# Patient Record
Sex: MALE | Race: BLACK OR AFRICAN AMERICAN | NOT HISPANIC OR LATINO | Employment: UNEMPLOYED | URBAN - METROPOLITAN AREA
[De-identification: names, ages, dates, MRNs, and addresses within clinical notes are randomized per-mention and may not be internally consistent; named-entity substitution may affect disease eponyms.]

---

## 2023-09-19 ENCOUNTER — OFFICE VISIT (OUTPATIENT)
Dept: PRIMARY CARE | Facility: CLINIC | Age: 48
End: 2023-09-19
Payer: COMMERCIAL

## 2023-09-19 ENCOUNTER — LAB (OUTPATIENT)
Dept: LAB | Facility: LAB | Age: 48
End: 2023-09-19
Payer: COMMERCIAL

## 2023-09-19 VITALS
HEART RATE: 51 BPM | TEMPERATURE: 97.7 F | WEIGHT: 315 LBS | DIASTOLIC BLOOD PRESSURE: 87 MMHG | OXYGEN SATURATION: 94 % | HEIGHT: 68 IN | BODY MASS INDEX: 47.74 KG/M2 | SYSTOLIC BLOOD PRESSURE: 149 MMHG

## 2023-09-19 DIAGNOSIS — I50.43 ACUTE ON CHRONIC COMBINED SYSTOLIC AND DIASTOLIC CONGESTIVE HEART FAILURE (MULTI): Primary | ICD-10-CM

## 2023-09-19 DIAGNOSIS — J45.20 MILD INTERMITTENT ASTHMA WITHOUT COMPLICATION (HHS-HCC): ICD-10-CM

## 2023-09-19 DIAGNOSIS — I50.22 HEART FAILURE WITH MID-RANGE EJECTION FRACTION (HFMEF) (MULTI): ICD-10-CM

## 2023-09-19 DIAGNOSIS — I10 PRIMARY HYPERTENSION: ICD-10-CM

## 2023-09-19 DIAGNOSIS — M79.89 SWELLING OF BOTH LOWER EXTREMITIES: ICD-10-CM

## 2023-09-19 DIAGNOSIS — N18.31 STAGE 3A CHRONIC KIDNEY DISEASE (MULTI): ICD-10-CM

## 2023-09-19 DIAGNOSIS — Z65.9 CONCERNED ABOUT HAVING SOCIAL PROBLEM: ICD-10-CM

## 2023-09-19 DIAGNOSIS — G47.33 OBSTRUCTIVE SLEEP APNEA: ICD-10-CM

## 2023-09-19 DIAGNOSIS — I50.23 ACUTE ON CHRONIC SYSTOLIC HEART FAILURE (MULTI): ICD-10-CM

## 2023-09-19 PROBLEM — R06.02 SOB (SHORTNESS OF BREATH) ON EXERTION: Status: ACTIVE | Noted: 2023-09-19

## 2023-09-19 PROBLEM — F32.A DEPRESSION: Status: ACTIVE | Noted: 2023-09-19

## 2023-09-19 PROBLEM — I50.20 SYSTOLIC HEART FAILURE (MULTI): Status: ACTIVE | Noted: 2023-09-19

## 2023-09-19 PROBLEM — I50.9 CHF EXACERBATION (MULTI): Status: ACTIVE | Noted: 2023-09-19

## 2023-09-19 PROBLEM — E66.01 MORBID OBESITY (MULTI): Status: ACTIVE | Noted: 2023-09-19

## 2023-09-19 PROBLEM — I42.6: Status: ACTIVE | Noted: 2023-09-19

## 2023-09-19 LAB
ALBUMIN (G/DL) IN SER/PLAS: 4 G/DL (ref 3.4–5)
ANION GAP IN SER/PLAS: 11 MMOL/L (ref 10–20)
CALCIUM (MG/DL) IN SER/PLAS: 9.3 MG/DL (ref 8.6–10.6)
CARBON DIOXIDE, TOTAL (MMOL/L) IN SER/PLAS: 34 MMOL/L (ref 21–32)
CHLORIDE (MMOL/L) IN SER/PLAS: 102 MMOL/L (ref 98–107)
CREATININE (MG/DL) IN SER/PLAS: 1.24 MG/DL (ref 0.5–1.3)
GFR MALE: 71 ML/MIN/1.73M2
GLUCOSE (MG/DL) IN SER/PLAS: 86 MG/DL (ref 74–99)
NATRIURETIC PEPTIDE B (PG/ML) IN SER/PLAS: 3 PG/ML (ref 0–99)
PHOSPHATE (MG/DL) IN SER/PLAS: 3.1 MG/DL (ref 2.5–4.9)
POTASSIUM (MMOL/L) IN SER/PLAS: 4.2 MMOL/L (ref 3.5–5.3)
SODIUM (MMOL/L) IN SER/PLAS: 143 MMOL/L (ref 136–145)
UREA NITROGEN (MG/DL) IN SER/PLAS: 12 MG/DL (ref 6–23)

## 2023-09-19 PROCEDURE — 3077F SYST BP >= 140 MM HG: CPT | Performed by: STUDENT IN AN ORGANIZED HEALTH CARE EDUCATION/TRAINING PROGRAM

## 2023-09-19 PROCEDURE — 83880 ASSAY OF NATRIURETIC PEPTIDE: CPT

## 2023-09-19 PROCEDURE — 3008F BODY MASS INDEX DOCD: CPT | Performed by: STUDENT IN AN ORGANIZED HEALTH CARE EDUCATION/TRAINING PROGRAM

## 2023-09-19 PROCEDURE — 99214 OFFICE O/P EST MOD 30 MIN: CPT | Performed by: STUDENT IN AN ORGANIZED HEALTH CARE EDUCATION/TRAINING PROGRAM

## 2023-09-19 PROCEDURE — 80069 RENAL FUNCTION PANEL: CPT

## 2023-09-19 PROCEDURE — 3079F DIAST BP 80-89 MM HG: CPT | Performed by: STUDENT IN AN ORGANIZED HEALTH CARE EDUCATION/TRAINING PROGRAM

## 2023-09-19 PROCEDURE — 1036F TOBACCO NON-USER: CPT | Performed by: STUDENT IN AN ORGANIZED HEALTH CARE EDUCATION/TRAINING PROGRAM

## 2023-09-19 PROCEDURE — 36415 COLL VENOUS BLD VENIPUNCTURE: CPT

## 2023-09-19 RX ORDER — ATORVASTATIN CALCIUM 40 MG/1
TABLET, FILM COATED ORAL
COMMUNITY
End: 2023-09-19 | Stop reason: SDUPTHER

## 2023-09-19 RX ORDER — EMPAGLIFLOZIN 10 MG/1
TABLET, FILM COATED ORAL
COMMUNITY
Start: 2023-05-02 | End: 2023-12-10 | Stop reason: HOSPADM

## 2023-09-19 RX ORDER — MULTIVITAMIN/IRON/FOLIC ACID 18MG-0.4MG
TABLET ORAL
COMMUNITY
Start: 2023-05-02 | End: 2024-05-27 | Stop reason: ENTERED-IN-ERROR

## 2023-09-19 RX ORDER — SACUBITRIL AND VALSARTAN 97; 103 MG/1; MG/1
1 TABLET, FILM COATED ORAL 2 TIMES DAILY
COMMUNITY
Start: 2023-04-04 | End: 2023-12-10 | Stop reason: HOSPADM

## 2023-09-19 RX ORDER — FOLIC ACID 1 MG/1
TABLET ORAL
COMMUNITY
Start: 2023-05-02 | End: 2023-12-10 | Stop reason: HOSPADM

## 2023-09-19 RX ORDER — ALBUTEROL SULFATE 90 UG/1
2 AEROSOL, METERED RESPIRATORY (INHALATION) EVERY 4 HOURS PRN
Qty: 18 G | Refills: 3 | Status: ON HOLD | OUTPATIENT
Start: 2023-09-19 | End: 2023-12-10 | Stop reason: SDUPTHER

## 2023-09-19 RX ORDER — METOPROLOL SUCCINATE 25 MG/1
25 TABLET, EXTENDED RELEASE ORAL DAILY
Qty: 90 TABLET | Refills: 0 | Status: ON HOLD | OUTPATIENT
Start: 2023-09-19 | End: 2024-06-09

## 2023-09-19 RX ORDER — SPIRONOLACTONE 25 MG/1
12.5 TABLET ORAL DAILY
COMMUNITY
End: 2023-09-19 | Stop reason: SDUPTHER

## 2023-09-19 RX ORDER — ASPIRIN 81 MG/1
1 TABLET ORAL DAILY
Status: ON HOLD | COMMUNITY
End: 2024-06-09

## 2023-09-19 RX ORDER — ATORVASTATIN CALCIUM 40 MG/1
40 TABLET, FILM COATED ORAL DAILY
Qty: 90 TABLET | Refills: 0 | Status: SHIPPED | OUTPATIENT
Start: 2023-09-19 | End: 2023-12-10 | Stop reason: HOSPADM

## 2023-09-19 RX ORDER — TORSEMIDE 100 MG/1
100 TABLET ORAL 2 TIMES DAILY
COMMUNITY
Start: 2023-06-29 | End: 2023-12-10 | Stop reason: HOSPADM

## 2023-09-19 RX ORDER — SPIRONOLACTONE 25 MG/1
12.5 TABLET ORAL DAILY
Qty: 90 TABLET | Refills: 0 | Status: SHIPPED | OUTPATIENT
Start: 2023-09-19 | End: 2023-12-10 | Stop reason: HOSPADM

## 2023-09-19 RX ORDER — METOPROLOL SUCCINATE 25 MG/1
25 TABLET, EXTENDED RELEASE ORAL DAILY
COMMUNITY
End: 2023-09-19 | Stop reason: SDUPTHER

## 2023-09-19 ASSESSMENT — PAIN SCALES - GENERAL: PAINLEVEL: 10-WORST PAIN EVER

## 2023-09-19 NOTE — PROGRESS NOTES
"Subjective   Patient ID: Patric Arenas is a 48 y.o. male with a PMHx of HFmrEF (last echo w/ EF of 45-50%), DLD, asthma, HTN, MJ, and obesity who presents for Follow-up (Has concerns with heart condition.) and med refill.    He reports worsening shortness of breath on exertion and states that overall he feels worse than when he went into hospital for heart failure 2 months ago. Feet \"always swell up\" and are currently swollen. Elevation of the legs offers some relief. Notes orthopnea and paroxysmal nocturnal dyspnea, has to now sleep sitting up. Denies dyspnea currently. Able to ambulate, but reports ambulating from exam room to entrance of clinic would leave him short of breath. No chest pain. Has not had follow up with cardiology since June.    Also has a history of sleep apnea and was prescribed a CPAP in the past. He denies using it since he had a hard time tolerating it and says that he stored it in his basement. He has not had recent follow up with sleep medicine. He reports snoring and that he wakes up feeling tired.    Patient is not sure about all his medications and reports needing refills on some of them. These include spironolactone, metoprolol, atorvastatin, and albuterol. He denies taking sertraline which was previously prescribed for depression.    Reports drinking about 1 pint of liquor a week. No other drug use. Lives with his sister and is currently not working due to disability. He needs assistance with disability paperwork.      Review of Systems   Eyes:  Positive for visual disturbance (has not had follow up with eye doctor recently).   Musculoskeletal:  Positive for back pain (non-radiating).   Neurological:  Positive for headaches (3 times per week).     Objective   /87 (BP Location: Right arm, Patient Position: Sitting, BP Cuff Size: Large adult)   Pulse 51   Temp 36.5 °C (97.7 °F) (Temporal)   Ht 1.727 m (5' 8\")   Wt (!) 181 kg (399 lb 9.6 oz)   SpO2 94%   BMI 60.76 kg/m²  Body " mass index is 60.76 kg/m².    Physical Exam  General: Falling asleep during interview   HENT: NCAT, no scleral icterus  Skin: Lipodermatosclerosis on anterior shins bilaterally  Cardiac: RRR, normal S1, S2, no M/R/G  Pulm: CTAB, decreased air flow in bilateral lower lung fields  Abdomen: soft, distended, NT, no involuntary guarding or rebound tenderness  : no flank pain  EXT: significant pitting edema in bilateral lower extremities  Neuro: AOx3, able to follow commands, no gross FND    Assessment/Plan   48 y.o. M with PMHx of HFmrEF, DLD, asthma, HTN, MJ (not using CPAP), and obesity presenting with a heart failure exacerbation. This is supported by his significant pitting edema in lower extremities, complaint of orthopnea, significant weight gain, and medication nonadherence. Assessment of severity of exacerbation is complicated by challenging volume exam due to body habitus as well as likely multifactorial nature of his symptoms. Patient's chronic pulmonary disease, untreated MJ, and likely obesity hypoventilation are also likely causing elevated right-sided pressures, exacerbating his lower extremity edema.    Problem List Items Addressed This Visit       CHF exacerbation (CMS/[facility]) - Primary     -degree of exacerbation is challenging to assess due to above factors  -last reported dry weight was 173 kg, weight today is 181 kg suggesting significant volume overload  -obtain CXR for further evaluation of volume status  -RFP and BNP for additional evaluation (although suspect BNP will have limited value given patient's obesity)  -refill medications and counseled on the importance of adherence to regimen (Entresto 97/103mg, Jardiance 10mg, Metoprolol 100mg BID, and spironolactone 25mg)  -follow up with cardiology         Relevant Medications    Entresto  mg tablet    spironolactone (Aldactone) 25 mg tablet    metoprolol succinate XL (Toprol-XL) 25 mg 24 hr tablet    atorvastatin (Lipitor) 40 mg tablet     Other Relevant Orders    Renal Function Panel (Completed)    Referral to Cardiology    Compression Stockings 20-30 mmHg    Referral to Adult Sleep Medicine    XR chest 2 views (Completed)    B-type natriuretic peptide (Completed)    Obstructive sleep apnea     -untreated MJ is likely contributing to exacerbation of symptoms and worsening lower extremity edema  -encouraged use of home CPAP  -follow up with sleep medicine         Relevant Orders    Referral to Adult Sleep Medicine    XR chest 2 views (Completed)    Swelling of both lower extremities     -suspect edema is d/t a combination of volume overload from ADHF and chronic venous hypertension related to obesity and MJ  -elevate legs as able  -compression stockings         Heart failure with mid-range ejection fraction (HFmEF) (CMS/[facility])    Relevant Medications    Entresto  mg tablet    metoprolol succinate XL (Toprol-XL) 25 mg 24 hr tablet    Mild intermittent asthma without complication    Relevant Medications    albuterol (Ventolin HFA) 90 mcg/actuation inhaler    Other Relevant Orders    XR chest 2 views (Completed)    Primary hypertension     -BP elevated at 149/87 above goal of <130/80  -elevated BP is likely contributing to exacerbation of symptoms  -currently managed with Entresto, spironolactone, torsemide  -may consider additional antihypertensive therapy if pressure remains elevated on follow up         Relevant Medications    spironolactone (Aldactone) 25 mg tablet    metoprolol succinate XL (Toprol-XL) 25 mg 24 hr tablet    Other Relevant Orders    Renal Function Panel (Completed)    Stage 3a chronic kidney disease (CMS/[facility])     -GFR on last RFP mildly decreased in the 50s  -given high dose loop diuretic use will obtain repeat RFP to assess for stability         Concerned about having social problem     -connect with community health working to assist with housing/disability concerns          Follow up in 1 week for reassessment of  symptoms.    This patient was seen and discussed with Dr. Gutiérrez.    Pedro Luis Cronin  MS3    Patient seen and evaluated with medical student. Agree with assessment above, edits made within text.     Jeremiah Crockett MD   PGY-3  [facility]

## 2023-09-19 NOTE — LETTER
To whom it may concern,    Mr. Patric Arenas has a number of serious chronic medical conditions and should be approved for an apartment to assist with the management of these conditions. Please reach out to my office for any questions.    Sincerely,          Jeremiah Crockett MD

## 2023-09-19 NOTE — PATIENT INSTRUCTIONS
Dear Mr. Patric Arenas,     It was a pleasure getting to manage your care with you today.     Go upstairs to the 5th floor of [facility] to get your x-ray done.    Labs:  We ordered Labs for you at the  Labs.     Directions to  Labs:   1. You can find them as you exit our clinic walk past the coffee shop.   2. Turn Right and walk to the end of the [address] (you will see a staircase).   3. When you arrive at the end of the [address]ren[address], turn left and walk down the hallway with skylights.   4. Half way down the [address] hallway you will see the lab on your left    Prescriptions:  We have sent medication prescriptions to your pharmacy on file. Please pick them up at your earliest convenience.     Referral:   We have provided you the below referrals. Please call to schedule referrals if no one has contacted you within 3 days.   1. X-ray of the lungs  2. Cardiology  3. Sleep doctors    We will get in touch with our community health worker to help you with your unemployment.    Follow up Appointment:     Emergency  In the case of an emergency please call 911 or visit the Emergency Department immediately for evaluation.     We look forward to continuing your care here at our Clinic. Take Care.     Sincerely,   Jeremiah Crockett MD

## 2023-09-21 PROBLEM — E78.5 DYSLIPIDEMIA: Status: ACTIVE | Noted: 2023-09-21

## 2023-09-21 PROBLEM — Z65.9 CONCERNED ABOUT HAVING SOCIAL PROBLEM: Status: ACTIVE | Noted: 2023-09-21

## 2023-09-21 PROBLEM — I50.22 HEART FAILURE WITH MID-RANGE EJECTION FRACTION (HFMEF) (MULTI): Status: ACTIVE | Noted: 2023-09-19

## 2023-09-21 RX ORDER — DEXTROMETHORPHAN HYDROBROMIDE, GUAIFENESIN 5; 100 MG/5ML; MG/5ML
650 LIQUID ORAL EVERY 8 HOURS PRN
COMMUNITY
End: 2024-05-27 | Stop reason: ENTERED-IN-ERROR

## 2023-09-21 ASSESSMENT — ENCOUNTER SYMPTOMS
HEADACHES: 1
BACK PAIN: 1

## 2023-09-22 NOTE — ASSESSMENT & PLAN NOTE
-degree of exacerbation is challenging to assess due to above factors  -last reported dry weight was 173 kg, weight today is 181 kg suggesting significant volume overload  -obtain CXR for further evaluation of volume status  -RFP and BNP for additional evaluation (although suspect BNP will have limited value given patient's obesity)  -refill medications and counseled on the importance of adherence to regimen (Entresto 97/103mg, Jardiance 10mg, Metoprolol 100mg BID, and spironolactone 25mg)  -follow up with cardiology

## 2023-09-22 NOTE — ASSESSMENT & PLAN NOTE
-BP elevated at 149/87 above goal of <130/80  -elevated BP is likely contributing to exacerbation of symptoms  -currently managed with Entresto, spironolactone, torsemide  -may consider additional antihypertensive therapy if pressure remains elevated on follow up

## 2023-09-22 NOTE — ASSESSMENT & PLAN NOTE
-suspect edema is d/t a combination of volume overload from ADHF and chronic venous hypertension related to obesity and MJ  -elevate legs as able  -compression stockings

## 2023-09-22 NOTE — ASSESSMENT & PLAN NOTE
-GFR on last RFP mildly decreased in the 50s  -given high dose loop diuretic use will obtain repeat RFP to assess for stability

## 2023-09-22 NOTE — ASSESSMENT & PLAN NOTE
-untreated MJ is likely contributing to exacerbation of symptoms and worsening lower extremity edema  -encouraged use of home CPAP  -follow up with sleep medicine

## 2023-09-26 ENCOUNTER — OFFICE VISIT (OUTPATIENT)
Dept: PRIMARY CARE | Facility: CLINIC | Age: 48
End: 2023-09-26
Payer: COMMERCIAL

## 2023-09-26 VITALS
DIASTOLIC BLOOD PRESSURE: 83 MMHG | HEART RATE: 90 BPM | BODY MASS INDEX: 47.74 KG/M2 | WEIGHT: 315 LBS | OXYGEN SATURATION: 86 % | HEIGHT: 68 IN | SYSTOLIC BLOOD PRESSURE: 127 MMHG | TEMPERATURE: 96.3 F

## 2023-09-26 DIAGNOSIS — I50.43 ACUTE ON CHRONIC COMBINED SYSTOLIC AND DIASTOLIC CONGESTIVE HEART FAILURE (MULTI): Primary | ICD-10-CM

## 2023-09-26 DIAGNOSIS — G47.33 OBSTRUCTIVE SLEEP APNEA: ICD-10-CM

## 2023-09-26 DIAGNOSIS — E66.01 MORBID OBESITY (MULTI): ICD-10-CM

## 2023-09-26 DIAGNOSIS — R06.02 SOB (SHORTNESS OF BREATH) ON EXERTION: ICD-10-CM

## 2023-09-26 DIAGNOSIS — I10 PRIMARY HYPERTENSION: ICD-10-CM

## 2023-09-26 PROCEDURE — 99214 OFFICE O/P EST MOD 30 MIN: CPT | Performed by: STUDENT IN AN ORGANIZED HEALTH CARE EDUCATION/TRAINING PROGRAM

## 2023-09-26 PROCEDURE — 1036F TOBACCO NON-USER: CPT | Performed by: STUDENT IN AN ORGANIZED HEALTH CARE EDUCATION/TRAINING PROGRAM

## 2023-09-26 PROCEDURE — 3074F SYST BP LT 130 MM HG: CPT | Performed by: STUDENT IN AN ORGANIZED HEALTH CARE EDUCATION/TRAINING PROGRAM

## 2023-09-26 PROCEDURE — 3008F BODY MASS INDEX DOCD: CPT | Performed by: STUDENT IN AN ORGANIZED HEALTH CARE EDUCATION/TRAINING PROGRAM

## 2023-09-26 PROCEDURE — 3079F DIAST BP 80-89 MM HG: CPT | Performed by: STUDENT IN AN ORGANIZED HEALTH CARE EDUCATION/TRAINING PROGRAM

## 2023-09-26 RX ORDER — METOLAZONE 5 MG/1
5 TABLET ORAL DAILY
Qty: 30 TABLET | Refills: 0 | Status: SHIPPED | OUTPATIENT
Start: 2023-09-26 | End: 2024-06-09 | Stop reason: HOSPADM

## 2023-09-26 ASSESSMENT — PAIN SCALES - GENERAL: PAINLEVEL: 0-NO PAIN

## 2023-09-26 NOTE — PATIENT INSTRUCTIONS
Dear Mr. Arenas,     It was a pleasure getting to manage your care with you today.     Labs:  We ordered Labs for you at the  Labs. Please get these done on Friday at the time of your next visit.    Directions to  Labs:   1. You can find them as you exit our clinic walk past the coffee shop.   2. Turn Right and walk to the end of the [address] (you will see a staircase).   3. When you arrive at the end of the [address]ren[address], turn left and walk down the hallway with skylights.   4. Half way down the [address] hallway you will see the lab on your left    Prescriptions:  We have sent medication prescriptions to your pharmacy on file. Please pick them up at your earliest convenience. This is a water pill booster that you can take once daily.    Referral:   We have provided you the below referrals. Please call to schedule referrals if no one has contacted you within 3 days.   1. Cardiology [phone]  2. Sleep medicine [phone]    Follow up Appointment: on Friday 9/29    Emergency  In the case of an emergency please call 911 or visit the Emergency Department immediately for evaluation.     We look forward to continuing your care here at our Clinic. Take Care.     Sincerely,   Jeremiah Crockett MD

## 2023-09-26 NOTE — PROGRESS NOTES
"Patient ID: Patric Arenas is a 48 y.o. male with a PMHx of HFmrEF (last echo w/ EF of 45-50%), DLD, asthma, HTN, MJ, and obesity who presents for follow-up for HF exacerbation management.     He reports taking his medications everyday this past week (spironolactone, metoprolol, torsemide, Jardiance). He reports some continued dyspnea on exertion and states that overall he feels better than last week. Feet \"always swell up\" but are less swollen today. Notes occasional orthopnea and has continued to sleep sitting up. Denies dyspnea at rest or chest pain. Has not had follow up with cardiology since June and still has not scheduled appointment.  Has not gotten compression stockings. Reports today that he had been prescribed supplemental O2 (2L) but has not been using because it's too large and cumbersome, wants a smaller one.     Also has a history of sleep apnea and was prescribed a CPAP in the past. He denies using it because he doesn't like the mask, would like to try \"the new mask that's out\". He has not had recent follow up with sleep medicine and has not called yet to get that scheduled. He reports snoring and that he wakes up feeling tired.     Lives with his sister and is currently not working due to disability. He needs assistance with disability paperwork, says he needs something from social security.     Review of Systems   Eyes:  Positive for visual disturbance (has not had follow up with eye doctor recently).   Musculoskeletal:  Positive for back pain (non-radiating).   Neurological:  Positive for headaches (3 times per week).     Objective:  /83 (last week: 149/87) Pulse 90 Temp 35.7 °C (96.3 °F) (Temporal) Ht 1.727 m (5' 8\") Wt 180 kg (396 lb 1.6 oz) (last week 181kg, 399 lb 9.6 oz) SpO2 86% (93% after 30 mins rest) (last week 94%) BMI 60.23 kg/m²     Physical Exam  General: Awake, alert, conversing appropriately  HENT: NCAT, no scleral icterus  Skin: Lipodermatosclerosis on anterior shins " bilaterally  Cardiac: RRR, normal S1, S2, no M/R/G  Pulm: CTAB, decreased air flow in bilateral lower lung fields. No crackles.  Abdomen: firm, distended, NT, no involuntary guarding or rebound tenderness  : no flank pain  EXT: pitting edema in bilateral lower extremities, decreased compared to last week  Neuro: AOx3, able to follow commands, no gross FND  Psych: Appropriately concerned for health today (seemingly unconcerned last week).    Assessment/Plan:  48 y.o. M with PMHx of HFmrEF, DLD, asthma, HTN, MJ (not using CPAP), and obesity presenting for a follow up on management of a heart failure exacerbation.     Problem List Items Addressed This Visit             ICD-10-CM    CHF exacerbation (CMS/[facility]) - Primary I50.9     HF appears improved today overall given medication adherence, 1 kg weight loss over the past week, decreased lower extremity edema, and patient-reported improvement in symptoms. However, he has low SpO2 after exertion, decreased air movement in lower lung fields, and continued orthopnea and dyspnea on exertion warranting more aggressive management.  - Degree of exacerbation challenging to assess due to body habitus and multifactorial nature of symptoms  - Consider IVC US at next visit if time allows, although imaging utility may be limited d/t body habitus  - Stressed importance of continued medication adherence  - Rx sent for O2 condenser for chronic use  - Add metolazone 5 mg daily for increased diuresis in addition to torsemide 100 mg BID         Relevant Medications    metOLazone (Zaroxolyn) 5 mg tablet    Other Relevant Orders    Portable Oxygen Condenser    Renal function panel    Morbid obesity (CMS/[facility]) E66.01    Relevant Orders    Hemoglobin A1c    Obstructive sleep apnea G47.33     - Likely contributing to exacerbation of CHF symptoms and sleep difficulties  - Will attempt to prescribe nasal CPAP for improved patient compliance  - Sleep medicine referral          Relevant Orders     Portable Oxygen Condenser    Positive Airway Pressure (PAP) Therapy    SOB (shortness of breath) on exertion R06.02    Relevant Orders    Portable Oxygen Condenser    Primary hypertension I10     - BP much improved today compared to last week; currently at goal of <130 systolic  - Stress continued medication adherence          Follow up in 3 days for reassessment of symptoms with plan to draw repeat RFP prior to visit to ensure patient's electrolytes and renal function remain stable.    This patient was seen and discussed with Dr. Hobson.    Pedro Luis Cronin  MS3    Patient seen and evaluated with medical student. Agree with assessment above, edits made within text.    Jeremiah Crockett MD   PGY-3  [facility]

## 2023-09-28 NOTE — ASSESSMENT & PLAN NOTE
- Likely contributing to exacerbation of CHF symptoms and sleep difficulties  - Will attempt to prescribe nasal CPAP for improved patient compliance  - Sleep medicine referral

## 2023-09-28 NOTE — PROGRESS NOTES
I saw and evaluated the patient. I personally obtained the key and critical portions of the history and physical exam or was physically present for key and critical portions performed by the resident/fellow. I reviewed the resident/fellow's documentation and discussed the patient with the resident/fellow. I agree with the resident/fellow's medical decision making as documented in the note.    Abdullahi Hobson MD

## 2023-09-28 NOTE — ASSESSMENT & PLAN NOTE
- BP much improved today compared to last week; currently at goal of <130 systolic  - Stress continued medication adherence

## 2023-09-28 NOTE — ASSESSMENT & PLAN NOTE
HF appears improved today overall given medication adherence, 1 kg weight loss over the past week, decreased lower extremity edema, and patient-reported improvement in symptoms. However, he has low SpO2 after exertion, decreased air movement in lower lung fields, and continued orthopnea and dyspnea on exertion warranting more aggressive management.  - Degree of exacerbation challenging to assess due to body habitus and multifactorial nature of symptoms  - Consider IVC US at next visit if time allows, although imaging utility may be limited d/t body habitus  - Stressed importance of continued medication adherence  - Rx sent for O2 condenser for chronic use  - Add metolazone 5 mg daily for increased diuresis in addition to torsemide 100 mg BID

## 2023-10-17 NOTE — PROGRESS NOTES
I saw and evaluated the patient. I personally obtained the key and critical portions of the history and physical exam or was physically present for key and critical portions performed by the resident/fellow. I reviewed the resident/fellow's documentation and discussed the patient with the resident/fellow. I agree with the resident/fellow's medical decision making as documented in the note.    Vi Gutiérrez MD

## 2023-12-07 ENCOUNTER — APPOINTMENT (OUTPATIENT)
Dept: RADIOLOGY | Facility: HOSPITAL | Age: 48
End: 2023-12-07
Payer: COMMERCIAL

## 2023-12-07 ENCOUNTER — HOSPITAL ENCOUNTER (INPATIENT)
Facility: HOSPITAL | Age: 48
LOS: 3 days | Discharge: HOME | End: 2023-12-10
Attending: EMERGENCY MEDICINE | Admitting: STUDENT IN AN ORGANIZED HEALTH CARE EDUCATION/TRAINING PROGRAM
Payer: COMMERCIAL

## 2023-12-07 DIAGNOSIS — R06.02 SHORTNESS OF BREATH: Primary | ICD-10-CM

## 2023-12-07 DIAGNOSIS — J44.1 COPD EXACERBATION (MULTI): ICD-10-CM

## 2023-12-07 DIAGNOSIS — J45.20 MILD INTERMITTENT ASTHMA WITHOUT COMPLICATION (HHS-HCC): ICD-10-CM

## 2023-12-07 DIAGNOSIS — J96.21 ACUTE ON CHRONIC RESPIRATORY FAILURE WITH HYPOXEMIA (MULTI): ICD-10-CM

## 2023-12-07 DIAGNOSIS — I50.43 ACUTE ON CHRONIC COMBINED SYSTOLIC AND DIASTOLIC CONGESTIVE HEART FAILURE (MULTI): ICD-10-CM

## 2023-12-07 LAB
ALBUMIN SERPL BCP-MCNC: 3.9 G/DL (ref 3.4–5)
ALP SERPL-CCNC: 80 U/L (ref 33–120)
ALT SERPL W P-5'-P-CCNC: 10 U/L (ref 10–52)
AMPHETAMINES UR QL SCN: NORMAL
ANION GAP BLDV CALCULATED.4IONS-SCNC: 2 MMOL/L (ref 10–25)
ANION GAP BLDV CALCULATED.4IONS-SCNC: 3 MMOL/L (ref 10–25)
ANION GAP BLDV CALCULATED.4IONS-SCNC: 3 MMOL/L (ref 10–25)
ANION GAP SERPL CALC-SCNC: 11 MMOL/L (ref 10–20)
ANION GAP SERPL CALC-SCNC: 13 MMOL/L (ref 10–20)
ANION GAP SERPL CALC-SCNC: 8 MMOL/L (ref 10–20)
AST SERPL W P-5'-P-CCNC: 14 U/L (ref 9–39)
BARBITURATES UR QL SCN: NORMAL
BASE EXCESS BLDV CALC-SCNC: 12.1 MMOL/L (ref -2–3)
BASE EXCESS BLDV CALC-SCNC: 12.2 MMOL/L (ref -2–3)
BASE EXCESS BLDV CALC-SCNC: 13.5 MMOL/L (ref -2–3)
BASOPHILS # BLD AUTO: 0.03 X10*3/UL (ref 0–0.1)
BASOPHILS NFR BLD AUTO: 0.7 %
BENZODIAZ UR QL SCN: NORMAL
BILIRUB SERPL-MCNC: 0.5 MG/DL (ref 0–1.2)
BNP SERPL-MCNC: 42 PG/ML (ref 0–99)
BODY TEMPERATURE: 37 DEGREES CELSIUS
BUN SERPL-MCNC: 10 MG/DL (ref 6–23)
BUN SERPL-MCNC: 11 MG/DL (ref 6–23)
BUN SERPL-MCNC: 14 MG/DL (ref 6–23)
BZE UR QL SCN: NORMAL
CA-I BLDV-SCNC: 1.14 MMOL/L (ref 1.1–1.33)
CA-I BLDV-SCNC: 1.14 MMOL/L (ref 1.1–1.33)
CA-I BLDV-SCNC: 1.18 MMOL/L (ref 1.1–1.33)
CALCIUM SERPL-MCNC: 9.5 MG/DL (ref 8.6–10.6)
CALCIUM SERPL-MCNC: 9.6 MG/DL (ref 8.6–10.6)
CALCIUM SERPL-MCNC: 9.7 MG/DL (ref 8.6–10.6)
CANNABINOIDS UR QL SCN: NORMAL
CARDIAC TROPONIN I PNL SERPL HS: 14 NG/L (ref 0–53)
CHLORIDE BLDV-SCNC: 97 MMOL/L (ref 98–107)
CHLORIDE BLDV-SCNC: 98 MMOL/L (ref 98–107)
CHLORIDE BLDV-SCNC: 99 MMOL/L (ref 98–107)
CHLORIDE SERPL-SCNC: 101 MMOL/L (ref 98–107)
CHLORIDE SERPL-SCNC: 96 MMOL/L (ref 98–107)
CHLORIDE SERPL-SCNC: 97 MMOL/L (ref 98–107)
CO2 SERPL-SCNC: 37 MMOL/L (ref 21–32)
CO2 SERPL-SCNC: 37 MMOL/L (ref 21–32)
CO2 SERPL-SCNC: 39 MMOL/L (ref 21–32)
CREAT SERPL-MCNC: 1.16 MG/DL (ref 0.5–1.3)
CREAT SERPL-MCNC: 1.3 MG/DL (ref 0.5–1.3)
CREAT SERPL-MCNC: 1.42 MG/DL (ref 0.5–1.3)
EOSINOPHIL # BLD AUTO: 0.14 X10*3/UL (ref 0–0.7)
EOSINOPHIL NFR BLD AUTO: 3.4 %
ERYTHROCYTE [DISTWIDTH] IN BLOOD BY AUTOMATED COUNT: 14.6 % (ref 11.5–14.5)
ETHANOL SERPL-MCNC: <10 MG/DL
FENTANYL+NORFENTANYL UR QL SCN: NORMAL
FLUAV RNA RESP QL NAA+PROBE: NOT DETECTED
FLUBV RNA RESP QL NAA+PROBE: NOT DETECTED
GFR SERPL CREATININE-BSD FRML MDRD: 60 ML/MIN/1.73M*2
GFR SERPL CREATININE-BSD FRML MDRD: 67 ML/MIN/1.73M*2
GFR SERPL CREATININE-BSD FRML MDRD: 77 ML/MIN/1.73M*2
GLUCOSE BLDV-MCNC: 112 MG/DL (ref 74–99)
GLUCOSE BLDV-MCNC: 181 MG/DL (ref 74–99)
GLUCOSE BLDV-MCNC: 195 MG/DL (ref 74–99)
GLUCOSE SERPL-MCNC: 104 MG/DL (ref 74–99)
GLUCOSE SERPL-MCNC: 161 MG/DL (ref 74–99)
GLUCOSE SERPL-MCNC: 177 MG/DL (ref 74–99)
HCO3 BLDV-SCNC: 42.1 MMOL/L (ref 22–26)
HCO3 BLDV-SCNC: 42.7 MMOL/L (ref 22–26)
HCO3 BLDV-SCNC: 44.2 MMOL/L (ref 22–26)
HCT VFR BLD AUTO: 49.4 % (ref 41–52)
HCT VFR BLD EST: 46 % (ref 41–52)
HCT VFR BLD EST: 49 % (ref 41–52)
HCT VFR BLD EST: 50 % (ref 41–52)
HGB BLD-MCNC: 15 G/DL (ref 13.5–17.5)
HGB BLDV-MCNC: 15.3 G/DL (ref 13.5–17.5)
HGB BLDV-MCNC: 16.3 G/DL (ref 13.5–17.5)
HGB BLDV-MCNC: 16.5 G/DL (ref 13.5–17.5)
IMM GRANULOCYTES # BLD AUTO: 0.02 X10*3/UL (ref 0–0.7)
IMM GRANULOCYTES NFR BLD AUTO: 0.5 % (ref 0–0.9)
INHALED O2 CONCENTRATION: 32 %
INHALED O2 CONCENTRATION: 60 %
LACTATE BLDV-SCNC: 1 MMOL/L (ref 0.4–2)
LACTATE BLDV-SCNC: 2 MMOL/L (ref 0.4–2)
LACTATE BLDV-SCNC: 2 MMOL/L (ref 0.4–2)
LACTATE BLDV-SCNC: 2.1 MMOL/L (ref 0.4–2)
LYMPHOCYTES # BLD AUTO: 1.14 X10*3/UL (ref 1.2–4.8)
LYMPHOCYTES NFR BLD AUTO: 27.5 %
MAGNESIUM SERPL-MCNC: 2.17 MG/DL (ref 1.6–2.4)
MAGNESIUM SERPL-MCNC: 2.24 MG/DL (ref 1.6–2.4)
MAGNESIUM SERPL-MCNC: 2.31 MG/DL (ref 1.6–2.4)
MCH RBC QN AUTO: 31 PG (ref 26–34)
MCHC RBC AUTO-ENTMCNC: 30.4 G/DL (ref 32–36)
MCV RBC AUTO: 102 FL (ref 80–100)
MONOCYTES # BLD AUTO: 0.38 X10*3/UL (ref 0.1–1)
MONOCYTES NFR BLD AUTO: 9.2 %
NEUTROPHILS # BLD AUTO: 2.44 X10*3/UL (ref 1.2–7.7)
NEUTROPHILS NFR BLD AUTO: 58.7 %
NRBC BLD-RTO: 0 /100 WBCS (ref 0–0)
OPIATES UR QL SCN: NORMAL
OXYCODONE+OXYMORPHONE UR QL SCN: NORMAL
OXYHGB MFR BLDV: 59.7 % (ref 45–75)
OXYHGB MFR BLDV: 67.9 % (ref 45–75)
OXYHGB MFR BLDV: 78 % (ref 45–75)
PCO2 BLDV: 78 MM HG (ref 41–51)
PCO2 BLDV: 81 MM HG (ref 41–51)
PCO2 BLDV: 82 MM HG (ref 41–51)
PCP UR QL SCN: NORMAL
PH BLDV: 7.33 PH (ref 7.33–7.43)
PH BLDV: 7.34 PH (ref 7.33–7.43)
PH BLDV: 7.34 PH (ref 7.33–7.43)
PLATELET # BLD AUTO: 229 X10*3/UL (ref 150–450)
PO2 BLDV: 40 MM HG (ref 35–45)
PO2 BLDV: 44 MM HG (ref 35–45)
PO2 BLDV: 53 MM HG (ref 35–45)
POTASSIUM BLDV-SCNC: 3.8 MMOL/L (ref 3.5–5.3)
POTASSIUM BLDV-SCNC: 3.9 MMOL/L (ref 3.5–5.3)
POTASSIUM BLDV-SCNC: 3.9 MMOL/L (ref 3.5–5.3)
POTASSIUM SERPL-SCNC: 3.7 MMOL/L (ref 3.5–5.3)
POTASSIUM SERPL-SCNC: 3.7 MMOL/L (ref 3.5–5.3)
POTASSIUM SERPL-SCNC: 3.8 MMOL/L (ref 3.5–5.3)
PROT SERPL-MCNC: 7.9 G/DL (ref 6.4–8.2)
RBC # BLD AUTO: 4.84 X10*6/UL (ref 4.5–5.9)
SAO2 % BLDV: 61 % (ref 45–75)
SAO2 % BLDV: 70 % (ref 45–75)
SAO2 % BLDV: 80 % (ref 45–75)
SARS-COV-2 RNA RESP QL NAA+PROBE: NOT DETECTED
SODIUM BLDV-SCNC: 139 MMOL/L (ref 136–145)
SODIUM BLDV-SCNC: 140 MMOL/L (ref 136–145)
SODIUM BLDV-SCNC: 140 MMOL/L (ref 136–145)
SODIUM SERPL-SCNC: 142 MMOL/L (ref 136–145)
SODIUM SERPL-SCNC: 142 MMOL/L (ref 136–145)
SODIUM SERPL-SCNC: 143 MMOL/L (ref 136–145)
WBC # BLD AUTO: 4.2 X10*3/UL (ref 4.4–11.3)

## 2023-12-07 PROCEDURE — 85025 COMPLETE CBC W/AUTO DIFF WBC: CPT

## 2023-12-07 PROCEDURE — 84132 ASSAY OF SERUM POTASSIUM: CPT | Performed by: EMERGENCY MEDICINE

## 2023-12-07 PROCEDURE — 36415 COLL VENOUS BLD VENIPUNCTURE: CPT

## 2023-12-07 PROCEDURE — 2500000004 HC RX 250 GENERAL PHARMACY W/ HCPCS (ALT 636 FOR OP/ED)

## 2023-12-07 PROCEDURE — 71046 X-RAY EXAM CHEST 2 VIEWS: CPT | Performed by: RADIOLOGY

## 2023-12-07 PROCEDURE — 2500000005 HC RX 250 GENERAL PHARMACY W/O HCPCS: Performed by: STUDENT IN AN ORGANIZED HEALTH CARE EDUCATION/TRAINING PROGRAM

## 2023-12-07 PROCEDURE — 83735 ASSAY OF MAGNESIUM: CPT

## 2023-12-07 PROCEDURE — 99285 EMERGENCY DEPT VISIT HI MDM: CPT | Performed by: EMERGENCY MEDICINE

## 2023-12-07 PROCEDURE — 1210000001 HC SEMI-PRIVATE ROOM DAILY

## 2023-12-07 PROCEDURE — 94660 CPAP INITIATION&MGMT: CPT

## 2023-12-07 PROCEDURE — 96375 TX/PRO/DX INJ NEW DRUG ADDON: CPT

## 2023-12-07 PROCEDURE — 87636 SARSCOV2 & INF A&B AMP PRB: CPT | Performed by: EMERGENCY MEDICINE

## 2023-12-07 PROCEDURE — 82435 ASSAY OF BLOOD CHLORIDE: CPT | Performed by: STUDENT IN AN ORGANIZED HEALTH CARE EDUCATION/TRAINING PROGRAM

## 2023-12-07 PROCEDURE — 84484 ASSAY OF TROPONIN QUANT: CPT

## 2023-12-07 PROCEDURE — 85018 HEMOGLOBIN: CPT

## 2023-12-07 PROCEDURE — 2500000004 HC RX 250 GENERAL PHARMACY W/ HCPCS (ALT 636 FOR OP/ED): Mod: SE

## 2023-12-07 PROCEDURE — 82435 ASSAY OF BLOOD CHLORIDE: CPT

## 2023-12-07 PROCEDURE — 84295 ASSAY OF SERUM SODIUM: CPT | Performed by: EMERGENCY MEDICINE

## 2023-12-07 PROCEDURE — 93010 ELECTROCARDIOGRAM REPORT: CPT | Performed by: EMERGENCY MEDICINE

## 2023-12-07 PROCEDURE — 82077 ASSAY SPEC XCP UR&BREATH IA: CPT

## 2023-12-07 PROCEDURE — 2500000002 HC RX 250 W HCPCS SELF ADMINISTERED DRUGS (ALT 637 FOR MEDICARE OP, ALT 636 FOR OP/ED)

## 2023-12-07 PROCEDURE — 83605 ASSAY OF LACTIC ACID: CPT

## 2023-12-07 PROCEDURE — 2500000002 HC RX 250 W HCPCS SELF ADMINISTERED DRUGS (ALT 637 FOR MEDICARE OP, ALT 636 FOR OP/ED): Mod: SE

## 2023-12-07 PROCEDURE — 80048 BASIC METABOLIC PNL TOTAL CA: CPT | Mod: CCI

## 2023-12-07 PROCEDURE — 5A09357 ASSISTANCE WITH RESPIRATORY VENTILATION, LESS THAN 24 CONSECUTIVE HOURS, CONTINUOUS POSITIVE AIRWAY PRESSURE: ICD-10-PCS | Performed by: STUDENT IN AN ORGANIZED HEALTH CARE EDUCATION/TRAINING PROGRAM

## 2023-12-07 PROCEDURE — 2500000005 HC RX 250 GENERAL PHARMACY W/O HCPCS: Mod: SE

## 2023-12-07 PROCEDURE — 80307 DRUG TEST PRSMV CHEM ANLYZR: CPT

## 2023-12-07 PROCEDURE — 71046 X-RAY EXAM CHEST 2 VIEWS: CPT

## 2023-12-07 PROCEDURE — 84295 ASSAY OF SERUM SODIUM: CPT

## 2023-12-07 PROCEDURE — 99222 1ST HOSP IP/OBS MODERATE 55: CPT

## 2023-12-07 PROCEDURE — 2500000001 HC RX 250 WO HCPCS SELF ADMINISTERED DRUGS (ALT 637 FOR MEDICARE OP)

## 2023-12-07 PROCEDURE — 83880 ASSAY OF NATRIURETIC PEPTIDE: CPT

## 2023-12-07 PROCEDURE — 82805 BLOOD GASES W/O2 SATURATION: CPT

## 2023-12-07 PROCEDURE — 96365 THER/PROPH/DIAG IV INF INIT: CPT

## 2023-12-07 RX ORDER — ACETAMINOPHEN 325 MG/1
650 TABLET ORAL EVERY 6 HOURS
Status: DISCONTINUED | OUTPATIENT
Start: 2023-12-07 | End: 2023-12-10 | Stop reason: HOSPADM

## 2023-12-07 RX ORDER — AZITHROMYCIN 500 MG/1
500 TABLET, FILM COATED ORAL DAILY
Status: COMPLETED | OUTPATIENT
Start: 2023-12-07 | End: 2023-12-07

## 2023-12-07 RX ORDER — MAGNESIUM SULFATE HEPTAHYDRATE 40 MG/ML
2 INJECTION, SOLUTION INTRAVENOUS ONCE
Status: COMPLETED | OUTPATIENT
Start: 2023-12-07 | End: 2023-12-07

## 2023-12-07 RX ORDER — ATORVASTATIN CALCIUM 40 MG/1
40 TABLET, FILM COATED ORAL NIGHTLY
Status: DISCONTINUED | OUTPATIENT
Start: 2023-12-07 | End: 2023-12-10 | Stop reason: HOSPADM

## 2023-12-07 RX ORDER — ALBUTEROL SULFATE 0.83 MG/ML
2.5 SOLUTION RESPIRATORY (INHALATION) ONCE
Status: COMPLETED | OUTPATIENT
Start: 2023-12-07 | End: 2023-12-07

## 2023-12-07 RX ORDER — LORAZEPAM 0.5 MG/1
2 TABLET ORAL EVERY 2 HOUR PRN
Status: DISCONTINUED | OUTPATIENT
Start: 2023-12-07 | End: 2023-12-08

## 2023-12-07 RX ORDER — MULTIVIT-MIN/IRON FUM/FOLIC AC 7.5 MG-4
1 TABLET ORAL DAILY
Status: DISCONTINUED | OUTPATIENT
Start: 2023-12-07 | End: 2023-12-07

## 2023-12-07 RX ORDER — LORAZEPAM 0.5 MG/1
1 TABLET ORAL EVERY 2 HOUR PRN
Status: DISCONTINUED | OUTPATIENT
Start: 2023-12-07 | End: 2023-12-08

## 2023-12-07 RX ORDER — MULTIVIT-MIN/IRON FUM/FOLIC AC 7.5 MG-4
1 TABLET ORAL DAILY
Status: DISCONTINUED | OUTPATIENT
Start: 2023-12-07 | End: 2023-12-10 | Stop reason: HOSPADM

## 2023-12-07 RX ORDER — FUROSEMIDE 10 MG/ML
100 INJECTION INTRAMUSCULAR; INTRAVENOUS ONCE
Status: COMPLETED | OUTPATIENT
Start: 2023-12-07 | End: 2023-12-07

## 2023-12-07 RX ORDER — IPRATROPIUM BROMIDE AND ALBUTEROL SULFATE 2.5; .5 MG/3ML; MG/3ML
3 SOLUTION RESPIRATORY (INHALATION)
Status: DISCONTINUED | OUTPATIENT
Start: 2023-12-07 | End: 2023-12-08

## 2023-12-07 RX ORDER — ENOXAPARIN SODIUM 100 MG/ML
60 INJECTION SUBCUTANEOUS EVERY 12 HOURS SCHEDULED
Status: DISCONTINUED | OUTPATIENT
Start: 2023-12-07 | End: 2023-12-10 | Stop reason: HOSPADM

## 2023-12-07 RX ORDER — KETOROLAC TROMETHAMINE 15 MG/ML
15 INJECTION, SOLUTION INTRAMUSCULAR; INTRAVENOUS ONCE
Status: COMPLETED | OUTPATIENT
Start: 2023-12-07 | End: 2023-12-07

## 2023-12-07 RX ORDER — ASPIRIN 81 MG/1
81 TABLET ORAL DAILY
Status: DISCONTINUED | OUTPATIENT
Start: 2023-12-07 | End: 2023-12-10 | Stop reason: HOSPADM

## 2023-12-07 RX ORDER — AZITHROMYCIN 500 MG/1
250 TABLET, FILM COATED ORAL DAILY
Status: DISCONTINUED | OUTPATIENT
Start: 2023-12-08 | End: 2023-12-10 | Stop reason: HOSPADM

## 2023-12-07 RX ORDER — POTASSIUM CHLORIDE 20 MEQ/1
20 TABLET, EXTENDED RELEASE ORAL ONCE
Status: COMPLETED | OUTPATIENT
Start: 2023-12-07 | End: 2023-12-07

## 2023-12-07 RX ORDER — POLYETHYLENE GLYCOL 3350 17 G/17G
17 POWDER, FOR SOLUTION ORAL DAILY
Status: DISCONTINUED | OUTPATIENT
Start: 2023-12-07 | End: 2023-12-10 | Stop reason: HOSPADM

## 2023-12-07 RX ORDER — METOLAZONE 5 MG/1
5 TABLET ORAL DAILY
Status: DISCONTINUED | OUTPATIENT
Start: 2023-12-07 | End: 2023-12-10 | Stop reason: HOSPADM

## 2023-12-07 RX ORDER — PREDNISONE 20 MG/1
40 TABLET ORAL DAILY
Status: DISCONTINUED | OUTPATIENT
Start: 2023-12-07 | End: 2023-12-10 | Stop reason: HOSPADM

## 2023-12-07 RX ORDER — IPRATROPIUM BROMIDE AND ALBUTEROL SULFATE 2.5; .5 MG/3ML; MG/3ML
3 SOLUTION RESPIRATORY (INHALATION) EVERY 20 MIN
Status: DISPENSED | OUTPATIENT
Start: 2023-12-07 | End: 2023-12-07

## 2023-12-07 RX ORDER — FOLIC ACID 1 MG/1
1 TABLET ORAL DAILY
Status: DISCONTINUED | OUTPATIENT
Start: 2023-12-07 | End: 2023-12-10 | Stop reason: HOSPADM

## 2023-12-07 RX ORDER — LORAZEPAM 0.5 MG/1
0.5 TABLET ORAL EVERY 2 HOUR PRN
Status: DISCONTINUED | OUTPATIENT
Start: 2023-12-07 | End: 2023-12-08

## 2023-12-07 RX ORDER — SERTRALINE HYDROCHLORIDE 25 MG/1
25 TABLET, FILM COATED ORAL DAILY
Status: DISCONTINUED | OUTPATIENT
Start: 2023-12-07 | End: 2023-12-10 | Stop reason: HOSPADM

## 2023-12-07 RX ORDER — FOLIC ACID 1 MG/1
1 TABLET ORAL DAILY
Status: DISCONTINUED | OUTPATIENT
Start: 2023-12-07 | End: 2023-12-07

## 2023-12-07 RX ORDER — SPIRONOLACTONE 25 MG/1
12.5 TABLET ORAL DAILY
Status: DISCONTINUED | OUTPATIENT
Start: 2023-12-07 | End: 2023-12-10 | Stop reason: HOSPADM

## 2023-12-07 RX ADMIN — PREDNISONE 40 MG: 20 TABLET ORAL at 14:14

## 2023-12-07 RX ADMIN — Medication 1 TABLET: at 14:13

## 2023-12-07 RX ADMIN — EMPAGLIFLOZIN 10 MG: 10 TABLET, FILM COATED ORAL at 14:14

## 2023-12-07 RX ADMIN — SPIRONOLACTONE 12.5 MG: 25 TABLET ORAL at 14:14

## 2023-12-07 RX ADMIN — SACUBITRIL AND VALSARTAN 1 TABLET: 49; 51 TABLET, FILM COATED ORAL at 14:13

## 2023-12-07 RX ADMIN — KETOROLAC TROMETHAMINE 15 MG: 15 INJECTION INTRAMUSCULAR; INTRAVENOUS at 12:09

## 2023-12-07 RX ADMIN — ENOXAPARIN SODIUM 60 MG: 60 INJECTION SUBCUTANEOUS at 22:49

## 2023-12-07 RX ADMIN — IPRATROPIUM BROMIDE AND ALBUTEROL SULFATE 3 ML: .5; 3 SOLUTION RESPIRATORY (INHALATION) at 19:00

## 2023-12-07 RX ADMIN — IPRATROPIUM BROMIDE AND ALBUTEROL SULFATE 3 ML: .5; 3 SOLUTION RESPIRATORY (INHALATION) at 10:49

## 2023-12-07 RX ADMIN — FUROSEMIDE 100 MG: 10 INJECTION INTRAMUSCULAR; INTRAVENOUS at 14:13

## 2023-12-07 RX ADMIN — ALBUTEROL SULFATE 2.5 MG: 2.5 SOLUTION RESPIRATORY (INHALATION) at 17:58

## 2023-12-07 RX ADMIN — MAGNESIUM SULFATE HEPTAHYDRATE 2 G: 40 INJECTION, SOLUTION INTRAVENOUS at 09:45

## 2023-12-07 RX ADMIN — Medication: at 10:15

## 2023-12-07 RX ADMIN — Medication 60 PERCENT: at 20:03

## 2023-12-07 RX ADMIN — SERTRALINE HYDROCHLORIDE 25 MG: 25 TABLET ORAL at 14:14

## 2023-12-07 RX ADMIN — ASPIRIN 81 MG: 81 TABLET, COATED ORAL at 14:14

## 2023-12-07 RX ADMIN — FUROSEMIDE 100 MG: 10 INJECTION, SOLUTION INTRAVENOUS at 09:45

## 2023-12-07 RX ADMIN — ACETAMINOPHEN 650 MG: 325 TABLET ORAL at 14:14

## 2023-12-07 RX ADMIN — METHYLPREDNISOLONE SODIUM SUCCINATE 125 MG: 125 INJECTION, POWDER, FOR SOLUTION INTRAMUSCULAR; INTRAVENOUS at 09:45

## 2023-12-07 RX ADMIN — FOLIC ACID 1 MG: 1 TABLET ORAL at 14:14

## 2023-12-07 RX ADMIN — ENOXAPARIN SODIUM 60 MG: 60 INJECTION SUBCUTANEOUS at 14:13

## 2023-12-07 RX ADMIN — IPRATROPIUM BROMIDE AND ALBUTEROL SULFATE 3 ML: .5; 3 SOLUTION RESPIRATORY (INHALATION) at 10:26

## 2023-12-07 RX ADMIN — AZITHROMYCIN MONOHYDRATE 500 MG: 500 TABLET ORAL at 14:16

## 2023-12-07 RX ADMIN — POTASSIUM CHLORIDE 20 MEQ: 1500 TABLET, EXTENDED RELEASE ORAL at 15:02

## 2023-12-07 ASSESSMENT — ENCOUNTER SYMPTOMS
FEVER: 0
CHILLS: 0
HEADACHES: 0
FATIGUE: 1
CONFUSION: 0
EYE DISCHARGE: 0
DYSURIA: 0
AGITATION: 1
WHEEZING: 1
LIGHT-HEADEDNESS: 0
HEMATURIA: 0
CHEST TIGHTNESS: 0
ABDOMINAL PAIN: 0
EYE PAIN: 0
CONSTIPATION: 0
DIARRHEA: 0
COUGH: 0
FREQUENCY: 0
VOMITING: 0
SHORTNESS OF BREATH: 1
NAUSEA: 0
PALPITATIONS: 0
NUMBNESS: 0
HALLUCINATIONS: 0
SEIZURES: 0

## 2023-12-07 ASSESSMENT — COLUMBIA-SUICIDE SEVERITY RATING SCALE - C-SSRS
2. HAVE YOU ACTUALLY HAD ANY THOUGHTS OF KILLING YOURSELF?: NO
6. HAVE YOU EVER DONE ANYTHING, STARTED TO DO ANYTHING, OR PREPARED TO DO ANYTHING TO END YOUR LIFE?: NO
1. IN THE PAST MONTH, HAVE YOU WISHED YOU WERE DEAD OR WISHED YOU COULD GO TO SLEEP AND NOT WAKE UP?: NO

## 2023-12-07 ASSESSMENT — PAIN - FUNCTIONAL ASSESSMENT
PAIN_FUNCTIONAL_ASSESSMENT: 0-10
PAIN_FUNCTIONAL_ASSESSMENT: 0-10

## 2023-12-07 ASSESSMENT — PAIN SCALES - GENERAL
PAINLEVEL_OUTOF10: 0 - NO PAIN
PAINLEVEL_OUTOF10: 4

## 2023-12-07 ASSESSMENT — PAIN DESCRIPTION - PAIN TYPE: TYPE: CHRONIC PAIN

## 2023-12-07 ASSESSMENT — PAIN DESCRIPTION - LOCATION: LOCATION: BACK

## 2023-12-07 NOTE — ED TRIAGE NOTES
Pt presents to the ED with SOB and a dry cough. Pt also complains of swelling of the lower legs. Pmhx of Ashthma, CHF, and COPD. Pt is audibly wheezing. Denies NVD.

## 2023-12-07 NOTE — ED PROVIDER NOTES
CC: Shortness of Breath     History provided by: Patient  Limitations to History: None    HPI:  Patient is a 48-year-old male with history of NICM/HFrEF (EF 5/2022 35%) s/p ICD in 2019 for primary prevention, MJ, hyperlipidemia, asthma, obesity who presents with shortness of breath ongoing for the past day.  He states he has had lower extremity edema for the past year.  He states he ran out of his inhaler recently.  He states this feels more like a COPD than CHF.  He denies any productive cough, fevers, chills, chest pain, nausea, vomiting, diarrhea. He also endorses weight gain. States last alcoholic beverage last night.    External Records Reviewed: I reviewed outpatient medical records, Care Everywhere, prior ED visits as appropriate    ???????????????????????????????????????????????????????????????  Triage Vitals:  T 36.8 °C (98.2 °F)    BP (!) 152/95  RR 24  O2 91 %      Physical Exam  Vitals and nursing note reviewed.   Constitutional:       General: He is not in acute distress.     Appearance: Normal appearance.   HENT:      Head: Normocephalic and atraumatic.   Eyes:      Conjunctiva/sclera: Conjunctivae normal.   Cardiovascular:      Rate and Rhythm: Normal rate and regular rhythm.   Pulmonary:      Effort: Pulmonary effort is normal. No respiratory distress.      Breath sounds: Examination of the right-lower field reveals wheezing. Examination of the left-lower field reveals wheezing. Wheezing present.   Abdominal:      General: Abdomen is flat.      Palpations: Abdomen is soft.   Musculoskeletal:         General: Normal range of motion.      Cervical back: Normal range of motion and neck supple.   Skin:     General: Skin is warm and dry.      Comments: B/L LE non-pitting edema, chronic skin ulcerations, no open wounds   Neurological:      General: No focal deficit present.      Mental Status: He is alert and oriented to person, place, and time. Mental status is at baseline.   Psychiatric:          Mood and Affect: Mood normal.         Behavior: Behavior normal.        ???????????????????????????????????????????????????????????????  ED Course/Treatment/Medical Decision Making  MDM:  Patient is a 48-year-old male presents with shortness of breath.  Vital signs notable for tachypnea, oxygen saturation in 70s on room air, no obvious respiratory distress.  Patient on baseline 2 L of oxygen at home placed on 4 L in ED with improvement to 91%.  Differential include PNA, pulmonary edema, PTX, PE, COPD, asthma, MI, CHF, valvular abnormality, arrhythmia, cardiac tamponade, anxiety, anemia, metabolic acidosis.  Have low suspicion for pulmonary embolism as Wells score is low risk.  Based off chronic conditions and presentation given wheezing on examination and lower extremity edema I suspect CHF versus COPD exacerbation.  Patient given DuoNebs, magnesium, Solu-Medrol and reassess.  Additionally, given endorsement of weight gain, lower extremity edema I do suspect CHF exacerbation clinically and will give 100 mg of IV Lasix as patient takes 100 mg of p.o. torsemide at home per chart review.  Patient has no significant calf tenderness, erythema concerning for DVT at this time.  With appropriate urine output after diuretics.  Patient off CPAP and on 2 to 4 L of oxygen via nasal cannula with improvement in symptoms, given comorbidities, poor access to follow-up and unreliable history patient will be admitted to medicine for presumed new oxygen requirement, COPD exacerbation and CHF exacerbation.  Additionally, unable to corroborate with records has a known oxygen requirement at home. Discussed with admission coordinator who accepts patient to medicine.    ED Course:  ED Course as of 12/07/23 1124   Thu Dec 07, 2023   0913 Venous full panel notable for normal pH 7.34, elevated CO2 in the 70s which appears consistent with baseline.  Patient is likely a chronic retainer with history of COPD as pH is normalized and has had a  history of similar elevated pCO2's.  Patient does not appear encephalopathic at this time.  [SA]   0913 EKG done at 0805 normal sinus rhythm rate 97,.  Number 178 ms, QRS 88 ms, QTc 464 ms, no acute ST segment elevations or depressions, difficult to interpret secondary to motion artifact [SA]   0934 CXR with perihilar opacities which appear worse than prior raising suspicion for edema and acute on chronic CHF exacerbation [SA]   0949 Patient back to baseline 2L O2 via nasal cannula [SA]   1005 Elevated bicarbonate consistent with prior labs, likely due to compensation for respiratory acidosis [SA]   1011 Patient states he has run out of all of his medications and home oxygen. I spoke to Social Work to coordinate home care and services. I suspect poor compliance is contributing to presentation today [SA]   1012 Patient snoring and desatting likely due to MJ (non-compliant with home CPAP), RT at bedside with CPAP during sleep, continues to be AOx3 [SA]   1019 BNP wnl consistent with prior, likely low due to obesity  [SA]   1026 Troponin wnl, mag wnl [SA]   1123 CBC wnl [SA]      ED Course User Index  [SA] [facility], DO         Diagnoses as of 12/07/23 1124   Shortness of breath   Acute on chronic combined systolic and diastolic congestive heart failure (CMS/HCC)   COPD exacerbation (CMS/HCC)       EKG Interpretation:  See ED Course/Below:    Independent Interpretation of Studies:  I independently interpreted labs/imaging as stated in ED Course or below.    Differential diagnoses considered include but are not limited to: See MDM/Below:    Social Determinants Limiting Care:  None identified Poor health literacy      Disposition:  Admitted on tele    Brittaney Cony, ROSMERY, PGY-2    I reviewed the case with the attending ED physician. The attending ED physician agrees with the plan. Patient and/or patient´s representative was counseled regarding labs, imaging, likely diagnosis, and plan. All questions were  answered.    Disclaimer: This note was dictated by speech recognition.  Attempt at proofreading was made to minimize errors.  Errors in transcription may be present.  Please call if questions.    Procedures ? SmartLinks last updated 12/7/2023 10:35 AM     Attending Note:  The patient was seen by the resident/fellow/CJ.  I have personally performed a substantive portion of the encounter.  I have seen and examined the patient; agree with the workup, evaluation, MDM, management and diagnosis with the exception/addition of the following.  The care plan has been discussed with the resident/fellow; I have reviewed the resident/fellow’s note and agree with the documented findings with the exception/addition of the following:       HPI:   Patric Arenas is a 48 year old male with history of HFmrEF (LVEF 45-50%), HTN, HLD, MJ, asthma, and obesity presenting to the ED for shortness of breath.  The patient reports several day history of cough and shortness of breath.  He notes that at baseline, he is on 2L NC.  He reports non-productive cough and wheezing.  He reports chronic bilateral LE edema which may be worse slightly.  He denies chest pain, palpitations, fevers, chills or night sweats. No recent travel or sick contacts.  Denies history of malignancy, recent immobilization or DVT/PE.      Per chart review, seen by PCP 9/26/23. Admitted 4/2023 for CHF exacerbation, treated with diuresis with Lasix, diamox and metolazone.      Additional History Obtained from: See HPI       Physical Exam:  General: Grossly well-developed, awake, alert, NAD    Head: Normocephalic, no overt external signs of trauma    ENT: Mucus membranes moist, nares patent    Neck: Supple, trachea midline, no stridor    Neurologic: A&Ox4, FS, TM, EOMI, PERRL, HAYDEN x 4 with grossly symmetric strength, SILT grossly intact BUE and BLE    Cardiovascular: RRR, no MRG, pulses grossly symmetric    Respiratory: CTA B/L, expiratory wheezing bilaterally,    Abdomen:  Soft, not appreciably tender, no evident rebound/guarding, no obvious pusatile mass    Back: No apparent CVA TTP    MSK: No gross bony deformities in BUE and BLE. Edema in BLE    Skin/Integumentary: Warm and dry    Psychiatric: Calm and cooperative. Normal mood and affect.          EKG Interpretation:  12/07-23 - 0805 - sinus rhythm, right axis, significant background artifact limiting interpretation, but no obvious significant ST elevation or depression. TWI lead II seen previously 4/29/23. Rate decreased from 99.     Interpreted by provider as above      Differential Diagnoses Considered:  See MDM below    Chronic Medical Conditions Significantly Affecting Care:  See MDM below    External Records Reviewed:  I reviewed recent and relevant outside records as noted in HPI above and MDM below.     Independent Interpretation of Studies:    Laboratory/Imaging Studies:  Laboratory results personally reviewed and independently interpreted:  CBC without significant anemia, thrombocytopenia or leukocytosis  Metabolic panel without SUZANNE, HAGMA or significant electrolyte anomalies. No transaminitis or hyperbilirubinemia  No hypomagnesemia  HS troponin not elevated  BNP not elevated  COVID-19 and influenza negative  VBG 7.34/38/44, lactate WNL. Per chart review, appears chronic, prior pCO2 60-80, appears compensated.    Radiology imaging and preliminary and/or final reports personally reviewed/independently interpreted:  CXR   IMPRESSION:  1. Mild to moderate pulmonary edema with enlarged cardiac silhouette.    Social Determinants of Health Significantly Affecting Care:  See HPI/MDM    Prescription Drug Consideration:  See MDM    Diagnostic testing considered:  See MDM and relevant sections      Medical Decision Making/Course:  48 year old male with history of HFmrEF (LVEF 45-50%), HTN, HLD, MJ, asthma, and obesity presenting to the ED for shortness of breath. The patient was wheezing on presentation with initially increased  oxygen requirements.  He was ordered steroids, magnesium and nebulizer treatments with some improvement of symptoms.  He was also ordered diuresis as CXR with some component of pulmonary edema and patient endorsed worsening LE edema. There was no unilateral calf swelling/pain, recent long travel/immobilization, malignancy, or major risk factors or history concerning for DVT/PE.  There were no obvious EKG changes concerning for pericarditis.  History and exam less  consistent with myocarditis.  EKG and troponin, as well as history less consistent with ACS.  CXR without signs of pneumothorax, large pneumonia, large effusion, or other acute thoracic process.  Pulses were grossly symmetric and there was no tearing chest pain to the back, or history/exam findings consistent with aortic dissection  No pusatile abdominal mass or signs of ruptured AAA.  Patient desaturating while sleeping and placed on CPAP with improvement in saturations, likely 2/2 MJ.  VBG with chronic hypercapnia that appeared compensated.  Patient reports he does not have home oxygen left at home, and given likely component of exacerbation of reactive airway disease as well as fluid overload, would benefit from admission.  An opportunity to ask questions was provided and all were addressed at that time.  The patient verbalized understanding and was in agreement with plan.         Escalation of Care:  Appropriate for: Admission      Discussion of Management with Other Providers:  We discussed the patient/results with: Internal medicine       Brittaney Donnelly DO  Resident  12/07/23 1126       Brittaney Donnelly DO  Resident  12/07/23 1151       William Maza MD  12/07/23 1201

## 2023-12-07 NOTE — H&P
"History Of Present Illness  Patric Arenas is a 48 y.o. male presenting with SOB.    47 y/o M patient with PMHX of HFmrEF (last echo w/ EF of 45-50%), DLD, asthma, HTN, s/p ICD in 2019 for primary prevention, MJ (non-compliant with CPAP), asthma, obesity (BMI >50) who presents with SOB.     The patient reported that he has been feeling sick for a \"while\" where he is feeling more short of breath and also using his inhalers more often. He feels fatigued and swollen.   He reported that both of his legs have been swollen for years and they are just persistently like this. He reports compliance with his meds. He denies any chest pain, cough, fever, chills ,n/v/d/c, abdominal pain urinary symptoms and rest of ROS was unremarkable.     He reports waking un-refreshened, has day time naps and has been off CPAP because he could not get any sleep study yet. He also says he has problems with getting his home oxygen and that he currently doesn't have it where he should be on 2 L NC.     The interaction was minimal as the patient was irritated saying \"you guys keep asking me same questions over and over again\".      PMHX: As mentioned  SH:   Denies smoking  Reports drinking alcohol occasionally, reports using ecstasy 1 week ago and uses it about once monthly     Reports   Echo 12/13/2022   CONCLUSIONS:   1. Left ventricular systolic function is mildly decreased with a 45-50% estimated ejection fraction.   2. Poorly visualized anatomical structures due to suboptimal image quality.   3. Spectral Doppler shows an impaired relaxation pattern of left ventricular diastolic filling.   4. There is moderate concentric left ventricular hypertrophy.   5. There is global hypokinesis of the left ventricle with minor regional variations.    No cath report found in the charts    ED Course:   VS:     36.8 (98.2) 100 24 152/95 Abnormal  91   Reported to be desaturating to 70s which needed 4 L NC   Chem: WNL, bicarb 37, K 3.8, mg 2.17   CBC: " Unremarkable   Flu and covid -ve  PH 7.34, Pco2 78 (was 73 a year ago), ibcarb 42 --> Compensated resp acidosis   Lactate 1   CXR:   IMPRESSION:  1. Mild to moderate pulmonary edema with enlarged cardiac silhouette.    Past Medical History  No past medical history on file.    Surgical History  Past Surgical History:   Procedure Laterality Date    CARDIAC DEFIBRILLATOR PLACEMENT          Social History  He reports that he has never smoked. He has never used smokeless tobacco. He reports current alcohol use of about 10.0 standard drinks of alcohol per week. He reports that he does not currently use drugs after having used the following drugs: MDMA (ecstacy).    Family History  Family History   Problem Relation Name Age of Onset    Hypertension Mother          Allergies  Patient has no known allergies.    Review of Systems   Constitutional:  Positive for fatigue. Negative for chills and fever.   Eyes:  Negative for pain and discharge.   Respiratory:  Positive for shortness of breath and wheezing. Negative for cough and chest tightness.    Cardiovascular:  Positive for leg swelling. Negative for chest pain and palpitations.   Gastrointestinal:  Negative for abdominal pain, constipation, diarrhea, nausea and vomiting.   Genitourinary:  Negative for dysuria, enuresis, frequency and hematuria.   Neurological:  Negative for seizures, light-headedness, numbness and headaches.   Psychiatric/Behavioral:  Positive for agitation. Negative for behavioral problems, confusion and hallucinations.         Physical Exam  Constitutional:       Appearance: Normal appearance. He is obese.   HENT:      Head: Normocephalic and atraumatic.      Mouth/Throat:      Mouth: Mucous membranes are moist.      Pharynx: Oropharynx is clear.   Cardiovascular:      Rate and Rhythm: Normal rate and regular rhythm.      Heart sounds: Normal heart sounds.   Pulmonary:      Effort: Pulmonary effort is normal. No respiratory distress.      Breath sounds:  "Wheezing (with decreased airentry bilaterally) present.   Abdominal:      General: Bowel sounds are normal. There is distension.      Palpations: Abdomen is soft.      Tenderness: There is no abdominal tenderness. There is no guarding.   Musculoskeletal:      Right lower leg: Edema present.      Left lower leg: No edema (bilateral lower extremity edema 3+ pitting).   Neurological:      Mental Status: He is alert.         Last Recorded Vitals  Blood pressure 133/90, pulse 93, temperature 36.8 °C (98.2 °F), temperature source Temporal, resp. rate 20, height 1.727 m (5' 8\"), weight (!) 163 kg (360 lb), SpO2 91 %.    Relevant Results         Assessment/Plan   Principal Problem:    COPD exacerbation (CMS/HCC)      49 y/o M patient with PMHX of HFmrEF (last echo w/ EF of 45-50%), DLD, asthma, HTN, s/p ICD in 2019 for primary prevention, MJ (non-compliant with CPAP), asthma, obesity (BMI >50) who presents with SOB. The patient reported that he has been feeling sick for a \"while\" where he is feeling more short of breath and also using his inhalers more often. He feels fatigued and swollen. He reports waking un-refreshened, has day time naps and has been off CPAP because he could not get any sleep study yet. He also says he has problems with getting his home oxygen and that he currently doesn't have it where he should be on 2 L NC. Labs were unremarkable, vitals showed O2 sat of 70% per reports and corrected to 90s with 4 L NC, CXR showed Mild to moderate pulmonary edema with enlarged cardiac silhouette. He was given IV Solumderol, Duonebs, lasix 100 after which he put 1.3 Liters out.     # SOB and fatigue likely 2/2 Acute heart failure exacerbation vs COPD exacerbation   - Admit to medicine   - Monitor VS  - Keep O2 sat 88-92%  - Azithro 500 once and then 250 x3   - Prednisone 40 mg po daily  - Duonebs Q4 prn   - Lasix 100 mg BID for now (converting from 100 torsemide po) ---- NO STANDING ORDER -- WILL SPOT DOSE as NEEDED "   - c/w metolazone 5 mg po daily   - Restrict fluid intake to 1.5 L   - Daily weight  - Strict I&O  - CPAP at night  - RT consult   - Daily Blood gas  - Consider repeat Echo   - c/w empagliflozin   - c/w enteresto 49/51 BID  - Hold metop succinate 25 for now (to avoid further exacerbation)    - c/w narcisa 12.5 mg po daily   - Replete K, keep it >4 with mg >2 as well     # HLD  - c/w atorvasatin 40 mg po daily   - c/w ASA 81 mg po daily     # Substance use disorder  - Says he uses ecstasy once a month, also when questioned about alcohol the answer was unclear  - Will order ethanol level, tox screen and place on CIWA protocol with lorazepam protocol   - Thiamine and folic acid supplementation with MVI     F: prn   E: prn   N: Low sodium  A: PIV  DVT Ppx: Lovenox   Code status: Full code  NOK: Sister Em [phone]    Shruti Natarajan MD

## 2023-12-08 ENCOUNTER — ANCILLARY PROCEDURE (OUTPATIENT)
Dept: EMERGENCY MEDICINE | Facility: HOSPITAL | Age: 48
End: 2023-12-08
Payer: COMMERCIAL

## 2023-12-08 PROBLEM — R06.02 SHORTNESS OF BREATH: Status: RESOLVED | Noted: 2023-12-07 | Resolved: 2023-12-08

## 2023-12-08 LAB
ANION GAP BLDV CALCULATED.4IONS-SCNC: 3 MMOL/L (ref 10–25)
ANION GAP SERPL CALC-SCNC: 12 MMOL/L (ref 10–20)
ATRIAL RATE: 97 BPM
BASE EXCESS BLDV CALC-SCNC: 11.9 MMOL/L (ref -2–3)
BODY TEMPERATURE: 37 DEGREES CELSIUS
BUN SERPL-MCNC: 16 MG/DL (ref 6–23)
CA-I BLDV-SCNC: 1.16 MMOL/L (ref 1.1–1.33)
CALCIUM SERPL-MCNC: 9.3 MG/DL (ref 8.6–10.6)
CHLORIDE BLDV-SCNC: 99 MMOL/L (ref 98–107)
CHLORIDE SERPL-SCNC: 97 MMOL/L (ref 98–107)
CO2 SERPL-SCNC: 37 MMOL/L (ref 21–32)
CREAT SERPL-MCNC: 1.24 MG/DL (ref 0.5–1.3)
ERYTHROCYTE [DISTWIDTH] IN BLOOD BY AUTOMATED COUNT: 14.3 % (ref 11.5–14.5)
GFR SERPL CREATININE-BSD FRML MDRD: 71 ML/MIN/1.73M*2
GLUCOSE BLDV-MCNC: 178 MG/DL (ref 74–99)
GLUCOSE SERPL-MCNC: 136 MG/DL (ref 74–99)
HCO3 BLDV-SCNC: 42.2 MMOL/L (ref 22–26)
HCT VFR BLD AUTO: 49.4 % (ref 41–52)
HCT VFR BLD EST: 48 % (ref 41–52)
HGB BLD-MCNC: 14.9 G/DL (ref 13.5–17.5)
HGB BLDV-MCNC: 15.9 G/DL (ref 13.5–17.5)
INHALED O2 CONCENTRATION: 60 %
LACTATE BLDV-SCNC: 1.9 MMOL/L (ref 0.4–2)
MCH RBC QN AUTO: 30.7 PG (ref 26–34)
MCHC RBC AUTO-ENTMCNC: 30.2 G/DL (ref 32–36)
MCV RBC AUTO: 102 FL (ref 80–100)
NRBC BLD-RTO: 0 /100 WBCS (ref 0–0)
OXYHGB MFR BLDV: 68.5 % (ref 45–75)
P OFFSET: 197 MS
P ONSET: 130 MS
PCO2 BLDV: 80 MM HG (ref 41–51)
PH BLDV: 7.33 PH (ref 7.33–7.43)
PLATELET # BLD AUTO: 233 X10*3/UL (ref 150–450)
PO2 BLDV: 46 MM HG (ref 35–45)
POTASSIUM BLDV-SCNC: 4 MMOL/L (ref 3.5–5.3)
POTASSIUM SERPL-SCNC: 3.7 MMOL/L (ref 3.5–5.3)
PR INTERVAL: 178 MS
Q ONSET: 219 MS
QRS COUNT: 16 BEATS
QRS DURATION: 88 MS
QT INTERVAL: 366 MS
QTC CALCULATION(BAZETT): 464 MS
QTC FREDERICIA: 429 MS
R AXIS: 167 DEGREES
RBC # BLD AUTO: 4.85 X10*6/UL (ref 4.5–5.9)
SAO2 % BLDV: 70 % (ref 45–75)
SODIUM BLDV-SCNC: 140 MMOL/L (ref 136–145)
SODIUM SERPL-SCNC: 142 MMOL/L (ref 136–145)
T AXIS: 174 DEGREES
T OFFSET: 402 MS
VENTRICULAR RATE: 97 BPM
WBC # BLD AUTO: 7.2 X10*3/UL (ref 4.4–11.3)

## 2023-12-08 PROCEDURE — 96372 THER/PROPH/DIAG INJ SC/IM: CPT

## 2023-12-08 PROCEDURE — 2500000001 HC RX 250 WO HCPCS SELF ADMINISTERED DRUGS (ALT 637 FOR MEDICARE OP)

## 2023-12-08 PROCEDURE — 2500000004 HC RX 250 GENERAL PHARMACY W/ HCPCS (ALT 636 FOR OP/ED)

## 2023-12-08 PROCEDURE — 94640 AIRWAY INHALATION TREATMENT: CPT

## 2023-12-08 PROCEDURE — 36415 COLL VENOUS BLD VENIPUNCTURE: CPT | Performed by: STUDENT IN AN ORGANIZED HEALTH CARE EDUCATION/TRAINING PROGRAM

## 2023-12-08 PROCEDURE — 1210000001 HC SEMI-PRIVATE ROOM DAILY

## 2023-12-08 PROCEDURE — 80048 BASIC METABOLIC PNL TOTAL CA: CPT | Performed by: STUDENT IN AN ORGANIZED HEALTH CARE EDUCATION/TRAINING PROGRAM

## 2023-12-08 PROCEDURE — 93005 ELECTROCARDIOGRAM TRACING: CPT

## 2023-12-08 PROCEDURE — 2500000002 HC RX 250 W HCPCS SELF ADMINISTERED DRUGS (ALT 637 FOR MEDICARE OP, ALT 636 FOR OP/ED)

## 2023-12-08 PROCEDURE — 85027 COMPLETE CBC AUTOMATED: CPT | Performed by: STUDENT IN AN ORGANIZED HEALTH CARE EDUCATION/TRAINING PROGRAM

## 2023-12-08 PROCEDURE — 99233 SBSQ HOSP IP/OBS HIGH 50: CPT | Performed by: STUDENT IN AN ORGANIZED HEALTH CARE EDUCATION/TRAINING PROGRAM

## 2023-12-08 RX ORDER — IPRATROPIUM BROMIDE AND ALBUTEROL SULFATE 2.5; .5 MG/3ML; MG/3ML
3 SOLUTION RESPIRATORY (INHALATION) 3 TIMES DAILY
Status: DISCONTINUED | OUTPATIENT
Start: 2023-12-09 | End: 2023-12-09

## 2023-12-08 RX ORDER — FUROSEMIDE 10 MG/ML
100 INJECTION INTRAMUSCULAR; INTRAVENOUS DAILY
Status: DISCONTINUED | OUTPATIENT
Start: 2023-12-09 | End: 2023-12-10 | Stop reason: HOSPADM

## 2023-12-08 RX ADMIN — ASPIRIN 81 MG: 81 TABLET, COATED ORAL at 10:32

## 2023-12-08 RX ADMIN — SACUBITRIL AND VALSARTAN 1 TABLET: 49; 51 TABLET, FILM COATED ORAL at 10:31

## 2023-12-08 RX ADMIN — IPRATROPIUM BROMIDE AND ALBUTEROL SULFATE 3 ML: .5; 3 SOLUTION RESPIRATORY (INHALATION) at 11:00

## 2023-12-08 RX ADMIN — AZITHROMYCIN DIHYDRATE 250 MG: 500 TABLET ORAL at 10:32

## 2023-12-08 RX ADMIN — SPIRONOLACTONE 12.5 MG: 25 TABLET ORAL at 10:32

## 2023-12-08 RX ADMIN — IPRATROPIUM BROMIDE AND ALBUTEROL SULFATE 3 ML: .5; 3 SOLUTION RESPIRATORY (INHALATION) at 14:01

## 2023-12-08 RX ADMIN — ACETAMINOPHEN 650 MG: 325 TABLET ORAL at 07:49

## 2023-12-08 RX ADMIN — METOLAZONE 5 MG: 5 TABLET ORAL at 11:52

## 2023-12-08 RX ADMIN — SERTRALINE HYDROCHLORIDE 25 MG: 25 TABLET ORAL at 10:32

## 2023-12-08 RX ADMIN — Medication 1 TABLET: at 10:31

## 2023-12-08 RX ADMIN — IPRATROPIUM BROMIDE AND ALBUTEROL SULFATE 3 ML: .5; 3 SOLUTION RESPIRATORY (INHALATION) at 07:08

## 2023-12-08 RX ADMIN — ENOXAPARIN SODIUM 60 MG: 60 INJECTION SUBCUTANEOUS at 10:31

## 2023-12-08 RX ADMIN — IPRATROPIUM BROMIDE AND ALBUTEROL SULFATE 3 ML: .5; 3 SOLUTION RESPIRATORY (INHALATION) at 03:33

## 2023-12-08 RX ADMIN — IPRATROPIUM BROMIDE AND ALBUTEROL SULFATE 3 ML: .5; 3 SOLUTION RESPIRATORY (INHALATION) at 00:15

## 2023-12-08 RX ADMIN — SACUBITRIL AND VALSARTAN 1 TABLET: 49; 51 TABLET, FILM COATED ORAL at 20:33

## 2023-12-08 RX ADMIN — IPRATROPIUM BROMIDE AND ALBUTEROL SULFATE 3 ML: .5; 3 SOLUTION RESPIRATORY (INHALATION) at 22:08

## 2023-12-08 RX ADMIN — ATORVASTATIN CALCIUM 40 MG: 40 TABLET, FILM COATED ORAL at 20:32

## 2023-12-08 RX ADMIN — EMPAGLIFLOZIN 10 MG: 10 TABLET, FILM COATED ORAL at 10:32

## 2023-12-08 RX ADMIN — PREDNISONE 40 MG: 20 TABLET ORAL at 10:31

## 2023-12-08 RX ADMIN — FOLIC ACID 1 MG: 1 TABLET ORAL at 10:32

## 2023-12-08 RX ADMIN — ENOXAPARIN SODIUM 60 MG: 60 INJECTION SUBCUTANEOUS at 20:32

## 2023-12-08 RX ADMIN — ACETAMINOPHEN 650 MG: 325 TABLET ORAL at 18:39

## 2023-12-08 SDOH — SOCIAL STABILITY: SOCIAL INSECURITY: DOES ANYONE TRY TO KEEP YOU FROM HAVING/CONTACTING OTHER FRIENDS OR DOING THINGS OUTSIDE YOUR HOME?: NO

## 2023-12-08 SDOH — SOCIAL STABILITY: SOCIAL INSECURITY: DO YOU FEEL ANYONE HAS EXPLOITED OR TAKEN ADVANTAGE OF YOU FINANCIALLY OR OF YOUR PERSONAL PROPERTY?: NO

## 2023-12-08 SDOH — SOCIAL STABILITY: SOCIAL INSECURITY: ARE YOU OR HAVE YOU BEEN THREATENED OR ABUSED PHYSICALLY, EMOTIONALLY, OR SEXUALLY BY ANYONE?: NO

## 2023-12-08 SDOH — SOCIAL STABILITY: SOCIAL INSECURITY: ABUSE: ADULT

## 2023-12-08 SDOH — SOCIAL STABILITY: SOCIAL INSECURITY: DO YOU FEEL UNSAFE GOING BACK TO THE PLACE WHERE YOU ARE LIVING?: NO

## 2023-12-08 SDOH — SOCIAL STABILITY: SOCIAL INSECURITY: WERE YOU ABLE TO COMPLETE ALL THE BEHAVIORAL HEALTH SCREENINGS?: YES

## 2023-12-08 SDOH — SOCIAL STABILITY: SOCIAL INSECURITY: HAS ANYONE EVER THREATENED TO HURT YOUR FAMILY OR YOUR PETS?: NO

## 2023-12-08 SDOH — SOCIAL STABILITY: SOCIAL INSECURITY: ARE THERE ANY APPARENT SIGNS OF INJURIES/BEHAVIORS THAT COULD BE RELATED TO ABUSE/NEGLECT?: NO

## 2023-12-08 SDOH — SOCIAL STABILITY: SOCIAL INSECURITY: HAVE YOU HAD THOUGHTS OF HARMING ANYONE ELSE?: NO

## 2023-12-08 ASSESSMENT — COGNITIVE AND FUNCTIONAL STATUS - GENERAL
PATIENT BASELINE BEDBOUND: NO
DAILY ACTIVITIY SCORE: 24
MOBILITY SCORE: 24
MOBILITY SCORE: 24
DAILY ACTIVITIY SCORE: 24

## 2023-12-08 ASSESSMENT — LIFESTYLE VARIABLES
AUDIT-C TOTAL SCORE: 2
ORIENTATION AND CLOUDING OF SENSORIUM: ORIENTED AND CAN DO SERIAL ADDITIONS
AGITATION: NORMAL ACTIVITY
HOW MANY STANDARD DRINKS CONTAINING ALCOHOL DO YOU HAVE ON A TYPICAL DAY: 1 OR 2
HEADACHE, FULLNESS IN HEAD: NOT PRESENT
PAROXYSMAL SWEATS: NO SWEAT VISIBLE
HOW OFTEN DO YOU HAVE 6 OR MORE DRINKS ON ONE OCCASION: LESS THAN MONTHLY
TREMOR: NO TREMOR
ORIENTATION AND CLOUDING OF SENSORIUM: ORIENTED AND CAN DO SERIAL ADDITIONS
AUDITORY DISTURBANCES: NOT PRESENT
TOTAL SCORE: 0
ORIENTATION AND CLOUDING OF SENSORIUM: ORIENTED AND CAN DO SERIAL ADDITIONS
AUDITORY DISTURBANCES: NOT PRESENT
HEADACHE, FULLNESS IN HEAD: NOT PRESENT
NAUSEA AND VOMITING: NO NAUSEA AND NO VOMITING
PAROXYSMAL SWEATS: NO SWEAT VISIBLE
ANXIETY: NO ANXIETY, AT EASE
NAUSEA AND VOMITING: NO NAUSEA AND NO VOMITING
TREMOR: NO TREMOR
AGITATION: NORMAL ACTIVITY
ANXIETY: NO ANXIETY, AT EASE
VISUAL DISTURBANCES: NOT PRESENT
VISUAL DISTURBANCES: NOT PRESENT
PAROXYSMAL SWEATS: NO SWEAT VISIBLE
TREMOR: NO TREMOR
AGITATION: NORMAL ACTIVITY
NAUSEA AND VOMITING: NO NAUSEA AND NO VOMITING
AUDITORY DISTURBANCES: NOT PRESENT
TOTAL SCORE: 0
TOTAL SCORE: 0
SKIP TO QUESTIONS 9-10: 0
VISUAL DISTURBANCES: NOT PRESENT
AUDIT-C TOTAL SCORE: 2
HOW OFTEN DO YOU HAVE A DRINK CONTAINING ALCOHOL: MONTHLY OR LESS
ANXIETY: NO ANXIETY, AT EASE
HEADACHE, FULLNESS IN HEAD: NOT PRESENT

## 2023-12-08 ASSESSMENT — ACTIVITIES OF DAILY LIVING (ADL)
JUDGMENT_ADEQUATE_SAFELY_COMPLETE_DAILY_ACTIVITIES: YES
TOILETING: INDEPENDENT
FEEDING YOURSELF: INDEPENDENT
HEARING - LEFT EAR: FUNCTIONAL
BATHING: INDEPENDENT
DRESSING YOURSELF: INDEPENDENT
LACK_OF_TRANSPORTATION: NO
HEARING - RIGHT EAR: FUNCTIONAL
PATIENT'S MEMORY ADEQUATE TO SAFELY COMPLETE DAILY ACTIVITIES?: YES
WALKS IN HOME: INDEPENDENT
GROOMING: INDEPENDENT
ADEQUATE_TO_COMPLETE_ADL: YES

## 2023-12-08 ASSESSMENT — PAIN - FUNCTIONAL ASSESSMENT
PAIN_FUNCTIONAL_ASSESSMENT: 0-10
PAIN_FUNCTIONAL_ASSESSMENT: 0-10

## 2023-12-08 ASSESSMENT — PATIENT HEALTH QUESTIONNAIRE - PHQ9
SUM OF ALL RESPONSES TO PHQ9 QUESTIONS 1 & 2: 0
1. LITTLE INTEREST OR PLEASURE IN DOING THINGS: NOT AT ALL
2. FEELING DOWN, DEPRESSED OR HOPELESS: NOT AT ALL

## 2023-12-08 ASSESSMENT — PAIN SCALES - GENERAL
PAINLEVEL_OUTOF10: 0 - NO PAIN
PAINLEVEL_OUTOF10: 0 - NO PAIN
PAINLEVEL_OUTOF10: 2

## 2023-12-08 NOTE — HOSPITAL COURSE
49 yo M patient with PMH of HFmrEF (last echo w/ EF of 45-50%), DLD, asthma, HTN, s/p ICD in 2019 for primary prevention, MJ (non-compliant with CPAP), asthma, obesity (BMI >50) who presents with SOB.  Patient reports feeling short of breath, having uses inhaler.  He has been off CPAP and has been not able to get a sleep study.  He is also reporting being out of oxygen.  He wears 2 L at baseline.  On admission he was satting in the 70s, requiring 4 L nasal cannula.  Chest x-ray showed moderate pulmonary edema with enlarged heart, exam with Rales and wheezing, VBG showing CO2 retention about his baseline.  Overall concerning for both heart failure and COPD exacerbation.  Given Lasix, started on niv with improvement in gas.  Overall he was feeling better and requested to return home.  Patient was counseled and decided to stay.  Admitting to medicine service.  Since day of admission he has been wanting to discharge home, he has been in agreement with staying because we have advised him that he will not build to make it without oxygen at his current level of need, and he has been cooperative without any difficulties.  He has been adherent to his care as well.  Treating for acute heart failure and COPD exacerbation.  Patient with significant improvement over the last 24 hours.  Suspected lesser contribution from his heart failure.  He had a stepwise improvement each day.  Each day he is also still requesting that we work on arranging his discharge and he would like to return home.  On 12/10 he was tested for oxygen needs and saturating well and maintaining his oxygen with exertion, did not qualify for home oxygen.  Reported that he had supplies and everything needed for CPAP at home.  Overall clinically improving, discussed that he is high risk for rehospitalization, that he needs close outpatient follow-up and medical optimization.  Added controller inhaler, represcribed his albuterol and added a spacer.  Referred  patient to pulmonary.  Advised patient continue his guideline directed medical therapy and follow-up with cardiology, referral submitted.  Advised soon as possible follow-up with primary care, also submitted a referral in case he needs it.  Advised patient to verify scheduling is up appointments.  Advised patient to return with any medical concerns or worsening symptoms.  We discussed substance use, advised cessation, he does not want new resources at this time he says he knows how to ask for help and to get medical attention if he needs.

## 2023-12-08 NOTE — PROGRESS NOTES
"Patric Arenas is a 48 y.o. male on day 1 of admission presenting with COPD exacerbation (CMS/HCC).    Subjective   When asked about his oxygen, Pt stated \"My sister moved.\" \"I never used it.\" SW asked Pt what his sister had to do with his equipment, and he said \"that's where it was.\"    Objective   Pt indicated he had equipment at the home of his sister, however, his relationship appears strained as Pt was reluctant to call her to locate the equipment for purposes of discharge.  Pt given phone to call her.       Assessment/Plan   Principal Problem:    COPD exacerbation (CMS/HCC)  Active Problems:    Shortness of breath  Collaborated with admitting MD. Pt allegedly stating he is out of oxygen. Dispo determination is hospital at home vs home return with connection to oxygen provider. SW to see Pt to assess.  Spoke with Pt bedside. He is staying at [address], cell is [phone]. Pt stated he moved from his sister's home two weeks ago. He said he didn't believe the oxygen concentrator was in their possession. Pt called sister and she mentioned [facility]. SW discovered [facility] is the , not DME provider.   Communication with Kiel revealed Pt owes them $1200. They will only provide oxygen if Pt can return device, and sign new agreement for insurance billing. Pt's sister present in the room early evenign. She confirmed device is no longer in their possession. Spoke with Pt and presented choice list for DME. Pt is open to blanket referral. He is hoping for a portable oxygen device. Sister states Pt has tubing for his breathing machine but no mask. He does have the device.   [facility] referral sent via [facility]. Awaiting new orders for oxygen to attach to referral. Pt aware DME is needed for safe discharge. Sister confirmed he is living with his daughter and need resources for case management and housing. SW to follow for discharge planning.     WEI Ritchie      "

## 2023-12-08 NOTE — PROGRESS NOTES
Assessment/Plan   47 yo M patient with PMH of HFmrEF (last echo w/ EF of 45-50%), DLD, asthma, HTN, s/p ICD in 2019 for primary prevention, MJ (non-compliant with CPAP), asthma, obesity (BMI >50) who presents with SOB.  Patient reports feeling short of breath, having uses inhaler.  He has been off CPAP and has been not able to get a sleep study.  He is also reporting being out of oxygen.  He wears 2 L at baseline.  On admission he was satting in the 70s, requiring 4 L nasal cannula.  Chest x-ray showed moderate pulmonary edema with enlarged heart, exam with Rales and wheezing, VBG showing CO2 retention about his baseline.  Overall concerning for both heart failure and COPD exacerbation.  Given Lasix, started on niv with improvement in gas.  Overall he was feeling better and requested to return home.  Patient was counseled and decided to stay.  Admitting to medicine service.  Treating for acute heart failure and COPD exacerbation.    Acute hypoxemic respiratory failure secondary to acute heart failure and COPD exacerbation  -Appears mildly hypervolemic, chest x-ray with some pulmonary congestion.  He has COPD on home O2 and has been off his O2 and using his inhaler more.  Also with diminished air movement and some wheezing.  Likely multifactorial including not using required oxygen.  -Patient not stable for discharge yet wanting to leave AMA, discussed and was willing to stay.  Today he again wanted to be discharged, I discussed that it would not be advised until we can arrange oxygen at and I would need to set him up but it still recommended that he gets treated.  I discussed hospital at home and he was agreeable, waiting for assessment.    -Barrier to discharge is that patient has a bill for his oxygen, he does not have his oxygen supplies and he needs to return them.   involved.    -Continue prednisone, DuoNebs   -Start Lasix  mg daily for now monitor electrolytes.  Continue home metolazone.   "  -Does not need fluid restriction without hyponatremia.    - c/w empagliflozin, c/w enteresto 49/51 BID,  c/w narcisa 12.5 mg po daily   - Hold metop succinate 25 for now (to avoid further exacerbation)    -Monitor weights and I's/O.    # HLD  - c/w atorvasatin 40 mg po daily   - c/w ASA 81 mg po daily      # Substance use disorder  - Says he uses ecstasy once a month, also when questioned about alcohol the answer was unclear  -Patient not denies any alcohol problems states he drinks very rarely.  No signs of withdrawal, DC'd CIWA.  - Thiamine and folic acid supplementation with MVI    .  Dispo: Will need to address barrier of home oxygen.  Patient does not want to remain hospitalized, but he would like to try for hospital home.  Waiting for that evaluation as well.     Scheduled outpatient appointments in system:   No future appointments.  ---------------------------------------------------------------------------------------------------  Subjective   Patient seen and examined, overall is feeling better but still hypoxemic.  He has no Oxygen at home.  He is unsure where his supplies are.  He has a bill due to the oxygen company.  Right now this is a discharge barrier.  He wants to leave still, I explained that he cannot survive without oxygen and he agreed to stay for now for hospital at home assessment  ---------------------------------------------------------------------------------------------------  Objective   Last Recorded Vitals  Blood pressure 100/67, pulse 70, temperature 36.9 °C (98.4 °F), temperature source Oral, resp. rate 20, height 1.727 m (5' 8\"), weight (!) 163 kg (360 lb), SpO2 100 %.  Intake/Output last 3 Shifts:  I/O last 3 completed shifts:  In: - (0 mL/kg)   Out: 3875 (23.7 mL/kg) [Urine:3875 (0.7 mL/kg/hr)]  Weight: 163.3 kg     Physical Exam    Relevant Results  Lab Results   Component Value Date    WBC 7.2 12/08/2023    HGB 14.9 12/08/2023    HCT 49.4 12/08/2023     (H) 12/08/2023    "  12/08/2023      Lab Results   Component Value Date    GLUCOSE 136 (H) 12/08/2023    CALCIUM 9.3 12/08/2023     12/08/2023    K 3.7 12/08/2023    CO2 37 (H) 12/08/2023    CL 97 (L) 12/08/2023    BUN 16 12/08/2023    CREATININE 1.24 12/08/2023     Scheduled medications  acetaminophen, 650 mg, oral, q6h  aspirin, 81 mg, oral, Daily  atorvastatin, 40 mg, oral, Nightly  azithromycin, 250 mg, oral, Daily  empagliflozin, 10 mg, oral, Daily  enoxaparin, 60 mg, subcutaneous, q12h STEVE  folic acid, 1 mg, oral, Daily  [START ON 12/9/2023] furosemide, 100 mg, intravenous, Daily  ipratropium-albuteroL, 3 mL, nebulization, q4h  metOLazone, 5 mg, oral, Daily  multivitamin with minerals, 1 tablet, oral, Daily  polyethylene glycol, 17 g, oral, Daily  predniSONE, 40 mg, oral, Daily  sacubitriL-valsartan, 1 tablet, oral, BID  sertraline, 25 mg, oral, Daily  spironolactone, 12.5 mg, oral, Daily      Continuous medications     PRN medications  PRN medications: oxygen, oxygen, oxygen    Shabbir Guzmán MD        Update: Patient is clinically improving, he is still requiring oxygen.  Will need proper oxygen testing to provide oxygen prescription, then will need to see if patient is able to get oxygen from a company given prior issues.

## 2023-12-09 PROCEDURE — 99233 SBSQ HOSP IP/OBS HIGH 50: CPT | Performed by: STUDENT IN AN ORGANIZED HEALTH CARE EDUCATION/TRAINING PROGRAM

## 2023-12-09 PROCEDURE — 2500000001 HC RX 250 WO HCPCS SELF ADMINISTERED DRUGS (ALT 637 FOR MEDICARE OP)

## 2023-12-09 PROCEDURE — 2500000004 HC RX 250 GENERAL PHARMACY W/ HCPCS (ALT 636 FOR OP/ED): Performed by: STUDENT IN AN ORGANIZED HEALTH CARE EDUCATION/TRAINING PROGRAM

## 2023-12-09 PROCEDURE — 94660 CPAP INITIATION&MGMT: CPT

## 2023-12-09 PROCEDURE — 96372 THER/PROPH/DIAG INJ SC/IM: CPT

## 2023-12-09 PROCEDURE — 2500000004 HC RX 250 GENERAL PHARMACY W/ HCPCS (ALT 636 FOR OP/ED)

## 2023-12-09 PROCEDURE — 1210000001 HC SEMI-PRIVATE ROOM DAILY

## 2023-12-09 PROCEDURE — 2500000001 HC RX 250 WO HCPCS SELF ADMINISTERED DRUGS (ALT 637 FOR MEDICARE OP): Performed by: STUDENT IN AN ORGANIZED HEALTH CARE EDUCATION/TRAINING PROGRAM

## 2023-12-09 RX ORDER — IPRATROPIUM BROMIDE AND ALBUTEROL SULFATE 2.5; .5 MG/3ML; MG/3ML
3 SOLUTION RESPIRATORY (INHALATION) EVERY 6 HOURS PRN
Status: DISCONTINUED | OUTPATIENT
Start: 2023-12-09 | End: 2023-12-10 | Stop reason: HOSPADM

## 2023-12-09 RX ORDER — BENZONATATE 100 MG/1
200 CAPSULE ORAL 3 TIMES DAILY PRN
Status: DISCONTINUED | OUTPATIENT
Start: 2023-12-09 | End: 2023-12-10 | Stop reason: HOSPADM

## 2023-12-09 RX ADMIN — AZITHROMYCIN DIHYDRATE 250 MG: 500 TABLET ORAL at 08:52

## 2023-12-09 RX ADMIN — SACUBITRIL AND VALSARTAN 1 TABLET: 49; 51 TABLET, FILM COATED ORAL at 20:55

## 2023-12-09 RX ADMIN — PREDNISONE 40 MG: 20 TABLET ORAL at 08:51

## 2023-12-09 RX ADMIN — SPIRONOLACTONE 12.5 MG: 25 TABLET ORAL at 08:51

## 2023-12-09 RX ADMIN — ENOXAPARIN SODIUM 60 MG: 60 INJECTION SUBCUTANEOUS at 08:53

## 2023-12-09 RX ADMIN — FUROSEMIDE 100 MG: 10 INJECTION, SOLUTION INTRAVENOUS at 08:54

## 2023-12-09 RX ADMIN — ASPIRIN 81 MG: 81 TABLET, COATED ORAL at 08:51

## 2023-12-09 RX ADMIN — Medication 1 TABLET: at 08:51

## 2023-12-09 RX ADMIN — ENOXAPARIN SODIUM 60 MG: 60 INJECTION SUBCUTANEOUS at 20:54

## 2023-12-09 RX ADMIN — SACUBITRIL AND VALSARTAN 1 TABLET: 49; 51 TABLET, FILM COATED ORAL at 08:53

## 2023-12-09 RX ADMIN — METOLAZONE 5 MG: 5 TABLET ORAL at 08:52

## 2023-12-09 RX ADMIN — ACETAMINOPHEN 650 MG: 325 TABLET ORAL at 08:51

## 2023-12-09 RX ADMIN — ATORVASTATIN CALCIUM 40 MG: 40 TABLET, FILM COATED ORAL at 20:55

## 2023-12-09 RX ADMIN — EMPAGLIFLOZIN 10 MG: 10 TABLET, FILM COATED ORAL at 08:52

## 2023-12-09 RX ADMIN — FOLIC ACID 1 MG: 1 TABLET ORAL at 08:52

## 2023-12-09 RX ADMIN — BENZONATATE 200 MG: 100 CAPSULE ORAL at 05:44

## 2023-12-09 RX ADMIN — SERTRALINE HYDROCHLORIDE 25 MG: 25 TABLET ORAL at 08:52

## 2023-12-09 ASSESSMENT — LIFESTYLE VARIABLES
AUDITORY DISTURBANCES: NOT PRESENT
VISUAL DISTURBANCES: NOT PRESENT
HEADACHE, FULLNESS IN HEAD: NOT PRESENT
TOTAL SCORE: 0
NAUSEA AND VOMITING: NO NAUSEA AND NO VOMITING
ANXIETY: NO ANXIETY, AT EASE
AGITATION: NORMAL ACTIVITY
AGITATION: NORMAL ACTIVITY
VISUAL DISTURBANCES: NOT PRESENT
ORIENTATION AND CLOUDING OF SENSORIUM: ORIENTED AND CAN DO SERIAL ADDITIONS
ORIENTATION AND CLOUDING OF SENSORIUM: ORIENTED AND CAN DO SERIAL ADDITIONS
HEADACHE, FULLNESS IN HEAD: NOT PRESENT
PAROXYSMAL SWEATS: NO SWEAT VISIBLE
TOTAL SCORE: 0
AUDITORY DISTURBANCES: NOT PRESENT
PAROXYSMAL SWEATS: NO SWEAT VISIBLE
TREMOR: NO TREMOR
ORIENTATION AND CLOUDING OF SENSORIUM: ORIENTED AND CAN DO SERIAL ADDITIONS
NAUSEA AND VOMITING: NO NAUSEA AND NO VOMITING
AGITATION: NORMAL ACTIVITY
TREMOR: NO TREMOR
TREMOR: NO TREMOR
NAUSEA AND VOMITING: NO NAUSEA AND NO VOMITING
AUDITORY DISTURBANCES: NOT PRESENT
HEADACHE, FULLNESS IN HEAD: NOT PRESENT
ANXIETY: NO ANXIETY, AT EASE
TOTAL SCORE: 0
ANXIETY: NO ANXIETY, AT EASE
PAROXYSMAL SWEATS: NO SWEAT VISIBLE
VISUAL DISTURBANCES: NOT PRESENT

## 2023-12-09 ASSESSMENT — COGNITIVE AND FUNCTIONAL STATUS - GENERAL
MOBILITY SCORE: 20
TURNING FROM BACK TO SIDE WHILE IN FLAT BAD: A LITTLE
MOVING FROM LYING ON BACK TO SITTING ON SIDE OF FLAT BED WITH BEDRAILS: A LITTLE
DAILY ACTIVITIY SCORE: 22
CLIMB 3 TO 5 STEPS WITH RAILING: A LITTLE
DRESSING REGULAR LOWER BODY CLOTHING: A LITTLE
WALKING IN HOSPITAL ROOM: A LITTLE
HELP NEEDED FOR BATHING: A LITTLE

## 2023-12-09 ASSESSMENT — PAIN - FUNCTIONAL ASSESSMENT
PAIN_FUNCTIONAL_ASSESSMENT: 0-10
PAIN_FUNCTIONAL_ASSESSMENT: 0-10

## 2023-12-09 ASSESSMENT — PAIN SCALES - GENERAL
PAINLEVEL_OUTOF10: 0 - NO PAIN
PAINLEVEL_OUTOF10: 0 - NO PAIN

## 2023-12-09 NOTE — PROGRESS NOTES
Assessment/Plan   49 yo M patient with PMH of HFmrEF (last echo w/ EF of 45-50%), DLD, asthma, HTN, s/p ICD in 2019 for primary prevention, MJ (non-compliant with CPAP), asthma, obesity (BMI >50) who presents with SOB.  Patient reports feeling short of breath, having uses inhaler.  He has been off CPAP and has been not able to get a sleep study.  He is also reporting being out of oxygen.  He wears 2 L at baseline.  On admission he was satting in the 70s, requiring 4 L nasal cannula.  Chest x-ray showed moderate pulmonary edema with enlarged heart, exam with Rales and wheezing, VBG showing CO2 retention about his baseline.  Overall concerning for both heart failure and COPD exacerbation.  Given Lasix, started on niv with improvement in gas.  Overall he was feeling better and requested to return home.  Patient was counseled and decided to stay.  Admitting to medicine service.  Treating for acute heart failure and COPD exacerbation.  Patient with significant improvement over the last 24 hours.  He is sitting on room air at rest without any increased work of breathing, air movement is improving on exam.  He states that he was okay waiting given that we are trying to make arrangements for him.  Hospital home declined given substance use history.  We are awaiting official oxygen prescription to assist him in obtaining oxygen.  You will need ambulatory pulse ox testing.  Continue with current management.    Acute hypoxemic respiratory failure secondary to acute heart failure and COPD exacerbation  -Appears mildly hypervolemic, chest x-ray with some pulmonary congestion.  He has COPD on home O2 and has been off his O2 and using his inhaler more.  Also with diminished air movement and some wheezing.  Likely multifactorial including not using required oxygen.  -Patient not stable for discharge yet wanting to leave AMA, discussed and was willing to stay.  Today he again wanted to be discharged, I discussed that it would not  be advised until we can arrange oxygen at and I would need to set him up but it still recommended that he gets treated.  I discussed hospital at home and he was agreeable, waiting for assessment.    -Barrier to discharge is that patient has a bill for his oxygen, he does not have his oxygen supplies and he needs to return them.   involved.    -Continue prednisone, DuoNebs   -Start Lasix  mg daily for now monitor electrolytes.  Continue home metolazone.    -Does not need fluid restriction without hyponatremia.    - c/w empagliflozin, c/w enteresto 49/51 BID,  c/w narcisa 12.5 mg po daily   - Hold metop succinate 25 for now (to avoid further exacerbation)    -Monitor weights and I's/O.  -We are weaning oxygen, work of breathing is improved.  Requiring 1 L at rest, likely a couple liters on exertion.      # HLD  - c/w atorvasatin 40 mg po daily   - c/w ASA 81 mg po daily      # Substance use disorder  - Says he uses ecstasy once a month, also when questioned about alcohol the answer was unclear  -Patient not denies any alcohol problems states he drinks very rarely.  No signs of withdrawal, DC'd CHANDLER.  - Thiamine and folic acid supplementation with MVI    .  Dispo: Will need to address barrier of home oxygen.  Patient does not want to remain hospitalized, but he would like to try for hospital home.  Waiting for that evaluation as well.     Scheduled outpatient appointments in system:   No future appointments.  ---------------------------------------------------------------------------------------------------  Subjective   Patient seen and examined, overall is feeling better but still hypoxemic.  He has no Oxygen at home.  He is unsure where his supplies are.  He has a bill due to the oxygen company.  Right now this is a discharge barrier.  He wants to leave still, I explained that he cannot survive without oxygen and he agreed to stay for now for hospital at home  "assessment  ---------------------------------------------------------------------------------------------------  Objective   Last Recorded Vitals  Blood pressure 114/74, pulse 76, temperature 36.5 °C (97.7 °F), resp. rate 17, height 1.727 m (5' 8\"), weight (!) 181 kg (399 lb 3.2 oz), SpO2 93 %.  Intake/Output last 3 Shifts:  I/O last 3 completed shifts:  In: 480 (2.7 mL/kg) [P.O.:480]  Out: 1050 (5.8 mL/kg) [Urine:1050 (0.2 mL/kg/hr)]  Weight: 181.1 kg     Physical Exam  Vitals and nursing note reviewed.   Constitutional:       General: He is not in acute distress.     Appearance: He is obese. He is not ill-appearing or toxic-appearing.      Comments: Appears less toxic   HENT:      Head: Normocephalic and atraumatic.      Mouth/Throat:      Mouth: Mucous membranes are moist.   Eyes:      General: No scleral icterus.     Extraocular Movements: Extraocular movements intact.      Conjunctiva/sclera: Conjunctivae normal.   Cardiovascular:      Rate and Rhythm: Normal rate and regular rhythm.      Heart sounds: S1 normal and S2 normal. No murmur heard.  Pulmonary:      Effort: Pulmonary effort is normal. No respiratory distress.      Breath sounds: No wheezing, rhonchi or rales.      Comments: No further work of breathing, improved airflow bilaterally  Abdominal:      General: Bowel sounds are normal. There is no distension.      Palpations: Abdomen is soft.      Tenderness: There is no abdominal tenderness. There is no guarding or rebound.   Musculoskeletal:         General: No swelling or deformity.      Cervical back: Neck supple.      Right lower leg: Edema present.      Left lower leg: Edema present.   Skin:     General: Skin is warm and dry.      Findings: No rash.   Neurological:      General: No focal deficit present.      Mental Status: He is alert. Mental status is at baseline.   Psychiatric:         Mood and Affect: Mood normal.         Relevant Results  Lab Results   Component Value Date    WBC 7.2 " 12/08/2023    HGB 14.9 12/08/2023    HCT 49.4 12/08/2023     (H) 12/08/2023     12/08/2023      Lab Results   Component Value Date    GLUCOSE 136 (H) 12/08/2023    CALCIUM 9.3 12/08/2023     12/08/2023    K 3.7 12/08/2023    CO2 37 (H) 12/08/2023    CL 97 (L) 12/08/2023    BUN 16 12/08/2023    CREATININE 1.24 12/08/2023     Scheduled medications  acetaminophen, 650 mg, oral, q6h  aspirin, 81 mg, oral, Daily  atorvastatin, 40 mg, oral, Nightly  azithromycin, 250 mg, oral, Daily  empagliflozin, 10 mg, oral, Daily  enoxaparin, 60 mg, subcutaneous, q12h STEVE  folic acid, 1 mg, oral, Daily  furosemide, 100 mg, intravenous, Daily  metOLazone, 5 mg, oral, Daily  multivitamin with minerals, 1 tablet, oral, Daily  polyethylene glycol, 17 g, oral, Daily  predniSONE, 40 mg, oral, Daily  sacubitriL-valsartan, 1 tablet, oral, BID  sertraline, 25 mg, oral, Daily  spironolactone, 12.5 mg, oral, Daily      Continuous medications     PRN medications  PRN medications: benzonatate, ipratropium-albuteroL, oxygen, oxygen, oxygen    Shabbir Guzmán MD

## 2023-12-09 NOTE — CARE PLAN
The patient's goals for the shift include      The clinical goals for the shift include pt will not have any respiratory distress    Over the shift, the patient did not make progress toward the following goals. Barriers to progression include obesity and new oxygen needs. Recommendations to address these barriers include wean oxygen as tolerated.

## 2023-12-09 NOTE — NURSING NOTE
Event note: Walking Pulse -ox  Patient saturation at rest on room air  was  84-85% . Patient placed on 1L 02 NC saturation improved to between 87-90% at rest. Patient started ambulating for about 2 minutes on 1L and desaturates to 84% . Patient was having labored breathing at this time.  Oxygen bumped to 2L and saturation improved 87%. Supplemental oxygen bumped to 3L and saturation improved to between 88-92% .

## 2023-12-10 VITALS
OXYGEN SATURATION: 94 % | WEIGHT: 315 LBS | DIASTOLIC BLOOD PRESSURE: 81 MMHG | SYSTOLIC BLOOD PRESSURE: 122 MMHG | HEART RATE: 92 BPM | BODY MASS INDEX: 47.74 KG/M2 | TEMPERATURE: 97.9 F | RESPIRATION RATE: 16 BRPM | HEIGHT: 68 IN

## 2023-12-10 PROCEDURE — 94660 CPAP INITIATION&MGMT: CPT

## 2023-12-10 PROCEDURE — 99239 HOSP IP/OBS DSCHRG MGMT >30: CPT | Performed by: STUDENT IN AN ORGANIZED HEALTH CARE EDUCATION/TRAINING PROGRAM

## 2023-12-10 PROCEDURE — 96372 THER/PROPH/DIAG INJ SC/IM: CPT

## 2023-12-10 PROCEDURE — 2500000001 HC RX 250 WO HCPCS SELF ADMINISTERED DRUGS (ALT 637 FOR MEDICARE OP)

## 2023-12-10 PROCEDURE — 2500000004 HC RX 250 GENERAL PHARMACY W/ HCPCS (ALT 636 FOR OP/ED): Performed by: STUDENT IN AN ORGANIZED HEALTH CARE EDUCATION/TRAINING PROGRAM

## 2023-12-10 PROCEDURE — 2500000004 HC RX 250 GENERAL PHARMACY W/ HCPCS (ALT 636 FOR OP/ED)

## 2023-12-10 RX ORDER — ALBUTEROL SULFATE 90 UG/1
2 AEROSOL, METERED RESPIRATORY (INHALATION) EVERY 4 HOURS PRN
Qty: 18 G | Refills: 3 | Status: SHIPPED | OUTPATIENT
Start: 2023-12-10

## 2023-12-10 RX ORDER — AZITHROMYCIN 250 MG/1
500 TABLET, FILM COATED ORAL DAILY
Qty: 6 TABLET | Refills: 0 | Status: SHIPPED | OUTPATIENT
Start: 2023-12-11 | End: 2023-12-14

## 2023-12-10 RX ORDER — FLUTICASONE FUROATE AND VILANTEROL 200; 25 UG/1; UG/1
1 POWDER RESPIRATORY (INHALATION) ONCE
Status: SHIPPED | OUTPATIENT
Start: 2023-12-10

## 2023-12-10 RX ORDER — PREDNISONE 20 MG/1
40 TABLET ORAL DAILY
Qty: 6 TABLET | Refills: 0 | Status: SHIPPED | OUTPATIENT
Start: 2023-12-11 | End: 2023-12-14

## 2023-12-10 RX ADMIN — EMPAGLIFLOZIN 10 MG: 10 TABLET, FILM COATED ORAL at 09:00

## 2023-12-10 RX ADMIN — ENOXAPARIN SODIUM 60 MG: 60 INJECTION SUBCUTANEOUS at 09:00

## 2023-12-10 RX ADMIN — AZITHROMYCIN DIHYDRATE 250 MG: 500 TABLET ORAL at 09:01

## 2023-12-10 RX ADMIN — Medication 1 TABLET: at 09:00

## 2023-12-10 RX ADMIN — SACUBITRIL AND VALSARTAN 1 TABLET: 49; 51 TABLET, FILM COATED ORAL at 09:00

## 2023-12-10 RX ADMIN — PREDNISONE 40 MG: 20 TABLET ORAL at 09:01

## 2023-12-10 RX ADMIN — SPIRONOLACTONE 12.5 MG: 25 TABLET ORAL at 09:00

## 2023-12-10 RX ADMIN — FOLIC ACID 1 MG: 1 TABLET ORAL at 09:01

## 2023-12-10 RX ADMIN — ACETAMINOPHEN 650 MG: 325 TABLET ORAL at 01:01

## 2023-12-10 RX ADMIN — METOLAZONE 5 MG: 5 TABLET ORAL at 09:00

## 2023-12-10 RX ADMIN — ASPIRIN 81 MG: 81 TABLET, COATED ORAL at 09:00

## 2023-12-10 RX ADMIN — FUROSEMIDE 100 MG: 10 INJECTION, SOLUTION INTRAVENOUS at 09:01

## 2023-12-10 RX ADMIN — ACETAMINOPHEN 650 MG: 325 TABLET ORAL at 09:00

## 2023-12-10 ASSESSMENT — PAIN SCALES - PAIN ASSESSMENT IN ADVANCED DEMENTIA (PAINAD)
BREATHING: NORMAL
TOTALSCORE: 3
FACIALEXPRESSION: SAD, FRIGHTENED, FROWN
BODYLANGUAGE: TENSE, DISTRESSED PACING, FIDGETING
CONSOLABILITY: DISTRACTED OR REASSURED BY VOICE/TOUCH

## 2023-12-10 ASSESSMENT — LIFESTYLE VARIABLES
PAROXYSMAL SWEATS: NO SWEAT VISIBLE
HEADACHE, FULLNESS IN HEAD: NOT PRESENT
TOTAL SCORE: 0
AUDITORY DISTURBANCES: NOT PRESENT
HEADACHE, FULLNESS IN HEAD: NOT PRESENT
VISUAL DISTURBANCES: NOT PRESENT
VISUAL DISTURBANCES: NOT PRESENT
ORIENTATION AND CLOUDING OF SENSORIUM: ORIENTED AND CAN DO SERIAL ADDITIONS
AGITATION: NORMAL ACTIVITY
NAUSEA AND VOMITING: NO NAUSEA AND NO VOMITING
AGITATION: NORMAL ACTIVITY
ANXIETY: NO ANXIETY, AT EASE
NAUSEA AND VOMITING: NO NAUSEA AND NO VOMITING
TREMOR: NO TREMOR
ANXIETY: NO ANXIETY, AT EASE
AUDITORY DISTURBANCES: NOT PRESENT
TREMOR: NO TREMOR
TOTAL SCORE: 0
ORIENTATION AND CLOUDING OF SENSORIUM: ORIENTED AND CAN DO SERIAL ADDITIONS
PAROXYSMAL SWEATS: NO SWEAT VISIBLE

## 2023-12-10 ASSESSMENT — PAIN - FUNCTIONAL ASSESSMENT: PAIN_FUNCTIONAL_ASSESSMENT: 0-10

## 2023-12-10 ASSESSMENT — PAIN SCALES - WONG BAKER: WONGBAKER_NUMERICALRESPONSE: HURTS LITTLE BIT

## 2023-12-10 ASSESSMENT — PAIN SCALES - GENERAL: PAINLEVEL_OUTOF10: 4

## 2023-12-10 NOTE — DISCHARGE SUMMARY
Date of Admission: 12/7/2023    Date of Discharge: 12/10/2023    Discharge Diagnosis  Acute on chronic hypoxemic respiratory failure  COPD exacerbation (CMS/HCC)  Acute on chronic HF, moderately reduced EF  Nonadherence to treatment regimen    Issues Requiring Follow-Up  Patient previously on oxygen, he has a $1200 bill to the oxygen company.  He also cannot locate any the supplies he was given.  And he no longer has oxygen at home.  We had a discharge barrier of providing him oxygen but he was able to be weaned off of oxygen and tolerated walking well maintaining saturations above goal of 88%.  He will need close outpatient follow-up for optimization of his heart failure and COPD.    Discharge Meds     Your medication list        START taking these medications        Instructions Last Dose Given Next Dose Due   azithromycin 250 mg tablet  Commonly known as: Zithromax  Start taking on: December 11, 2023      Take 2 tablets (500 mg) by mouth once daily for 3 days. Do not start before December 11, 2023.       inhalational spacing device inhaler      Use as directed with inhalers       predniSONE 20 mg tablet  Commonly known as: Deltasone  Start taking on: December 11, 2023      Take 2 tablets (40 mg) by mouth once daily for 3 days. Do not start before December 11, 2023.              CHANGE how you take these medications        Instructions Last Dose Given Next Dose Due   albuterol 90 mcg/actuation inhaler  Commonly known as: Ventolin HFA  What changed: additional instructions      Inhale 2 puffs every 4 hours if needed for wheezing or shortness of breath. If you are using your rescue inhaler frequently, seek medical evaluation       atorvastatin 40 mg tablet  Commonly known as: Lipitor  What changed: Another medication with the same name was removed. Continue taking this medication, and follow the directions you see here.      TAKE 1 TABLET BY MOUTH ONCE DAILY       Entresto 49-51 mg tablet  Generic drug:  sacubitriL-valsartan  What changed: Another medication with the same name was removed. Continue taking this medication, and follow the directions you see here.      TAKE 1 TABLET BY MOUTH TWO TIMES A DAY       folic acid 1 mg tablet  Commonly known as: Folvite  What changed: Another medication with the same name was removed. Continue taking this medication, and follow the directions you see here.      TAKE 1 TABLET BY MOUTH ONCE A DAY       Jardiance 10 mg  Generic drug: empagliflozin  What changed: Another medication with the same name was removed. Continue taking this medication, and follow the directions you see here.      TAKE 1 TABLET BY MOUTH ONCE A DAY       spironolactone 25 mg tablet  Commonly known as: Aldactone  What changed: Another medication with the same name was removed. Continue taking this medication, and follow the directions you see here.      TAKE 1/2 TABLET BY MOUTH ONCE DAILY       torsemide 100 mg tablet  Commonly known as: Demadex  What changed: Another medication with the same name was removed. Continue taking this medication, and follow the directions you see here.      TAKE 1 TABLET BY MOUTH TWO TIMES A DAY WITH MEALS              CONTINUE taking these medications        Instructions Last Dose Given Next Dose Due   acetaminophen 650 mg ER tablet  Commonly known as: Tylenol 8 HOUR           aspirin 81 mg EC tablet           Certavite-Antioxidant tablet  Generic drug: multivitamin with minerals           Certavite-Antioxidant tablet  Generic drug: multivitamin with minerals      TAKE 1 TABLET BY MOUTH ONCE DAILY       metOLazone 5 mg tablet  Commonly known as: Zaroxolyn      Take 1 tablet (5 mg) by mouth once daily.       metoprolol succinate XL 25 mg 24 hr tablet  Commonly known as: Toprol-XL      Take 1 tablet (25 mg) by mouth once daily.       sertraline 25 mg tablet  Commonly known as: Zoloft      TAKE 1 TABLET BY MOUTH ONCE DAILY                 Where to Get Your Medications         These medications were sent to [facility]/pharmacy #[id number] - [place], [place] - [address]  [address], [facility] [address]      Phone: [phone]   albuterol 90 mcg/actuation inhaler  azithromycin 250 mg tablet  inhalational spacing device inhaler  predniSONE 20 mg tablet         Test Results Pending At Discharge  Pending Labs       No current pending labs.            Hospital Course  49 yo M patient with PMH of HFmrEF (last echo w/ EF of 45-50%), DLD, asthma, HTN, s/p ICD in 2019 for primary prevention, MJ (non-compliant with CPAP), asthma, obesity (BMI >50) who presents with SOB.  Patient reports feeling short of breath, having uses inhaler.  He has been off CPAP and has been not able to get a sleep study.  He is also reporting being out of oxygen.  He wears 2 L at baseline.  On admission he was satting in the 70s, requiring 4 L nasal cannula.  Chest x-ray showed moderate pulmonary edema with enlarged heart, exam with Rales and wheezing, VBG showing CO2 retention about his baseline.  Overall concerning for both heart failure and COPD exacerbation.  Given Lasix, started on niv with improvement in gas.  Overall he was feeling better and requested to return home.  Patient was counseled and decided to stay.  Admitting to medicine service.  Since day of admission he has been wanting to discharge home, he has been in agreement with staying because we have advised him that he will not build to make it without oxygen at his current level of need, and he has been cooperative without any difficulties.  He has been adherent to his care as well.  Treating for acute heart failure and COPD exacerbation.  Patient with significant improvement over the last 24 hours.  Suspected lesser contribution from his heart failure.  He had a stepwise improvement each day.  Each day he is also still requesting that we work on arranging his discharge and he would like to return home.  On 12/10 he was tested for oxygen needs and saturating  well and maintaining his oxygen with exertion, did not qualify for home oxygen.  Reported that he had supplies and everything needed for CPAP at home.  Overall clinically improving, discussed that he is high risk for rehospitalization, that he needs close outpatient follow-up and medical optimization.  Added controller inhaler, represcribed his albuterol and added a spacer.  Referred patient to pulmonary.  Advised patient continue his guideline directed medical therapy and follow-up with cardiology, referral submitted.  Advised soon as possible follow-up with primary care, also submitted a referral in case he needs it.  Advised patient to verify scheduling is up appointments.  Advised patient to return with any medical concerns or worsening symptoms.  We discussed substance use, advised cessation, he does not want new resources at this time he says he knows how to ask for help and to get medical attention if he needs.. Discharge plan of care discussed, education and counseling provided involving active problem care plan, warning signs,  risks/benefits of new medications or medication changes, follow-up care and testing.  Patient advised to follow-up with her primary care within 1 week of discharge or the next available for posthospitalization transition of care.  There was verbalized understanding and agreement with discharge care plan.    Pertinent Physical Exam At Time of Discharge  On the day of discharge, the patient reported feeling well and pain was controlled. Vitals and labs were stable. On exam: No acute distress, interactive, no increased work of breathing, regular rate and rhythm, abdomen soft/nontender, no edema.    Outpatient Follow-Up  No future appointments.  Referred to primary care, pulmonary and cardiology    Pt instructed to take all medications as prescribed.  Keep all follow-up appointments.  Contact their primary care physician with any questions or concerns that arise.  Come to the emergency  department with worsening of your symptoms or any other medical emergency. Instructed that any outpatient tests that are ordered for outpatient follow up are meant to expedite outpatient work up and management, and that the results are not followed or managed by the inpatient ordering team and MUST be followed up with the outpatient primary care or outpatient specialist.  Verbal understanding and agreement obtained to order tests for outpatient follow up purposes.       Time spent >30 minutes on discharge management.    Shabbir Guzmán MD

## 2023-12-10 NOTE — NURSING NOTE
Pt is discharged home via his own car. Verbalized understanding of discharge paperwork. No questioned asked. IV discontinue will monitor

## 2023-12-10 NOTE — DISCHARGE INSTRUCTIONS
You were admitted for respiratory failure due to your COPD and a likely smaller component of heart failure.  You have not on home oxygen, and although you requested to be discharged we could not send you home to your oxygen needs.  You were evaluated for hospital at home but unable to be accepted.  You agreed to remain inpatient for further management, you were treated with steroids, bronchodilators and diuretics.  You showed clinical improvement and upon testing your oxygen you were able to maintain oxygen saturations at rest and with exertion.  You report you have your supplies that you need for your nighttime noninvasive ventilation.  At this time given her clinical improvement you are ready for discharge.  However is important that you have close follow-up, given your borderline needs your risk for decompensation and rehospitalization.    Prescriptions will be sent to your pharmacy requested    Please follow-up with your primary care within 1 week of discharge for the soonest available.  A new referral will also be sent to continue to appointment..    A referral will be submitted for pulmonology evaluation.  Please verify scheduling.    Referral will be submitted for cardiology evaluation.  Please verify scheduling.    Please take all medications as prescribed and keep all follow-up appointments.  Please contact your primary care physician with any questions or concerns that arise.  You may contact your outpatient specialists office for any questions regarding specifics relating to their recommendations. Please monitor your symptoms and come to the emergency department with worsening of your symptoms, severe chest pain, shortness of breath, or any other medical emergency or concerns for your health.

## 2023-12-11 ENCOUNTER — PATIENT OUTREACH (OUTPATIENT)
Dept: CARE COORDINATION | Facility: CLINIC | Age: 48
End: 2023-12-11
Payer: COMMERCIAL

## 2023-12-11 NOTE — PROGRESS NOTES
Outreach call to patient to support a smooth transition of care from recent admission.  Unable to leave vm.   Enrolled patient in [employer] chatbot for additional support and patient education through transition period.  Will continue to monitor through transition period.

## 2023-12-15 ENCOUNTER — APPOINTMENT (OUTPATIENT)
Dept: PRIMARY CARE | Facility: HOSPITAL | Age: 48
End: 2023-12-15
Payer: COMMERCIAL

## 2023-12-18 ENCOUNTER — PHARMACY VISIT (OUTPATIENT)
Dept: PHARMACY | Facility: CLINIC | Age: 48
End: 2023-12-18
Payer: MEDICAID

## 2023-12-18 ENCOUNTER — OFFICE VISIT (OUTPATIENT)
Dept: CARDIOLOGY | Facility: HOSPITAL | Age: 48
End: 2023-12-18
Payer: COMMERCIAL

## 2023-12-18 VITALS
SYSTOLIC BLOOD PRESSURE: 110 MMHG | HEART RATE: 102 BPM | WEIGHT: 315 LBS | OXYGEN SATURATION: 87 % | DIASTOLIC BLOOD PRESSURE: 68 MMHG | BODY MASS INDEX: 60.42 KG/M2

## 2023-12-18 DIAGNOSIS — I50.43 ACUTE ON CHRONIC COMBINED SYSTOLIC AND DIASTOLIC CONGESTIVE HEART FAILURE (MULTI): ICD-10-CM

## 2023-12-18 PROCEDURE — RXMED WILLOW AMBULATORY MEDICATION CHARGE

## 2023-12-18 PROCEDURE — 99214 OFFICE O/P EST MOD 30 MIN: CPT | Performed by: NURSE PRACTITIONER

## 2023-12-18 PROCEDURE — 3008F BODY MASS INDEX DOCD: CPT | Performed by: NURSE PRACTITIONER

## 2023-12-18 PROCEDURE — 3074F SYST BP LT 130 MM HG: CPT | Performed by: NURSE PRACTITIONER

## 2023-12-18 PROCEDURE — 3078F DIAST BP <80 MM HG: CPT | Performed by: NURSE PRACTITIONER

## 2023-12-18 PROCEDURE — 1036F TOBACCO NON-USER: CPT | Performed by: NURSE PRACTITIONER

## 2023-12-18 ASSESSMENT — ENCOUNTER SYMPTOMS
DIZZINESS: 0
NEAR-SYNCOPE: 0
SYNCOPE: 0
NAUSEA: 0
IRREGULAR HEARTBEAT: 0
HEMATURIA: 0
DYSPNEA ON EXERTION: 1
WEIGHT GAIN: 0
WEIGHT LOSS: 0
ORTHOPNEA: 1
VOMITING: 0
HEMATOCHEZIA: 0
ANOREXIA: 0
BRUISES/BLEEDS EASILY: 0
PND: 1
DIARRHEA: 0
PALPITATIONS: 0
SHORTNESS OF BREATH: 1
SLEEP DISTURBANCES DUE TO BREATHING: 1
DECREASED APPETITE: 0
LIGHT-HEADEDNESS: 0

## 2023-12-18 NOTE — PATIENT INSTRUCTIONS
your refill for Jardiance 10mg daily and start taking.   Continue all other medications as ordered.   Please have labs drawn in 7-10 days after you start medication change. We will call you with any abnormal results.   Call [phone] to schedule 1 month follow up with Dr. Reyes.

## 2023-12-18 NOTE — PROGRESS NOTES
Subjective     Chief Complaint:  47yo patient here for post-hospitalization follow up for heart failure.     HPI  Presented to ED with SOB in absence of home O2 & CPAP. On admission he was satting in the 70s, requiring 4 L nasal cannula.  Chest x-ray showed moderate pulmonary edema with enlarged heart, exam with Rales and wheezing, VBG showing CO2 retention about his baseline.  Overall concerning for both heart failure and COPD exacerbation.  Given Lasix, started on niv with improvement in gas.  Overall he was feeling better and requested to return home.  Patient was counseled and decided to stay.  Admitting to medicine service.  Since day of admission he has been wanting to discharge home, he has been in agreement with staying because we have advised him that he will not build to make it without oxygen at his current level of need, and he has been cooperative without any difficulties.  He has been adherent to his care as well.  Treating for acute heart failure and COPD exacerbation.  Patient with significant improvement over the last 24 hours.  Suspected lesser contribution from his heart failure.  He had a stepwise improvement each day.  Each day he is also still requesting that we work on arranging his discharge and he would like to return home.  On 12/10 he was tested for oxygen needs and saturating well and maintaining his oxygen with exertion, did not qualify for home oxygen.  Reported that he had supplies and everything needed for CPAP at home.  Overall clinically improving, discussed that he is high risk for rehospitalization, that he needs close outpatient follow-up and medical optimization.  Added controller inhaler, represcribed his albuterol and added a spacer.  Referred patient to pulmonary.  Advised patient continue his guideline directed medical therapy and follow-up with cardiology, referral submitted.  Advised soon as possible follow-up with primary care, also submitted a referral in case he needs  it.  Advised patient to verify scheduling is up appointments.  Advised patient to return with any medical concerns or worsening symptoms.  We discussed substance use, advised cessation, he does not want new resources at this time he says he knows how to ask for help and to get medical attention if he needs.. Discharge plan of care discussed, education and counseling provided involving active problem care plan, warning signs,  risks/benefits of new medications or medication changes, follow-up care and testing.  Patient advised to follow-up with her primary care within 1 week of discharge or the next available for posthospitalization transition of care.  There was verbalized understanding and agreement with discharge care plan. Discharge weight 181kg (399lbs).      PMHx:  HFmrEF (last echo w/ EF of 45-50%), DLD, asthma, HTN, s/p ICD in 2019 for primary prevention, MJ (non-compliant with CPAP), asthma, obesity (BMI >50)  Social Hx: Never smoker. Quit EtoH/drugs 4-5 months ago.     Alone, ambulated unassisted to visit today. Denies chest pain. Denies palpitations. Denies SOB on exertion. No home O2 right now-states he lost the last oxygen tank & inpatient social work was unable to obtain home oxygen services at discharge. Sleeping on couch, endorses orthopnea. Nonadherent w/CPAP. Denies lightheadedness, dizziness, and syncope. Denies edema. Medication nonadherent-did not bring pill bottles or list. Out of Jardiance x 4 days. Does not think he is taking Metoprolol Succinate 25mg every day, unsure. Completed Azithromycin/Prednisone. Eating/drinking well. Denies N/V/D. Reporting intermittent blurred vision x 1-2 weeks.     Review of Systems   Constitutional: Negative for decreased appetite, weight gain and weight loss.   HENT:  Negative for nosebleeds.    Cardiovascular:  Positive for dyspnea on exertion, orthopnea and paroxysmal nocturnal dyspnea. Negative for chest pain, irregular heartbeat, leg swelling, near-syncope,  palpitations and syncope.   Respiratory:  Positive for shortness of breath and sleep disturbances due to breathing.    Hematologic/Lymphatic: Negative for bleeding problem. Does not bruise/bleed easily.   Gastrointestinal:  Negative for anorexia, diarrhea, hematochezia, nausea and vomiting.   Genitourinary:  Negative for hematuria.   Neurological:  Negative for dizziness and light-headedness.       Objective   Visit Vitals  /68 (BP Location: Left arm, Patient Position: Sitting)   Pulse 102   Wt (!) 180 kg (397 lb 6.4 oz)   SpO2 (!) 87%   BMI 60.42 kg/m²   Smoking Status Never   BSA 2.94 m²     Vitals reviewed.   Constitutional:       Appearance: Morbidly obese. Chronically ill-appearing.   Neck:      Vascular: No hepatojugular reflux or JVD. JVD normal.   Pulmonary:      Effort: Pulmonary effort is normal.      Breath sounds: Normal breath sounds.   Cardiovascular:      Normal rate. Regular rhythm.      Murmurs: There is no murmur.      No gallop.  No click. No rub.   Edema:     Peripheral edema present.     Ankle: bilateral trace edema of the ankle.     Feet: bilateral trace edema of the feet.  Abdominal:      General: There is distension.   Skin:     General: Skin is warm and dry.   Neurological:      Mental Status: Alert and oriented to person, place and time.       Lab Review:   Lab Results   Component Value Date     12/08/2023    K 3.7 12/08/2023    CL 97 (L) 12/08/2023    CO2 37 (H) 12/08/2023    BUN 16 12/08/2023    CREATININE 1.24 12/08/2023    GLUCOSE 136 (H) 12/08/2023    CALCIUM 9.3 12/08/2023     Lab Results   Component Value Date    WBC 7.2 12/08/2023    HGB 14.9 12/08/2023    HCT 49.4 12/08/2023     (H) 12/08/2023     12/08/2023       Current Outpatient Medications:     acetaminophen (Tylenol 8 HOUR) 650 mg ER tablet, Take 1 tablet (650 mg) by mouth every 8 hours if needed for mild pain (1 - 3) (hand pain). Do not crush, chew, or split., Disp: , Rfl:     albuterol (Ventolin  HFA) 90 mcg/actuation inhaler, Inhale 2 puffs every 4 hours if needed for wheezing or shortness of breath. If you are using your rescue inhaler frequently, seek medical evaluation, Disp: 18 g, Rfl: 3    aspirin 81 mg EC tablet, Take 1 tablet (81 mg) by mouth once daily., Disp: , Rfl:     atorvastatin (Lipitor) 40 mg tablet, TAKE 1 TABLET BY MOUTH ONCE DAILY, Disp: 30 tablet, Rfl: 3    Certavite-Antioxidant  mg-mcg tablet, , Disp: , Rfl:     empagliflozin (Jardiance) 10 mg, TAKE 1 TABLET BY MOUTH ONCE A DAY, Disp: 30 tablet, Rfl: 3    folic acid (Folvite) 1 mg tablet, TAKE 1 TABLET BY MOUTH ONCE A DAY, Disp: 30 tablet, Rfl: 3    inhalational spacing device inhaler, Use as directed with inhalers, Disp: 1 each, Rfl: 0    metOLazone (Zaroxolyn) 5 mg tablet, Take 1 tablet (5 mg) by mouth once daily., Disp: 30 tablet, Rfl: 0    metoprolol succinate XL (Toprol-XL) 25 mg 24 hr tablet, Take 1 tablet (25 mg) by mouth once daily., Disp: 90 tablet, Rfl: 0    multivitamin with minerals tablet, TAKE 1 TABLET BY MOUTH ONCE DAILY, Disp: 30 tablet, Rfl: 3    sacubitriL-valsartan (Entresto) 49-51 mg tablet, TAKE 1 TABLET BY MOUTH TWO TIMES A DAY, Disp: 60 tablet, Rfl: 3    sertraline (Zoloft) 25 mg tablet, TAKE 1 TABLET BY MOUTH ONCE DAILY, Disp: 30 tablet, Rfl: 3    spironolactone (Aldactone) 25 mg tablet, TAKE 1/2 TABLET BY MOUTH ONCE DAILY, Disp: 15 tablet, Rfl: 3    torsemide (Demadex) 100 mg tablet, TAKE 1 TABLET BY MOUTH TWO TIMES A DAY WITH MEALS, Disp: 60 tablet, Rfl: 3    Current Facility-Administered Medications:     fluticasone furoate-vilanteroL (Breo Ellipta) 200-25 mcg/dose inhaler 1 puff, 1 puff, inhalation, Once, Shabbir Guzmán MD    Assessment/Plan   Systolic Heart Failure  Endorses orthopnea. Appears euvolemic and normotensive on exam today. Most recent TTE 12/23/2022 LVEF 45-50% w/moderate LVH and impaired relaxation pattern of LV diastolic filling. NYHA Class III Stage C HFmrEF. Continue Metoprolol  Succinate 25mg every day (confirmed recent fill w/refill ready at [facility]), Entresto 49-51mg BID, Spironolactone 25mg every day, Torsemide 100mg BID, and Metolazone 5mg every day. Refill Jardiance 10mg every day. RFP in 7-10 days.

## 2023-12-26 ENCOUNTER — PATIENT OUTREACH (OUTPATIENT)
Dept: CARE COORDINATION | Facility: CLINIC | Age: 48
End: 2023-12-26
Payer: COMMERCIAL

## 2023-12-26 NOTE — PROGRESS NOTES
Outreach call to fu on PCP visit after dc, I spoke with the pt and he said he did attend his fu appt. Pt had no other questions or concerns.

## 2024-01-05 ENCOUNTER — TELEPHONE (OUTPATIENT)
Dept: PRIMARY CARE | Facility: CLINIC | Age: 49
End: 2024-01-05
Payer: COMMERCIAL

## 2024-01-09 NOTE — TELEPHONE ENCOUNTER
Copied from CRM #[id number]. Topic: Information Request - Prescription Refill FAQ  >> Jan 8, 2024  3:33 PM Rosario EPPERSON wrote:   Pt called back, missed call from office regarding paperwork needed

## 2024-01-15 ENCOUNTER — PATIENT OUTREACH (OUTPATIENT)
Dept: CARE COORDINATION | Facility: CLINIC | Age: 49
End: 2024-01-15
Payer: COMMERCIAL

## 2024-01-26 ENCOUNTER — OFFICE VISIT (OUTPATIENT)
Dept: PRIMARY CARE | Facility: CLINIC | Age: 49
End: 2024-01-26
Payer: COMMERCIAL

## 2024-01-26 VITALS
HEART RATE: 97 BPM | BODY MASS INDEX: 46.65 KG/M2 | OXYGEN SATURATION: 92 % | WEIGHT: 315 LBS | TEMPERATURE: 97.5 F | DIASTOLIC BLOOD PRESSURE: 77 MMHG | SYSTOLIC BLOOD PRESSURE: 114 MMHG | HEIGHT: 69 IN

## 2024-01-26 DIAGNOSIS — E78.5 DYSLIPIDEMIA: ICD-10-CM

## 2024-01-26 DIAGNOSIS — I50.22 HEART FAILURE WITH MID-RANGE EJECTION FRACTION (HFMEF) (MULTI): Primary | ICD-10-CM

## 2024-01-26 DIAGNOSIS — I10 PRIMARY HYPERTENSION: ICD-10-CM

## 2024-01-26 DIAGNOSIS — E66.01 CLASS 3 SEVERE OBESITY DUE TO EXCESS CALORIES WITH SERIOUS COMORBIDITY AND BODY MASS INDEX (BMI) OF 50.0 TO 59.9 IN ADULT (MULTI): ICD-10-CM

## 2024-01-26 DIAGNOSIS — M79.89 SWELLING OF BOTH LOWER EXTREMITIES: ICD-10-CM

## 2024-01-26 PROBLEM — Z20.822 CONTACT WITH AND (SUSPECTED) EXPOSURE TO COVID-19: Status: RESOLVED | Noted: 2023-05-02 | Resolved: 2024-01-26

## 2024-01-26 PROBLEM — I50.42 CHRONIC COMBINED SYSTOLIC AND DIASTOLIC HEART FAILURE (MULTI): Status: ACTIVE | Noted: 2024-01-26

## 2024-01-26 PROBLEM — I50.20 UNSPECIFIED SYSTOLIC (CONGESTIVE) HEART FAILURE (MULTI): Status: ACTIVE | Noted: 2024-01-26

## 2024-01-26 PROBLEM — R06.02 SOB (SHORTNESS OF BREATH) ON EXERTION: Status: RESOLVED | Noted: 2023-09-19 | Resolved: 2024-01-26

## 2024-01-26 PROBLEM — F10.90 ALCOHOL USE, UNSPECIFIED, UNCOMPLICATED: Status: ACTIVE | Noted: 2022-12-16

## 2024-01-26 PROBLEM — Z20.822 CONTACT WITH AND (SUSPECTED) EXPOSURE TO COVID-19: Status: ACTIVE | Noted: 2023-05-02

## 2024-01-26 PROBLEM — I50.9 CHF EXACERBATION (MULTI): Status: RESOLVED | Noted: 2023-09-19 | Resolved: 2024-01-26

## 2024-01-26 PROBLEM — Z91.199 PATIENT'S NONCOMPLIANCE WITH OTHER MEDICAL TREATMENT AND REGIMEN DUE TO UNSPECIFIED REASON: Status: ACTIVE | Noted: 2023-05-02

## 2024-01-26 PROBLEM — J44.1 COPD EXACERBATION (MULTI): Status: RESOLVED | Noted: 2023-12-07 | Resolved: 2024-01-26

## 2024-01-26 PROCEDURE — 90471 IMMUNIZATION ADMIN: CPT | Performed by: STUDENT IN AN ORGANIZED HEALTH CARE EDUCATION/TRAINING PROGRAM

## 2024-01-26 PROCEDURE — 90472 IMMUNIZATION ADMIN EACH ADD: CPT | Performed by: STUDENT IN AN ORGANIZED HEALTH CARE EDUCATION/TRAINING PROGRAM

## 2024-01-26 PROCEDURE — 99213 OFFICE O/P EST LOW 20 MIN: CPT | Performed by: STUDENT IN AN ORGANIZED HEALTH CARE EDUCATION/TRAINING PROGRAM

## 2024-01-26 PROCEDURE — 3008F BODY MASS INDEX DOCD: CPT | Performed by: STUDENT IN AN ORGANIZED HEALTH CARE EDUCATION/TRAINING PROGRAM

## 2024-01-26 PROCEDURE — 3074F SYST BP LT 130 MM HG: CPT | Performed by: STUDENT IN AN ORGANIZED HEALTH CARE EDUCATION/TRAINING PROGRAM

## 2024-01-26 PROCEDURE — 3078F DIAST BP <80 MM HG: CPT | Performed by: STUDENT IN AN ORGANIZED HEALTH CARE EDUCATION/TRAINING PROGRAM

## 2024-01-26 PROCEDURE — 90677 PCV20 VACCINE IM: CPT | Performed by: STUDENT IN AN ORGANIZED HEALTH CARE EDUCATION/TRAINING PROGRAM

## 2024-01-26 PROCEDURE — 90686 IIV4 VACC NO PRSV 0.5 ML IM: CPT | Performed by: STUDENT IN AN ORGANIZED HEALTH CARE EDUCATION/TRAINING PROGRAM

## 2024-01-26 PROCEDURE — 1036F TOBACCO NON-USER: CPT | Performed by: STUDENT IN AN ORGANIZED HEALTH CARE EDUCATION/TRAINING PROGRAM

## 2024-01-26 RX ORDER — FUROSEMIDE 80 MG/1
80 TABLET ORAL 2 TIMES DAILY
COMMUNITY
Start: 2023-04-26 | End: 2024-01-26 | Stop reason: ALTCHOICE

## 2024-01-26 RX ORDER — ENOXAPARIN SODIUM 100 MG/ML
60 INJECTION SUBCUTANEOUS EVERY 12 HOURS
COMMUNITY
Start: 2023-12-07 | End: 2024-05-27 | Stop reason: ENTERED-IN-ERROR

## 2024-01-26 RX ORDER — TORSEMIDE 100 MG/1
100 TABLET ORAL 2 TIMES DAILY
Qty: 180 TABLET | Refills: 0 | Status: SHIPPED | OUTPATIENT
Start: 2024-01-26 | End: 2024-06-09 | Stop reason: HOSPADM

## 2024-01-26 ASSESSMENT — PAIN SCALES - GENERAL: PAINLEVEL: 0-NO PAIN

## 2024-01-26 NOTE — PATIENT INSTRUCTIONS
Dear Patric Arenas,     It was a pleasure getting to manage your care with you today.     It's awesome that you're working out regularly! Please continue to walk at the gym and increase your exercise as you can tolerate.    Labs:  We ordered Labs for you at the  Labs.     Directions to  Labs:   1. You can find them as you exit our clinic walk past the coffee shop.   2. Turn Right and walk to the end of the [address] (you will see a staircase).   3. When you arrive at the end of the [address], turn left and walk down the hallway with skylights.   4. Half way down the [address] hallway you will see the lab on your left    Prescriptions:  We have sent medication prescriptions to your pharmacy on file. Please pick them up at your earliest convenience.     Referral:   We have provided you the below referrals. Please call to schedule referrals if no one has contacted you within 3 days.   1. Echocardiogram  2. Cardiac rehab    Follow up Appointment: 1 month for annual physical    Emergency  In the case of an emergency please call 911 or visit the Emergency Department immediately for evaluation.     We look forward to continuing your care here at our Clinic. Take Care.     Sincerely,   Jeremiah Crockett MD
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
## 2024-01-27 PROBLEM — E66.813 CLASS 3 SEVERE OBESITY DUE TO EXCESS CALORIES WITH SERIOUS COMORBIDITY AND BODY MASS INDEX (BMI) OF 50.0 TO 59.9 IN ADULT: Status: ACTIVE | Noted: 2023-09-19

## 2024-01-27 NOTE — ASSESSMENT & PLAN NOTE
-stable since admission last month for ADHF  -reports orthopnea, PND however no dyspnea on exertion  -suspect that symptoms are more related to poorly controlled sleep apnea rather than ADHF given that weight is relatively stable and he is not having dyspnea on exertion  -additionally, no crackles present on pulmonary exam, although volume exam is limited by body habitus  -repeat echo for further evaluation  -continue GDMT and diuretics per cardiology  -check RFP and mag levels  -monitor daily weight  -cardiac rehab referral

## 2024-01-27 NOTE — ASSESSMENT & PLAN NOTE
-bilateral lower extremity edema present on exam  -suspect edema is more due to venous insufficiency rather than ADHF given symptoms and exam findings  -encouraged leg elevation as much as possible, especially at night

## 2024-02-07 NOTE — PROGRESS NOTES
I reviewed the resident/fellow's documentation and discussed the patient with the resident/fellow. I agree with the resident/fellow's medical decision making as documented in the note.    Tova Rahman MD

## 2024-02-13 ENCOUNTER — HOSPITAL ENCOUNTER (OUTPATIENT)
Facility: HOSPITAL | Age: 49
Setting detail: OBSERVATION
Discharge: HOME | End: 2024-02-14
Attending: EMERGENCY MEDICINE | Admitting: NURSE PRACTITIONER
Payer: COMMERCIAL

## 2024-02-13 ENCOUNTER — CLINICAL SUPPORT (OUTPATIENT)
Dept: EMERGENCY MEDICINE | Facility: HOSPITAL | Age: 49
End: 2024-02-13
Payer: COMMERCIAL

## 2024-02-13 ENCOUNTER — APPOINTMENT (OUTPATIENT)
Dept: RADIOLOGY | Facility: HOSPITAL | Age: 49
End: 2024-02-13
Payer: COMMERCIAL

## 2024-02-13 DIAGNOSIS — I50.22 HEART FAILURE WITH MID-RANGE EJECTION FRACTION (HFMEF) (MULTI): Primary | ICD-10-CM

## 2024-02-13 DIAGNOSIS — J44.1 COPD EXACERBATION (MULTI): ICD-10-CM

## 2024-02-13 DIAGNOSIS — I50.9 ACUTE ON CHRONIC CONGESTIVE HEART FAILURE, UNSPECIFIED HEART FAILURE TYPE (MULTI): ICD-10-CM

## 2024-02-13 DIAGNOSIS — R07.9 CHEST PAIN, UNSPECIFIED TYPE: ICD-10-CM

## 2024-02-13 DIAGNOSIS — I50.23 ACUTE ON CHRONIC SYSTOLIC CONGESTIVE HEART FAILURE (MULTI): ICD-10-CM

## 2024-02-13 PROBLEM — R06.02 SOB (SHORTNESS OF BREATH): Status: ACTIVE | Noted: 2024-02-13

## 2024-02-13 LAB
ALBUMIN SERPL BCP-MCNC: 3.7 G/DL (ref 3.4–5)
ALP SERPL-CCNC: 67 U/L (ref 33–120)
ALT SERPL W P-5'-P-CCNC: 12 U/L (ref 10–52)
ANION GAP SERPL CALC-SCNC: 9 MMOL/L (ref 10–20)
AST SERPL W P-5'-P-CCNC: 13 U/L (ref 9–39)
BASOPHILS # BLD AUTO: 0.02 X10*3/UL (ref 0–0.1)
BASOPHILS NFR BLD AUTO: 0.5 %
BILIRUB SERPL-MCNC: 0.4 MG/DL (ref 0–1.2)
BNP SERPL-MCNC: 46 PG/ML (ref 0–99)
BUN SERPL-MCNC: 9 MG/DL (ref 6–23)
CALCIUM SERPL-MCNC: 9.3 MG/DL (ref 8.6–10.6)
CARDIAC TROPONIN I PNL SERPL HS: 9 NG/L (ref 0–53)
CHLORIDE SERPL-SCNC: 103 MMOL/L (ref 98–107)
CO2 SERPL-SCNC: 33 MMOL/L (ref 21–32)
CREAT SERPL-MCNC: 1.04 MG/DL (ref 0.5–1.3)
EGFRCR SERPLBLD CKD-EPI 2021: 87 ML/MIN/1.73M*2
EOSINOPHIL # BLD AUTO: 0.11 X10*3/UL (ref 0–0.7)
EOSINOPHIL NFR BLD AUTO: 2.9 %
ERYTHROCYTE [DISTWIDTH] IN BLOOD BY AUTOMATED COUNT: 14.6 % (ref 11.5–14.5)
GLUCOSE SERPL-MCNC: 111 MG/DL (ref 74–99)
HCT VFR BLD AUTO: 46.5 % (ref 41–52)
HGB BLD-MCNC: 14.6 G/DL (ref 13.5–17.5)
IMM GRANULOCYTES # BLD AUTO: 0.01 X10*3/UL (ref 0–0.7)
IMM GRANULOCYTES NFR BLD AUTO: 0.3 % (ref 0–0.9)
LYMPHOCYTES # BLD AUTO: 1.12 X10*3/UL (ref 1.2–4.8)
LYMPHOCYTES NFR BLD AUTO: 29.9 %
MAGNESIUM SERPL-MCNC: 1.96 MG/DL (ref 1.6–2.4)
MCH RBC QN AUTO: 31.3 PG (ref 26–34)
MCHC RBC AUTO-ENTMCNC: 31.4 G/DL (ref 32–36)
MCV RBC AUTO: 100 FL (ref 80–100)
MONOCYTES # BLD AUTO: 0.25 X10*3/UL (ref 0.1–1)
MONOCYTES NFR BLD AUTO: 6.7 %
NEUTROPHILS # BLD AUTO: 2.24 X10*3/UL (ref 1.2–7.7)
NEUTROPHILS NFR BLD AUTO: 59.7 %
NRBC BLD-RTO: 0 /100 WBCS (ref 0–0)
PLATELET # BLD AUTO: 209 X10*3/UL (ref 150–450)
POTASSIUM SERPL-SCNC: 4.3 MMOL/L (ref 3.5–5.3)
PROT SERPL-MCNC: 7 G/DL (ref 6.4–8.2)
RBC # BLD AUTO: 4.66 X10*6/UL (ref 4.5–5.9)
SODIUM SERPL-SCNC: 141 MMOL/L (ref 136–145)
WBC # BLD AUTO: 3.8 X10*3/UL (ref 4.4–11.3)

## 2024-02-13 PROCEDURE — G0378 HOSPITAL OBSERVATION PER HR: HCPCS

## 2024-02-13 PROCEDURE — 2500000004 HC RX 250 GENERAL PHARMACY W/ HCPCS (ALT 636 FOR OP/ED): Mod: SE | Performed by: EMERGENCY MEDICINE

## 2024-02-13 PROCEDURE — 94640 AIRWAY INHALATION TREATMENT: CPT

## 2024-02-13 PROCEDURE — 96374 THER/PROPH/DIAG INJ IV PUSH: CPT

## 2024-02-13 PROCEDURE — 80053 COMPREHEN METABOLIC PANEL: CPT | Performed by: EMERGENCY MEDICINE

## 2024-02-13 PROCEDURE — 83735 ASSAY OF MAGNESIUM: CPT | Performed by: STUDENT IN AN ORGANIZED HEALTH CARE EDUCATION/TRAINING PROGRAM

## 2024-02-13 PROCEDURE — 99285 EMERGENCY DEPT VISIT HI MDM: CPT | Mod: 25 | Performed by: EMERGENCY MEDICINE

## 2024-02-13 PROCEDURE — 93005 ELECTROCARDIOGRAM TRACING: CPT

## 2024-02-13 PROCEDURE — 71046 X-RAY EXAM CHEST 2 VIEWS: CPT

## 2024-02-13 PROCEDURE — 36415 COLL VENOUS BLD VENIPUNCTURE: CPT | Performed by: EMERGENCY MEDICINE

## 2024-02-13 PROCEDURE — 80307 DRUG TEST PRSMV CHEM ANLYZR: CPT | Performed by: NURSE PRACTITIONER

## 2024-02-13 PROCEDURE — 83880 ASSAY OF NATRIURETIC PEPTIDE: CPT | Performed by: EMERGENCY MEDICINE

## 2024-02-13 PROCEDURE — 84484 ASSAY OF TROPONIN QUANT: CPT | Performed by: EMERGENCY MEDICINE

## 2024-02-13 PROCEDURE — 99222 1ST HOSP IP/OBS MODERATE 55: CPT | Performed by: NURSE PRACTITIONER

## 2024-02-13 PROCEDURE — 2500000002 HC RX 250 W HCPCS SELF ADMINISTERED DRUGS (ALT 637 FOR MEDICARE OP, ALT 636 FOR OP/ED): Mod: SE | Performed by: STUDENT IN AN ORGANIZED HEALTH CARE EDUCATION/TRAINING PROGRAM

## 2024-02-13 PROCEDURE — 85025 COMPLETE CBC W/AUTO DIFF WBC: CPT | Performed by: EMERGENCY MEDICINE

## 2024-02-13 PROCEDURE — 82077 ASSAY SPEC XCP UR&BREATH IA: CPT | Performed by: NURSE PRACTITIONER

## 2024-02-13 RX ORDER — METOPROLOL SUCCINATE 25 MG/1
25 TABLET, EXTENDED RELEASE ORAL DAILY
Status: DISCONTINUED | OUTPATIENT
Start: 2024-02-14 | End: 2024-02-14 | Stop reason: HOSPADM

## 2024-02-13 RX ORDER — FUROSEMIDE 10 MG/ML
40 INJECTION INTRAMUSCULAR; INTRAVENOUS ONCE
Status: COMPLETED | OUTPATIENT
Start: 2024-02-13 | End: 2024-02-13

## 2024-02-13 RX ORDER — SERTRALINE HYDROCHLORIDE 25 MG/1
25 TABLET, FILM COATED ORAL DAILY
Status: DISCONTINUED | OUTPATIENT
Start: 2024-02-14 | End: 2024-02-14 | Stop reason: HOSPADM

## 2024-02-13 RX ORDER — POLYETHYLENE GLYCOL 3350 17 G/17G
17 POWDER, FOR SOLUTION ORAL DAILY PRN
Status: DISCONTINUED | OUTPATIENT
Start: 2024-02-13 | End: 2024-02-14 | Stop reason: HOSPADM

## 2024-02-13 RX ORDER — ASPIRIN 81 MG/1
81 TABLET ORAL DAILY
Status: DISCONTINUED | OUTPATIENT
Start: 2024-02-14 | End: 2024-02-14 | Stop reason: HOSPADM

## 2024-02-13 RX ORDER — ACETAMINOPHEN 500 MG
10 TABLET ORAL NIGHTLY PRN
Status: DISCONTINUED | OUTPATIENT
Start: 2024-02-13 | End: 2024-02-14 | Stop reason: HOSPADM

## 2024-02-13 RX ORDER — TORSEMIDE 20 MG/1
100 TABLET ORAL 2 TIMES DAILY
Status: DISCONTINUED | OUTPATIENT
Start: 2024-02-14 | End: 2024-02-14

## 2024-02-13 RX ORDER — MULTIVIT-MIN/IRON FUM/FOLIC AC 7.5 MG-4
1 TABLET ORAL DAILY
Status: DISCONTINUED | OUTPATIENT
Start: 2024-02-14 | End: 2024-02-14 | Stop reason: HOSPADM

## 2024-02-13 RX ORDER — ATORVASTATIN CALCIUM 20 MG/1
40 TABLET, FILM COATED ORAL NIGHTLY
Status: DISCONTINUED | OUTPATIENT
Start: 2024-02-13 | End: 2024-02-14 | Stop reason: HOSPADM

## 2024-02-13 RX ORDER — ENOXAPARIN SODIUM 100 MG/ML
60 INJECTION SUBCUTANEOUS EVERY 12 HOURS SCHEDULED
Status: DISCONTINUED | OUTPATIENT
Start: 2024-02-13 | End: 2024-02-14 | Stop reason: HOSPADM

## 2024-02-13 RX ORDER — SPIRONOLACTONE 25 MG/1
12.5 TABLET ORAL DAILY
Status: DISCONTINUED | OUTPATIENT
Start: 2024-02-14 | End: 2024-02-14 | Stop reason: HOSPADM

## 2024-02-13 RX ORDER — IPRATROPIUM BROMIDE AND ALBUTEROL SULFATE 2.5; .5 MG/3ML; MG/3ML
3 SOLUTION RESPIRATORY (INHALATION) EVERY 20 MIN
Status: COMPLETED | OUTPATIENT
Start: 2024-02-13 | End: 2024-02-13

## 2024-02-13 RX ORDER — ALBUTEROL SULFATE 90 UG/1
2 AEROSOL, METERED RESPIRATORY (INHALATION) EVERY 4 HOURS PRN
Status: DISCONTINUED | OUTPATIENT
Start: 2024-02-13 | End: 2024-02-14 | Stop reason: HOSPADM

## 2024-02-13 RX ORDER — FOLIC ACID 1 MG/1
1 TABLET ORAL DAILY
Status: DISCONTINUED | OUTPATIENT
Start: 2024-02-14 | End: 2024-02-14 | Stop reason: HOSPADM

## 2024-02-13 RX ORDER — ACETAMINOPHEN 325 MG/1
650 TABLET ORAL EVERY 6 HOURS PRN
Status: DISCONTINUED | OUTPATIENT
Start: 2024-02-13 | End: 2024-02-14 | Stop reason: HOSPADM

## 2024-02-13 RX ADMIN — IPRATROPIUM BROMIDE AND ALBUTEROL SULFATE 3 ML: .5; 3 SOLUTION RESPIRATORY (INHALATION) at 20:15

## 2024-02-13 RX ADMIN — IPRATROPIUM BROMIDE AND ALBUTEROL SULFATE 3 ML: .5; 3 SOLUTION RESPIRATORY (INHALATION) at 20:40

## 2024-02-13 RX ADMIN — FUROSEMIDE 40 MG: 10 INJECTION, SOLUTION INTRAMUSCULAR; INTRAVENOUS at 21:53

## 2024-02-13 RX ADMIN — IPRATROPIUM BROMIDE AND ALBUTEROL SULFATE 3 ML: .5; 3 SOLUTION RESPIRATORY (INHALATION) at 20:14

## 2024-02-13 ASSESSMENT — COLUMBIA-SUICIDE SEVERITY RATING SCALE - C-SSRS
2. HAVE YOU ACTUALLY HAD ANY THOUGHTS OF KILLING YOURSELF?: NO
1. IN THE PAST MONTH, HAVE YOU WISHED YOU WERE DEAD OR WISHED YOU COULD GO TO SLEEP AND NOT WAKE UP?: NO
6. HAVE YOU EVER DONE ANYTHING, STARTED TO DO ANYTHING, OR PREPARED TO DO ANYTHING TO END YOUR LIFE?: NO

## 2024-02-13 ASSESSMENT — PAIN SCALES - GENERAL: PAINLEVEL_OUTOF10: 8

## 2024-02-13 ASSESSMENT — PAIN - FUNCTIONAL ASSESSMENT: PAIN_FUNCTIONAL_ASSESSMENT: 0-10

## 2024-02-13 NOTE — ED TRIAGE NOTES
"Mid sternal chest pain starting this morning. Pt denies radiation of pain. Denies N/V. Pt also endorsing shortness of breath, intermittent. Defibrillator placed 2019. Pt is 88% on room air. States he was told he is suppose to wear O2 but he \"never got any\". He is placed on 2L O2 NC in triage and SPO2 is 94%. Pt reports Hx of COPD and CHF.   "

## 2024-02-14 ENCOUNTER — APPOINTMENT (OUTPATIENT)
Dept: CARDIOLOGY | Facility: HOSPITAL | Age: 49
End: 2024-02-14
Payer: COMMERCIAL

## 2024-02-14 VITALS
RESPIRATION RATE: 14 BRPM | HEIGHT: 68 IN | HEART RATE: 88 BPM | SYSTOLIC BLOOD PRESSURE: 167 MMHG | OXYGEN SATURATION: 93 % | TEMPERATURE: 96.8 F | WEIGHT: 315 LBS | DIASTOLIC BLOOD PRESSURE: 87 MMHG | BODY MASS INDEX: 47.74 KG/M2

## 2024-02-14 PROBLEM — I50.9 ACUTE ON CHRONIC CONGESTIVE HEART FAILURE (MULTI): Status: RESOLVED | Noted: 2024-02-13 | Resolved: 2024-02-14

## 2024-02-14 PROBLEM — R06.02 SOB (SHORTNESS OF BREATH): Status: RESOLVED | Noted: 2024-02-13 | Resolved: 2024-02-14

## 2024-02-14 LAB
ALBUMIN SERPL BCP-MCNC: 3.8 G/DL (ref 3.4–5)
AMPHETAMINES UR QL SCN: NORMAL
ANION GAP SERPL CALC-SCNC: 11 MMOL/L (ref 10–20)
AORTIC VALVE PEAK VELOCITY: 1.28 M/S
ATRIAL RATE: 91 BPM
AV PEAK GRADIENT: 6.6 MMHG
AVA (PEAK VEL): 2.65 CM2
BARBITURATES UR QL SCN: NORMAL
BENZODIAZ UR QL SCN: NORMAL
BUN SERPL-MCNC: 8 MG/DL (ref 6–23)
BZE UR QL SCN: NORMAL
CALCIUM SERPL-MCNC: 9.1 MG/DL (ref 8.6–10.6)
CANNABINOIDS UR QL SCN: NORMAL
CHLORIDE SERPL-SCNC: 100 MMOL/L (ref 98–107)
CO2 SERPL-SCNC: 34 MMOL/L (ref 21–32)
CREAT SERPL-MCNC: 1.03 MG/DL (ref 0.5–1.3)
EGFRCR SERPLBLD CKD-EPI 2021: 88 ML/MIN/1.73M*2
ERYTHROCYTE [DISTWIDTH] IN BLOOD BY AUTOMATED COUNT: 14.9 % (ref 11.5–14.5)
ETHANOL SERPL-MCNC: <10 MG/DL
FENTANYL+NORFENTANYL UR QL SCN: NORMAL
GLUCOSE SERPL-MCNC: 111 MG/DL (ref 74–99)
HCT VFR BLD AUTO: 47.1 % (ref 41–52)
HGB BLD-MCNC: 14.4 G/DL (ref 13.5–17.5)
LEFT VENTRICULAR OUTFLOW TRACT DIAMETER: 2 CM
MAGNESIUM SERPL-MCNC: 2.19 MG/DL (ref 1.6–2.4)
MCH RBC QN AUTO: 31.2 PG (ref 26–34)
MCHC RBC AUTO-ENTMCNC: 30.6 G/DL (ref 32–36)
MCV RBC AUTO: 102 FL (ref 80–100)
MITRAL VALVE E/A RATIO: 1.1
MITRAL VALVE E/E' RATIO: 7.69
NRBC BLD-RTO: 0 /100 WBCS (ref 0–0)
OPIATES UR QL SCN: NORMAL
OXYCODONE+OXYMORPHONE UR QL SCN: NORMAL
P AXIS: 71 DEGREES
P OFFSET: 205 MS
P ONSET: 148 MS
PCP UR QL SCN: NORMAL
PHOSPHATE SERPL-MCNC: 4.4 MG/DL (ref 2.5–4.9)
PLATELET # BLD AUTO: 198 X10*3/UL (ref 150–450)
POTASSIUM SERPL-SCNC: 4.5 MMOL/L (ref 3.5–5.3)
PR INTERVAL: 148 MS
Q ONSET: 222 MS
QRS COUNT: 14 BEATS
QRS DURATION: 92 MS
QT INTERVAL: 412 MS
QTC CALCULATION(BAZETT): 506 MS
QTC FREDERICIA: 473 MS
R AXIS: 49 DEGREES
RBC # BLD AUTO: 4.62 X10*6/UL (ref 4.5–5.9)
RIGHT VENTRICLE FREE WALL PEAK S': 12.6 CM/S
SODIUM SERPL-SCNC: 140 MMOL/L (ref 136–145)
T AXIS: 17 DEGREES
T OFFSET: 428 MS
TRICUSPID ANNULAR PLANE SYSTOLIC EXCURSION: 2.2 CM
VENTRICULAR RATE: 91 BPM
WBC # BLD AUTO: 3.4 X10*3/UL (ref 4.4–11.3)

## 2024-02-14 PROCEDURE — 93306 TTE W/DOPPLER COMPLETE: CPT

## 2024-02-14 PROCEDURE — 85027 COMPLETE CBC AUTOMATED: CPT | Performed by: NURSE PRACTITIONER

## 2024-02-14 PROCEDURE — 80069 RENAL FUNCTION PANEL: CPT | Performed by: NURSE PRACTITIONER

## 2024-02-14 PROCEDURE — 2500000004 HC RX 250 GENERAL PHARMACY W/ HCPCS (ALT 636 FOR OP/ED): Mod: SE | Performed by: NURSE PRACTITIONER

## 2024-02-14 PROCEDURE — 96372 THER/PROPH/DIAG INJ SC/IM: CPT

## 2024-02-14 PROCEDURE — 96376 TX/PRO/DX INJ SAME DRUG ADON: CPT

## 2024-02-14 PROCEDURE — 2500000002 HC RX 250 W HCPCS SELF ADMINISTERED DRUGS (ALT 637 FOR MEDICARE OP, ALT 636 FOR OP/ED): Mod: SE | Performed by: NURSE PRACTITIONER

## 2024-02-14 PROCEDURE — 2500000005 HC RX 250 GENERAL PHARMACY W/O HCPCS: Mod: SE | Performed by: NURSE PRACTITIONER

## 2024-02-14 PROCEDURE — G0378 HOSPITAL OBSERVATION PER HR: HCPCS

## 2024-02-14 PROCEDURE — 93306 TTE W/DOPPLER COMPLETE: CPT | Performed by: INTERNAL MEDICINE

## 2024-02-14 PROCEDURE — 94660 CPAP INITIATION&MGMT: CPT

## 2024-02-14 PROCEDURE — 99239 HOSP IP/OBS DSCHRG MGMT >30: CPT | Performed by: STUDENT IN AN ORGANIZED HEALTH CARE EDUCATION/TRAINING PROGRAM

## 2024-02-14 PROCEDURE — 36415 COLL VENOUS BLD VENIPUNCTURE: CPT | Performed by: NURSE PRACTITIONER

## 2024-02-14 PROCEDURE — 83735 ASSAY OF MAGNESIUM: CPT | Performed by: NURSE PRACTITIONER

## 2024-02-14 PROCEDURE — 94762 N-INVAS EAR/PLS OXIMTRY CONT: CPT

## 2024-02-14 PROCEDURE — 2500000001 HC RX 250 WO HCPCS SELF ADMINISTERED DRUGS (ALT 637 FOR MEDICARE OP): Mod: SE | Performed by: NURSE PRACTITIONER

## 2024-02-14 RX ORDER — IPRATROPIUM BROMIDE AND ALBUTEROL SULFATE 2.5; .5 MG/3ML; MG/3ML
3 SOLUTION RESPIRATORY (INHALATION) EVERY 6 HOURS PRN
Status: DISCONTINUED | OUTPATIENT
Start: 2024-02-14 | End: 2024-02-14 | Stop reason: HOSPADM

## 2024-02-14 RX ORDER — FUROSEMIDE 10 MG/ML
40 INJECTION INTRAMUSCULAR; INTRAVENOUS
Status: DISCONTINUED | OUTPATIENT
Start: 2024-02-14 | End: 2024-02-14 | Stop reason: HOSPADM

## 2024-02-14 RX ADMIN — ATORVASTATIN CALCIUM 40 MG: 20 TABLET, FILM COATED ORAL at 00:55

## 2024-02-14 RX ADMIN — EMPAGLIFLOZIN 10 MG: 10 TABLET, FILM COATED ORAL at 08:23

## 2024-02-14 RX ADMIN — FOLIC ACID 1 MG: 1 TABLET ORAL at 08:23

## 2024-02-14 RX ADMIN — FUROSEMIDE 40 MG: 10 INJECTION, SOLUTION INTRAMUSCULAR; INTRAVENOUS at 08:23

## 2024-02-14 RX ADMIN — Medication 1 TABLET: at 08:23

## 2024-02-14 RX ADMIN — SACUBITRIL AND VALSARTAN 1 TABLET: 49; 51 TABLET, FILM COATED ORAL at 00:56

## 2024-02-14 RX ADMIN — ENOXAPARIN SODIUM 60 MG: 100 INJECTION SUBCUTANEOUS at 00:55

## 2024-02-14 RX ADMIN — SERTRALINE 25 MG: 25 TABLET, FILM COATED ORAL at 08:23

## 2024-02-14 RX ADMIN — Medication 50 PERCENT: at 02:25

## 2024-02-14 RX ADMIN — ENOXAPARIN SODIUM 60 MG: 100 INJECTION SUBCUTANEOUS at 08:23

## 2024-02-14 RX ADMIN — ACETAMINOPHEN 650 MG: 325 TABLET ORAL at 12:22

## 2024-02-14 RX ADMIN — PERFLUTREN 2 ML OF DILUTION: 6.52 INJECTION, SUSPENSION INTRAVENOUS at 09:57

## 2024-02-14 RX ADMIN — ASPIRIN 81 MG: 81 TABLET, COATED ORAL at 08:23

## 2024-02-14 RX ADMIN — ACETAMINOPHEN 650 MG: 325 TABLET ORAL at 01:04

## 2024-02-14 RX ADMIN — SACUBITRIL AND VALSARTAN 1 TABLET: 49; 51 TABLET, FILM COATED ORAL at 08:23

## 2024-02-14 RX ADMIN — METOPROLOL SUCCINATE 25 MG: 25 TABLET, EXTENDED RELEASE ORAL at 08:23

## 2024-02-14 RX ADMIN — SPIRONOLACTONE 12.5 MG: 25 TABLET, FILM COATED ORAL at 08:23

## 2024-02-14 ASSESSMENT — ENCOUNTER SYMPTOMS
FEVER: 0
MUSCULOSKELETAL NEGATIVE: 1
DIFFICULTY URINATING: 0
FLANK PAIN: 0
COUGH: 0
VOMITING: 0
RHINORRHEA: 0
DYSURIA: 0
NAUSEA: 0
PALPITATIONS: 0
CHILLS: 0
DIARRHEA: 0
ENDOCRINE NEGATIVE: 1
ABDOMINAL PAIN: 0
HEADACHES: 0
HEMATURIA: 0
DIZZINESS: 0
LIGHT-HEADEDNESS: 0
SORE THROAT: 0
SHORTNESS OF BREATH: 1
WHEEZING: 0
NUMBNESS: 0

## 2024-02-14 ASSESSMENT — LIFESTYLE VARIABLES
EVER HAD A DRINK FIRST THING IN THE MORNING TO STEADY YOUR NERVES TO GET RID OF A HANGOVER: NO
EVER FELT BAD OR GUILTY ABOUT YOUR DRINKING: NO
HAVE YOU EVER FELT YOU SHOULD CUT DOWN ON YOUR DRINKING: NO
HAVE PEOPLE ANNOYED YOU BY CRITICIZING YOUR DRINKING: NO

## 2024-02-14 ASSESSMENT — PAIN - FUNCTIONAL ASSESSMENT: PAIN_FUNCTIONAL_ASSESSMENT: 0-10

## 2024-02-14 ASSESSMENT — PAIN DESCRIPTION - LOCATION: LOCATION: FACE

## 2024-02-14 ASSESSMENT — PAIN SCALES - GENERAL
PAINLEVEL_OUTOF10: 5 - MODERATE PAIN
PAINLEVEL_OUTOF10: 0 - NO PAIN
PAINLEVEL_OUTOF10: 0 - NO PAIN
PAINLEVEL_OUTOF10: 3
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
## 2024-02-14 NOTE — ED PROCEDURE NOTE
Procedure    Performed by: Tommy Coffey MD  Authorized by: Mehrdad Ortiz MD  Cardiac Indications: orthopnea dyspnea              Respiratory Indications: shortness of breath  Procedure: Cardiac Ultrasound    Findings:   Views: parasternal long, parasternal short, apical four and subxiphoid  The pericardial space was visualized and was NEGATIVE for a significant pericardial effusion.  Activity: Ventricular contractions were visualized.  LV: LV systolic function was NORMAL.      Impression:  Cardiac: The focused cardiac ultrasound was INDETERMINATE given inadequate visualization. and body habitus  Procedure: Thoracic Ultrasound    Findings:  R Lung Sliding: The RIGHT chest was evaluated and LUNG SLIDING was visualized.  L Lung Sliding: The LEFT chest was evaluated and LUNG SLIDING was visualized.  R Effusion: The RIGHT chest was evaluated and there was NO PLEURAL EFFUSION.  L Effusion: The LEFT chest was evaluated and there was NO PLEURAL EFFUSION.    B-lines: The RIGHT chest was evaluated and multiple B-LINES were visualized        Impression:  Thorax: The focused thoracic ultrasound exam had ABNORMAL findings as specified.                   Tommy Coffey MD  Resident  02/13/24 [id number]       Tommy Coffey MD  Resident  02/13/24 [id number]

## 2024-02-14 NOTE — PROGRESS NOTES
I was consulted by provider to give an oxygen tank to the patient for discharge. Oxygen tank retrieved and given to bedside nurse.     Enrrique Taylor RN
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
## 2024-02-14 NOTE — PROGRESS NOTES
I arranged transport home for the patient. The patient stated he lives at [address] [place] [address] in [place]. The patient stated his phone number is [phone]. Ana María lizeth placed in Round Trip. I escorted the patient to the ED lobby. The patient left the oxygen tank behind stating he does not need it.       Enrrique Taylor RN
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
## 2024-05-26 ENCOUNTER — HOSPITAL ENCOUNTER (INPATIENT)
Facility: HOSPITAL | Age: 49
LOS: 13 days | Discharge: HOME HEALTH | End: 2024-06-09
Attending: EMERGENCY MEDICINE | Admitting: INTERNAL MEDICINE
Payer: COMMERCIAL

## 2024-05-26 ENCOUNTER — APPOINTMENT (OUTPATIENT)
Dept: RADIOLOGY | Facility: HOSPITAL | Age: 49
End: 2024-05-26
Payer: COMMERCIAL

## 2024-05-26 ENCOUNTER — CLINICAL SUPPORT (OUTPATIENT)
Dept: EMERGENCY MEDICINE | Facility: HOSPITAL | Age: 49
End: 2024-05-26
Payer: COMMERCIAL

## 2024-05-26 DIAGNOSIS — N17.9 ACUTE KIDNEY INJURY SUPERIMPOSED ON CKD (CMS-HCC): ICD-10-CM

## 2024-05-26 DIAGNOSIS — R07.9 CHEST PAIN, UNSPECIFIED TYPE: ICD-10-CM

## 2024-05-26 DIAGNOSIS — N18.9 ACUTE KIDNEY INJURY SUPERIMPOSED ON CKD (CMS-HCC): ICD-10-CM

## 2024-05-26 DIAGNOSIS — G47.33 OBSTRUCTIVE SLEEP APNEA: ICD-10-CM

## 2024-05-26 DIAGNOSIS — I10 PRIMARY HYPERTENSION: ICD-10-CM

## 2024-05-26 DIAGNOSIS — I50.43 ACUTE ON CHRONIC COMBINED SYSTOLIC AND DIASTOLIC CONGESTIVE HEART FAILURE (MULTI): Primary | ICD-10-CM

## 2024-05-26 DIAGNOSIS — I50.23 ACUTE ON CHRONIC SYSTOLIC (CONGESTIVE) HEART FAILURE (MULTI): ICD-10-CM

## 2024-05-26 DIAGNOSIS — J44.1 COPD EXACERBATION (MULTI): ICD-10-CM

## 2024-05-26 DIAGNOSIS — J96.22 ACUTE AND CHRONIC RESPIRATORY FAILURE WITH HYPERCAPNIA (MULTI): ICD-10-CM

## 2024-05-26 DIAGNOSIS — I50.9 HEART FAILURE, UNSPECIFIED HF CHRONICITY, UNSPECIFIED HEART FAILURE TYPE (MULTI): ICD-10-CM

## 2024-05-26 DIAGNOSIS — I50.9 ACUTE ON CHRONIC CONGESTIVE HEART FAILURE, UNSPECIFIED HEART FAILURE TYPE (MULTI): ICD-10-CM

## 2024-05-26 DIAGNOSIS — J44.1 COPD WITH ACUTE EXACERBATION (MULTI): ICD-10-CM

## 2024-05-26 LAB
ALBUMIN SERPL BCP-MCNC: 4 G/DL (ref 3.4–5)
ALP SERPL-CCNC: 86 U/L (ref 33–120)
ALT SERPL W P-5'-P-CCNC: 13 U/L (ref 10–52)
ANION GAP BLDV CALCULATED.4IONS-SCNC: 6 MMOL/L (ref 10–25)
ANION GAP SERPL CALC-SCNC: 13 MMOL/L (ref 10–20)
AST SERPL W P-5'-P-CCNC: 18 U/L (ref 9–39)
ATRIAL RATE: 95 BPM
BASE EXCESS BLDV CALC-SCNC: 12.9 MMOL/L (ref -2–3)
BASOPHILS # BLD AUTO: 0.03 X10*3/UL (ref 0–0.1)
BASOPHILS NFR BLD AUTO: 0.5 %
BILIRUB SERPL-MCNC: 0.5 MG/DL (ref 0–1.2)
BNP SERPL-MCNC: 373 PG/ML (ref 0–99)
BODY TEMPERATURE: 37 DEGREES CELSIUS
BUN SERPL-MCNC: 18 MG/DL (ref 6–23)
CA-I BLDV-SCNC: 1.13 MMOL/L (ref 1.1–1.33)
CALCIUM SERPL-MCNC: 9.3 MG/DL (ref 8.6–10.6)
CARDIAC TROPONIN I PNL SERPL HS: 44 NG/L (ref 0–53)
CHLORIDE BLDV-SCNC: 93 MMOL/L (ref 98–107)
CHLORIDE SERPL-SCNC: 92 MMOL/L (ref 98–107)
CO2 SERPL-SCNC: 39 MMOL/L (ref 21–32)
CREAT SERPL-MCNC: 1.67 MG/DL (ref 0.5–1.3)
EGFRCR SERPLBLD CKD-EPI 2021: 50 ML/MIN/1.73M*2
EOSINOPHIL # BLD AUTO: 0.1 X10*3/UL (ref 0–0.7)
EOSINOPHIL NFR BLD AUTO: 1.8 %
ERYTHROCYTE [DISTWIDTH] IN BLOOD BY AUTOMATED COUNT: 16.4 % (ref 11.5–14.5)
GLUCOSE BLDV-MCNC: 152 MG/DL (ref 74–99)
GLUCOSE SERPL-MCNC: 141 MG/DL (ref 74–99)
HCO3 BLDV-SCNC: 43.1 MMOL/L (ref 22–26)
HCT VFR BLD AUTO: 54.9 % (ref 41–52)
HCT VFR BLD EST: 49 % (ref 41–52)
HGB BLD-MCNC: 16.1 G/DL (ref 13.5–17.5)
HGB BLDV-MCNC: 16.4 G/DL (ref 13.5–17.5)
IMM GRANULOCYTES # BLD AUTO: 0.01 X10*3/UL (ref 0–0.7)
IMM GRANULOCYTES NFR BLD AUTO: 0.2 % (ref 0–0.9)
INHALED O2 CONCENTRATION: 60 %
LACTATE BLDV-SCNC: 2.3 MMOL/L (ref 0.4–2)
LYMPHOCYTES # BLD AUTO: 1.62 X10*3/UL (ref 1.2–4.8)
LYMPHOCYTES NFR BLD AUTO: 29.1 %
MAGNESIUM SERPL-MCNC: 1.98 MG/DL (ref 1.6–2.4)
MCH RBC QN AUTO: 31 PG (ref 26–34)
MCHC RBC AUTO-ENTMCNC: 29.3 G/DL (ref 32–36)
MCV RBC AUTO: 106 FL (ref 80–100)
MONOCYTES # BLD AUTO: 0.37 X10*3/UL (ref 0.1–1)
MONOCYTES NFR BLD AUTO: 6.7 %
NEUTROPHILS # BLD AUTO: 3.43 X10*3/UL (ref 1.2–7.7)
NEUTROPHILS NFR BLD AUTO: 61.7 %
NRBC BLD-RTO: 0.7 /100 WBCS (ref 0–0)
OXYHGB MFR BLDV: 69.6 % (ref 45–75)
P AXIS: 64 DEGREES
P OFFSET: 205 MS
P ONSET: 153 MS
PCO2 BLDV: 78 MM HG (ref 41–51)
PH BLDV: 7.35 PH (ref 7.33–7.43)
PLATELET # BLD AUTO: 201 X10*3/UL (ref 150–450)
PO2 BLDV: 51 MM HG (ref 35–45)
POTASSIUM BLDV-SCNC: 3.9 MMOL/L (ref 3.5–5.3)
POTASSIUM SERPL-SCNC: 3.7 MMOL/L (ref 3.5–5.3)
PR INTERVAL: 140 MS
PROT SERPL-MCNC: 8 G/DL (ref 6.4–8.2)
Q ONSET: 223 MS
QRS COUNT: 15 BEATS
QRS DURATION: 90 MS
QT INTERVAL: 400 MS
QTC CALCULATION(BAZETT): 502 MS
QTC FREDERICIA: 466 MS
R AXIS: -8 DEGREES
RBC # BLD AUTO: 5.2 X10*6/UL (ref 4.5–5.9)
SAO2 % BLDV: 71 % (ref 45–75)
SODIUM BLDV-SCNC: 138 MMOL/L (ref 136–145)
SODIUM SERPL-SCNC: 140 MMOL/L (ref 136–145)
T AXIS: -37 DEGREES
T OFFSET: 423 MS
VENTRICULAR RATE: 95 BPM
WBC # BLD AUTO: 5.6 X10*3/UL (ref 4.4–11.3)

## 2024-05-26 PROCEDURE — 85025 COMPLETE CBC W/AUTO DIFF WBC: CPT

## 2024-05-26 PROCEDURE — 36415 COLL VENOUS BLD VENIPUNCTURE: CPT

## 2024-05-26 PROCEDURE — 71045 X-RAY EXAM CHEST 1 VIEW: CPT | Performed by: RADIOLOGY

## 2024-05-26 PROCEDURE — 84295 ASSAY OF SERUM SODIUM: CPT

## 2024-05-26 PROCEDURE — 84484 ASSAY OF TROPONIN QUANT: CPT

## 2024-05-26 PROCEDURE — 31500 INSERT EMERGENCY AIRWAY: CPT | Performed by: STUDENT IN AN ORGANIZED HEALTH CARE EDUCATION/TRAINING PROGRAM

## 2024-05-26 PROCEDURE — 2500000005 HC RX 250 GENERAL PHARMACY W/O HCPCS: Mod: SE | Performed by: EMERGENCY MEDICINE

## 2024-05-26 PROCEDURE — 82330 ASSAY OF CALCIUM: CPT

## 2024-05-26 PROCEDURE — 2500000004 HC RX 250 GENERAL PHARMACY W/ HCPCS (ALT 636 FOR OP/ED): Mod: SE

## 2024-05-26 PROCEDURE — 84132 ASSAY OF SERUM POTASSIUM: CPT

## 2024-05-26 PROCEDURE — 71045 X-RAY EXAM CHEST 1 VIEW: CPT

## 2024-05-26 PROCEDURE — 2500000002 HC RX 250 W HCPCS SELF ADMINISTERED DRUGS (ALT 637 FOR MEDICARE OP, ALT 636 FOR OP/ED): Mod: SE

## 2024-05-26 PROCEDURE — 96365 THER/PROPH/DIAG IV INF INIT: CPT

## 2024-05-26 PROCEDURE — 83880 ASSAY OF NATRIURETIC PEPTIDE: CPT

## 2024-05-26 PROCEDURE — 94640 AIRWAY INHALATION TREATMENT: CPT | Mod: 59

## 2024-05-26 PROCEDURE — 83735 ASSAY OF MAGNESIUM: CPT

## 2024-05-26 PROCEDURE — 96375 TX/PRO/DX INJ NEW DRUG ADDON: CPT

## 2024-05-26 PROCEDURE — 93005 ELECTROCARDIOGRAM TRACING: CPT

## 2024-05-26 PROCEDURE — 99285 EMERGENCY DEPT VISIT HI MDM: CPT | Mod: 25

## 2024-05-26 RX ORDER — IPRATROPIUM BROMIDE AND ALBUTEROL SULFATE 2.5; .5 MG/3ML; MG/3ML
3 SOLUTION RESPIRATORY (INHALATION) EVERY 20 MIN
Status: COMPLETED | OUTPATIENT
Start: 2024-05-26 | End: 2024-05-26

## 2024-05-26 RX ORDER — MAGNESIUM SULFATE HEPTAHYDRATE 40 MG/ML
2 INJECTION, SOLUTION INTRAVENOUS ONCE
Status: COMPLETED | OUTPATIENT
Start: 2024-05-26 | End: 2024-05-26

## 2024-05-26 RX ORDER — FUROSEMIDE 10 MG/ML
INJECTION INTRAMUSCULAR; INTRAVENOUS
Status: COMPLETED
Start: 2024-05-26 | End: 2024-05-26

## 2024-05-26 RX ORDER — FUROSEMIDE 10 MG/ML
100 INJECTION INTRAMUSCULAR; INTRAVENOUS ONCE
Status: COMPLETED | OUTPATIENT
Start: 2024-05-26 | End: 2024-05-26

## 2024-05-26 RX ADMIN — FUROSEMIDE 100 MG: 10 INJECTION, SOLUTION INTRAVENOUS at 22:27

## 2024-05-26 RX ADMIN — IPRATROPIUM BROMIDE AND ALBUTEROL SULFATE 3 ML: .5; 3 SOLUTION RESPIRATORY (INHALATION) at 22:54

## 2024-05-26 RX ADMIN — MAGNESIUM SULFATE HEPTAHYDRATE 2 G: 40 INJECTION, SOLUTION INTRAVENOUS at 22:28

## 2024-05-26 RX ADMIN — FUROSEMIDE 100 MG: 10 INJECTION INTRAMUSCULAR; INTRAVENOUS at 22:27

## 2024-05-26 RX ADMIN — METHYLPREDNISOLONE SODIUM SUCCINATE 125 MG: 125 INJECTION, POWDER, FOR SOLUTION INTRAMUSCULAR; INTRAVENOUS at 22:27

## 2024-05-26 RX ADMIN — IPRATROPIUM BROMIDE AND ALBUTEROL SULFATE 3 ML: .5; 3 SOLUTION RESPIRATORY (INHALATION) at 23:23

## 2024-05-26 RX ADMIN — Medication 6 L/MIN: at 22:05

## 2024-05-26 RX ADMIN — IPRATROPIUM BROMIDE AND ALBUTEROL SULFATE 3 ML: .5; 3 SOLUTION RESPIRATORY (INHALATION) at 22:28

## 2024-05-26 ASSESSMENT — PAIN - FUNCTIONAL ASSESSMENT: PAIN_FUNCTIONAL_ASSESSMENT: 0-10

## 2024-05-26 ASSESSMENT — PAIN DESCRIPTION - LOCATION: LOCATION: CHEST

## 2024-05-26 ASSESSMENT — LIFESTYLE VARIABLES
EVER HAD A DRINK FIRST THING IN THE MORNING TO STEADY YOUR NERVES TO GET RID OF A HANGOVER: NO
HAVE PEOPLE ANNOYED YOU BY CRITICIZING YOUR DRINKING: NO
HAVE YOU EVER FELT YOU SHOULD CUT DOWN ON YOUR DRINKING: NO
EVER FELT BAD OR GUILTY ABOUT YOUR DRINKING: NO
TOTAL SCORE: 0

## 2024-05-26 ASSESSMENT — COLUMBIA-SUICIDE SEVERITY RATING SCALE - C-SSRS
6. HAVE YOU EVER DONE ANYTHING, STARTED TO DO ANYTHING, OR PREPARED TO DO ANYTHING TO END YOUR LIFE?: NO
1. IN THE PAST MONTH, HAVE YOU WISHED YOU WERE DEAD OR WISHED YOU COULD GO TO SLEEP AND NOT WAKE UP?: NO
2. HAVE YOU ACTUALLY HAD ANY THOUGHTS OF KILLING YOURSELF?: NO

## 2024-05-26 ASSESSMENT — PAIN DESCRIPTION - PAIN TYPE: TYPE: ACUTE PAIN

## 2024-05-26 ASSESSMENT — PAIN SCALES - GENERAL: PAINLEVEL_OUTOF10: 5 - MODERATE PAIN

## 2024-05-27 ENCOUNTER — APPOINTMENT (OUTPATIENT)
Dept: RADIOLOGY | Facility: HOSPITAL | Age: 49
End: 2024-05-27
Payer: COMMERCIAL

## 2024-05-27 PROBLEM — I50.9 ACUTE ON CHRONIC CONGESTIVE HEART FAILURE, UNSPECIFIED HEART FAILURE TYPE (MULTI): Status: ACTIVE | Noted: 2024-05-27

## 2024-05-27 PROBLEM — J44.1 COPD WITH ACUTE EXACERBATION (MULTI): Status: ACTIVE | Noted: 2024-05-27

## 2024-05-27 LAB
ALBUMIN SERPL BCP-MCNC: 3.5 G/DL (ref 3.4–5)
ALBUMIN SERPL BCP-MCNC: 4.3 G/DL (ref 3.4–5)
ALP SERPL-CCNC: 87 U/L (ref 33–120)
ALT SERPL W P-5'-P-CCNC: 14 U/L (ref 10–52)
AMPHETAMINES UR QL SCN: ABNORMAL
ANION GAP BLDV CALCULATED.4IONS-SCNC: -1 MMOL/L (ref 10–25)
ANION GAP BLDV CALCULATED.4IONS-SCNC: -1 MMOL/L (ref 10–25)
ANION GAP BLDV CALCULATED.4IONS-SCNC: -2 MMOL/L (ref 10–25)
ANION GAP BLDV CALCULATED.4IONS-SCNC: 0 MMOL/L (ref 10–25)
ANION GAP BLDV CALCULATED.4IONS-SCNC: 0 MMOL/L (ref 10–25)
ANION GAP BLDV CALCULATED.4IONS-SCNC: 1 MMOL/L (ref 10–25)
ANION GAP BLDV CALCULATED.4IONS-SCNC: 1 MMOL/L (ref 10–25)
ANION GAP BLDV CALCULATED.4IONS-SCNC: ABNORMAL MMOL/L
ANION GAP SERPL CALC-SCNC: 15 MMOL/L (ref 10–20)
ANION GAP SERPL CALC-SCNC: 16 MMOL/L (ref 10–20)
APPEARANCE UR: ABNORMAL
AST SERPL W P-5'-P-CCNC: 15 U/L (ref 9–39)
BARBITURATES UR QL SCN: ABNORMAL
BASE EXCESS BLDV CALC-SCNC: 13 MMOL/L (ref -2–3)
BASE EXCESS BLDV CALC-SCNC: 13.1 MMOL/L (ref -2–3)
BASE EXCESS BLDV CALC-SCNC: 13.5 MMOL/L (ref -2–3)
BASE EXCESS BLDV CALC-SCNC: 14.8 MMOL/L (ref -2–3)
BASE EXCESS BLDV CALC-SCNC: 15.2 MMOL/L (ref -2–3)
BASE EXCESS BLDV CALC-SCNC: 16.3 MMOL/L (ref -2–3)
BASE EXCESS BLDV CALC-SCNC: 17.4 MMOL/L (ref -2–3)
BASE EXCESS BLDV CALC-SCNC: ABNORMAL MMOL/L
BENZODIAZ UR QL SCN: ABNORMAL
BILIRUB SERPL-MCNC: 0.7 MG/DL (ref 0–1.2)
BILIRUB UR STRIP.AUTO-MCNC: NEGATIVE MG/DL
BODY TEMPERATURE: 37 DEGREES CELSIUS
BUN SERPL-MCNC: 18 MG/DL (ref 6–23)
BUN SERPL-MCNC: 22 MG/DL (ref 6–23)
BZE UR QL SCN: ABNORMAL
CA-I BLDV-SCNC: 0.98 MMOL/L (ref 1.1–1.33)
CA-I BLDV-SCNC: 1.05 MMOL/L (ref 1.1–1.33)
CA-I BLDV-SCNC: 1.06 MMOL/L (ref 1.1–1.33)
CA-I BLDV-SCNC: 1.09 MMOL/L (ref 1.1–1.33)
CA-I BLDV-SCNC: 1.11 MMOL/L (ref 1.1–1.33)
CA-I BLDV-SCNC: 1.13 MMOL/L (ref 1.1–1.33)
CA-I BLDV-SCNC: 1.15 MMOL/L (ref 1.1–1.33)
CA-I BLDV-SCNC: ABNORMAL MMOL/L
CALCIUM SERPL-MCNC: 8.3 MG/DL (ref 8.6–10.6)
CALCIUM SERPL-MCNC: 9.2 MG/DL (ref 8.6–10.6)
CANNABINOIDS UR QL SCN: ABNORMAL
CARDIAC TROPONIN I PNL SERPL HS: 48 NG/L (ref 0–53)
CHLORIDE BLDV-SCNC: 90 MMOL/L (ref 98–107)
CHLORIDE BLDV-SCNC: 91 MMOL/L (ref 98–107)
CHLORIDE BLDV-SCNC: 93 MMOL/L (ref 98–107)
CHLORIDE BLDV-SCNC: 94 MMOL/L (ref 98–107)
CHLORIDE BLDV-SCNC: 96 MMOL/L (ref 98–107)
CHLORIDE BLDV-SCNC: ABNORMAL MMOL/L
CHLORIDE SERPL-SCNC: 90 MMOL/L (ref 98–107)
CHLORIDE SERPL-SCNC: 92 MMOL/L (ref 98–107)
CO2 SERPL-SCNC: 31 MMOL/L (ref 21–32)
CO2 SERPL-SCNC: 37 MMOL/L (ref 21–32)
COLOR UR: YELLOW
CREAT SERPL-MCNC: 1.44 MG/DL (ref 0.5–1.3)
CREAT SERPL-MCNC: 1.52 MG/DL (ref 0.5–1.3)
EGFRCR SERPLBLD CKD-EPI 2021: 55 ML/MIN/1.73M*2
EGFRCR SERPLBLD CKD-EPI 2021: 59 ML/MIN/1.73M*2
ERYTHROCYTE [DISTWIDTH] IN BLOOD BY AUTOMATED COUNT: 16.2 % (ref 11.5–14.5)
FENTANYL+NORFENTANYL UR QL SCN: ABNORMAL
FLUAV RNA RESP QL NAA+PROBE: NOT DETECTED
FLUBV RNA RESP QL NAA+PROBE: NOT DETECTED
GLUCOSE BLD MANUAL STRIP-MCNC: 124 MG/DL (ref 74–99)
GLUCOSE BLD MANUAL STRIP-MCNC: 151 MG/DL (ref 74–99)
GLUCOSE BLD MANUAL STRIP-MCNC: 161 MG/DL (ref 74–99)
GLUCOSE BLDV-MCNC: 178 MG/DL (ref 74–99)
GLUCOSE BLDV-MCNC: 178 MG/DL (ref 74–99)
GLUCOSE BLDV-MCNC: 183 MG/DL (ref 74–99)
GLUCOSE BLDV-MCNC: 187 MG/DL (ref 74–99)
GLUCOSE BLDV-MCNC: 206 MG/DL (ref 74–99)
GLUCOSE BLDV-MCNC: 260 MG/DL (ref 74–99)
GLUCOSE BLDV-MCNC: 307 MG/DL (ref 74–99)
GLUCOSE BLDV-MCNC: ABNORMAL MG/DL
GLUCOSE SERPL-MCNC: 154 MG/DL (ref 74–99)
GLUCOSE SERPL-MCNC: 244 MG/DL (ref 74–99)
GLUCOSE UR STRIP.AUTO-MCNC: NORMAL MG/DL
HCO3 BLDV-SCNC: 42.1 MMOL/L (ref 22–26)
HCO3 BLDV-SCNC: 42.3 MMOL/L (ref 22–26)
HCO3 BLDV-SCNC: 46.9 MMOL/L (ref 22–26)
HCO3 BLDV-SCNC: 47.1 MMOL/L (ref 22–26)
HCO3 BLDV-SCNC: 48 MMOL/L (ref 22–26)
HCO3 BLDV-SCNC: 48.3 MMOL/L (ref 22–26)
HCO3 BLDV-SCNC: 49.4 MMOL/L (ref 22–26)
HCO3 BLDV-SCNC: ABNORMAL MMOL/L
HCT VFR BLD AUTO: 58.1 % (ref 41–52)
HCT VFR BLD EST: 37 % (ref 41–52)
HCT VFR BLD EST: 38 % (ref 41–52)
HCT VFR BLD EST: 45 % (ref 41–52)
HCT VFR BLD EST: 46 % (ref 41–52)
HCT VFR BLD EST: 50 % (ref 41–52)
HCT VFR BLD EST: 51 % (ref 41–52)
HCT VFR BLD EST: 51 % (ref 41–52)
HCT VFR BLD EST: 52 % (ref 41–52)
HGB BLD-MCNC: 16.9 G/DL (ref 13.5–17.5)
HGB BLDV-MCNC: 12.3 G/DL (ref 13.5–17.5)
HGB BLDV-MCNC: 12.6 G/DL (ref 13.5–17.5)
HGB BLDV-MCNC: 15 G/DL (ref 13.5–17.5)
HGB BLDV-MCNC: 15.2 G/DL (ref 13.5–17.5)
HGB BLDV-MCNC: 16.6 G/DL (ref 13.5–17.5)
HGB BLDV-MCNC: 16.9 G/DL (ref 13.5–17.5)
HGB BLDV-MCNC: 17 G/DL (ref 13.5–17.5)
HGB BLDV-MCNC: 17.3 G/DL (ref 13.5–17.5)
HYALINE CASTS #/AREA URNS AUTO: ABNORMAL /LPF
INHALED O2 CONCENTRATION: 50 %
INHALED O2 CONCENTRATION: 60 %
INHALED O2 CONCENTRATION: 60 %
INHALED O2 CONCENTRATION: 68 %
INHALED O2 CONCENTRATION: 70 %
INHALED O2 CONCENTRATION: 80 %
KETONES UR STRIP.AUTO-MCNC: NEGATIVE MG/DL
LACTATE BLDV-SCNC: 1 MMOL/L (ref 0.4–2)
LACTATE BLDV-SCNC: 1.1 MMOL/L (ref 0.4–2)
LACTATE BLDV-SCNC: 1.1 MMOL/L (ref 0.4–2)
LACTATE BLDV-SCNC: 1.2 MMOL/L (ref 0.4–2)
LACTATE BLDV-SCNC: 1.9 MMOL/L (ref 0.4–2)
LACTATE BLDV-SCNC: 2.6 MMOL/L (ref 0.4–2)
LACTATE BLDV-SCNC: 3 MMOL/L (ref 0.4–2)
LACTATE BLDV-SCNC: ABNORMAL MMOL/L
LEUKOCYTE ESTERASE UR QL STRIP.AUTO: NEGATIVE
MAGNESIUM SERPL-MCNC: 2.44 MG/DL (ref 1.6–2.4)
MAGNESIUM SERPL-MCNC: 2.49 MG/DL (ref 1.6–2.4)
MCH RBC QN AUTO: 30.8 PG (ref 26–34)
MCHC RBC AUTO-ENTMCNC: 29.1 G/DL (ref 32–36)
MCV RBC AUTO: 106 FL (ref 80–100)
METHADONE UR QL SCN: ABNORMAL
MUCOUS THREADS #/AREA URNS AUTO: ABNORMAL /LPF
NITRITE UR QL STRIP.AUTO: NEGATIVE
NRBC BLD-RTO: 0.8 /100 WBCS (ref 0–0)
OPIATES UR QL SCN: ABNORMAL
OXYCODONE+OXYMORPHONE UR QL SCN: ABNORMAL
OXYHGB MFR BLDV: 50 % (ref 45–75)
OXYHGB MFR BLDV: 59.9 % (ref 45–75)
OXYHGB MFR BLDV: 64.6 % (ref 45–75)
OXYHGB MFR BLDV: 66.3 % (ref 45–75)
OXYHGB MFR BLDV: 72.7 % (ref 45–75)
OXYHGB MFR BLDV: 74.4 % (ref 45–75)
OXYHGB MFR BLDV: 79.5 % (ref 45–75)
OXYHGB MFR BLDV: 88.4 % (ref 45–75)
PCO2 BLDV: 110 MM HG (ref 41–51)
PCO2 BLDV: 118 MM HG (ref 41–51)
PCO2 BLDV: 118 MM HG (ref 41–51)
PCO2 BLDV: 120 MM HG (ref 41–51)
PCO2 BLDV: 53 MM HG (ref 41–51)
PCO2 BLDV: 62 MM HG (ref 41–51)
PCO2 BLDV: 83 MM HG (ref 41–51)
PCO2 BLDV: ABNORMAL MM[HG]
PCP UR QL SCN: ABNORMAL
PH BLDV: 7.21 PH (ref 7.33–7.43)
PH BLDV: 7.22 PH (ref 7.33–7.43)
PH BLDV: 7.23 PH (ref 7.33–7.43)
PH BLDV: 7.24 PH (ref 7.33–7.43)
PH BLDV: 7.36 PH (ref 7.33–7.43)
PH BLDV: 7.44 PH (ref 7.33–7.43)
PH BLDV: 7.51 PH (ref 7.33–7.43)
PH BLDV: ABNORMAL [PH]
PH UR STRIP.AUTO: 5.5 [PH]
PHOSPHATE SERPL-MCNC: 2 MG/DL (ref 2.5–4.9)
PLATELET # BLD AUTO: 195 X10*3/UL (ref 150–450)
PO2 BLDV: 32 MM HG (ref 35–45)
PO2 BLDV: 42 MM HG (ref 35–45)
PO2 BLDV: 46 MM HG (ref 35–45)
PO2 BLDV: 47 MM HG (ref 35–45)
PO2 BLDV: 54 MM HG (ref 35–45)
PO2 BLDV: 55 MM HG (ref 35–45)
PO2 BLDV: 56 MM HG (ref 35–45)
PO2 BLDV: ABNORMAL MM[HG]
POTASSIUM BLDV-SCNC: 3.2 MMOL/L (ref 3.5–5.3)
POTASSIUM BLDV-SCNC: 3.4 MMOL/L (ref 3.5–5.3)
POTASSIUM BLDV-SCNC: 4.3 MMOL/L (ref 3.5–5.3)
POTASSIUM BLDV-SCNC: 4.3 MMOL/L (ref 3.5–5.3)
POTASSIUM BLDV-SCNC: 4.5 MMOL/L (ref 3.5–5.3)
POTASSIUM BLDV-SCNC: 4.5 MMOL/L (ref 3.5–5.3)
POTASSIUM BLDV-SCNC: 4.9 MMOL/L (ref 3.5–5.3)
POTASSIUM BLDV-SCNC: ABNORMAL MMOL/L
POTASSIUM SERPL-SCNC: 3.5 MMOL/L (ref 3.5–5.3)
POTASSIUM SERPL-SCNC: 4.1 MMOL/L (ref 3.5–5.3)
PROT SERPL-MCNC: 8.8 G/DL (ref 6.4–8.2)
PROT UR STRIP.AUTO-MCNC: ABNORMAL MG/DL
RBC # BLD AUTO: 5.48 X10*6/UL (ref 4.5–5.9)
RBC # UR STRIP.AUTO: ABNORMAL /UL
RBC #/AREA URNS AUTO: >20 /HPF
SAO2 % BLDV: 46 % (ref 45–75)
SAO2 % BLDV: 61 % (ref 45–75)
SAO2 % BLDV: 66 % (ref 45–75)
SAO2 % BLDV: 68 % (ref 45–75)
SAO2 % BLDV: 81 % (ref 45–75)
SAO2 % BLDV: 83 % (ref 45–75)
SAO2 % BLDV: 86 % (ref 45–75)
SAO2 % BLDV: 92 % (ref 45–75)
SARS-COV-2 RNA RESP QL NAA+PROBE: NOT DETECTED
SODIUM BLDV-SCNC: 130 MMOL/L (ref 136–145)
SODIUM BLDV-SCNC: 130 MMOL/L (ref 136–145)
SODIUM BLDV-SCNC: 136 MMOL/L (ref 136–145)
SODIUM BLDV-SCNC: 136 MMOL/L (ref 136–145)
SODIUM BLDV-SCNC: 137 MMOL/L (ref 136–145)
SODIUM BLDV-SCNC: 137 MMOL/L (ref 136–145)
SODIUM BLDV-SCNC: 139 MMOL/L (ref 136–145)
SODIUM BLDV-SCNC: ABNORMAL MMOL/L
SODIUM SERPL-SCNC: 132 MMOL/L (ref 136–145)
SODIUM SERPL-SCNC: 141 MMOL/L (ref 136–145)
SP GR UR STRIP.AUTO: 1.02
UROBILINOGEN UR STRIP.AUTO-MCNC: NORMAL MG/DL
WBC # BLD AUTO: 6 X10*3/UL (ref 4.4–11.3)
WBC #/AREA URNS AUTO: ABNORMAL /HPF

## 2024-05-27 PROCEDURE — 82947 ASSAY GLUCOSE BLOOD QUANT: CPT

## 2024-05-27 PROCEDURE — 87635 SARS-COV-2 COVID-19 AMP PRB: CPT

## 2024-05-27 PROCEDURE — 82330 ASSAY OF CALCIUM: CPT

## 2024-05-27 PROCEDURE — A4217 STERILE WATER/SALINE, 500 ML: HCPCS

## 2024-05-27 PROCEDURE — 84132 ASSAY OF SERUM POTASSIUM: CPT

## 2024-05-27 PROCEDURE — 83735 ASSAY OF MAGNESIUM: CPT

## 2024-05-27 PROCEDURE — 2500000001 HC RX 250 WO HCPCS SELF ADMINISTERED DRUGS (ALT 637 FOR MEDICARE OP)

## 2024-05-27 PROCEDURE — 94640 AIRWAY INHALATION TREATMENT: CPT

## 2024-05-27 PROCEDURE — 36415 COLL VENOUS BLD VENIPUNCTURE: CPT

## 2024-05-27 PROCEDURE — 87449 NOS EACH ORGANISM AG IA: CPT

## 2024-05-27 PROCEDURE — 82805 BLOOD GASES W/O2 SATURATION: CPT | Performed by: STUDENT IN AN ORGANIZED HEALTH CARE EDUCATION/TRAINING PROGRAM

## 2024-05-27 PROCEDURE — 2500000002 HC RX 250 W HCPCS SELF ADMINISTERED DRUGS (ALT 637 FOR MEDICARE OP, ALT 636 FOR OP/ED)

## 2024-05-27 PROCEDURE — 99291 CRITICAL CARE FIRST HOUR: CPT

## 2024-05-27 PROCEDURE — 84295 ASSAY OF SERUM SODIUM: CPT

## 2024-05-27 PROCEDURE — 2500000005 HC RX 250 GENERAL PHARMACY W/O HCPCS: Performed by: EMERGENCY MEDICINE

## 2024-05-27 PROCEDURE — 2500000005 HC RX 250 GENERAL PHARMACY W/O HCPCS

## 2024-05-27 PROCEDURE — 80307 DRUG TEST PRSMV CHEM ANLYZR: CPT

## 2024-05-27 PROCEDURE — 81001 URINALYSIS AUTO W/SCOPE: CPT

## 2024-05-27 PROCEDURE — 2500000004 HC RX 250 GENERAL PHARMACY W/ HCPCS (ALT 636 FOR OP/ED)

## 2024-05-27 PROCEDURE — 83605 ASSAY OF LACTIC ACID: CPT

## 2024-05-27 PROCEDURE — 94002 VENT MGMT INPAT INIT DAY: CPT

## 2024-05-27 PROCEDURE — 74018 RADEX ABDOMEN 1 VIEW: CPT

## 2024-05-27 PROCEDURE — 71045 X-RAY EXAM CHEST 1 VIEW: CPT | Performed by: RADIOLOGY

## 2024-05-27 PROCEDURE — 2500000004 HC RX 250 GENERAL PHARMACY W/ HCPCS (ALT 636 FOR OP/ED): Performed by: STUDENT IN AN ORGANIZED HEALTH CARE EDUCATION/TRAINING PROGRAM

## 2024-05-27 PROCEDURE — 71045 X-RAY EXAM CHEST 1 VIEW: CPT

## 2024-05-27 PROCEDURE — 87081 CULTURE SCREEN ONLY: CPT

## 2024-05-27 PROCEDURE — 87040 BLOOD CULTURE FOR BACTERIA: CPT

## 2024-05-27 PROCEDURE — 85018 HEMOGLOBIN: CPT

## 2024-05-27 PROCEDURE — 5A1945Z RESPIRATORY VENTILATION, 24-96 CONSECUTIVE HOURS: ICD-10-PCS | Performed by: INTERNAL MEDICINE

## 2024-05-27 PROCEDURE — 87632 RESP VIRUS 6-11 TARGETS: CPT

## 2024-05-27 PROCEDURE — 2500000005 HC RX 250 GENERAL PHARMACY W/O HCPCS: Performed by: STUDENT IN AN ORGANIZED HEALTH CARE EDUCATION/TRAINING PROGRAM

## 2024-05-27 PROCEDURE — 82435 ASSAY OF BLOOD CHLORIDE: CPT

## 2024-05-27 PROCEDURE — 1200000002 HC GENERAL ROOM WITH TELEMETRY DAILY

## 2024-05-27 PROCEDURE — 85027 COMPLETE CBC AUTOMATED: CPT

## 2024-05-27 PROCEDURE — 94799 UNLISTED PULMONARY SVC/PX: CPT

## 2024-05-27 PROCEDURE — 94660 CPAP INITIATION&MGMT: CPT

## 2024-05-27 PROCEDURE — C9113 INJ PANTOPRAZOLE SODIUM, VIA: HCPCS

## 2024-05-27 PROCEDURE — 82435 ASSAY OF BLOOD CHLORIDE: CPT | Performed by: STUDENT IN AN ORGANIZED HEALTH CARE EDUCATION/TRAINING PROGRAM

## 2024-05-27 PROCEDURE — 0BH17EZ INSERTION OF ENDOTRACHEAL AIRWAY INTO TRACHEA, VIA NATURAL OR ARTIFICIAL OPENING: ICD-10-PCS | Performed by: INTERNAL MEDICINE

## 2024-05-27 RX ORDER — POTASSIUM CHLORIDE 1.5 G/1.58G
40 POWDER, FOR SOLUTION ORAL ONCE
Status: COMPLETED | OUTPATIENT
Start: 2024-05-27 | End: 2024-05-27

## 2024-05-27 RX ORDER — ENOXAPARIN SODIUM 100 MG/ML
60 INJECTION SUBCUTANEOUS EVERY 12 HOURS
Status: DISCONTINUED | OUTPATIENT
Start: 2024-05-27 | End: 2024-05-27

## 2024-05-27 RX ORDER — FENTANYL CITRATE-0.9 % NACL/PF 10 MCG/ML
25-200 PLASTIC BAG, INJECTION (ML) INTRAVENOUS CONTINUOUS
Status: DISCONTINUED | OUTPATIENT
Start: 2024-05-27 | End: 2024-05-28

## 2024-05-27 RX ORDER — PROPOFOL 10 MG/ML
INJECTION, EMULSION INTRAVENOUS
Status: COMPLETED
Start: 2024-05-27 | End: 2024-05-27

## 2024-05-27 RX ORDER — METOPROLOL SUCCINATE 25 MG/1
25 TABLET, EXTENDED RELEASE ORAL DAILY
Status: DISCONTINUED | OUTPATIENT
Start: 2024-05-27 | End: 2024-05-31

## 2024-05-27 RX ORDER — AZITHROMYCIN 500 MG/1
500 TABLET, FILM COATED ORAL
Status: DISCONTINUED | OUTPATIENT
Start: 2024-05-27 | End: 2024-05-28

## 2024-05-27 RX ORDER — ALBUTEROL SULFATE 0.83 MG/ML
SOLUTION RESPIRATORY (INHALATION)
Status: COMPLETED
Start: 2024-05-27 | End: 2024-05-27

## 2024-05-27 RX ORDER — IPRATROPIUM BROMIDE AND ALBUTEROL SULFATE 2.5; .5 MG/3ML; MG/3ML
3 SOLUTION RESPIRATORY (INHALATION)
Status: DISCONTINUED | OUTPATIENT
Start: 2024-05-27 | End: 2024-05-27

## 2024-05-27 RX ORDER — PANTOPRAZOLE SODIUM 40 MG/10ML
40 INJECTION, POWDER, LYOPHILIZED, FOR SOLUTION INTRAVENOUS DAILY
Status: DISCONTINUED | OUTPATIENT
Start: 2024-05-27 | End: 2024-06-02

## 2024-05-27 RX ORDER — ATORVASTATIN CALCIUM 40 MG/1
40 TABLET, FILM COATED ORAL NIGHTLY
Status: DISCONTINUED | OUTPATIENT
Start: 2024-05-27 | End: 2024-06-02

## 2024-05-27 RX ORDER — SODIUM BICARBONATE 1 MEQ/ML
SYRINGE (ML) INTRAVENOUS
Status: COMPLETED
Start: 2024-05-27 | End: 2024-05-27

## 2024-05-27 RX ORDER — FLUTICASONE FUROATE AND VILANTEROL 200; 25 UG/1; UG/1
1 POWDER RESPIRATORY (INHALATION) ONCE
Status: DISCONTINUED | OUTPATIENT
Start: 2024-05-27 | End: 2024-05-27

## 2024-05-27 RX ORDER — PROPOFOL 10 MG/ML
0-50 INJECTION, EMULSION INTRAVENOUS CONTINUOUS
Status: DISCONTINUED | OUTPATIENT
Start: 2024-05-27 | End: 2024-05-28

## 2024-05-27 RX ORDER — SERTRALINE HYDROCHLORIDE 25 MG/1
25 TABLET, FILM COATED ORAL DAILY
Status: DISCONTINUED | OUTPATIENT
Start: 2024-05-27 | End: 2024-05-27

## 2024-05-27 RX ORDER — PREDNISONE 10 MG/1
40 TABLET ORAL DAILY
Status: DISCONTINUED | OUTPATIENT
Start: 2024-05-27 | End: 2024-05-27

## 2024-05-27 RX ORDER — METOLAZONE 5 MG/1
5 TABLET ORAL DAILY
Status: DISCONTINUED | OUTPATIENT
Start: 2024-05-27 | End: 2024-05-28

## 2024-05-27 RX ORDER — ALBUTEROL SULFATE 90 UG/1
2 AEROSOL, METERED RESPIRATORY (INHALATION) EVERY 4 HOURS PRN
Status: DISCONTINUED | OUTPATIENT
Start: 2024-05-27 | End: 2024-06-02

## 2024-05-27 RX ORDER — ACETAMINOPHEN 325 MG/1
650 TABLET ORAL EVERY 6 HOURS PRN
Status: DISCONTINUED | OUTPATIENT
Start: 2024-05-27 | End: 2024-05-27

## 2024-05-27 RX ORDER — ROCURONIUM BROMIDE 10 MG/ML
100 INJECTION, SOLUTION INTRAVENOUS ONCE
Status: COMPLETED | OUTPATIENT
Start: 2024-05-27 | End: 2024-05-27

## 2024-05-27 RX ORDER — ALBUTEROL SULFATE 0.83 MG/ML
15 SOLUTION RESPIRATORY (INHALATION) ONCE
Status: COMPLETED | OUTPATIENT
Start: 2024-05-27 | End: 2024-05-27

## 2024-05-27 RX ORDER — HEPARIN SODIUM 5000 [USP'U]/ML
7500 INJECTION, SOLUTION INTRAVENOUS; SUBCUTANEOUS EVERY 8 HOURS SCHEDULED
Status: DISCONTINUED | OUTPATIENT
Start: 2024-05-27 | End: 2024-06-02

## 2024-05-27 RX ORDER — FUROSEMIDE 10 MG/ML
100 INJECTION INTRAMUSCULAR; INTRAVENOUS ONCE
Status: DISCONTINUED | OUTPATIENT
Start: 2024-05-27 | End: 2024-05-27

## 2024-05-27 RX ORDER — PHENYLEPHRINE HCL IN 0.9% NACL 0.4MG/10ML
SYRINGE (ML) INTRAVENOUS
Status: COMPLETED
Start: 2024-05-27 | End: 2024-05-27

## 2024-05-27 RX ORDER — POLYETHYLENE GLYCOL 3350 17 G/17G
17 POWDER, FOR SOLUTION ORAL DAILY
Status: DISCONTINUED | OUTPATIENT
Start: 2024-05-27 | End: 2024-05-27

## 2024-05-27 RX ORDER — ASPIRIN 81 MG/1
81 TABLET ORAL DAILY
Status: DISCONTINUED | OUTPATIENT
Start: 2024-05-27 | End: 2024-06-02

## 2024-05-27 RX ORDER — ATORVASTATIN CALCIUM 20 MG/1
10 TABLET, FILM COATED ORAL NIGHTLY
Status: DISCONTINUED | OUTPATIENT
Start: 2024-05-27 | End: 2024-05-27

## 2024-05-27 RX ORDER — VANCOMYCIN HYDROCHLORIDE 1 G/20ML
INJECTION, POWDER, LYOPHILIZED, FOR SOLUTION INTRAVENOUS DAILY PRN
Status: DISCONTINUED | OUTPATIENT
Start: 2024-05-27 | End: 2024-05-28

## 2024-05-27 RX ORDER — ALBUTEROL SULFATE 0.83 MG/ML
15 SOLUTION RESPIRATORY (INHALATION) ONCE
Status: DISCONTINUED | OUTPATIENT
Start: 2024-05-27 | End: 2024-05-27

## 2024-05-27 RX ADMIN — ATORVASTATIN CALCIUM 40 MG: 40 TABLET, FILM COATED ORAL at 20:11

## 2024-05-27 RX ADMIN — PIPERACILLIN SODIUM AND TAZOBACTAM SODIUM 3.38 G: 3; .375 INJECTION, SOLUTION INTRAVENOUS at 23:35

## 2024-05-27 RX ADMIN — IPRATROPIUM BROMIDE AND ALBUTEROL SULFATE 3 ML: .5; 3 SOLUTION RESPIRATORY (INHALATION) at 07:05

## 2024-05-27 RX ADMIN — PROPOFOL 10 MCG/KG/MIN: 10 INJECTION, EMULSION INTRAVENOUS at 08:55

## 2024-05-27 RX ADMIN — Medication 12 L/MIN: at 04:25

## 2024-05-27 RX ADMIN — Medication 70 PERCENT: at 09:54

## 2024-05-27 RX ADMIN — HEPARIN SODIUM 7500 UNITS: 5000 INJECTION INTRAVENOUS; SUBCUTANEOUS at 16:30

## 2024-05-27 RX ADMIN — PROPOFOL 50 MCG/KG/MIN: 10 INJECTION, EMULSION INTRAVENOUS at 10:37

## 2024-05-27 RX ADMIN — POTASSIUM CHLORIDE 40 MEQ: 1.5 POWDER, FOR SOLUTION ORAL at 20:11

## 2024-05-27 RX ADMIN — ALBUTEROL SULFATE 15 MG: 2.5 SOLUTION RESPIRATORY (INHALATION) at 05:37

## 2024-05-27 RX ADMIN — Medication 200 MG: at 08:30

## 2024-05-27 RX ADMIN — VANCOMYCIN HYDROCHLORIDE 2000 MG: 5 INJECTION, POWDER, LYOPHILIZED, FOR SOLUTION INTRAVENOUS at 13:12

## 2024-05-27 RX ADMIN — PROPOFOL 40.02 MCG/KG/MIN: 10 INJECTION, EMULSION INTRAVENOUS at 19:49

## 2024-05-27 RX ADMIN — PROPOFOL 50 MCG/KG/MIN: 10 INJECTION, EMULSION INTRAVENOUS at 13:05

## 2024-05-27 RX ADMIN — Medication 50 L/MIN: at 16:31

## 2024-05-27 RX ADMIN — PANTOPRAZOLE SODIUM 40 MG: 40 INJECTION, POWDER, FOR SOLUTION INTRAVENOUS at 14:53

## 2024-05-27 RX ADMIN — PIPERACILLIN SODIUM AND TAZOBACTAM SODIUM 3.38 G: 3; .375 INJECTION, SOLUTION INTRAVENOUS at 17:34

## 2024-05-27 RX ADMIN — FUROSEMIDE 10 MG/HR: 10 INJECTION, SOLUTION INTRAMUSCULAR; INTRAVENOUS at 14:53

## 2024-05-27 RX ADMIN — PROPOFOL 40 MCG/KG/MIN: 10 INJECTION, EMULSION INTRAVENOUS at 14:53

## 2024-05-27 RX ADMIN — SODIUM BICARBONATE 50 MEQ: 84 INJECTION INTRAVENOUS at 08:31

## 2024-05-27 RX ADMIN — METOLAZONE 5 MG: 5 TABLET ORAL at 16:11

## 2024-05-27 RX ADMIN — ALBUTEROL SULFATE 15 MG: 0.83 SOLUTION RESPIRATORY (INHALATION) at 05:37

## 2024-05-27 RX ADMIN — Medication 60 PERCENT: at 14:10

## 2024-05-27 RX ADMIN — ROCURONIUM BROMIDE 100 MG: 10 INJECTION INTRAVENOUS at 08:31

## 2024-05-27 RX ADMIN — PROPOFOL 40.02 MCG/KG/MIN: 10 INJECTION, EMULSION INTRAVENOUS at 22:25

## 2024-05-27 RX ADMIN — HEPARIN SODIUM 7500 UNITS: 5000 INJECTION INTRAVENOUS; SUBCUTANEOUS at 20:11

## 2024-05-27 RX ADMIN — PROPOFOL 40 MCG/KG/MIN: 10 INJECTION, EMULSION INTRAVENOUS at 17:34

## 2024-05-27 RX ADMIN — PIPERACILLIN SODIUM AND TAZOBACTAM SODIUM 3.38 G: 3; .375 INJECTION, SOLUTION INTRAVENOUS at 12:37

## 2024-05-27 RX ADMIN — Medication 100 MCG/HR: at 16:54

## 2024-05-27 SDOH — ECONOMIC STABILITY: INCOME INSECURITY

## 2024-05-27 SDOH — SOCIAL STABILITY: SOCIAL NETWORK: IN A TYPICAL WEEK, HOW MANY TIMES DO YOU TALK ON THE PHONE WITH FAMILY, FRIENDS, OR NEIGHBORS?: PATIENT UNABLE TO ANSWER

## 2024-05-27 SDOH — HEALTH STABILITY: PHYSICAL HEALTH

## 2024-05-27 SDOH — HEALTH STABILITY: PHYSICAL HEALTH: ON AVERAGE, HOW MANY MINUTES DO YOU ENGAGE IN EXERCISE AT THIS LEVEL?: PATIENT UNABLE TO ANSWER

## 2024-05-27 SDOH — SOCIAL STABILITY: SOCIAL NETWORK
DO YOU BELONG TO ANY CLUBS OR ORGANIZATIONS SUCH AS CHURCH GROUPS UNIONS, FRATERNAL OR ATHLETIC GROUPS, OR SCHOOL GROUPS?: PATIENT UNABLE TO ANSWER

## 2024-05-27 SDOH — ECONOMIC STABILITY: TRANSPORTATION INSECURITY

## 2024-05-27 SDOH — ECONOMIC STABILITY: INCOME INSECURITY
IN THE LAST 12 MONTHS, WAS THERE A TIME WHEN YOU WERE NOT ABLE TO PAY THE MORTGAGE OR RENT ON TIME?: PATIENT UNABLE TO ANSWER

## 2024-05-27 SDOH — ECONOMIC STABILITY: INCOME INSECURITY
HOW HARD IS IT FOR YOU TO PAY FOR THE VERY BASICS LIKE FOOD, HOUSING, MEDICAL CARE, AND HEATING?: PATIENT UNABLE TO ANSWER

## 2024-05-27 SDOH — SOCIAL STABILITY: SOCIAL INSECURITY: WITHIN THE LAST YEAR, HAVE YOU BEEN AFRAID OF YOUR PARTNER OR EX-PARTNER?: PATIENT UNABLE TO ANSWER

## 2024-05-27 SDOH — SOCIAL STABILITY: SOCIAL INSECURITY: HAS ANYONE EVER THREATENED TO HURT YOUR FAMILY OR YOUR PETS?: UNABLE TO ASSESS

## 2024-05-27 SDOH — SOCIAL STABILITY: SOCIAL INSECURITY
WITHIN THE LAST YEAR, HAVE YOU BEEN KICKED, HIT, SLAPPED, OR OTHERWISE PHYSICALLY HURT BY YOUR PARTNER OR EX-PARTNER?: PATIENT UNABLE TO ANSWER

## 2024-05-27 SDOH — ECONOMIC STABILITY: HOUSING INSECURITY

## 2024-05-27 SDOH — SOCIAL STABILITY: SOCIAL INSECURITY: ABUSE: ADULT

## 2024-05-27 SDOH — SOCIAL STABILITY: SOCIAL INSECURITY: DOES ANYONE TRY TO KEEP YOU FROM HAVING/CONTACTING OTHER FRIENDS OR DOING THINGS OUTSIDE YOUR HOME?: UNABLE TO ASSESS

## 2024-05-27 SDOH — ECONOMIC STABILITY: TRANSPORTATION INSECURITY
IN THE PAST 12 MONTHS, HAS LACK OF TRANSPORTATION KEPT YOU FROM MEETINGS, WORK, OR FROM GETTING THINGS NEEDED FOR DAILY LIVING?: PATIENT UNABLE TO ANSWER

## 2024-05-27 SDOH — SOCIAL STABILITY: SOCIAL INSECURITY: DO YOU FEEL ANYONE HAS EXPLOITED OR TAKEN ADVANTAGE OF YOU FINANCIALLY OR OF YOUR PERSONAL PROPERTY?: UNABLE TO ASSESS

## 2024-05-27 SDOH — SOCIAL STABILITY: SOCIAL INSECURITY
WITHIN THE LAST YEAR, HAVE YOU BEEN HUMILIATED OR EMOTIONALLY ABUSED IN OTHER WAYS BY YOUR PARTNER OR EX-PARTNER?: PATIENT UNABLE TO ANSWER

## 2024-05-27 SDOH — HEALTH STABILITY: MENTAL HEALTH
HOW OFTEN DO YOU NEED TO HAVE SOMEONE HELP YOU WHEN YOU READ INSTRUCTIONS, PAMPHLETS, OR OTHER WRITTEN MATERIAL FROM YOUR DOCTOR OR PHARMACY?: PATIENT UNABLE TO RESPOND

## 2024-05-27 SDOH — HEALTH STABILITY: MENTAL HEALTH
STRESS IS WHEN SOMEONE FEELS TENSE, NERVOUS, ANXIOUS, OR CAN'T SLEEP AT NIGHT BECAUSE THEIR MIND IS TROUBLED. HOW STRESSED ARE YOU?: PATIENT UNABLE TO ANSWER

## 2024-05-27 SDOH — SOCIAL STABILITY: SOCIAL INSECURITY
WITHIN THE LAST YEAR, HAVE TO BEEN RAPED OR FORCED TO HAVE ANY KIND OF SEXUAL ACTIVITY BY YOUR PARTNER OR EX-PARTNER?: PATIENT UNABLE TO ANSWER

## 2024-05-27 SDOH — SOCIAL STABILITY: SOCIAL INSECURITY: ARE THERE ANY APPARENT SIGNS OF INJURIES/BEHAVIORS THAT COULD BE RELATED TO ABUSE/NEGLECT?: UNABLE TO ASSESS

## 2024-05-27 SDOH — ECONOMIC STABILITY: HOUSING INSECURITY: IN THE LAST 12 MONTHS, HOW MANY PLACES HAVE YOU LIVED?: 1

## 2024-05-27 SDOH — SOCIAL STABILITY: SOCIAL NETWORK: HOW OFTEN DO YOU ATTEND CHURCH OR RELIGIOUS SERVICES?: PATIENT UNABLE TO ANSWER

## 2024-05-27 SDOH — SOCIAL STABILITY: SOCIAL NETWORK: HOW OFTEN DO YOU GET TOGETHER WITH FRIENDS OR RELATIVES?: PATIENT UNABLE TO ANSWER

## 2024-05-27 SDOH — ECONOMIC STABILITY: HOUSING INSECURITY
IN THE LAST 12 MONTHS, WAS THERE A TIME WHEN YOU DID NOT HAVE A STEADY PLACE TO SLEEP OR SLEPT IN A SHELTER (INCLUDING NOW)?: PATIENT UNABLE TO ANSWER

## 2024-05-27 SDOH — SOCIAL STABILITY: SOCIAL INSECURITY: DO YOU FEEL UNSAFE GOING BACK TO THE PLACE WHERE YOU ARE LIVING?: UNABLE TO ASSESS

## 2024-05-27 SDOH — SOCIAL STABILITY: SOCIAL INSECURITY

## 2024-05-27 SDOH — SOCIAL STABILITY: SOCIAL INSECURITY: HAVE YOU HAD ANY THOUGHTS OF HARMING ANYONE ELSE?: UNABLE TO ASSESS

## 2024-05-27 SDOH — SOCIAL STABILITY: SOCIAL NETWORK: HOW OFTEN DO YOU ATTENT MEETINGS OF THE CLUB OR ORGANIZATION YOU BELONG TO?: PATIENT UNABLE TO ANSWER

## 2024-05-27 SDOH — ECONOMIC STABILITY: FOOD INSECURITY
WITHIN THE PAST 12 MONTHS, THE FOOD YOU BOUGHT JUST DIDN'T LAST AND YOU DIDN'T HAVE MONEY TO GET MORE.: PATIENT UNABLE TO ANSWER

## 2024-05-27 SDOH — SOCIAL STABILITY: SOCIAL NETWORK: ARE YOU MARRIED, WIDOWED, DIVORCED, SEPARATED, NEVER MARRIED, OR LIVING WITH A PARTNER?: PATIENT UNABLE TO ANSWER

## 2024-05-27 SDOH — ECONOMIC STABILITY: INCOME INSECURITY
IN THE PAST 12 MONTHS, HAS THE ELECTRIC, GAS, OIL, OR WATER COMPANY THREATENED TO SHUT OFF SERVICE IN YOUR HOME?: PATIENT UNABLE TO ANSWER

## 2024-05-27 SDOH — HEALTH STABILITY: MENTAL HEALTH: HOW OFTEN DO YOU HAVE A DRINK CONTAINING ALCOHOL?: PATIENT UNABLE TO ANSWER

## 2024-05-27 SDOH — ECONOMIC STABILITY: TRANSPORTATION INSECURITY
IN THE PAST 12 MONTHS, HAS THE LACK OF TRANSPORTATION KEPT YOU FROM MEDICAL APPOINTMENTS OR FROM GETTING MEDICATIONS?: PATIENT UNABLE TO ANSWER

## 2024-05-27 SDOH — ECONOMIC STABILITY: FOOD INSECURITY
WITHIN THE PAST 12 MONTHS, YOU WORRIED THAT YOUR FOOD WOULD RUN OUT BEFORE YOU GOT MONEY TO BUY MORE.: PATIENT UNABLE TO ANSWER

## 2024-05-27 SDOH — HEALTH STABILITY: PHYSICAL HEALTH
ON AVERAGE, HOW MANY DAYS PER WEEK DO YOU ENGAGE IN MODERATE TO STRENUOUS EXERCISE (LIKE A BRISK WALK)?: PATIENT UNABLE TO ANSWER

## 2024-05-27 SDOH — HEALTH STABILITY: MENTAL HEALTH: HOW MANY STANDARD DRINKS CONTAINING ALCOHOL DO YOU HAVE ON A TYPICAL DAY?: PATIENT UNABLE TO ANSWER

## 2024-05-27 SDOH — SOCIAL STABILITY: SOCIAL INSECURITY: WERE YOU ABLE TO COMPLETE ALL THE BEHAVIORAL HEALTH SCREENINGS?: NO

## 2024-05-27 SDOH — SOCIAL STABILITY: SOCIAL INSECURITY: ARE YOU OR HAVE YOU BEEN THREATENED OR ABUSED PHYSICALLY, EMOTIONALLY, OR SEXUALLY BY ANYONE?: UNABLE TO ASSESS

## 2024-05-27 ASSESSMENT — ACTIVITIES OF DAILY LIVING (ADL)
TOILETING: UNABLE TO ASSESS
HEARING - LEFT EAR: UNABLE TO ASSESS
BATHING: UNABLE TO ASSESS
HEARING - LEFT EAR: UNABLE TO ASSESS
WALKS IN HOME: UNABLE TO ASSESS
PATIENT'S MEMORY ADEQUATE TO SAFELY COMPLETE DAILY ACTIVITIES?: UNABLE TO ASSESS
FEEDING YOURSELF: UNABLE TO ASSESS
ADEQUATE_TO_COMPLETE_ADL: UNABLE TO ASSESS
BATHING: UNABLE TO ASSESS
JUDGMENT_ADEQUATE_SAFELY_COMPLETE_DAILY_ACTIVITIES: UNABLE TO ASSESS
JUDGMENT_ADEQUATE_SAFELY_COMPLETE_DAILY_ACTIVITIES: UNABLE TO ASSESS
TOILETING: UNABLE TO ASSESS
FEEDING YOURSELF: UNABLE TO ASSESS
GROOMING: UNABLE TO ASSESS
WALKS IN HOME: UNABLE TO ASSESS
HEARING - RIGHT EAR: UNABLE TO ASSESS
DRESSING YOURSELF: UNABLE TO ASSESS
DRESSING YOURSELF: UNABLE TO ASSESS
HEARING - RIGHT EAR: UNABLE TO ASSESS
GROOMING: UNABLE TO ASSESS
PATIENT'S MEMORY ADEQUATE TO SAFELY COMPLETE DAILY ACTIVITIES?: UNABLE TO ASSESS
ADEQUATE_TO_COMPLETE_ADL: UNABLE TO ASSESS

## 2024-05-27 ASSESSMENT — COGNITIVE AND FUNCTIONAL STATUS - GENERAL: PATIENT BASELINE BEDBOUND: UNABLE TO ASSESS AT THIS TIME

## 2024-05-27 ASSESSMENT — PAIN - FUNCTIONAL ASSESSMENT
PAIN_FUNCTIONAL_ASSESSMENT: CPOT (CRITICAL CARE PAIN OBSERVATION TOOL)

## 2024-05-27 NOTE — CONSULTS
"Vancomycin Dosing by Pharmacy  INITIAL CONSULT      Patric Arenas is a 48 y.o. male who Pharmacy is consulted to dose vancomycin for  medical prophylaxis .     Based on the patient's indication and renal status, this patient will be dosed based on a goal vancomycin level of 15-20.     Renal function is currently declining.    Estimated Creatinine Clearance: 99.5 mL/min (A) (by C-G formula based on SCr of 1.52 mg/dL (H)).    Vancomycin Rm   Date Value Ref Range Status   05/17/2021 41.1 ug/mL Final     Comment:     .Therapeutic Ranges:     Peak: All ages:   30.0-40.0 ug/mL  .                      Trough: Age <18y:    5.0-10.0 ug/mL  .                           Age >/= 18y:    5.0-20.0 ug/mL  .   Vancomycin trough concentrations drawn immediately   prior to the next dose at steady-state are preferred   for monitoring patients treated with vancomycin.   Ref.: Am J Health-Syst Pharm 66: 83-98, 2009.       Vancomycin Tr   Date Value Ref Range Status   05/16/2021 23.2 (HH) 5.0 - 20.0 ug/mL Final     Comment:      Vancomycin levels should be interpreted in conjunction   with the dose, disease being treated, vancomycin HAMIDA,   time of draw (trough concentrations should be   obtained just before the next dose at steady-state),   and other clinical information. Trough concentrations   of 15-20 ug/mL are desired for severe infections.   Ref.: Am J Health-Syst Pharm 66: 83-98, 2009.   16:15 05/16/2021.  CRT VANCT MANJU BRIDGES , 05/16/2021 16:15         Results from last 7 days   Lab Units 05/27/24  0500 05/26/24  2209   CREATININE mg/dL 1.52* 1.67*   BUN mg/dL 18 18   WBC AUTO x10*3/uL 6.0 5.6        Visit Vitals  BP (!) 132/102 Comment: pt arrived to MICU from ED   Pulse 90   Temp 36.5 °C (97.7 °F)   Resp 20       No results found for: \"STAPHMRSASCR\", \"GRAMSTAIN\", \"URINECULTURE\", \"BLOODCULT\", \"TISWDCULTSME\"     Assessment/Plan     Will order 2000 mg for one dose only.    Vancomycin follow-up level will be ordered for 5/28 with AM " labs, unless clinically indicated sooner.  Will continue to monitor renal function daily while on vancomycin and order serum creatinine at least every 48 hours if not already ordered.  Will follow for continued vancomycin needs, clinical response, and signs/symptoms of toxicity.       Kierra Leyva, RPh

## 2024-05-27 NOTE — ED TRIAGE NOTES
Pt to ED with c/o SOB and Chest pain x2 days. Pt initially 66%-76%  on RA, placed on 2, 4, and then 6L O2 via NC and SpO2 88-90%. Pt taken directly to Rm 12 per Charge RN.     Hx of CHF and COPD

## 2024-05-27 NOTE — H&P
History Of Present Illness  Patric Arenas is a 48 y.o. male presenting with 48-year-old male with past medical history of CHF (EF 45 to 50%), COPD, nonischemic cardiomyopathy s/p AICD, hypertension, hyperlipidemia presenting due to shortness of breath.    Patient presented to the ED complaining of SOB for one week, reported worsening Shortness of breath on exertion and rest and reports he can't walk more than 50 feet.reported ongoing chest pain for the past week the pain radiate to his shoulders, 3/10 in severity. When asked about his medications he said he may have missed few doses. Pt denies nausea or vomiting, fevers or chills, cough or congestion. He has not been using his inhaler frequently.     PMHX: As mentioned  SH:   Denies smoking  Reports drinking alcohol occasionally, reports using ecstasy 1 week ago and uses it about once monthly     Vs:  T 36.9 °C (98.4 °F)  HR (!) 109  BP (!) 141/95  RR (!) 22  O2 (!) 90 %     Labs:  Cbc: no leukocytosis, hgb stable   CMP: remarkable for cr of 1.67   BNP of 373   ABGs: PH:7.35 Pco2 78 lactate 2.3     EKG:   Sinus rhythm with a rate of 95.  No UT or QRS prolongation.  T wave inversions noted in the anterolateral leads and more pronounced in the inferior leads.  No ST segment elevation.     Imaging   Cardiomegaly with mild pulmonary vascular congestion.     ED intervention   Solumedrol 125mg   Magnesium 2g   Duineb   Lasix 100mg     Cardiac hx:  Echo 02/13/2024   1. Poorly visualized anatomical structures due to suboptimal image quality (even with Definity).   2. Left ventricular systolic function is suspected mildly decreased with a 45% estimated ejection fraction.   3. Abnormal septal motion consistent with RV pacemaker.    Past Medical History  Past Medical History:   Diagnosis Date    Laceration of liver 06/05/2006    Formatting of this note might be different from the original. Liver injury without mention of open wound into cavity, unspecified laceration  [id number]       Surgical History  Past Surgical History:   Procedure Laterality Date    CARDIAC DEFIBRILLATOR PLACEMENT          Social History  He reports that he has never smoked. He has never used smokeless tobacco. He reports current alcohol use of about 10.0 standard drinks of alcohol per week. He reports that he does not currently use drugs after having used the following drugs: MDMA (ecstacy).    Family History  Family History   Problem Relation Name Age of Onset    Hypertension Mother          Allergies  Patient has no known allergies.    Review of Systems   All other systems reviewed and are negative.       Physical Exam   Constitutional:       Appearance: Normal appearance. He is obese.   HENT:      Head: Normocephalic and atraumatic.      Mouth/Throat:      Mouth: Mucous membranes are moist.      Pharynx: Oropharynx is clear.   Cardiovascular:      Rate and Rhythm: Normal rate and regular rhythm.      Heart sounds: Normal heart sounds.   Pulmonary:      Effort: Pulmonary effort is normal. No respiratory distress.      Breath sounds: Wheezing (with decreased airentry bilaterally) present.   Abdominal:      General: Bowel sounds are normal. There is distension.      Palpations: Abdomen is soft.      Tenderness: There is no abdominal tenderness. There is no guarding.   Musculoskeletal:      Right lower leg: Edema present.      Left lower leg: No edema (bilateral lower extremity edema 3+ pitting).   Neurological:      Mental Status: He is alert.   Last Recorded Vitals  Blood pressure (!) 116/99, pulse 91, temperature 36.9 °C (98.4 °F), temperature source Temporal, resp. rate (!) 26, weight (!) 172 kg (380 lb), SpO2 97%.    Relevant Results  Results for orders placed or performed during the hospital encounter of 05/26/24 (from the past 24 hour(s))   Blood Gas, Venous Full Panel   Result Value Ref Range    POCT pH, Venous 7.35 7.33 - 7.43 pH    POCT pCO2, Venous 78 (HH) 41 - 51 mm Hg    POCT pO2, Venous 51 (H)  35 - 45 mm Hg    POCT SO2, Venous 71 45 - 75 %    POCT Oxy Hemoglobin, Venous 69.6 45.0 - 75.0 %    POCT Hematocrit Calculated, Venous 49.0 41.0 - 52.0 %    POCT Sodium, Venous 138 136 - 145 mmol/L    POCT Potassium, Venous 3.9 3.5 - 5.3 mmol/L    POCT Chloride, Venous 93 (L) 98 - 107 mmol/L    POCT Ionized Calicum, Venous 1.13 1.10 - 1.33 mmol/L    POCT Glucose, Venous 152 (H) 74 - 99 mg/dL    POCT Lactate, Venous 2.3 (H) 0.4 - 2.0 mmol/L    POCT Base Excess, Venous 12.9 (H) -2.0 - 3.0 mmol/L    POCT HCO3 Calculated, Venous 43.1 (H) 22.0 - 26.0 mmol/L    POCT Hemoglobin, Venous 16.4 13.5 - 17.5 g/dL    POCT Anion Gap, Venous 6.0 (L) 10.0 - 25.0 mmol/L    Patient Temperature 37.0 degrees Celsius    FiO2 60 %   CBC and Auto Differential   Result Value Ref Range    WBC 5.6 4.4 - 11.3 x10*3/uL    nRBC 0.7 (H) 0.0 - 0.0 /100 WBCs    RBC 5.20 4.50 - 5.90 x10*6/uL    Hemoglobin 16.1 13.5 - 17.5 g/dL    Hematocrit 54.9 (H) 41.0 - 52.0 %     (H) 80 - 100 fL    MCH 31.0 26.0 - 34.0 pg    MCHC 29.3 (L) 32.0 - 36.0 g/dL    RDW 16.4 (H) 11.5 - 14.5 %    Platelets 201 150 - 450 x10*3/uL    Neutrophils % 61.7 40.0 - 80.0 %    Immature Granulocytes %, Automated 0.2 0.0 - 0.9 %    Lymphocytes % 29.1 13.0 - 44.0 %    Monocytes % 6.7 2.0 - 10.0 %    Eosinophils % 1.8 0.0 - 6.0 %    Basophils % 0.5 0.0 - 2.0 %    Neutrophils Absolute 3.43 1.20 - 7.70 x10*3/uL    Immature Granulocytes Absolute, Automated 0.01 0.00 - 0.70 x10*3/uL    Lymphocytes Absolute 1.62 1.20 - 4.80 x10*3/uL    Monocytes Absolute 0.37 0.10 - 1.00 x10*3/uL    Eosinophils Absolute 0.10 0.00 - 0.70 x10*3/uL    Basophils Absolute 0.03 0.00 - 0.10 x10*3/uL   Magnesium   Result Value Ref Range    Magnesium 1.98 1.60 - 2.40 mg/dL   Comprehensive metabolic panel   Result Value Ref Range    Glucose 141 (H) 74 - 99 mg/dL    Sodium 140 136 - 145 mmol/L    Potassium 3.7 3.5 - 5.3 mmol/L    Chloride 92 (L) 98 - 107 mmol/L    Bicarbonate 39 (H) 21 - 32 mmol/L    Anion Gap  13 10 - 20 mmol/L    Urea Nitrogen 18 6 - 23 mg/dL    Creatinine 1.67 (H) 0.50 - 1.30 mg/dL    eGFR 50 (L) >60 mL/min/1.73m*2    Calcium 9.3 8.6 - 10.6 mg/dL    Albumin 4.0 3.4 - 5.0 g/dL    Alkaline Phosphatase 86 33 - 120 U/L    Total Protein 8.0 6.4 - 8.2 g/dL    AST 18 9 - 39 U/L    Bilirubin, Total 0.5 0.0 - 1.2 mg/dL    ALT 13 10 - 52 U/L   B-Type Natriuretic Peptide   Result Value Ref Range     (H) 0 - 99 pg/mL   Troponin I, High Sensitivity, Initial   Result Value Ref Range    Troponin I, High Sensitivity 44 0 - 53 ng/L   ECG 12 lead   Result Value Ref Range    Ventricular Rate 95 BPM    Atrial Rate 95 BPM    DC Interval 140 ms    QRS Duration 90 ms    QT Interval 400 ms    QTC Calculation(Bazett) 502 ms    P Axis 64 degrees    R Axis -8 degrees    T Axis -37 degrees    QRS Count 15 beats    Q Onset 223 ms    P Onset 153 ms    P Offset 205 ms    T Offset 423 ms    QTC Fredericia 466 ms             Assessment/Plan   49 y/o M patient with PMHX of HFrEF, COPD presented to the ED with SOB, pt on 12L o2 mask, chest xray with pulmonary edema, . Pt will be admitted to medicine for further management for COPD and HF exacerbation.     #Acute respiratory failure   #Heart Failure exacerbation   #COPD Exacerbation   -pt with signs and symptoms of volume overload. BLE edema, pulmonary edema, increase o2 requirements,  bl normal.   -can't r/o COPD given increase o2 requirements and wheezes on exam  -HF likely to medication non adherence   -s/p solumedrol, lasix, mg, duonebs in the ED    Plan:  -Admit to medicine   -start prednisone 40mg for 5 days  -start azithromycin 500mg for 3 days   -Duonebs Q4 prn   -c/w inhalers albuterol   -Lasix 100mg in the morning, spot dose as needed   -strict I & Os   -weight daily   -holdmetop   -cpap at night   -RT consult   -restrict fluid intake   -will hold entresto and Spironolactone given SUZANNE     #SUZANNE  -cr 1.67 could be cardiorenal   -hold spironolactone and  entresto  -avoid nephrotoxic medications   -consider U.lytes and renal us in no improvement of RFP   -daily rfp   -monitor for now     # HLD  - c/w atorvasatin 40 mg po daily   - c/w ASA 81 mg po daily     F: prn   E: prn   N: Low sodium  A: PIV  DVT Ppx: Lovenox   Code status: Full code  NOK: Sister Em [phone]    I spent 60 minutes in the professional and overall care of this patient.      Lorraine Bhagat MD

## 2024-05-27 NOTE — PROGRESS NOTES
Pharmacy Medication History Review    Patric Arenas is a 48 y.o. male admitted for Acute on chronic congestive heart failure, unspecified heart failure type (Multi). Pharmacy reviewed the patient's bberg-td-gtopspagg medications and allergies for accuracy.    The list below reflects the updated PTA list. Comments regarding how patient may be taking medications differently can be found in the Admit Orders Activity  Prior to Admission Medications   Prescriptions Last Dose Informant   albuterol (Ventolin HFA) 90 mcg/actuation inhaler Unknown at patient request refills Self   Sig: Inhale 2 puffs every 4 hours if needed for wheezing or shortness of breath. If you are using your rescue inhaler frequently, seek medical evaluation   aspirin 81 mg EC tablet Unknown at patient request refills Self   Sig: Take 1 tablet (81 mg) by mouth once daily.   atorvastatin (Lipitor) 40 mg tablet Unknown at patient request refills    Sig: TAKE 1 TABLET BY MOUTH ONCE DAILY   empagliflozin (Jardiance) 10 mg Unknown at patient request refills Self   Sig: TAKE 1 TABLET BY MOUTH ONCE A DAY   inhalational spacing device inhaler  Self   Sig: Use as directed with inhalers   metOLazone (Zaroxolyn) 5 mg tablet Not Taking    Sig: Take 1 tablet (5 mg) by mouth once daily.   Patient not taking: Reported on 5/27/2024   metoprolol succinate XL (Toprol-XL) 25 mg 24 hr tablet Unknown at patient request refills Self   Sig: Take 1 tablet (25 mg) by mouth once daily.   oxygen (O2) gas therapy Unknown Self   Sig: Inhale.   sacubitriL-valsartan (Entresto) 49-51 mg tablet Unknown at patient request refills    Sig: TAKE 1 TABLET BY MOUTH TWO TIMES A DAY   sertraline (Zoloft) 25 mg tablet Not Taking    Sig: TAKE 1 TABLET BY MOUTH ONCE DAILY   Patient not taking: Reported on 5/27/2024   spironolactone (Aldactone) 25 mg tablet Unknown at patient request refills    Sig: TAKE 1/2 TABLET BY MOUTH ONCE DAILY   torsemide (Demadex) 100 mg tablet Unknown at patient  request refills Self   Sig: Take 1 tablet (100 mg) by mouth 2 times a day.      Facility-Administered Medications Last Administration Doses Remaining   fluticasone furoate-vilanteroL (Breo Ellipta) 200-25 mcg/dose inhaler 1 puff None recorded 1           The list below reflects the updated allergy list. Please review each documented allergy for additional clarification and justification.  Allergies  Reviewed by Rasheed Billings on 5/27/2024   No Known Allergies         Patient accepts M2B at discharge. Pharmacy has been updated to [facility].    Sources used to complete the med history include   Allergy list sure scripts   Allergy list epic   Epic dispense history  [facility] ( none )  Patient interview   2/14/2024 discharge summary ( alia )     Below are additional concerns with the patient's PTA list.  Patient knows what medications that he is supposed to take - patient is noncompliant with home medication therapies    Patient request refills - ventolin 90 mcg inhaler - aspirin 81 mg - jardiance 10 mg - entersto 49-51 mg     Rasheed Billings [facility] [place]  Transitions of Care Pharmacy Technician  [facility]  Please reach out via "[employer] Secure Chat for questions, or if no response call [employer] or [employer] “[facility]”

## 2024-05-27 NOTE — ED PROVIDER NOTES
"CC: No chief complaint on file.     HPI:   Patient is a 48-year-old male with past medical history of CHF (EF 45 to 50%), COPD, nonischemic cardiomyopathy s/p AICD, hypertension, hyperlipidemia presenting due to shortness of breath that has been worse for the past 3 days.  Patient has had chest pain that has been ongoing for the past week that he reports is worse with exertion and his anterior chest pain radiates to his shoulders.  He feels lightheaded and presyncopal with significant exertion and reports that his ability to walk more than 50 feet has decreased significantly and needs frequent rest.  He is unable to lie flat and does not use his CPAP at night for his MJ that he has been diagnosed with.  Patient supposed to be on 2 L of oxygen but reports he does not have a concentrator at home and has been off of oxygen.  He is initially presenting in respiratory distress given hypoxia, tachypnea and tachycardia.  He was placed on 6 L of oxygen with minimal improvement and was started on 12 L cannula bubbler.  Patient has been largely compliant with his heart failure medications but reports that he may have \"missed a few doses \".  He denies any hemoptysis, nausea or vomiting, fevers or chills, cough or congestion.  He has not been using his inhaler frequently and does have mild decreased air movement noted upon initial auscultation.  He denies any headache, dizziness, vision changes, back pain, recent sick contacts or travel.    Limitations to History: none  Additional History Obtained from: patient alone    PMHx/PSHx:  Per HPI.   - has a past medical history of Laceration of liver (06/05/2006).  - has a past surgical history that includes Cardiac defibrillator placement.    Social History:  - Tobacco:  reports that he has never smoked. He has never used smokeless tobacco.   - Alcohol:  reports current alcohol use of about 10.0 standard drinks of alcohol per week.   - Drugs:  reports that he does not currently use " drugs after having used the following drugs: MDMA (ecstacy).     Medications: Reviewed in EMR.     Allergies:  Patient has no known allergies.    ???????????????????????????????????????????????????????????????  Triage Vitals:  T 36.9 °C (98.4 °F)  HR (!) 109  BP (!) 141/95  RR (!) 22  O2 (!) 90 % (6L O2 via NC in triage) Supplemental oxygen    Physical Exam  Constitutional:       Appearance: He is obese. He is ill-appearing. He is not diaphoretic.   HENT:      Head: Normocephalic and atraumatic.      Nose: Nose normal. No congestion or rhinorrhea.      Mouth/Throat:      Mouth: Mucous membranes are dry.      Pharynx: Oropharynx is clear.   Eyes:      Extraocular Movements: Extraocular movements intact.      Pupils: Pupils are equal, round, and reactive to light.   Cardiovascular:      Rate and Rhythm: Regular rhythm. Tachycardia present.      Pulses: Normal pulses.      Heart sounds: No murmur heard.     No friction rub. No gallop.   Pulmonary:      Effort: Respiratory distress (mild tachypnea with diminished air movement) present.      Breath sounds: Wheezing present. No rhonchi or rales.   Abdominal:      General: There is no distension.      Palpations: Abdomen is soft.      Tenderness: There is no abdominal tenderness. There is no guarding or rebound.      Comments: Well healed ex-lap surgical site   Musculoskeletal:         General: Swelling (bilateral LE pitting edema to the level of the mid-shin without erythema) present.      Cervical back: Normal range of motion and neck supple.   Skin:     General: Skin is warm and dry.      Capillary Refill: Capillary refill takes less than 2 seconds.   Neurological:      General: No focal deficit present.      Mental Status: He is alert and oriented to person, place, and time.      Motor: No weakness.      Coordination: Coordination normal.      Gait: Gait normal.       ???????????????????????????????????????????????????????????????  EKG (per my interpretation):   Sinus rhythm with a rate of 95.  No UT or QRS prolongation.  T wave inversions noted in the anterolateral leads and more pronounced in the inferior leads.  No ST segment elevation.  No DEBORAH.  QTc prolonged at 500.    ED Course       Medical Decision Making:  Patient is a 48-year-old male with past medical history of CHF, COPD, NICM s/p AICD, hypertension, hyperlipidemia presenting due to shortness of breath and chest pain.  Differentials considered but not limited to CHF exacerbation, COPD exacerbation, PE, MI, URI, flu, COVID, aortic dissection.    Based on the patient's history and physical examination I believe that the patient is likely having a mixed picture of COPD and CHF exacerbation.  Patient appears significantly volume overloaded with elevated BNP of 373 up from a baseline of normal.  Patient's troponin is not significantly elevated and EKG appears at baseline.  Patient was given 100 mg of IV Lasix given the fact that he takes torsemide 100 mg twice daily.  Patient's venous full panel shows compensated respiratory acidosis with a pCO2 of 78.  Patient appears more comfortable on the 12 L bubbler and POCUS shows mildly decreased cardiac function along with evidence of B-lines.  Patient also has audible wheezing with diminished air movement.  He was given DuoNebs albuterol and magnesium.  Patient has an elevated creatinine to 1.67 but still is making urine.  He was admitted to medicine for further management of both COPD and CHF exacerbation.  The fact that patient's time course of symptoms has been over 3 days I have a lower suspicion for aortic dissection and patient is not have a history of PE however if he does not significantly improve, can be considered in the future.  Patient does not have symptoms of URI at this time.  Patient care was overseen by attending physician agrees with the plan and disposition.    External records reviewed: recent inpatient, clinic, and prior ED notes  Diagnostic imaging  independently reviewed/interpreted by me (as reflected in MDM) includes: CXR, POCUS  Social Determinants Affecting Care: Poor health literacy  Discussion of management with other providers: attending  Prescription Drug Consideration: none  Escalation of Care: admission    Impression:   CHF exacerbation  COPD exacerbation    Disposition: Admitted      Procedures ? SmartLinks last updated 5/26/2024 11:13 PM     Teresa Seo  PGY-2 Emergency Medicine  [facility]     Teresa Seo MD  Resident  05/26/24 0034

## 2024-05-27 NOTE — PROGRESS NOTES
I received Patric Arenas in signout from Dr. Seo.  Please see the previous note for all HPI, PE and MDM up to the time of signout at 0700.    In brief Patric Arenas is an 48 y.o. male presenting for No chief complaint on file.  .  At the time of signout we were awaiting: MICU admit, pushpa    Patient is a 48-year-old male with a past medical history of CHF, COPD, nonischemic cardiomyopathy s/p AICD, hypertension, hyperlipidemia originally presented to the emergency department increasing shortness of breath.  See previous provider note for details of initial presentation.  In brief, patient was treated for a mixed COPD and heart failure exacerbation in the setting of the patient's known MJ and obese body habitus.  Patient had continued acute on chronic hypercarbic respiratory failure despite up titration of the patient's BiPAP settings, DuoNebs, continuous albuterol, IV Solu-Medrol, magnesium, diuresis with 100 mg of IV Lasix.  Previous provider had discussed extensively with the patient the possibility of intubation with the family.  A code white had been called as patient's respiratory status and required further up titration of BiPAP settings.  Repeated gases showed persistent hypercarbic respiratory failure.  Patient had consistent tachypnea while on the BiPAP.  Continued to be in moderate respiratory distress.  I did discuss extensively the risks and benefits of intubation with the patient and his family members at bedside.  After discussion of the risks and benefits, and shared decision making with the patient and family members, elected on intubation for treatment of the patient's hypercarbic respiratory failure and prevent further decompensation given his ongoing evidence of respiratory distress on my reevaluation.  Patient was intubated as per intubation note below.  Of note, patient did require extensive positioning and ramping given his body habitus, an amp of bicarb was given periintubation in order  to prevent the risk of acidosis contributing to risk of hemodynamic collapse during intubation.  Propofol, fluids, pressors all available during the intubation itself for anticipated possible risks.  Patient intubated successfully.  Tube placement confirmed with chest x-ray.  OG and [place] catheter placed.  Patient will be admitted to the MICU in stable condition with critical care ongoing for further treatment of hypercarbic respiratory failure.    Pt Disposition: Admit    Intubation    Performed by: Baron Epps MD  Authorized by: Kelli Snow MD    Consent:     Consent obtained:  Verbal and emergent situation    Consent given by:  Patient (and multiple family members at bedside after extensive discussion)    Risks discussed:  Aspiration, death, hypoxia, laryngeal injury, pneumothorax and dental trauma    Alternatives discussed:  No treatment, observation and delayed treatment  Universal protocol:     Procedure explained and questions answered to patient or proxy's satisfaction: yes      Relevant documents present and verified: yes      Test results available: yes      Imaging studies available: yes      Required blood products, implants, devices, and special equipment available: yes      Site/side marked: yes      Immediately prior to procedure, a time out was called: yes      Patient identity confirmed:  Verbally with patient and arm band  Pre-procedure details:     Indications: airway protection and respiratory failure      Patient status:  Awake    Look externally: no concerns      Mouth opening - incisor distance:  2 finger widths    Hyoid-mental distance: 3 or more finger widths      Hyoid-thyroid distance: 2 or more finger widths      Mallampati score:  III    Obstruction: none      Neck mobility: normal      Pharmacologic strategy: RSI      Induction agents:  Ketamine    Paralytics:  Rocuronium  Procedure details:     Preoxygenation:  BiLevel    CPR in progress: no      Number of attempts:  1  Successful  intubation attempt details:     Intubation method:  Oral    Intubation technique: video assisted      Laryngoscope blade:  Hypercurved and Mac 4    Bougie used: no      Grade view: I      Tube size (mm):  7.5    Tube type:  Cuffed    Tube visualized through cords: yes    Placement assessment:     ETT at teeth/gumline (cm):  25 at teeth    Tube secured with:  ETT camacho    Breath sounds:  Equal    Placement verification: chest rise, colorimetric ETCO2, CXR verification, equal breath sounds, tube exhalation and waveform ETCO2      CXR findings:  Appropriate position  Post-procedure details:     Procedure completion:  Tolerated well, no immediate complications

## 2024-05-27 NOTE — SIGNIFICANT EVENT
05/27/24 0621   Onset Documentation   Rapid Response Initiated By RN   Location/Room [facility] [place] [facility]  (ED)   Pager Time [phone]   Arrival Time 0513   Event End Time 0528   Level II Called No   Primary Reason for Call   (Bipap initiated for CO2 on  and pH 7.24)

## 2024-05-27 NOTE — H&P
MEDICAL INTENSIVE CARE UNIT  ADMISSION H&P    Subjective   HPI:  Patric Arenas is a 48 y.o. male with CHF (EF 45 to 50%), COPD, nonischemic cardiomyopathy s/p AICD, hypertension, hyperlipidemia, MJ  who presenting to the ICU for acute hypercapnic respiratory failure requiring intubation. Patient initially presented to the ED on 5/26 with shortness of breath x 3-4 days. Per chart review, patient reported that he could not walk more than 50 feet. In the ED, he denied nausea, vomiting, fever, chills, cough or congestion. Reported that he was having difficulty lying flat.Patient has required multiple hospitalizations for acute decompensated heart failure. Per mother, he has never been intubated before. Patient is suppose to be on 2L of oxygen at home and CPAP at night, but does not currently have proper supplies.    On arrival to the ED, patient was afebrile, tachycardic to 109, tachypneic to 22 bpm and satting on 90% on 6L. Patient was then placed on 12 L cannula bubbler. Patient then transitioned to BIPAP due to VBG with Co2 of 118 and pH of 7.24. Patient also given DuoNebs, continuous albuterol, IV Solu-Medrol and, diuresis with 100 mg of IV Lasix.  VBG did not show improvement with BIPAP and patient was intubated and transitioned to the floor.    ED Course:  Vitals:   T 36.9 °C (98.4 °F)  HR (!) 109  BP (!) 141/95  RR (!) 22  O2 (!) 90 % (6L O2 via NC in triage) Supplemental oxygen    Relevant studies:    Results from last 7 days   Lab Units 05/27/24  0500 05/26/24  2209   WBC AUTO x10*3/uL 6.0 5.6   HEMOGLOBIN g/dL 16.9 16.1   HEMATOCRIT % 58.1* 54.9*   PLATELETS AUTO x10*3/uL 195 201     Results from last 7 days   Lab Units 05/27/24  0500 05/26/24  2209   CO2 mmol/L 37* 39*   ANION GAP mmol/L 16 13   BUN mg/dL 18 18   CREATININE mg/dL 1.52* 1.67*   GLUCOSE mg/dL 154* 141*   CALCIUM mg/dL 9.2 9.3   MAGNESIUM mg/dL 2.44* 1.98      Results from last 7 days   Lab Units 05/27/24  0500 05/26/24  2209   ALT U/L 14  "13   AST U/L 15 18   ALK PHOS U/L 87 86            No lab exists for component: \"PT\"  Results from last 7 days   Lab Units 05/27/24  1026 05/27/24  0724 05/27/24  0629   FIO2 % 70 80 80     Results from last 7 days   Lab Units 05/27/24  1026 05/27/24  0724 05/27/24  0629   POCT PH, VENOUS pH 7.36 7.22* 7.21*   POCT PCO2, VENOUS mm Hg 83* 118* 120*           No lab exists for component: \"BNPRESU\", \"CPKT\"          Medical History:  PMH: CHF (EF 45 to 50%), COPD, nonischemic cardiomyopathy s/p AICD, hypertension, hyperlipidemia, MJ     Medications: reviewed     Allergies: NKDA   PSH: AICD placement    SocHx:  Home: lives in apartment building. Mother lives in same building.  Substance use:   -Alcohol: Drinks occasionally  -Tobacco: denies    -Recreational drugs: Hx of ectasy use per chart review    ROS: 10-point ROS negative unless otherwise mentioned in HPI            Objective   Vitals: reviewed  Exam:  Physical Exam  Constitutional:       General: He is not in acute distress.     Appearance: He is not toxic-appearing.   HENT:      Head: Normocephalic and atraumatic.      Nose: Nose normal. No rhinorrhea.      Mouth/Throat:      Mouth: Mucous membranes are moist.   Eyes:      General: No scleral icterus.     Extraocular Movements: Extraocular movements intact.      Pupils: Pupils are equal, round, and reactive to light.   Cardiovascular:      Rate and Rhythm: Normal rate and regular rhythm.      Heart sounds: No murmur heard.     No friction rub. No gallop.   Pulmonary:      Effort: Pulmonary effort is normal. No respiratory distress.      Breath sounds: Normal breath sounds. No wheezing, rhonchi or rales.      Comments: Intubated. Coarse breath sounds bilaterally.  Abdominal:      General: Bowel sounds are normal. There is no distension.      Palpations: Abdomen is soft. There is no mass.      Tenderness: There is no abdominal tenderness. There is no guarding.   Musculoskeletal:         General: No tenderness.      " Comments: Bilateral pitting edema to thighs   Skin:     General: Skin is warm and dry.      Capillary Refill: Capillary refill takes less than 2 seconds.      Coloration: Skin is not jaundiced.      Findings: No bruising or rash.   Neurological:      General: No focal deficit present.      Comments: Sedated               Assessment/Plan   48 y.o. male with  CHF (EF 45 to 50%), COPD, nonischemic cardiomyopathy s/p AICD, hypertension, hyperlipidemia, MJ  who presenting to the ICU for acute hypercapnic respiratory failure. On exam, patient appears volume overloaded. Patient has BNP elevated at 373. Clinical suspicion for acute hypercapnic respiratory failure 2/2 acute decompensated heart failure. However, respiratory failure could be multifactorial as well, including COPD exacerbation, obesity hypoventilation syndrome or infection. Patient also with history of MJ (not compliant with CPAP.)    CNS  -Sedation: Propofol and fentanyl    PULM  #Acute on chronic hypercapnic respiratory failure  #COPD  -Vent settings: ACVC , Tv 480, PEEP 10, itime 1, Fio2 70%  -See CV section    CV  #acute decompensated heart failure  -40mg lasix bolus followed by lasix drip.    -Goal urine output of 100 ml/hr.  -Continue home metolazone  -Hold home torsemide  -Hold home spironolactone  -Hold home empagliflozin   -Hold home metoprolol  -TTE    #hyperlipidemia  -Continue atorvastatin  -Continue aspirin    ID  #Elevated lactate  - Blood Cultures  - UA and urine culture  - Pro-guille  - RVP, Covid, legionella,   - MRSA nares  - Vanc, Zosyn, Azithromycin    FEN/GI  -NPO    RENAL/  #SUZANNE, likely cardiorenal  - renally dose medications  - Daily RFP  - avoid nephrotoxic medications    HEME/ONC  -No active issues    ENDO  -No active issues    MSK/Skin  -No active issues      FEN: NPO  A: PIV x 2  O2: Intubated, FIO2 70%  Drips: propofol, fentanyl, lasix  Abx: Vancomycin, zosyn, azithromycin  DVT PPx: subcutaneous heparin  GI PPx: PPI      CODE  STATUS: FULL (confirmed with mother on admission)  SURROGATE DECISION MAKER: Hyun (Daughter) - [phone]    Juwan Bernabe MD  Internal Medicine-Pediatrics PGY-1

## 2024-05-28 ENCOUNTER — APPOINTMENT (OUTPATIENT)
Dept: CARDIOLOGY | Facility: HOSPITAL | Age: 49
End: 2024-05-28
Payer: COMMERCIAL

## 2024-05-28 ENCOUNTER — APPOINTMENT (OUTPATIENT)
Dept: RADIOLOGY | Facility: HOSPITAL | Age: 49
End: 2024-05-28
Payer: COMMERCIAL

## 2024-05-28 LAB
ALBUMIN SERPL BCP-MCNC: 3.4 G/DL (ref 3.4–5)
ANION GAP BLDV CALCULATED.4IONS-SCNC: -2 MMOL/L (ref 10–25)
ANION GAP BLDV CALCULATED.4IONS-SCNC: 1 MMOL/L (ref 10–25)
ANION GAP SERPL CALC-SCNC: 15 MMOL/L (ref 10–20)
AORTIC VALVE PEAK VELOCITY: 1.02 M/S
APTT PPP: 29 SECONDS (ref 27–38)
AV PEAK GRADIENT: 4.2 MMHG
AVA (PEAK VEL): 2.65 CM2
BASE EXCESS BLDV CALC-SCNC: 14.5 MMOL/L (ref -2–3)
BASE EXCESS BLDV CALC-SCNC: 16.7 MMOL/L (ref -2–3)
BASE EXCESS BLDV CALC-SCNC: 16.8 MMOL/L (ref -2–3)
BASOPHILS # BLD AUTO: 0.01 X10*3/UL (ref 0–0.1)
BASOPHILS NFR BLD AUTO: 0.2 %
BODY TEMPERATURE: 37 DEGREES CELSIUS
BUN SERPL-MCNC: 26 MG/DL (ref 6–23)
CA-I BLDV-SCNC: 1.07 MMOL/L (ref 1.1–1.33)
CA-I BLDV-SCNC: 1.07 MMOL/L (ref 1.1–1.33)
CALCIUM SERPL-MCNC: 8.5 MG/DL (ref 8.6–10.6)
CHLORIDE BLDV-SCNC: 91 MMOL/L (ref 98–107)
CHLORIDE BLDV-SCNC: 94 MMOL/L (ref 98–107)
CHLORIDE SERPL-SCNC: 93 MMOL/L (ref 98–107)
CHLORIDE UR-SCNC: 123 MMOL/L
CHLORIDE/CREATININE (MMOL/G) IN URINE: 316 MMOL/G CREAT (ref 23–275)
CO2 SERPL-SCNC: 39 MMOL/L (ref 21–32)
CREAT SERPL-MCNC: 2.07 MG/DL (ref 0.5–1.3)
CREAT UR-MCNC: 38.1 MG/DL (ref 20–370)
CREAT UR-MCNC: 38.9 MG/DL (ref 20–370)
EGFRCR SERPLBLD CKD-EPI 2021: 38 ML/MIN/1.73M*2
EOSINOPHIL # BLD AUTO: 0.03 X10*3/UL (ref 0–0.7)
EOSINOPHIL NFR BLD AUTO: 0.5 %
ERYTHROCYTE [DISTWIDTH] IN BLOOD BY AUTOMATED COUNT: 16.2 % (ref 11.5–14.5)
EST. AVERAGE GLUCOSE BLD GHB EST-MCNC: 131 MG/DL
GLUCOSE BLDV-MCNC: 120 MG/DL (ref 74–99)
GLUCOSE BLDV-MCNC: 153 MG/DL (ref 74–99)
GLUCOSE SERPL-MCNC: 96 MG/DL (ref 74–99)
HBA1C MFR BLD: 6.2 %
HCO3 BLDV-SCNC: 43.2 MMOL/L (ref 22–26)
HCO3 BLDV-SCNC: 46.3 MMOL/L (ref 22–26)
HCO3 BLDV-SCNC: 48.6 MMOL/L (ref 22–26)
HCT VFR BLD AUTO: 51.5 % (ref 41–52)
HCT VFR BLD EST: 49 % (ref 41–52)
HCT VFR BLD EST: 50 % (ref 41–52)
HGB BLD-MCNC: 15.7 G/DL (ref 13.5–17.5)
HGB BLDV-MCNC: 16.4 G/DL (ref 13.5–17.5)
HGB BLDV-MCNC: 16.8 G/DL (ref 13.5–17.5)
HOLD SPECIMEN: NORMAL
IMM GRANULOCYTES # BLD AUTO: 0.02 X10*3/UL (ref 0–0.7)
IMM GRANULOCYTES NFR BLD AUTO: 0.4 % (ref 0–0.9)
INHALED O2 CONCENTRATION: 40 %
INHALED O2 CONCENTRATION: 50 %
INHALED O2 CONCENTRATION: 50 %
INR PPP: 1.1 (ref 0.9–1.1)
LACTATE BLDV-SCNC: 1.9 MMOL/L (ref 0.4–2)
LACTATE BLDV-SCNC: 5.7 MMOL/L (ref 0.4–2)
LEFT VENTRICLE INTERNAL DIMENSION DIASTOLE: 4.9 CM (ref 3.5–6)
LEFT VENTRICULAR OUTFLOW TRACT DIAMETER: 1.9 CM
LEGIONELLA AG UR QL: NEGATIVE
LYMPHOCYTES # BLD AUTO: 0.79 X10*3/UL (ref 1.2–4.8)
LYMPHOCYTES NFR BLD AUTO: 14 %
MAGNESIUM SERPL-MCNC: 2.29 MG/DL (ref 1.6–2.4)
MCH RBC QN AUTO: 31.8 PG (ref 26–34)
MCHC RBC AUTO-ENTMCNC: 30.5 G/DL (ref 32–36)
MCV RBC AUTO: 104 FL (ref 80–100)
MITRAL VALVE E/A RATIO: 0.98
MITRAL VALVE E/E' RATIO: 8.67
MONOCYTES # BLD AUTO: 0.42 X10*3/UL (ref 0.1–1)
MONOCYTES NFR BLD AUTO: 7.4 %
NEUTROPHILS # BLD AUTO: 4.37 X10*3/UL (ref 1.2–7.7)
NEUTROPHILS NFR BLD AUTO: 77.5 %
NRBC BLD-RTO: 0 /100 WBCS (ref 0–0)
OXYHGB MFR BLDV: 25.3 % (ref 45–75)
OXYHGB MFR BLDV: 89.6 % (ref 45–75)
OXYHGB MFR BLDV: ABNORMAL %
PCO2 BLDV: 73 MM HG (ref 41–51)
PCO2 BLDV: 73 MM HG (ref 41–51)
PCO2 BLDV: 90 MM HG (ref 41–51)
PH BLDV: 7.34 PH (ref 7.33–7.43)
PH BLDV: 7.38 PH (ref 7.33–7.43)
PH BLDV: 7.41 PH (ref 7.33–7.43)
PHOSPHATE SERPL-MCNC: 4 MG/DL (ref 2.5–4.9)
PLATELET # BLD AUTO: 158 X10*3/UL (ref 150–450)
PO2 BLDV: 30 MM HG (ref 35–45)
PO2 BLDV: 38 MM HG (ref 35–45)
PO2 BLDV: 71 MM HG (ref 35–45)
POTASSIUM BLDV-SCNC: 3.2 MMOL/L (ref 3.5–5.3)
POTASSIUM BLDV-SCNC: 3.6 MMOL/L (ref 3.5–5.3)
POTASSIUM SERPL-SCNC: 3.7 MMOL/L (ref 3.5–5.3)
POTASSIUM UR-SCNC: 35 MMOL/L
POTASSIUM/CREAT UR-RTO: 90 MMOL/G CREAT
PROCALCITONIN SERPL-MCNC: 0.07 NG/ML
PROTHROMBIN TIME: 12.7 SECONDS (ref 9.8–12.8)
RBC # BLD AUTO: 4.94 X10*6/UL (ref 4.5–5.9)
RIGHT VENTRICLE FREE WALL PEAK S': 8.68 CM/S
RIGHT VENTRICLE PEAK SYSTOLIC PRESSURE: 29.6 MMHG
SAO2 % BLDV: 26 % (ref 45–75)
SAO2 % BLDV: 92 % (ref 45–75)
SAO2 % BLDV: ABNORMAL %
SODIUM BLDV-SCNC: 135 MMOL/L (ref 136–145)
SODIUM BLDV-SCNC: 137 MMOL/L (ref 136–145)
SODIUM SERPL-SCNC: 143 MMOL/L (ref 136–145)
SODIUM UR-SCNC: 98 MMOL/L
SODIUM/CREAT UR-RTO: 252 MMOL/G CREAT
T4 FREE SERPL-MCNC: 1.14 NG/DL (ref 0.78–1.48)
TSH SERPL-ACNC: 0.37 MIU/L (ref 0.44–3.98)
UREA/CREAT UR-SRTO: 4.5 G/G CREAT
UUN UR-MCNC: 173 MG/DL
VANCOMYCIN SERPL-MCNC: 10.3 UG/ML (ref 5–20)
WBC # BLD AUTO: 5.6 X10*3/UL (ref 4.4–11.3)

## 2024-05-28 PROCEDURE — 2500000004 HC RX 250 GENERAL PHARMACY W/ HCPCS (ALT 636 FOR OP/ED)

## 2024-05-28 PROCEDURE — 84439 ASSAY OF FREE THYROXINE: CPT | Performed by: NURSE PRACTITIONER

## 2024-05-28 PROCEDURE — 94003 VENT MGMT INPAT SUBQ DAY: CPT

## 2024-05-28 PROCEDURE — 82810 BLOOD GASES O2 SAT ONLY: CPT

## 2024-05-28 PROCEDURE — 82805 BLOOD GASES W/O2 SATURATION: CPT | Performed by: NURSE PRACTITIONER

## 2024-05-28 PROCEDURE — 83735 ASSAY OF MAGNESIUM: CPT

## 2024-05-28 PROCEDURE — 2500000005 HC RX 250 GENERAL PHARMACY W/O HCPCS: Performed by: NURSE PRACTITIONER

## 2024-05-28 PROCEDURE — 85025 COMPLETE CBC W/AUTO DIFF WBC: CPT

## 2024-05-28 PROCEDURE — 93306 TTE W/DOPPLER COMPLETE: CPT

## 2024-05-28 PROCEDURE — 84133 ASSAY OF URINE POTASSIUM: CPT | Performed by: NURSE PRACTITIONER

## 2024-05-28 PROCEDURE — 82805 BLOOD GASES W/O2 SATURATION: CPT

## 2024-05-28 PROCEDURE — 93306 TTE W/DOPPLER COMPLETE: CPT | Performed by: INTERNAL MEDICINE

## 2024-05-28 PROCEDURE — 36415 COLL VENOUS BLD VENIPUNCTURE: CPT

## 2024-05-28 PROCEDURE — 84443 ASSAY THYROID STIM HORMONE: CPT | Performed by: NURSE PRACTITIONER

## 2024-05-28 PROCEDURE — A4217 STERILE WATER/SALINE, 500 ML: HCPCS

## 2024-05-28 PROCEDURE — 2500000001 HC RX 250 WO HCPCS SELF ADMINISTERED DRUGS (ALT 637 FOR MEDICARE OP)

## 2024-05-28 PROCEDURE — C9113 INJ PANTOPRAZOLE SODIUM, VIA: HCPCS

## 2024-05-28 PROCEDURE — 2500000005 HC RX 250 GENERAL PHARMACY W/O HCPCS: Performed by: STUDENT IN AN ORGANIZED HEALTH CARE EDUCATION/TRAINING PROGRAM

## 2024-05-28 PROCEDURE — 85730 THROMBOPLASTIN TIME PARTIAL: CPT

## 2024-05-28 PROCEDURE — 36415 COLL VENOUS BLD VENIPUNCTURE: CPT | Performed by: NURSE PRACTITIONER

## 2024-05-28 PROCEDURE — 2500000004 HC RX 250 GENERAL PHARMACY W/ HCPCS (ALT 636 FOR OP/ED): Performed by: NURSE PRACTITIONER

## 2024-05-28 PROCEDURE — 84145 PROCALCITONIN (PCT): CPT | Performed by: NURSE PRACTITIONER

## 2024-05-28 PROCEDURE — 84132 ASSAY OF SERUM POTASSIUM: CPT

## 2024-05-28 PROCEDURE — 80202 ASSAY OF VANCOMYCIN: CPT

## 2024-05-28 PROCEDURE — 82550 ASSAY OF CK (CPK): CPT

## 2024-05-28 PROCEDURE — 80069 RENAL FUNCTION PANEL: CPT

## 2024-05-28 PROCEDURE — 1200000002 HC GENERAL ROOM WITH TELEMETRY DAILY

## 2024-05-28 PROCEDURE — 83036 HEMOGLOBIN GLYCOSYLATED A1C: CPT | Performed by: NURSE PRACTITIONER

## 2024-05-28 PROCEDURE — 84295 ASSAY OF SERUM SODIUM: CPT

## 2024-05-28 PROCEDURE — 71045 X-RAY EXAM CHEST 1 VIEW: CPT

## 2024-05-28 PROCEDURE — 85610 PROTHROMBIN TIME: CPT

## 2024-05-28 PROCEDURE — 71045 X-RAY EXAM CHEST 1 VIEW: CPT | Performed by: RADIOLOGY

## 2024-05-28 PROCEDURE — 85018 HEMOGLOBIN: CPT

## 2024-05-28 PROCEDURE — 84540 ASSAY OF URINE/UREA-N: CPT | Performed by: NURSE PRACTITIONER

## 2024-05-28 RX ORDER — HALOPERIDOL 5 MG/ML
5 INJECTION INTRAMUSCULAR ONCE
Status: COMPLETED | OUTPATIENT
Start: 2024-05-28 | End: 2024-05-28

## 2024-05-28 RX ORDER — POLYETHYLENE GLYCOL 3350 17 G/17G
17 POWDER, FOR SOLUTION ORAL DAILY
Status: DISCONTINUED | OUTPATIENT
Start: 2024-05-28 | End: 2024-06-02

## 2024-05-28 RX ORDER — OLANZAPINE 10 MG/2ML
2.5 INJECTION, POWDER, FOR SOLUTION INTRAMUSCULAR EVERY 6 HOURS PRN
Status: DISCONTINUED | OUTPATIENT
Start: 2024-05-28 | End: 2024-05-28

## 2024-05-28 RX ORDER — HALOPERIDOL 5 MG/ML
INJECTION INTRAMUSCULAR
Status: COMPLETED
Start: 2024-05-28 | End: 2024-05-28

## 2024-05-28 RX ORDER — ACETAZOLAMIDE 500 MG/5ML
250 INJECTION, POWDER, LYOPHILIZED, FOR SOLUTION INTRAVENOUS ONCE
Status: COMPLETED | OUTPATIENT
Start: 2024-05-28 | End: 2024-05-28

## 2024-05-28 RX ORDER — DEXMEDETOMIDINE HYDROCHLORIDE 4 UG/ML
.2-1.5 INJECTION, SOLUTION INTRAVENOUS CONTINUOUS
Status: DISCONTINUED | OUTPATIENT
Start: 2024-05-28 | End: 2024-05-28

## 2024-05-28 RX ORDER — DEXMEDETOMIDINE HYDROCHLORIDE 4 UG/ML
INJECTION, SOLUTION INTRAVENOUS
Status: COMPLETED
Start: 2024-05-28 | End: 2024-05-28

## 2024-05-28 RX ORDER — DEXMEDETOMIDINE HYDROCHLORIDE 4 UG/ML
.1-1.5 INJECTION, SOLUTION INTRAVENOUS CONTINUOUS
Status: DISCONTINUED | OUTPATIENT
Start: 2024-05-28 | End: 2024-05-29

## 2024-05-28 RX ADMIN — Medication 40 PERCENT: at 12:20

## 2024-05-28 RX ADMIN — FUROSEMIDE 10 MG/HR: 10 INJECTION, SOLUTION INTRAMUSCULAR; INTRAVENOUS at 16:18

## 2024-05-28 RX ADMIN — AZITHROMYCIN DIHYDRATE 500 MG: 500 TABLET ORAL at 08:08

## 2024-05-28 RX ADMIN — PROPOFOL 25 MCG/KG/MIN: 10 INJECTION, EMULSION INTRAVENOUS at 09:56

## 2024-05-28 RX ADMIN — ACETAZOLAMIDE 250 MG: 500 INJECTION, POWDER, LYOPHILIZED, FOR SOLUTION INTRAVENOUS at 16:55

## 2024-05-28 RX ADMIN — Medication 100 MCG/HR: at 02:29

## 2024-05-28 RX ADMIN — DEXMEDETOMIDINE HYDROCHLORIDE IN SODIUM CHLORIDE 0.4 MCG/KG/HR: 4 INJECTION INTRAVENOUS at 18:46

## 2024-05-28 RX ADMIN — HEPARIN SODIUM 7500 UNITS: 5000 INJECTION INTRAVENOUS; SUBCUTANEOUS at 14:17

## 2024-05-28 RX ADMIN — ATORVASTATIN CALCIUM 40 MG: 40 TABLET, FILM COATED ORAL at 20:35

## 2024-05-28 RX ADMIN — Medication 10 L/MIN: at 17:15

## 2024-05-28 RX ADMIN — ASPIRIN 81 MG: 81 TABLET, COATED ORAL at 08:08

## 2024-05-28 RX ADMIN — VANCOMYCIN HYDROCHLORIDE 1500 MG: 5 INJECTION, POWDER, LYOPHILIZED, FOR SOLUTION INTRAVENOUS at 09:23

## 2024-05-28 RX ADMIN — DEXMEDETOMIDINE HYDROCHLORIDE 0.2 MCG/KG/HR: 4 INJECTION, SOLUTION INTRAVENOUS at 11:51

## 2024-05-28 RX ADMIN — HALOPERIDOL 5 MG: 5 INJECTION INTRAMUSCULAR at 13:05

## 2024-05-28 RX ADMIN — PERFLUTREN 10 ML OF DILUTION: 6.52 INJECTION, SUSPENSION INTRAVENOUS at 10:58

## 2024-05-28 RX ADMIN — PROPOFOL 29.07 MCG/KG/MIN: 10 INJECTION, EMULSION INTRAVENOUS at 01:17

## 2024-05-28 RX ADMIN — HALOPERIDOL LACTATE 5 MG: 5 INJECTION, SOLUTION INTRAMUSCULAR at 13:05

## 2024-05-28 RX ADMIN — Medication 40 PERCENT: at 07:25

## 2024-05-28 RX ADMIN — DEXMEDETOMIDINE HYDROCHLORIDE IN SODIUM CHLORIDE 0.2 MCG/KG/HR: 4 INJECTION INTRAVENOUS at 11:51

## 2024-05-28 RX ADMIN — PIPERACILLIN SODIUM AND TAZOBACTAM SODIUM 3.38 G: 3; .375 INJECTION, SOLUTION INTRAVENOUS at 05:23

## 2024-05-28 RX ADMIN — Medication 10 L/MIN: at 12:55

## 2024-05-28 RX ADMIN — PANTOPRAZOLE SODIUM 40 MG: 40 INJECTION, POWDER, FOR SOLUTION INTRAVENOUS at 08:08

## 2024-05-28 RX ADMIN — HEPARIN SODIUM 7500 UNITS: 5000 INJECTION INTRAVENOUS; SUBCUTANEOUS at 20:36

## 2024-05-28 RX ADMIN — HEPARIN SODIUM 7500 UNITS: 5000 INJECTION INTRAVENOUS; SUBCUTANEOUS at 05:39

## 2024-05-28 RX ADMIN — POLYETHYLENE GLYCOL 3350 17 G: 17 POWDER, FOR SOLUTION ORAL at 11:48

## 2024-05-28 RX ADMIN — PROPOFOL 24.23 MCG/KG/MIN: 10 INJECTION, EMULSION INTRAVENOUS at 05:22

## 2024-05-28 RX ADMIN — Medication 40 PERCENT: at 10:00

## 2024-05-28 RX ADMIN — METOLAZONE 5 MG: 5 TABLET ORAL at 08:08

## 2024-05-28 RX ADMIN — DEXMEDETOMIDINE HYDROCHLORIDE IN SODIUM CHLORIDE 0.29 MCG/KG/HR: 4 INJECTION INTRAVENOUS at 20:35

## 2024-05-28 ASSESSMENT — PAIN - FUNCTIONAL ASSESSMENT
PAIN_FUNCTIONAL_ASSESSMENT: CPOT (CRITICAL CARE PAIN OBSERVATION TOOL)

## 2024-05-28 ASSESSMENT — PAIN SCALES - GENERAL
PAINLEVEL_OUTOF10: 0 - NO PAIN

## 2024-05-28 NOTE — PROGRESS NOTES
Vancomycin Dosing by Pharmacy- Cessation of Therapy    Consult to pharmacy for vancomycin dosing has been discontinued by the prescriber, pharmacy will sign off at this time.    Please call pharmacy if there are further questions or re-enter a consult if vancomycin is resumed.     Yesenia Matta, Pharmacist

## 2024-05-28 NOTE — PROGRESS NOTES
Social Work Transitional Care Note (chart review)   - ICU TREATMENT PLAN: Patient was admitted to ICU for hypercapnic respiratory failure. Patient is currently intubated and sedated.   - Payer: [facility]  -Support System: Daughter Hyun is listed as NOK.  - Planned Disposition: Pending medical outcome and rehab recommendations  - Additional Information: None at this time.  - Barriers to discharge: None at this time. SW will continue to follow.     - Anticipated date of discharge: 06/06/24  MACRINA ALEJANDRA

## 2024-05-28 NOTE — PROGRESS NOTES
Critical Care Daily Progress        Subjective   Patric Arenas is a 48 y.o. male on day 1 of admission presenting with Acute on chronic congestive heart failure, unspecified heart failure type (Multi).     Interval History:  Intubated in ED and admitted to MICU yesterday for acute hypercapnic resp failure. Overnight, UO robust, Lasix drip decreased from 10 mg/hr to 5 mg/hr.  Additionally Propofol drip decreased to 25 mcg/kg/min from 30 2/2 soft BP's.  This am, pt withdrawals to painful stimuli.  MV adjusted to /40%/10 +10 PEEP    Late morning, pt self extubated, pulled out Ivs, OG    Complaints: unable to complete ROS 2/2 mental status/sedation    Scheduled Medications:   aspirin, 81 mg, oral, Daily  atorvastatin, 40 mg, oral, Nightly  azithromycin, 500 mg, oral, q24h STEVE  heparin (porcine), 7,500 Units, subcutaneous, q8h STEVE  metOLazone, 5 mg, oral, Daily  [Held by provider] metoprolol succinate XL, 25 mg, oral, Daily  pantoprazole, 40 mg, intravenous, Daily  piperacillin-tazobactam, 3.375 g, intravenous, q6h         Continuous Medications:   fentaNYL,  mcg/hr, Last Rate: 100 mcg/hr (05/28/24 0646)  furosemide, 5 mg/hr, Last Rate: 5 mg/hr (05/28/24 0646)  oxygen, , Last Rate: 50 L/min (05/27/24 1631)  propofol, 0-50 mcg/kg/min, Last Rate: 24.225 mcg/kg/min (05/28/24 0646)         PRN Medications:   PRN medications: albuterol, oxygen, vancomycin    Objective     Constitutional: morbidly obese male, sedated, withdrawals to pain  Eyes: PERRL, no icterus   ENMT: mucous membranes moist, no apparent injury, no lesions seen  Head/Neck: Neck supple, no apparent injury  Respiratory/Thorax: Lungs CTA bilaterally, #7.5 ETT, suctioned for clear secretions  Cardiovascular: Regular, rate and rhythm, no murmurs, normal S1 and S2  Gastrointestinal: Nondistended, soft, non-tender, BS present x 4  Musculoskeletal: ROM intact, no joint swelling, normal strength  Extremities: normal extremities, no edema, contusions or  "wounds  Neurological:sedated, withdrawals to pain  Skin: Warm and dry, no lesions, no rashes    Vital Signs  Blood pressure 91/61, pulse 57, temperature 35.7 °C (96.3 °F), temperature source Temporal, resp. rate 12, height 1.854 m (6' 1\"), weight (!) 182 kg (401 lb 3.8 oz), SpO2 92%.  Oxygen Therapy  SpO2: 92 %  Medical Gas Therapy: Supplemental oxygen  O2 Delivery Method: Endotracheal tube  Vent Mode: Volume control/assist control  FiO2 (%):  [40 %-100 %] 40 %  S RR:  [12-20] 12  S VT:  [450 mL-500 mL] 450 mL  PEEP/CPAP (cm H2O):  [10 cm H20-12 cm H20] 10 cm H20  MAP (cm H2O):  [15-20] 15    Intake/Output last 3 Shifts:  I/O last 3 completed shifts:  In: 687.4 (3.8 mL/kg) [I.V.:527.4 (2.9 mL/kg); NG/GT:60; IV Piggyback:100]  Out: 990 (5.4 mL/kg) [Urine:990 (0.2 mL/kg/hr)]  Weight: 182 kg     Scheduled medications  aspirin, 81 mg, oral, Daily  atorvastatin, 40 mg, oral, Nightly  azithromycin, 500 mg, oral, q24h STEVE  heparin (porcine), 7,500 Units, subcutaneous, q8h STEVE  metOLazone, 5 mg, oral, Daily  [Held by provider] metoprolol succinate XL, 25 mg, oral, Daily  pantoprazole, 40 mg, intravenous, Daily  piperacillin-tazobactam, 3.375 g, intravenous, q6h      Continuous medications  fentaNYL,  mcg/hr, Last Rate: 100 mcg/hr (05/28/24 0646)  furosemide, 5 mg/hr, Last Rate: 5 mg/hr (05/28/24 0646)  oxygen, , Last Rate: 50 L/min (05/27/24 1631)  propofol, 0-50 mcg/kg/min, Last Rate: 24.225 mcg/kg/min (05/28/24 0646)      PRN medications  PRN medications: albuterol, oxygen, vancomycin    Lines and Tubes:  Peripheral IV 05/26/24 18 G Distal;Left;Upper Antecubital (Active)   Placement Date/Time: 05/26/24 7145   Hand Hygiene Completed: Yes  Size (Gauge): 18 G  Orientation: Distal;Left;Upper  Location: Antecubital  Site Prep: Chlorhexidine   Local Anesthetic: None  Technique: Anatomical landmarks  Insertion attempts: 1  Pat...   Number of days: 1       Peripheral IV 05/27/24 18 G Right Antecubital (Active)   Placement " Date/Time: 05/27/24 0828   Size (Gauge): 18 G  Orientation: Right  Location: Antecubital  Site Prep: Chlorhexidine    Number of days: 0       ETT  7.5 mm (Active)   Placement Date/Time: 05/27/24 0833   Mask Ventilation: Vent by mask  Technique: Video laryngoscopy  ETT Type: ETT - single  Single Lumen Tube Size: 7.5 mm  Cuffed: Yes  Location: Oral  Airway Insertion Attempts: 1  Placement Verification: Auscultation...   Number of days: 0       Urethral Catheter (Active)   Placement Date/Time: 05/27/24 0909   Catheter Balloon Size: 10 mL   Number of days: 0       NG/OG/Feeding Tube OG - [place] sump 16 Fr Left mouth (Active)   Placement Date/Time: 05/27/24 0853   Type of Tube: NG/OG Tube  Tube Type: OG - [place] sump  NG/OG Tube Size: 16 Fr  Tube Location: Left mouth   Number of days: 0         Relevant Results  Results from last 7 days   Lab Units 05/27/24  0500 05/26/24  2209   WBC AUTO x10*3/uL 6.0 5.6   HEMOGLOBIN g/dL 16.9 16.1   HEMATOCRIT % 58.1* 54.9*   PLATELETS AUTO x10*3/uL 195 201     Results from last 7 days   Lab Units 05/27/24  1811 05/27/24  0500 05/26/24  2209   SODIUM mmol/L 132* 141 140   POTASSIUM mmol/L 3.5 4.1 3.7   CHLORIDE mmol/L 90* 92* 92*   CO2 mmol/L 31 37* 39*   BUN mg/dL 22 18 18   CREATININE mg/dL 1.44* 1.52* 1.67*   CALCIUM mg/dL 8.3* 9.2 9.3   PROTEIN TOTAL g/dL  --  8.8* 8.0   BILIRUBIN TOTAL mg/dL  --  0.7 0.5   ALK PHOS U/L  --  87 86   ALT U/L  --  14 13   AST U/L  --  15 18   GLUCOSE mg/dL 244* 154* 141*     Results from last 7 days   Lab Units 05/26/24  2324 05/26/24 2209   TROPHS ng/L 48 44          XR chest 1 view 05/27/2024  XR abdomen 1 view 05/27/2024  Interval placement of endotracheal tube with the tip approximately  4.3 cm above the danny. Left subclavian vein approach pacemaker/AICD  with leads projecting over the right ventricle, similar to prior exam.    Cardiomediastinal silhouette is stable in size and configuration,  persistently enlarged.    Low lung volumes  contributing to bronchovascular crowding.  No new consolidation, pleural effusion, or pneumothorax.    2 AP views of the abdomen.    Interval placement of enteric tube, with the tip overlying the  expected position of the gastric antrum.    Nonobstructive bowel gas pattern. Limited evaluation of  pneumoperitoneum on supine imaging, however no gross evidence of free  air is noted.    Osseous structures demonstrate no acute bony changes.    Impression  1. Interval placement of endotracheal tube with tip approximately 4.3  cm above the danny.  2. Similar cardiomegaly with mild pulmonary vascular congestion and  low lung volumes.  3. Enteric tube tip overlies the expected position of the gastric  antrum.  4. Nonobstructive bowel gas pattern.        Assessment/Plan   Principal Problem:    Acute on chronic congestive heart failure, unspecified heart failure type (Multi)  Active Problems:    COPD with acute exacerbation (Multi)    Patric Arenas is a 48 y.o. male with CHF (EF 45 to 50%), COPD, nonischemic cardiomyopathy s/p AICD, hypertension, hyperlipidemia, MJ  admitted to the MICU with acute hypercapnic respiratory failure 2/2 acute decompensated HF.  Currently intubated on MV, sedated.    Neuro: Agitation  - sedated with Propofol, Fentanyl  - change to Precedex from Propofol.  Decrease Fentanyl  - +PCP on Utox screen  - self extubated this afternoon.  STOP fentanyl  - continue Precedex drip for RASS 0-> -1      Pulm: COPD, MJ (noncompliant with home O2 and CPAP) Acute on Chronic hypercapnic respiratory failure (in ED pH 7.24, pCO2 118).    Self extubated, attempted BiPap which pt removed.  Placed on 5 L NC  - Vent settings: 450/12/40%/10==> 400/10/40%/10 PPE  - home meds: Ventolin 90 mcg/inh, Breo ellipta 200-25 mcg/dose  - PRN Albuterol  - diureses to goal neg 1-2 L  - 250 mg IV diamox x1    Cardiac:acute decompensated HF, HFpEF (2/24 Echo: EF 45%), Nonischemic Cardiomyopathy s/p AICD, HTN, HLD  - BP soft, HR  50-60's.  Propofol decrease  - home ASA 81 mg daily, Atorvastatin 40 mg daily, Jardiance 10 mg, Entresto 49-51 mg, Metolazone 5 mg, Metoprolol Succ 25 mg, spironolactone 25 mg, Torsemide 100 mg  - Continue  ASA 81 mg daily, Atorva 40 mg daily  - STOP scheduled metolazone  - holding home Metoprolol Succ 25 mg daily, Jardiance, Entresto, spironolactone, Torsemide  - Furosemide drip at 5 mg/ml  - Echo completed today, results pending    FEN/GI: Morbidly Obese with BMI 52  - Diet: NPO  - Bowel Regimen: Miralax    Renal: SUZANNE  - urine lytes pending  - voiding per delgado    Heme: No acute issues  - DVT prophylaxis: subcutaneous heparin, SCDs    Endo: Pre-DM  - A1C 6.2    ID: No acute issues  - Tmax: 36.5 C  - Micro: 5/27 Flu/Covid Neg, Staph nares pending, 5/27 BC x2 NGTD  - STOP atb and monitor off atb  - Antimicrobials: Azitthro, Vanco (5/27- 5/28), Zosyn (5/27- 5/28    MSK/SKIN: No acute issues    F None  E Replete  N NPO  G: PPI  DVT: subcutaneous Heparin   Abx: sto ptoday  Lines:  ETT (5/27), delgado (5/27), PIV x2, NG  Drips: lasix, precedex  Pressors: None    CODE STATUS: FULL (confirmed with mother on admission)  SURROGATE DECISION MAKER: Hyun (Daughter) - [phone]    Dispo: Continue MICU for acute hypercapnic respiratory failure & acute decompensated HF      I spent 70 minutes in the professional and overall care of this patient.

## 2024-05-28 NOTE — CARE PLAN
Problem: Pain  Goal: My pain/discomfort is manageable  Outcome: Progressing     Problem: Safety  Goal: Patient will be injury free during hospitalization  Outcome: Progressing  Goal: I will remain free of falls  Outcome: Progressing     Problem: Daily Care  Goal: Daily care needs are met  Outcome: Progressing     Problem: Psychosocial Needs  Goal: Demonstrates ability to cope with hospitalization/illness  Outcome: Progressing  Goal: Collaborate with me, my family, and caregiver to identify my specific goals  Outcome: Progressing     Problem: Discharge Barriers  Goal: My discharge needs are met  Outcome: Progressing     Problem: Respiratory  Goal: Clear secretions with interventions this shift  Outcome: Progressing  Goal: Minimize anxiety/maximize coping throughout shift  Outcome: Progressing  Goal: Minimal/no exertional discomfort or dyspnea this shift  Outcome: Progressing  Goal: No signs of respiratory distress (eg. Use of accessory muscles. Peds grunting)  Outcome: Progressing  Goal: Patent airway maintained this shift  Outcome: Progressing  Goal: Tolerate mechanical ventilation evidenced by VS/agitation level this shift  Outcome: Progressing  Goal: Tolerate pulmonary toileting this shift  Outcome: Progressing  Goal: Verbalize decreased shortness of breath this shift  Outcome: Progressing  Goal: Wean oxygen to maintain O2 saturation per order/standard this shift  Outcome: Progressing  Goal: Increase self care and/or family involvement in next 24 hours  Outcome: Progressing     Problem: Safety - Medical Restraint  Goal: Remains free of injury from restraints (Restraint for Interference with Medical Device)  Outcome: Progressing  Goal: Free from restraint(s) (Restraint for Interference with Medical Device)  Outcome: Progressing     Problem: Skin  Goal: Decreased wound size/increased tissue granulation at next dressing change  Outcome: Progressing  Goal: Participates in plan/prevention/treatment measures  Outcome:  Progressing  Goal: Prevent/manage excess moisture  Outcome: Progressing  Goal: Prevent/minimize sheer/friction injuries  Outcome: Progressing  Goal: Promote/optimize nutrition  Outcome: Progressing  Goal: Promote skin healing  Outcome: Progressing

## 2024-05-28 NOTE — PROGRESS NOTES
Vancomycin Dosing by Pharmacy- FOLLOW UP    Patric Arenas is a 48 y.o. year old male who Pharmacy has been consulted for vancomycin dosing for Sepsis/medical prophylaxis. Based on the patient's indication and renal status this patient is being dosed based on a goal trough/random level of 15-20.     Renal function is currently declining. Continue to dose by levels.    Most recent trough level: 10.3 mcg/mL drawn 16 hours after the initial 2g loading dose.     Visit Vitals  BP 91/61   Pulse 57   Temp 35.7 °C (96.3 °F) (Temporal)   Resp 12        Lab Results   Component Value Date    CREATININE 2.07 (H) 2024    CREATININE 1.44 (H) 2024    CREATININE 1.52 (H) 2024    CREATININE 1.67 (H) 2024        Patient weight is as follows:   Vitals:    24 0947   Weight: (!) 182 kg (401 lb 3.8 oz)       Cultures:  No results found for the encounter in last 14 days.       I/O last 3 completed shifts:  In: 1365.3 (7.5 mL/kg) [I.V.:1105.3 (6.1 mL/kg); NG/GT:60; IV Piggyback:200]  Out: 2090 (11.5 mL/kg) [Urine:0 (0.3 mL/kg/hr)]  Weight: 182 kg   I/O during current shift:  No intake/output data recorded.    Temp (24hrs), Av.8 °C (96.5 °F), Min:35.3 °C (95.5 °F), Max:36.5 °C (97.7 °F)      Assessment/Plan    Creatinine increased from 1.44 to 2.07 since yesterday. Initial trough was 10.3 after the 2g loading dose. Will give a smaller dose of 1500mg x1 today due to worsening renal function.   The next level will be obtained on  with AM labs. May be obtained sooner if clinically indicated.   Will continue to monitor renal function daily while on vancomycin and order serum creatinine at least every 48 hours if not already ordered.  Follow for continued vancomycin needs, clinical response, and signs/symptoms of toxicity.       Yohannes Vasquez, PharmD, BCPS

## 2024-05-28 NOTE — CONSULTS
"Nutrition Initial Assessment:   Nutrition Assessment    Reason for Assessment: Provider consult order, Admission nursing screening    Patient is a 48 y.o. male presenting with hypercapnic respiratory failure from decompensated HF vs COPD exacerbation vs OHS (non-compliant with CPAP) vs infection.  Currently intubated, sedated with fentanyl and propofol.  Also on lasix drip.     Pt with OGT in place for enteral access.  Current Propofol rate is 25.8mls/hr which provides 681kcals daily from fat.     Past history includes CHF (EF 45 to 50%), COPD, nonischemic cardiomyopathy s/p AICD, hypertension, hyperlipidemia, MJ     Anthropometrics:  Height: 185.4 cm (6' 1\")   Weight: (!) 182 kg (401 lb 3.8 oz)   BMI (Calculated): 52.95  IBW/kg (Dietitian Calculated): 83.6 kg  Percent of IBW: 219 %     Weight History:     5/28/24: 182kg  5/26/24: 172kg  2/13/24: 172kg  12/18/23: 180kg  9/19/23: 181kg  6/29/23: 177kg    Weight appears overall stable in the last year.    Weight Change %:     Nutrition Focused Physical Exam Findings:    Subcutaneous Fat Loss:   Orbital Fat Pads: Well nourished (slightly bulging fat pads)  Buccal Fat Pads: Well nourished (full, rounded cheeks)  Triceps: Well nourished (ample fat tissue)  Muscle Wasting:  Temporalis: Well nourished (well-defined muscle)  Pectoralis (Clavicular Region): Well nourished (clavicle not visible)  Deltoid/Trapezius: Well nourished (rounded appearance at arm, shoulder, neck)  Quadriceps: Well nourished (well developed, well rounded)  Gastrocnemius: Well nourished (well developed bulbous muscle)  Edema:  Edema Location: generalized non-pitting  Physical Findings:  Skin: Negative    Nutrition Significant Labs:  BMP Trend:   Results from last 7 days   Lab Units 05/28/24  0521 05/27/24  1811 05/27/24  0500 05/26/24  2209   GLUCOSE mg/dL 96 244* 154* 141*   CALCIUM mg/dL 8.5* 8.3* 9.2 9.3   SODIUM mmol/L 143 132* 141 140   POTASSIUM mmol/L 3.7 3.5 4.1 3.7   CO2 mmol/L 39* 31 37* " "39*   CHLORIDE mmol/L 93* 90* 92* 92*   BUN mg/dL 26* 22 18 18   CREATININE mg/dL 2.07* 1.44* 1.52* 1.67*    , A1C:  Lab Results   Component Value Date    HGBA1C 6.2 (H) 05/28/2024   , BG POCT trend:   Results from last 7 days   Lab Units 05/27/24  1956 05/27/24  1552 05/27/24  0955   POCT GLUCOSE mg/dL 124* 151* 161*    , Renal Lab Trend:   Results from last 7 days   Lab Units 05/28/24  0521 05/27/24  1811 05/27/24  0500 05/27/24  0500 05/26/24  2209   POTASSIUM mmol/L 3.7 3.5  --  4.1 3.7   PHOSPHORUS mg/dL 4.0 2.0*   < >  --   --    SODIUM mmol/L 143 132*  --  141 140   MAGNESIUM mg/dL 2.29 2.49*  --  2.44* 1.98   EGFR mL/min/1.73m*2 38* 59*  --  55* 50*   BUN mg/dL 26* 22  --  18 18   CREATININE mg/dL 2.07* 1.44*  --  1.52* 1.67*    < > = values in this interval not displayed.    , Vit D: No results found for: \"VITD25\"     Nutrition Specific Medications:  Scheduled medications  aspirin, 81 mg, oral, Daily  atorvastatin, 40 mg, oral, Nightly  azithromycin, 500 mg, oral, q24h STEVE  dexmedeTOMIDine, , ,   heparin (porcine), 7,500 Units, subcutaneous, q8h STEVE  [Held by provider] metoprolol succinate XL, 25 mg, oral, Daily  pantoprazole, 40 mg, intravenous, Daily      Continuous medications  fentaNYL,  mcg/hr, Last Rate: 100 mcg/hr (05/28/24 0725)  furosemide, 10 mg/hr, Last Rate: 10 mg/hr (05/28/24 1037)  oxygen, , Last Rate: 50 L/min (05/27/24 1631)  propofol, 0-50 mcg/kg/min, Last Rate: 25 mcg/kg/min (05/28/24 0956)      PRN medications  PRN medications: albuterol, dexmedeTOMIDine, oxygen     I/O:   Last BM Date: 05/27/24;        Estimated Needs:   Total Energy Estimated Needs (kCal):  (6272-5442 while intubated)  Method for Estimating Needs: MV= 6.4, RMR= 2627 using admit weight  Total Protein Estimated Needs (g):  (165-210)  Method for Estimating Needs: 2.0-2.5 x 83.6kg            Nutrition Diagnosis        Nutrition Diagnosis  Patient has Nutrition Diagnosis: Yes  Diagnosis Status (1): New  Nutrition " Diagnosis 1: Increased nutrient needs  Related to (1): hypocaloric feeding while intubated  As Evidenced by (1): goal is to provide lower calorie and therefore higher protein feedling regimen while intubated in order to aid in vent weaning and blood sugar contorl       Nutrition Interventions/Recommendations         Nutrition Prescription:  For tube feed: Two Checo HN at start rate of 15mls/hr.  While on current propofol rate, increase only once to goal of 25mls/hr.    Once off of Propofol, increase to new goal rate of 35mls/hr reached.  Also order one packet of Pro-Stat AWC TID.    Check Vitamin D level.          Nutrition Interventions:    TF at goal with propofol= 1881kcals, 50grams protein   TF at goal without propofol with but with prostat= 1980kcals, 121grams protein       Nutrition Education:   Not appropriate       Nutrition Monitoring and Evaluation   Food/Nutrient Related History Monitoring  Monitoring and Evaluation Plan: Enteral and parenteral nutrition intake  Criteria: TF to meet >60% estimated needs         Time Spent/Follow-up Reminder:   Time Spent (min): 60 minutes  Last Date of Nutrition Visit: 05/28/24  Nutrition Follow-Up Needed?: Dietitian to reassess per policy  Follow up Comment: TF via OGT/vent

## 2024-05-29 ENCOUNTER — APPOINTMENT (OUTPATIENT)
Dept: CARDIOLOGY | Facility: HOSPITAL | Age: 49
End: 2024-05-29
Payer: COMMERCIAL

## 2024-05-29 LAB
ALBUMIN SERPL BCP-MCNC: 3.5 G/DL (ref 3.4–5)
ALBUMIN SERPL BCP-MCNC: 3.6 G/DL (ref 3.4–5)
ANION GAP SERPL CALC-SCNC: 17 MMOL/L (ref 10–20)
ANION GAP SERPL CALC-SCNC: 18 MMOL/L (ref 10–20)
BASE EXCESS BLDV CALC-SCNC: 17.3 MMOL/L (ref -2–3)
BASOPHILS # BLD AUTO: 0.02 X10*3/UL (ref 0–0.1)
BASOPHILS NFR BLD AUTO: 0.3 %
BODY TEMPERATURE: 37 DEGREES CELSIUS
BUN SERPL-MCNC: 32 MG/DL (ref 6–23)
BUN SERPL-MCNC: 38 MG/DL (ref 6–23)
CALCIUM SERPL-MCNC: 8.3 MG/DL (ref 8.6–10.6)
CALCIUM SERPL-MCNC: 8.4 MG/DL (ref 8.6–10.6)
CHLORIDE SERPL-SCNC: 90 MMOL/L (ref 98–107)
CHLORIDE SERPL-SCNC: 91 MMOL/L (ref 98–107)
CK SERPL-CCNC: 293 U/L (ref 0–325)
CK SERPL-CCNC: 294 U/L (ref 0–325)
CO2 SERPL-SCNC: 40 MMOL/L (ref 21–32)
CO2 SERPL-SCNC: 41 MMOL/L (ref 21–32)
CREAT SERPL-MCNC: 2.9 MG/DL (ref 0.5–1.3)
CREAT SERPL-MCNC: 3.37 MG/DL (ref 0.5–1.3)
EGFRCR SERPLBLD CKD-EPI 2021: 21 ML/MIN/1.73M*2
EGFRCR SERPLBLD CKD-EPI 2021: 26 ML/MIN/1.73M*2
EOSINOPHIL # BLD AUTO: 0.06 X10*3/UL (ref 0–0.7)
EOSINOPHIL NFR BLD AUTO: 0.9 %
ERYTHROCYTE [DISTWIDTH] IN BLOOD BY AUTOMATED COUNT: 16.2 % (ref 11.5–14.5)
GLUCOSE BLD MANUAL STRIP-MCNC: 91 MG/DL (ref 74–99)
GLUCOSE SERPL-MCNC: 71 MG/DL (ref 74–99)
GLUCOSE SERPL-MCNC: 90 MG/DL (ref 74–99)
HCO3 BLDV-SCNC: 46.7 MMOL/L (ref 22–26)
HCT VFR BLD AUTO: 49.9 % (ref 41–52)
HGB BLD-MCNC: 14.8 G/DL (ref 13.5–17.5)
IMM GRANULOCYTES # BLD AUTO: 0.02 X10*3/UL (ref 0–0.7)
IMM GRANULOCYTES NFR BLD AUTO: 0.3 % (ref 0–0.9)
INHALED O2 CONCENTRATION: 45 %
LYMPHOCYTES # BLD AUTO: 0.91 X10*3/UL (ref 1.2–4.8)
LYMPHOCYTES NFR BLD AUTO: 13.8 %
MAGNESIUM SERPL-MCNC: 2.24 MG/DL (ref 1.6–2.4)
MAGNESIUM SERPL-MCNC: 2.33 MG/DL (ref 1.6–2.4)
MCH RBC QN AUTO: 31 PG (ref 26–34)
MCHC RBC AUTO-ENTMCNC: 29.7 G/DL (ref 32–36)
MCV RBC AUTO: 105 FL (ref 80–100)
MONOCYTES # BLD AUTO: 0.53 X10*3/UL (ref 0.1–1)
MONOCYTES NFR BLD AUTO: 8.1 %
NEUTROPHILS # BLD AUTO: 5.04 X10*3/UL (ref 1.2–7.7)
NEUTROPHILS NFR BLD AUTO: 76.6 %
NRBC BLD-RTO: 0 /100 WBCS (ref 0–0)
OXYHGB MFR BLDV: 91.1 % (ref 45–75)
PCO2 BLDV: 79 MM HG (ref 41–51)
PH BLDV: 7.38 PH (ref 7.33–7.43)
PHOSPHATE SERPL-MCNC: 7.3 MG/DL (ref 2.5–4.9)
PHOSPHATE SERPL-MCNC: 8.1 MG/DL (ref 2.5–4.9)
PLATELET # BLD AUTO: 159 X10*3/UL (ref 150–450)
PO2 BLDV: 72 MM HG (ref 35–45)
POTASSIUM SERPL-SCNC: 4.1 MMOL/L (ref 3.5–5.3)
POTASSIUM SERPL-SCNC: 4.1 MMOL/L (ref 3.5–5.3)
RBC # BLD AUTO: 4.77 X10*6/UL (ref 4.5–5.9)
SAO2 % BLDV: 94 % (ref 45–75)
SODIUM SERPL-SCNC: 144 MMOL/L (ref 136–145)
SODIUM SERPL-SCNC: 145 MMOL/L (ref 136–145)
STAPHYLOCOCCUS SPEC CULT: NORMAL
WBC # BLD AUTO: 6.6 X10*3/UL (ref 4.4–11.3)

## 2024-05-29 PROCEDURE — 80069 RENAL FUNCTION PANEL: CPT

## 2024-05-29 PROCEDURE — 82947 ASSAY GLUCOSE BLOOD QUANT: CPT

## 2024-05-29 PROCEDURE — 82550 ASSAY OF CK (CPK): CPT | Performed by: NURSE PRACTITIONER

## 2024-05-29 PROCEDURE — 82805 BLOOD GASES W/O2 SATURATION: CPT | Performed by: NURSE PRACTITIONER

## 2024-05-29 PROCEDURE — 93005 ELECTROCARDIOGRAM TRACING: CPT

## 2024-05-29 PROCEDURE — 83735 ASSAY OF MAGNESIUM: CPT

## 2024-05-29 PROCEDURE — 2500000004 HC RX 250 GENERAL PHARMACY W/ HCPCS (ALT 636 FOR OP/ED): Performed by: NURSE PRACTITIONER

## 2024-05-29 PROCEDURE — 36415 COLL VENOUS BLD VENIPUNCTURE: CPT | Performed by: NURSE PRACTITIONER

## 2024-05-29 PROCEDURE — 2500000001 HC RX 250 WO HCPCS SELF ADMINISTERED DRUGS (ALT 637 FOR MEDICARE OP)

## 2024-05-29 PROCEDURE — 2500000001 HC RX 250 WO HCPCS SELF ADMINISTERED DRUGS (ALT 637 FOR MEDICARE OP): Performed by: NURSE PRACTITIONER

## 2024-05-29 PROCEDURE — 82810 BLOOD GASES O2 SAT ONLY: CPT | Performed by: NURSE PRACTITIONER

## 2024-05-29 PROCEDURE — 2500000004 HC RX 250 GENERAL PHARMACY W/ HCPCS (ALT 636 FOR OP/ED)

## 2024-05-29 PROCEDURE — 2500000005 HC RX 250 GENERAL PHARMACY W/O HCPCS: Performed by: NURSE PRACTITIONER

## 2024-05-29 PROCEDURE — 85025 COMPLETE CBC W/AUTO DIFF WBC: CPT

## 2024-05-29 PROCEDURE — 36415 COLL VENOUS BLD VENIPUNCTURE: CPT

## 2024-05-29 PROCEDURE — C9113 INJ PANTOPRAZOLE SODIUM, VIA: HCPCS

## 2024-05-29 PROCEDURE — 1200000002 HC GENERAL ROOM WITH TELEMETRY DAILY

## 2024-05-29 RX ORDER — TALC
6 POWDER (GRAM) TOPICAL NIGHTLY
Status: DISCONTINUED | OUTPATIENT
Start: 2024-05-29 | End: 2024-06-02

## 2024-05-29 RX ORDER — OLANZAPINE 10 MG/2ML
2.5 INJECTION, POWDER, FOR SOLUTION INTRAMUSCULAR EVERY 6 HOURS PRN
Status: DISCONTINUED | OUTPATIENT
Start: 2024-05-29 | End: 2024-06-02

## 2024-05-29 RX ADMIN — DEXMEDETOMIDINE HYDROCHLORIDE IN SODIUM CHLORIDE 0.26 MCG/KG/HR: 4 INJECTION INTRAVENOUS at 07:27

## 2024-05-29 RX ADMIN — Medication 6 MG: at 21:04

## 2024-05-29 RX ADMIN — HEPARIN SODIUM 7500 UNITS: 5000 INJECTION INTRAVENOUS; SUBCUTANEOUS at 13:59

## 2024-05-29 RX ADMIN — Medication 5 L/MIN: at 20:00

## 2024-05-29 RX ADMIN — HEPARIN SODIUM 7500 UNITS: 5000 INJECTION INTRAVENOUS; SUBCUTANEOUS at 05:32

## 2024-05-29 RX ADMIN — ATORVASTATIN CALCIUM 40 MG: 40 TABLET, FILM COATED ORAL at 21:03

## 2024-05-29 RX ADMIN — POLYETHYLENE GLYCOL 3350 17 G: 17 POWDER, FOR SOLUTION ORAL at 13:58

## 2024-05-29 RX ADMIN — ASPIRIN 81 MG: 81 TABLET, COATED ORAL at 13:59

## 2024-05-29 RX ADMIN — HEPARIN SODIUM 7500 UNITS: 5000 INJECTION INTRAVENOUS; SUBCUTANEOUS at 21:03

## 2024-05-29 RX ADMIN — PANTOPRAZOLE SODIUM 40 MG: 40 INJECTION, POWDER, FOR SOLUTION INTRAVENOUS at 08:02

## 2024-05-29 RX ADMIN — Medication 3 L/MIN: at 09:58

## 2024-05-29 ASSESSMENT — COGNITIVE AND FUNCTIONAL STATUS - GENERAL
HELP NEEDED FOR BATHING: A LITTLE
DRESSING REGULAR LOWER BODY CLOTHING: A LOT
CLIMB 3 TO 5 STEPS WITH RAILING: TOTAL
WALKING IN HOSPITAL ROOM: A LOT
STANDING UP FROM CHAIR USING ARMS: A LOT
DRESSING REGULAR UPPER BODY CLOTHING: A LITTLE
TOILETING: A LITTLE
MOVING TO AND FROM BED TO CHAIR: A LITTLE
MOBILITY SCORE: 16
DAILY ACTIVITIY SCORE: 19

## 2024-05-29 ASSESSMENT — PAIN SCALES - GENERAL
PAINLEVEL_OUTOF10: 0 - NO PAIN
PAINLEVEL_OUTOF10: 0 - NO PAIN

## 2024-05-29 ASSESSMENT — PAIN - FUNCTIONAL ASSESSMENT
PAIN_FUNCTIONAL_ASSESSMENT: 0-10
PAIN_FUNCTIONAL_ASSESSMENT: CPOT (CRITICAL CARE PAIN OBSERVATION TOOL)

## 2024-05-29 NOTE — PROGRESS NOTES
"Critical Care Daily Progress        Subjective   Patric Arenas is a 48 y.o. male on day 2 of admission presenting with Acute on chronic congestive heart failure, unspecified heart failure type (Multi).     Interval History: Self extubated 5/28,  attempted BiPap which pt removed. Patient with increased agitation, hallucinations.  Placed on 5 L NC.     Complaints: unable to complete full ROS 2/2 sedation.  Pt does shake head \"no\" to pain    Scheduled Medications:   aspirin, 81 mg, oral, Daily  atorvastatin, 40 mg, oral, Nightly  heparin (porcine), 7,500 Units, subcutaneous, q8h STEVE  [Held by provider] metoprolol succinate XL, 25 mg, oral, Daily  oxygen, , inhalation, Continuous - Inhalation  pantoprazole, 40 mg, intravenous, Daily  polyethylene glycol, 17 g, oral, Daily       Continuous Medications:   dexmedeTOMIDine, 0.1-1.5 mcg/kg/hr, Last Rate: 0.286 mcg/kg/hr (05/28/24 2155)  furosemide, 10 mg/hr, Last Rate: 10 mg/hr (05/28/24 2155)       PRN Medications:   PRN medications: albuterol    Objective     Constitutional: moribly obese middle aged male, awakes to voice able to state name, year & quickly falls back asleep.    Eyes: PERRL, EOMI, no icterus   ENMT: mucous membranes moist, no apparent injury, no lesions seen  Head/Neck: Neck supple, no apparent injury  Respiratory/Thorax: Lungs CTA bilaterally, non-labored breathing, no cough, on 3 L NC  Cardiovascular: Regular, rate and rhythm, no murmurs, normal S1 and S2  Gastrointestinal: Nondistended, soft, non-tender, BS present x 4  Musculoskeletal: ROM intact, no joint swelling, normal strength  Extremities: normal extremities, no edema, contusions or wounds  Neurological: alert and oriented x 3, speech clear, follows commands appropriately, cr. n. II-XII intact, sensation grossly intact, motor 5/5 throughout  Skin: Warm and dry, no lesions, no rashes    Vital Signs  Blood pressure (!) 77/39, pulse 68, temperature 36.1 °C (97 °F), resp. rate 23, height 1.854 m (6' " "1\"), weight (!) 182 kg (401 lb 3.8 oz), SpO2 96%.  Oxygen Therapy  SpO2: 96 %  Medical Gas Therapy: Supplemental oxygen  O2 Delivery Method: Venturi mask  Vent Mode: Volume control/assist control  FiO2 (%):  [10 %-50 %] 10 %  S RR:  [10-12] 12  S VT:  [400 mL-450 mL] 400 mL  PEEP/CPAP (cm H2O):  [10 cm H20] 10 cm H20  MAP (cm H2O):  [16] 16    Intake/Output last 3 Shifts:  I/O last 3 completed shifts:  In: 2240 (12.3 mL/kg) [I.V.:1360 (7.5 mL/kg); NG/GT:180; IV Piggyback:700]  Out: 3355 (18.4 mL/kg) [Urine:3355 (0.5 mL/kg/hr)]  Weight: 182 kg     Scheduled medications  aspirin, 81 mg, oral, Daily  atorvastatin, 40 mg, oral, Nightly  heparin (porcine), 7,500 Units, subcutaneous, q8h STEVE  [Held by provider] metoprolol succinate XL, 25 mg, oral, Daily  oxygen, , inhalation, Continuous - Inhalation  pantoprazole, 40 mg, intravenous, Daily  polyethylene glycol, 17 g, oral, Daily      Continuous medications  dexmedeTOMIDine, 0.1-1.5 mcg/kg/hr, Last Rate: 0.286 mcg/kg/hr (05/28/24 2155)  furosemide, 10 mg/hr, Last Rate: 10 mg/hr (05/28/24 2155)      PRN medications  PRN medications: albuterol    Lines and Tubes:  Peripheral IV 05/28/24 20 G Right Antecubital (Active)   Placement Date/Time: 05/28/24 1407   Size (Gauge): 20 G  Orientation: Right  Location: Antecubital  Site Prep: Usual sterile procedure followed;Chlorhexidine   Technique: Ultrasound guidance  Placed by: DENVER  Insertion attempts: 1   Number of days: 0       Peripheral IV 05/28/24 22 G Left;Ventral Forearm (Active)   Placement Date/Time: 05/28/24 1407   Size (Gauge): 22 G  Orientation: Left;Ventral  Location: Forearm  Site Prep: Usual sterile procedure followed;Chlorhexidine   Placed by: DENVER  Insertion attempts: 1   Number of days: 0       Urethral Catheter (Active)   Placement Date/Time: 05/27/24 0909   Catheter Balloon Size: 10 mL   Number of days: 1         Relevant Results  Results from last 7 days   Lab Units 05/28/24  0521 05/27/24  0500 05/26/24  2209 "   WBC AUTO x10*3/uL 5.6 6.0 5.6   HEMOGLOBIN g/dL 15.7 16.9 16.1   HEMATOCRIT % 51.5 58.1* 54.9*   PLATELETS AUTO x10*3/uL 158 195 201     Results from last 7 days   Lab Units 05/28/24  2310 05/28/24  0521 05/27/24  1811 05/27/24  0500 05/26/24  2209   SODIUM mmol/L 144 143 132* 141 140   POTASSIUM mmol/L 4.1 3.7 3.5 4.1 3.7   CHLORIDE mmol/L 91* 93* 90* 92* 92*   CO2 mmol/L 40* 39* 31 37* 39*   BUN mg/dL 32* 26* 22 18 18   CREATININE mg/dL 2.90* 2.07* 1.44* 1.52* 1.67*   CALCIUM mg/dL 8.3* 8.5* 8.3* 9.2 9.3   PROTEIN TOTAL g/dL  --   --   --  8.8* 8.0   BILIRUBIN TOTAL mg/dL  --   --   --  0.7 0.5   ALK PHOS U/L  --   --   --  87 86   ALT U/L  --   --   --  14 13   AST U/L  --   --   --  15 18   GLUCOSE mg/dL 90 96 244* 154* 141*     Results from last 7 days   Lab Units 05/26/24  2324 05/26/24  2209   TROPHS ng/L 48 44          XR chest 1 view 05/28/2024  FINDINGS:  AP radiograph of the chest was provided.    MEDICAL DEVICES:  ETT distal tip 4.0 cm from the danny.  Left chest wall AICD. Enteric tube overlies the esophagus and stomach  with distal tip not imaged.    CARDIOMEDIASTINAL SILHOUETTE:  The cardiomediastinal silhouette is largely obscured by overlying  airspace opacities. Within these limits, superior similarly enlarged  to prior exam.    LUNGS:  Interval worsening in pulmonary vascular congestion interstitial lung  markings and predominantly left lower lung hazy opacities. Small left  pleural effusion, similar to prior exam. Small right pleural effusion  similar to prior exam. No pneumothorax.    ABDOMEN:  No remarkable upper abdominal findings.    BONES:  No acute osseous changes.    Impression  1.  Interval worsening in interstitial/alveolar pulmonary edema.  Underlying infectious etiology not excluded.  2. Similar small bilateral pleural effusions.         Assessment/Plan   Principal Problem:    Acute on chronic congestive heart failure, unspecified heart failure type (Multi)  Active Problems:    COPD  with acute exacerbation (Multi)  Patric Arenas is a 48 y.o. male with CHF (EF 45 to 50%), COPD, nonischemic cardiomyopathy s/p AICD, hypertension, hyperlipidemia, MJ  admitted to the MICU with acute hypercapnic respiratory failure 2/2 acute decompensated HF I/s/o noncompliance with meds.  Self extubated 5/28, placed on bipap which he did not tolerate, became agitated and ripped out IV's. Now on 3 L NC     Neuro: Encephalopathy/Agitation requiring precedex.  D/dx delirium vs ?drug use (ectasy & PCP use)  - +PCP on [facility] screen  - wean off Precedex today  - PRN Zyprexa 2.5 mg Q6H      Pulm: Asthma, MJ (noncompliant with home O2 and CPAP) Acute on Chronic hypercapnic respiratory failure (in ED pH 7.24, pCO2 118).    Self extubated 5/28,  attempted BiPap which pt removed.  Placed on 5 L NC--> weaned to 3 L NC  - after chart review hx of Asthma, no COPD hx  - home meds: Ventolin 90 mcg/inh, Breo ellipta 200-25 mcg/dose  - PRN Albuterol  - diureses to goal neg 1-2 L.  HOLD on further diuresis today given Cr bump  - 5/28 s/p 250 mg IV diamox x1     Cardiac:acute decompensated HF, HFpEF (2/24 Echo: EF 45%), Nonischemic Cardiomyopathy s/p AICD, HTN, HLD  - BP soft  - home ASA 81 mg daily, Atorvastatin 40 mg daily, Jardiance 10 mg, Entresto 49-51 mg, Metolazone 5 mg, Metoprolol Succ 25 mg, spironolactone 25 mg, Torsemide 100 mg  - Continue  ASA 81 mg daily, Atorva 40 mg daily  - holding home Metoprolol Succ 25 mg daily, Jardiance, Entresto, spironolactone, Torsemide, metolazone  - Furosemide drip at 10 mg/ml==> hold 2/2 SUZANNE       FEN/GI: Morbidly Obese with BMI 52  - Diet: NPO  - Bowel Regimen: Miralax  - bedside swallow today when pt awake.  If passes bedside swallow, will start PO meds, Low Na diet     Renal: SUZANNE, intrinsic injury, possibly ATN 2/2 hypotension vs diuretics  - FeUrea 36.2%  - voiding per delgado  - HOLD further diuresis today     Heme: No acute issues  - DVT prophylaxis: subcutaneous heparin, SCDs     Endo:  Pre-DM  - A1C 6.2     ID: No acute issues  - Tmax: 36.3 C  - Micro: 5/27 Flu/Covid Neg, Staph nares pending, 5/27 BC x2 NGTD  - monitor off atb  - Antimicrobials: Azitthro, Vanco (5/27- 5/28), Zosyn (5/27- 5/28     MSK/SKIN: No acute issues     F None  E Replete  N NPO  G: PPI  DVT: subcutaneous Heparin   Abx: sto ptoday  Lines:  PIV x2, [place] (5.27)  Drips: lasix, precedex  Pressors: None     CODE STATUS: FULL (confirmed with mother on admission)  SURROGATE DECISION MAKER: Hyun (Daughter) - [phone]     Dispo: Continue MICU for acute hypercapnic respiratory failure & acute decompensated HF and agitation requiring precedex, if stable off Precedex    Pt discussed with Dr. Mays seen and examined. All labs, VS and previous plan of care reviewed.    I spent 70 minutes in the professional and overall care of this patient.

## 2024-05-29 NOTE — HOSPITAL COURSE
05/05/2022 I spoke to the patients daughter Alex.  The patient is already scheduled to see Dr. Jorge for her lumbar spine.  Dr. Yañez referred her.  She asked to give me a call back tomorrow to schedule her 6 month post op with Marium..    ----- Message from Luz Armenta MA sent at 4/29/2022  1:30 PM CDT -----  Regarding: NEEDS APPOINTMENT  > From: Brenda Brown  > To: KIM COLON   > Sent: 4/28/2022 10:35 AM  >  I can see her at 6 months post op from her ACDF.  cervical x-rays with flex/ext views.  yong    > From: Brenda Brown  > To: KIM COLON   > Sent: 4/28/2022 10:34 AM  >  Lumbar MRI was reviewed.  She would benefit from 2 level decompression.  Have her obtain lumbar x-rays with flex/ext views.  Refer to Cesar or Ania.  Let her know Dr. Leone is no longer doing lumbar procedures.  bibi       Patric Arenas is a 48 y.o. male with PMH of CHF (EF 45 to 50%), COPD, nonischemic cardiomyopathy s/p AICD, HTN, HLD, MJ  who presenting to the ICU for acute hypercapnic respiratory failure requiring intubation after coming to the ED with c/o SOB x3-4 days.    ED Course:  On arrival to the ED, patient was afebrile, tachycardic to 109, tachypneic to 22 bpm and satting on 90% on 6L. Patient was then placed on 12 L cannula bubbler. Patient then transitioned to BIPAP due to VBG with Co2 of 118 and pH of 7.24. Patient also given DuoNebs, continuous albuterol, IV Solu-Medrol and, diuresis with 100 mg of IV Lasix.  VBG did not show improvement with BIPAP and patient was intubated and transitioned to the ICU.    ICU Course:  Patient admitted to MICU for acute hypercapnic resp faillure and acute decompensated HF I/s/o noncompliance with meds, O2 and CPAP.  Intubated and sedated on admit.  Aggressively diuresed with IV Lasix drip & 1 x dose of 250 mg IV Diamox.  Patient self extubated 5/28, placed on BiPap which he did not tolerated, becoming acutely agitated.  Placed on 5 L NC, started on Precedex drip which was continued until 5/29.  Patient now awake, alert, oriented x3 and behaving appropriately.  Lasix drip continued until 5/29 am, started to have soft BP's with decreased UO and SUZANNE (FeUrea 36%, intrinisic, likely ATN 2/2 hypotension vs diuretics).  Patient passed bedside swallow so was started on low sodium diet.  He was transferred to [facility] on 5/30.  However he became more restless, taking off oxygen and requiring up to 15L, airvo and bipap.  He became more sleepy and CO2 was found to be up to 110; CXR with vascular congestion so given IV lasix then was transferred back to CICU on 6/1.  He was placed on bipap which was switched to airvo during day.  On 6/2 he was transferred to SDU on airvo during day and tolerating bipap at night.     Floor Course:  Transferred to the floor 6/7 evening on 2L O2 N/C during the day,  BiPAP at night satting >92%. Home bipap machine & home oxygen tank/equipement delivered at bedside on day of discharge. Discussed importance of complying to supplemental O2/Bipap, medication regimen, and follow-up appointments; also discussed importance of abstaining from drugs/illegal substances, to which patient verbalized understanding.     GDMT titrated as patient tolerated; resumed empa 10mg daily on day of discharge, recommend introducing entresto on an OP basis d/t SUZANNE on CKD (which improved prior to discharge with discharge Cr of 1.80). Initiated Torsemide 40mg daily which was proven to be sufficient, as patient voided 2.1L (net negative 1.2L) with administration.     [facility] for PT/OT referral sent/to be arranged.    Discharge weight: 175kg    After all labs and VS were reviewed the decision was made that the patient was medically stable for discharge.  The patient was discharged in satisfactory condition.    More than 60 minutes were spent in coordinating patient discharge.

## 2024-05-29 NOTE — PROGRESS NOTES
Physical Therapy                 Therapy Communication Note    Patient Name: Patric Arenas  MRN: [id number]  Today's Date: 5/29/2024     Discipline: Physical Therapy    Missed Visit Reason: Missed Visit Reason:  (patient remains in restraints, on precedex gtt d/t agitation; sitter in the room; per RN, please hold this date.)    Missed Time: Attempt    Comment:

## 2024-05-29 NOTE — PROGRESS NOTES
Occupational Therapy                 Therapy Communication Note    Patient Name: Patric Arenas  MRN: [id number]  Today's Date: 5/29/2024     Discipline: Occupational Therapy    Missed Visit Reason: Missed Visit Reason:  (patient remains in restraints, on precedex gtt d/t agitation; sitter in the room; per RN, please hold this date.)    Missed Time: Attempt    Comment:  Janette Saul OTR/L  Inpatient Occupational Therapist   Rehab Office: [phone]

## 2024-05-29 NOTE — CARE PLAN
Problem: Pain  Goal: My pain/discomfort is manageable  Outcome: Progressing     Problem: Safety  Goal: Patient will be injury free during hospitalization  Outcome: Progressing  Goal: I will remain free of falls  Outcome: Progressing     Problem: Daily Care  Goal: Daily care needs are met  Outcome: Progressing     Problem: Psychosocial Needs  Goal: Demonstrates ability to cope with hospitalization/illness  Outcome: Progressing  Goal: Collaborate with me, my family, and caregiver to identify my specific goals  Outcome: Progressing     Problem: Discharge Barriers  Goal: My discharge needs are met  Outcome: Progressing     Problem: Respiratory  Goal: Clear secretions with interventions this shift  Outcome: Progressing  Goal: Minimize anxiety/maximize coping throughout shift  Outcome: Progressing  Goal: Minimal/no exertional discomfort or dyspnea this shift  Outcome: Progressing  Goal: No signs of respiratory distress (eg. Use of accessory muscles. Peds grunting)  Outcome: Progressing  Goal: Patent airway maintained this shift  Outcome: Progressing  Goal: Tolerate mechanical ventilation evidenced by VS/agitation level this shift  Outcome: Progressing  Goal: Tolerate pulmonary toileting this shift  Outcome: Progressing  Goal: Verbalize decreased shortness of breath this shift  Outcome: Progressing  Goal: Wean oxygen to maintain O2 saturation per order/standard this shift  Outcome: Progressing  Goal: Increase self care and/or family involvement in next 24 hours  Outcome: Progressing     Problem: Safety - Medical Restraint  Goal: Remains free of injury from restraints (Restraint for Interference with Medical Device)  Outcome: Progressing  Goal: Free from restraint(s) (Restraint for Interference with Medical Device)  Outcome: Progressing     Problem: Skin  Goal: Decreased wound size/increased tissue granulation at next dressing change  Outcome: Progressing  Goal: Participates in plan/prevention/treatment measures  Outcome:  Progressing  Goal: Prevent/manage excess moisture  Outcome: Progressing  Goal: Prevent/minimize sheer/friction injuries  Outcome: Progressing  Goal: Promote/optimize nutrition  Outcome: Progressing  Goal: Promote skin healing  Outcome: Progressing     Problem: Fall/Injury  Goal: Not fall by end of shift  Outcome: Progressing  Goal: Be free from injury by end of the shift  Outcome: Progressing  Goal: Verbalize understanding of personal risk factors for fall in the hospital  Outcome: Progressing  Goal: Verbalize understanding of risk factor reduction measures to prevent injury from fall in the home  Outcome: Progressing  Goal: Use assistive devices by end of the shift  Outcome: Progressing  Goal: Pace activities to prevent fatigue by end of the shift  Outcome: Progressing     Problem: Heart Failure  Goal: Improved gas exchange this shift  Outcome: Progressing  Goal: Improved urinary output this shift  Outcome: Progressing  Goal: Reduction in peripheral edema within 24 hours  Outcome: Progressing  Goal: Report improvement of dyspnea/breathlessness this shift  Outcome: Progressing  Goal: Weight from fluid excess reduced over 2-3 days, then stabilize  Outcome: Progressing  Goal: Increase self care and/or family involvement in 24 hours  Outcome: Progressing

## 2024-05-29 NOTE — PROGRESS NOTES
MICU to Floor Transfer Summary      I, personally, evaluated Patric Arenas prior to transfer to the floor, including reviewing all current laboratory and imaging studies. The patient remains appropriate for transfer to the floor. Bedside nurse and respiratory therapy are also in agreement of patient's readiness for the floor.        I:  Hospital Course   Patric Arenas is a 48 y.o. male with CHF (EF 45 to 50%), COPD, nonischemic cardiomyopathy s/p AICD, hypertension, hyperlipidemia, MJ  who presenting to the ICU for acute hypercapnic respiratory failure requiring intubation. Patient initially presented to the ED on 5/26 with shortness of breath x 3-4 days. Per chart review, patient reported that he could not walk more than 50 feet. In the ED, he denied nausea, vomiting, fever, chills, cough or congestion. Reported that he was having difficulty lying flat.Patient has required multiple hospitalizations for acute decompensated heart failure. Per mother, he has never been intubated before. Patient is suppose to be on 2L of oxygen at home and CPAP at night, but does not currently have proper supplies.     On arrival to the ED, patient was afebrile, tachycardic to 109, tachypneic to 22 bpm and satting on 90% on 6L. Patient was then placed on 12 L cannula bubbler. Patient then transitioned to BIPAP due to VBG with Co2 of 118 and pH of 7.24. Patient also given DuoNebs, continuous albuterol, IV Solu-Medrol and, diuresis with 100 mg of IV Lasix.  VBG did not show improvement with BIPAP and patient was intubated and transitioned to the floor.     Patient admitted to MICU for acute hypercapnic resp faillure and acute decompensated HF I/s/o noncompliance with meds, O2 and CPAP.  Intubated and sedated on admit.  Aggressively diuresed with IV Lasix drip & 1 x dose of 250 mg IV Diamox.  Patient self extubated 5/28, placed on BiPap which he did not tolerated, becoming acutely agitated.  Placed on 5 L NC, started on Precedex drip  which was continued until 5/29.  Patient now awake, alert, oriented x3 and behaving appropriately.  Lasix drip continued until 5/29 am, started to have soft BP's with decreased UO and SUZANNE (FeUrea 36%, intrinisic, likely ATN 2/2 hypotension vs diuretics).  Patient passed bedside swallow, started on Low Na diet.  HDS and ready for transfer to [facility].        C: Code Status/DPOA Info/Goals of Care/ACP Note      CODE STATUS: FULL (confirmed with mother on admission)  SURROGATE DECISION MAKER: Hyun (Daughter) - [phone]       U: Unprescribing & Pertinent High-Risk Medications    Changes to home meds: Holding home ASA 81 mg daily, Atorvastatin 40 mg daily, Jardiance 10 mg, Entresto 49-51 mg, Metolazone 5 mg, Metoprolol Succ 25 mg, spironolactone 25 mg, Torsemide 100 mg      Anticoagulation: subcutaneous Heparin, SCDs    Antibiotics:   [x] N/A - no current planned antimicrobioals  []      P: Pending Tests at the Time of Transfer   None      A: Active consultants, including Rehab:   []  Subspecialty Consultants:  none  [x]  PT  [x]  OT  []  SLP  []  Wound Care    U: Uncertainty Measure/Diagnostic Pause:    Working diagnosis at the time of transfer Acute hypercapnic resp failure, Acute decompensated HF, SUZANNE     Diagnosis Degree of Certainty: 1. High degree of certainty about the clinical diagnosis.     S: Summary of Major Problems and To-Dos:   - PRN Zyprexa for agitation  - need to resume home GDMT meds for HF when BP allows  - passed bedside swallow, started on Low Na diet  - RFP, Mag in am  - continue diuresis if kidney fxn/BP improves     - Discharge plan: return home when medically stable.  Will need Home O2 eval on day of discharge (has been noncompliant with home O2 and no longer has at home).  Will also need Pulm follow up for repeat sleep study and CPAP machine.  Additionally will need new scripts for all meds.     E: Exam, including Lines/Drains/Airways & Data Review:   Constitutional: moribly obese middle  aged male, A&Ox3   Eyes: PERRL, EOMI, no icterus   ENMT: mucous membranes moist, no apparent injury, no lesions seen  Head/Neck: Neck supple, no apparent injury  Respiratory/Thorax: Lungs CTA bilaterally, non-labored breathing, no cough, on 3 L NC  Cardiovascular: Regular, rate and rhythm, no murmurs, normal S1 and S2  Gastrointestinal: Nondistended, soft, non-tender, BS present x 4  : delgado with clear yellow urine  Musculoskeletal: ROM intact, no joint swelling, normal strength  Extremities: normal extremities, no edema, contusions or wounds  Neurological: alert and oriented x 3, speech clear, follows commands appropriately, cr. n. II-XII intact, sensation grossly intact, motor 5/5 throughout  Skin: Warm and dry, no lesions, no rashes, PIV x2    Current Vital Signs:  Heart Rate: 85 (05/29/24 1600 : Jose Antonio Beauchamp RN)  BP: 118/83 (05/29/24 1600 : Jose Antonio Beauchamp RN)  Temp: 37.3 °C (99.1 °F) (05/29/24 1500 : Jaswant Mroocho)  Resp: 26 (05/29/24 1600 : Jose Antonio Beauchamp RN)  SpO2: 93 % (05/29/24 1600 : Jose Antonio Beauchamp RN)      ANANT Mccoy-CNP

## 2024-05-30 PROBLEM — I50.9 ACUTE ON CHRONIC CONGESTIVE HEART FAILURE, UNSPECIFIED HEART FAILURE TYPE (MULTI): Status: RESOLVED | Noted: 2024-05-27 | Resolved: 2024-05-30

## 2024-05-30 LAB
ADENOVIRUS RVP, VIRC: NOT DETECTED
ALBUMIN SERPL BCP-MCNC: 3.7 G/DL (ref 3.4–5)
ALBUMIN SERPL BCP-MCNC: 3.9 G/DL (ref 3.4–5)
ANION GAP BLDA CALCULATED.4IONS-SCNC: -6 MMO/L (ref 10–25)
ANION GAP SERPL CALC-SCNC: 15 MMOL/L (ref 10–20)
ANION GAP SERPL CALC-SCNC: 18 MMOL/L (ref 10–20)
BASE EXCESS BLDA CALC-SCNC: 18.8 MMOL/L (ref -2–3)
BASOPHILS # BLD AUTO: 0.01 X10*3/UL (ref 0–0.1)
BASOPHILS # BLD AUTO: 0.02 X10*3/UL (ref 0–0.1)
BASOPHILS NFR BLD AUTO: 0.2 %
BASOPHILS NFR BLD AUTO: 0.4 %
BNP SERPL-MCNC: 159 PG/ML (ref 0–99)
BODY TEMPERATURE: 37 DEGREES CELSIUS
BUN SERPL-MCNC: 47 MG/DL (ref 6–23)
BUN SERPL-MCNC: 48 MG/DL (ref 6–23)
CA-I BLDA-SCNC: 1.14 MMOL/L (ref 1.1–1.33)
CALCIUM SERPL-MCNC: 9 MG/DL (ref 8.6–10.6)
CALCIUM SERPL-MCNC: 9.8 MG/DL (ref 8.6–10.6)
CHLORIDE BLDA-SCNC: 95 MMOL/L (ref 98–107)
CHLORIDE SERPL-SCNC: 88 MMOL/L (ref 98–107)
CHLORIDE SERPL-SCNC: 89 MMOL/L (ref 98–107)
CO2 SERPL-SCNC: 39 MMOL/L (ref 21–32)
CO2 SERPL-SCNC: 43 MMOL/L (ref 21–32)
CREAT SERPL-MCNC: 2.96 MG/DL (ref 0.5–1.3)
CREAT SERPL-MCNC: 3.33 MG/DL (ref 0.5–1.3)
EGFRCR SERPLBLD CKD-EPI 2021: 22 ML/MIN/1.73M*2
EGFRCR SERPLBLD CKD-EPI 2021: 25 ML/MIN/1.73M*2
ENTEROVIRUS/RHINOVIRUS RVP, VIRC: NOT DETECTED
EOSINOPHIL # BLD AUTO: 0.04 X10*3/UL (ref 0–0.7)
EOSINOPHIL # BLD AUTO: 0.05 X10*3/UL (ref 0–0.7)
EOSINOPHIL NFR BLD AUTO: 0.8 %
EOSINOPHIL NFR BLD AUTO: 1 %
ERYTHROCYTE [DISTWIDTH] IN BLOOD BY AUTOMATED COUNT: 15.9 % (ref 11.5–14.5)
ERYTHROCYTE [DISTWIDTH] IN BLOOD BY AUTOMATED COUNT: 16.2 % (ref 11.5–14.5)
GLUCOSE BLDA-MCNC: 144 MG/DL (ref 74–99)
GLUCOSE SERPL-MCNC: 123 MG/DL (ref 74–99)
GLUCOSE SERPL-MCNC: 92 MG/DL (ref 74–99)
HCO3 BLDA-SCNC: 49.6 MMOL/L (ref 22–26)
HCT VFR BLD AUTO: 54.9 % (ref 41–52)
HCT VFR BLD AUTO: 58.2 % (ref 41–52)
HCT VFR BLD EST: 50 % (ref 41–52)
HGB BLD-MCNC: 16.1 G/DL (ref 13.5–17.5)
HGB BLD-MCNC: 17.1 G/DL (ref 13.5–17.5)
HGB BLDA-MCNC: 16.7 G/DL (ref 13.5–17.5)
HUMAN BOCAVIRUS RVP, VIRC: NOT DETECTED
HUMAN CORONAVIRUS RVP, VIRC: NOT DETECTED
IMM GRANULOCYTES # BLD AUTO: 0 X10*3/UL (ref 0–0.7)
IMM GRANULOCYTES # BLD AUTO: 0.01 X10*3/UL (ref 0–0.7)
IMM GRANULOCYTES NFR BLD AUTO: 0 % (ref 0–0.9)
IMM GRANULOCYTES NFR BLD AUTO: 0.2 % (ref 0–0.9)
INFLUENZA A , VIRC: NOT DETECTED
INFLUENZA A H1N1-09 , VIRC: NOT DETECTED
INFLUENZA B PCR, VIRC: NOT DETECTED
INHALED O2 CONCENTRATION: 44 %
LACTATE BLDA-SCNC: 1.1 MMOL/L (ref 0.4–2)
LYMPHOCYTES # BLD AUTO: 0.71 X10*3/UL (ref 1.2–4.8)
LYMPHOCYTES # BLD AUTO: 0.78 X10*3/UL (ref 1.2–4.8)
LYMPHOCYTES NFR BLD AUTO: 14.6 %
LYMPHOCYTES NFR BLD AUTO: 15.1 %
MAGNESIUM SERPL-MCNC: 2.7 MG/DL (ref 1.6–2.4)
MAGNESIUM SERPL-MCNC: 2.76 MG/DL (ref 1.6–2.4)
MCH RBC QN AUTO: 31.4 PG (ref 26–34)
MCH RBC QN AUTO: 31.7 PG (ref 26–34)
MCHC RBC AUTO-ENTMCNC: 29.3 G/DL (ref 32–36)
MCHC RBC AUTO-ENTMCNC: 29.4 G/DL (ref 32–36)
MCV RBC AUTO: 107 FL (ref 80–100)
MCV RBC AUTO: 108 FL (ref 80–100)
METAPNEUMOVIRUS , VIRC: NOT DETECTED
MONOCYTES # BLD AUTO: 0.46 X10*3/UL (ref 0.1–1)
MONOCYTES # BLD AUTO: 0.47 X10*3/UL (ref 0.1–1)
MONOCYTES NFR BLD AUTO: 9.1 %
MONOCYTES NFR BLD AUTO: 9.5 %
NEUTROPHILS # BLD AUTO: 3.61 X10*3/UL (ref 1.2–7.7)
NEUTROPHILS # BLD AUTO: 3.87 X10*3/UL (ref 1.2–7.7)
NEUTROPHILS NFR BLD AUTO: 74.5 %
NEUTROPHILS NFR BLD AUTO: 74.6 %
NRBC BLD-RTO: 0 /100 WBCS (ref 0–0)
NRBC BLD-RTO: 0 /100 WBCS (ref 0–0)
OXYHGB MFR BLDA: 85 % (ref 94–98)
PARAINFLUENZA PCR, VIRC: NOT DETECTED
PCO2 BLDA: 82 MM HG (ref 38–42)
PH BLDA: 7.39 PH (ref 7.38–7.42)
PHOSPHATE SERPL-MCNC: 4.1 MG/DL (ref 2.5–4.9)
PHOSPHATE SERPL-MCNC: 6.5 MG/DL (ref 2.5–4.9)
PLATELET # BLD AUTO: 144 X10*3/UL (ref 150–450)
PLATELET # BLD AUTO: 163 X10*3/UL (ref 150–450)
PO2 BLDA: 57 MM HG (ref 85–95)
POTASSIUM BLDA-SCNC: 3.4 MMOL/L (ref 3.5–5.3)
POTASSIUM SERPL-SCNC: 3.7 MMOL/L (ref 3.5–5.3)
POTASSIUM SERPL-SCNC: 4.2 MMOL/L (ref 3.5–5.3)
RBC # BLD AUTO: 5.08 X10*6/UL (ref 4.5–5.9)
RBC # BLD AUTO: 5.45 X10*6/UL (ref 4.5–5.9)
RSV PCR, RVP, VIRC: NOT DETECTED
SAO2 % BLDA: 88 % (ref 94–100)
SODIUM BLDA-SCNC: 135 MMOL/L (ref 136–145)
SODIUM SERPL-SCNC: 142 MMOL/L (ref 136–145)
SODIUM SERPL-SCNC: 142 MMOL/L (ref 136–145)
SPECIMEN DRAWN FROM PATIENT: ABNORMAL
WBC # BLD AUTO: 4.9 X10*3/UL (ref 4.4–11.3)
WBC # BLD AUTO: 5.2 X10*3/UL (ref 4.4–11.3)

## 2024-05-30 PROCEDURE — 2500000001 HC RX 250 WO HCPCS SELF ADMINISTERED DRUGS (ALT 637 FOR MEDICARE OP)

## 2024-05-30 PROCEDURE — 80069 RENAL FUNCTION PANEL: CPT

## 2024-05-30 PROCEDURE — 85025 COMPLETE CBC W/AUTO DIFF WBC: CPT

## 2024-05-30 PROCEDURE — 83880 ASSAY OF NATRIURETIC PEPTIDE: CPT

## 2024-05-30 PROCEDURE — 99223 1ST HOSP IP/OBS HIGH 75: CPT | Performed by: INTERNAL MEDICINE

## 2024-05-30 PROCEDURE — 36415 COLL VENOUS BLD VENIPUNCTURE: CPT

## 2024-05-30 PROCEDURE — C9113 INJ PANTOPRAZOLE SODIUM, VIA: HCPCS

## 2024-05-30 PROCEDURE — 2500000004 HC RX 250 GENERAL PHARMACY W/ HCPCS (ALT 636 FOR OP/ED)

## 2024-05-30 PROCEDURE — 85018 HEMOGLOBIN: CPT | Performed by: INTERNAL MEDICINE

## 2024-05-30 PROCEDURE — 2500000004 HC RX 250 GENERAL PHARMACY W/ HCPCS (ALT 636 FOR OP/ED): Performed by: NURSE PRACTITIONER

## 2024-05-30 PROCEDURE — 2500000005 HC RX 250 GENERAL PHARMACY W/O HCPCS: Performed by: NURSE PRACTITIONER

## 2024-05-30 PROCEDURE — 1200000002 HC GENERAL ROOM WITH TELEMETRY DAILY

## 2024-05-30 PROCEDURE — 97161 PT EVAL LOW COMPLEX 20 MIN: CPT | Mod: GP | Performed by: STUDENT IN AN ORGANIZED HEALTH CARE EDUCATION/TRAINING PROGRAM

## 2024-05-30 PROCEDURE — 83735 ASSAY OF MAGNESIUM: CPT

## 2024-05-30 PROCEDURE — 2500000001 HC RX 250 WO HCPCS SELF ADMINISTERED DRUGS (ALT 637 FOR MEDICARE OP): Performed by: NURSE PRACTITIONER

## 2024-05-30 RX ORDER — FLUTICASONE FUROATE AND VILANTEROL 200; 25 UG/1; UG/1
1 POWDER RESPIRATORY (INHALATION) DAILY
Status: DISCONTINUED | OUTPATIENT
Start: 2024-05-30 | End: 2024-06-05

## 2024-05-30 RX ORDER — IPRATROPIUM BROMIDE AND ALBUTEROL SULFATE 2.5; .5 MG/3ML; MG/3ML
3 SOLUTION RESPIRATORY (INHALATION)
Status: DISCONTINUED | OUTPATIENT
Start: 2024-05-31 | End: 2024-05-31

## 2024-05-30 RX ADMIN — SALINE NASAL SPRAY 1 SPRAY: 1.5 SOLUTION NASAL at 15:12

## 2024-05-30 RX ADMIN — Medication 5 L/MIN: at 20:00

## 2024-05-30 RX ADMIN — HEPARIN SODIUM 7500 UNITS: 5000 INJECTION INTRAVENOUS; SUBCUTANEOUS at 15:12

## 2024-05-30 RX ADMIN — Medication 5 L/MIN: at 08:00

## 2024-05-30 RX ADMIN — PANTOPRAZOLE SODIUM 40 MG: 40 INJECTION, POWDER, FOR SOLUTION INTRAVENOUS at 08:58

## 2024-05-30 RX ADMIN — POLYETHYLENE GLYCOL 3350 17 G: 17 POWDER, FOR SOLUTION ORAL at 08:59

## 2024-05-30 RX ADMIN — HEPARIN SODIUM 7500 UNITS: 5000 INJECTION INTRAVENOUS; SUBCUTANEOUS at 21:07

## 2024-05-30 RX ADMIN — ASPIRIN 81 MG: 81 TABLET, COATED ORAL at 09:00

## 2024-05-30 RX ADMIN — Medication 6 MG: at 20:58

## 2024-05-30 RX ADMIN — ALBUTEROL SULFATE 2 PUFF: 90 AEROSOL, METERED RESPIRATORY (INHALATION) at 17:16

## 2024-05-30 RX ADMIN — ATORVASTATIN CALCIUM 40 MG: 40 TABLET, FILM COATED ORAL at 20:58

## 2024-05-30 RX ADMIN — HEPARIN SODIUM 7500 UNITS: 5000 INJECTION INTRAVENOUS; SUBCUTANEOUS at 05:47

## 2024-05-30 RX ADMIN — ALBUTEROL SULFATE 2 PUFF: 90 AEROSOL, METERED RESPIRATORY (INHALATION) at 12:38

## 2024-05-30 ASSESSMENT — COGNITIVE AND FUNCTIONAL STATUS - GENERAL
CLIMB 3 TO 5 STEPS WITH RAILING: TOTAL
DAILY ACTIVITIY SCORE: 19
TURNING FROM BACK TO SIDE WHILE IN FLAT BAD: A LITTLE
WALKING IN HOSPITAL ROOM: A LITTLE
DRESSING REGULAR UPPER BODY CLOTHING: A LITTLE
HELP NEEDED FOR BATHING: A LITTLE
STANDING UP FROM CHAIR USING ARMS: A LOT
DRESSING REGULAR LOWER BODY CLOTHING: A LOT
TOILETING: A LITTLE
CLIMB 3 TO 5 STEPS WITH RAILING: TOTAL
MOVING TO AND FROM BED TO CHAIR: A LITTLE
HELP NEEDED FOR BATHING: A LITTLE
DRESSING REGULAR UPPER BODY CLOTHING: A LITTLE
WALKING IN HOSPITAL ROOM: A LOT
DRESSING REGULAR LOWER BODY CLOTHING: A LOT
CLIMB 3 TO 5 STEPS WITH RAILING: TOTAL
MOBILITY SCORE: 16
STANDING UP FROM CHAIR USING ARMS: A LITTLE
STANDING UP FROM CHAIR USING ARMS: A LITTLE
MOVING TO AND FROM BED TO CHAIR: A LITTLE
MOBILITY SCORE: 16
WALKING IN HOSPITAL ROOM: A LITTLE
TOILETING: A LITTLE
MOVING FROM LYING ON BACK TO SITTING ON SIDE OF FLAT BED WITH BEDRAILS: A LITTLE
MOBILITY SCORE: 16
MOVING FROM LYING ON BACK TO SITTING ON SIDE OF FLAT BED WITH BEDRAILS: A LITTLE
TURNING FROM BACK TO SIDE WHILE IN FLAT BAD: A LITTLE
DAILY ACTIVITIY SCORE: 19
MOVING TO AND FROM BED TO CHAIR: A LITTLE

## 2024-05-30 ASSESSMENT — PAIN SCALES - GENERAL
PAINLEVEL_OUTOF10: 0 - NO PAIN

## 2024-05-30 ASSESSMENT — PAIN - FUNCTIONAL ASSESSMENT
PAIN_FUNCTIONAL_ASSESSMENT: 0-10

## 2024-05-30 ASSESSMENT — ACTIVITIES OF DAILY LIVING (ADL): LACK_OF_TRANSPORTATION: NO

## 2024-05-30 NOTE — PROGRESS NOTES
"[facility] Attending Shared Visit Note    This is a shared visit. Please see Advanced Practice Provider's encounter note for additional details.        Overnight events/Subjective: Transferred from the MICU overnight. Thinks his breathing is an issue because of \"partying\" and drinking.     Exam:   Physical exam: tired, no distress. Normal rate, regular rhythm, mildly labored breathing, clear to auscultation, abdomen non distended, no significant LE edema, no focal neuro deficits.     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     1. Respiratory failure: was supposed to be on O2 at home and CPAP but did not have proper supplies. Unclear compliance with other meds. Suspect his respiratory failure may be partially volume related but more likely related to chronic underlying lung disease and body habitus.   2. SUZANNE: not particularly overloaded and recently massively diuresed. Hold diuresis.   3. HFrecEF: Continue aspirin and atorvastatin. Holding below:  -Home meds: torsemide 100 mg BID, metolazone 5 mg, Metop succinate 25 mg, Spironolactone 25 mg, Jardiance 10 mg, Entresto 49-51 mg BID,  4. Drug use: counseled on complete cessation.     Dispo: Will need follow up with pulmonary, sleep medicine (for CPAP re initiation) and HF. Also will need home O2 most likely.     Jayden Gallegos MD    ________________________________________________________________________________  Problems:   Principal Problem:    Acute on chronic congestive heart failure, unspecified heart failure type (Multi)  Active Problems:    COPD with acute exacerbation (Multi)      Objective   Admit Date: 5/26/2024  Hospital Length of Stay: 3   Home: [facility] [id number]    Vitals:      5/30/2024    11:16 AM 5/30/2024     7:52 AM 5/30/2024     5:26 AM 5/29/2024 "    11:02 PM 5/29/2024     8:27 PM 5/29/2024     6:00 PM 5/29/2024     5:00 PM   Vitals   Systolic 120 127 124 101 116  121   Diastolic 79 83 81 67 76  74   Heart Rate 103 91 87 78 87 84 85   Temp 36.7 °C (98.1 °F) 36.7 °C (98.1 °F) 36.5 °C (97.7 °F) 36.8 °C (98.2 °F) 36.8 °C (98.2 °F)     Resp 18 19 20 18 18 29 29     Wt Readings from Last 5 Encounters:   05/27/24 (!) 182 kg (401 lb 3.8 oz)   02/13/24 (!) 172 kg (380 lb)   01/26/24 (!) 181 kg (399 lb)   12/18/23 (!) 180 kg (397 lb 6.4 oz)   12/09/23 (!) 181 kg (399 lb 3.2 oz)       Intake/Output Summary (Last 24 hours) at 5/30/2024 1308  Last data filed at 5/30/2024 0953  Gross per 24 hour   Intake 360 ml   Output 2580 ml   Net -2220 ml         MEDICATIONS  Infusions:     Scheduled:  aspirin, 81 mg, Daily  atorvastatin, 40 mg, Nightly  heparin (porcine), 7,500 Units, q8h STEVE  melatonin, 6 mg, Nightly  [Held by provider] metoprolol succinate XL, 25 mg, Daily  oxygen, , Continuous - Inhalation  pantoprazole, 40 mg, Daily  polyethylene glycol, 17 g, Daily      PRN:  albuterol, 2 puff, q4h PRN  OLANZapine, 2.5 mg, q6h PRN      Prior to Admission Meds:  Facility-Administered Medications Prior to Admission   Medication Dose Route Frequency Provider Last Rate Last Admin    fluticasone furoate-vilanteroL (Breo Ellipta) 200-25 mcg/dose inhaler 1 puff  1 puff inhalation Once Shabbir Guzmán MD         Medications Prior to Admission   Medication Sig Dispense Refill Last Dose    albuterol (Ventolin HFA) 90 mcg/actuation inhaler Inhale 2 puffs every 4 hours if needed for wheezing or shortness of breath. If you are using your rescue inhaler frequently, seek medical evaluation 18 g 3 Unknown at patient request refills    aspirin 81 mg EC tablet Take 1 tablet (81 mg) by mouth once daily.   Unknown at patient request refills    atorvastatin (Lipitor) 40 mg tablet TAKE 1 TABLET BY MOUTH ONCE DAILY 30 tablet 3 Unknown at patient request refills    empagliflozin (Jardiance) 10 mg  TAKE 1 TABLET BY MOUTH ONCE A DAY 30 tablet 3 Unknown at patient request refills    inhalational spacing device inhaler Use as directed with inhalers 1 each 0     metOLazone (Zaroxolyn) 5 mg tablet Take 1 tablet (5 mg) by mouth once daily. (Patient not taking: Reported on 5/27/2024) 30 tablet 0 Not Taking    metoprolol succinate XL (Toprol-XL) 25 mg 24 hr tablet Take 1 tablet (25 mg) by mouth once daily. 90 tablet 0 Unknown at patient request refills    oxygen (O2) gas therapy Inhale.   Unknown    sacubitriL-valsartan (Entresto) 49-51 mg tablet TAKE 1 TABLET BY MOUTH TWO TIMES A DAY 60 tablet 3 Unknown at patient request refills    sertraline (Zoloft) 25 mg tablet TAKE 1 TABLET BY MOUTH ONCE DAILY (Patient not taking: Reported on 5/27/2024) 30 tablet 3 Not Taking    spironolactone (Aldactone) 25 mg tablet TAKE 1/2 TABLET BY MOUTH ONCE DAILY 15 tablet 3 Unknown at patient request refills    torsemide (Demadex) 100 mg tablet Take 1 tablet (100 mg) by mouth 2 times a day. 180 tablet 0 Unknown at patient request refills       DATA:  CMP:  Recent Labs     05/30/24  0722 05/29/24  0527 05/28/24  2310 05/28/24  0521 05/27/24  1811 05/27/24  0500 05/26/24  2209 02/14/24  0524    145 144 143 132* 141 140 140   K 4.2 4.1 4.1 3.7 3.5 4.1 3.7 4.5   CL 89* 90* 91* 93* 90* 92* 92* 100   CO2 39* 41* 40* 39* 31 37* 39* 34*   ANIONGAP 18 18 17 15 15 16 13 11   BUN 48* 38* 32* 26* 22 18 18 8   CREATININE 3.33* 3.37* 2.90* 2.07* 1.44* 1.52* 1.67* 1.03   EGFR 22* 21* 26* 38* 59* 55* 50* 88   MG 2.76* 2.33 2.24 2.29 2.49* 2.44* 1.98 2.19     Recent Labs     05/30/24 0722 05/29/24  0527 05/28/24  2310 05/28/24  0521 05/27/24  1811 05/27/24  0500 05/26/24  2209 02/14/24  0524 02/13/24  1937 12/07/23  0909 04/29/23  1039 02/19/23  1853 01/18/23  1605 12/16/22  0700 12/15/22  2028 12/15/22  0803 12/14/22  1608 12/14/22  0751 05/15/21  0222 05/14/21  1936   ALBUMIN 3.7 3.5 3.6 3.4 3.5 4.3 4.0 3.8 3.7 3.9   < > 4.0   < > 4.2   < > 4.2   " < > 4.1   < > 3.5  3.5   ALT  --   --   --   --   --  14 13  --  12 10  --  12  --  16  --  17  --  16   < > 38   AST  --   --   --   --   --  15 18  --  13 14  --  18  --  28  --  29  --  28   < > 42*   BILITOT  --   --   --   --   --  0.7 0.5  --  0.4 0.5  --  0.6  --  0.9  --  0.7  --  0.8   < > 1.2   LIPASE  --   --   --   --   --   --   --   --   --   --   --   --   --   --   --   --   --   --   --  10    < > = values in this interval not displayed.     CBC:  Recent Labs     05/30/24 0723 05/29/24 0527 05/28/24  0521 05/27/24  0500 05/26/24  2209 02/14/24 0524 02/13/24  1937 12/08/23  0427   WBC 4.9 6.6 5.6 6.0 5.6 3.4* 3.8* 7.2   HGB 16.1 14.8 15.7 16.9 16.1 14.4 14.6 14.9   HCT 54.9* 49.9 51.5 58.1* 54.9* 47.1 46.5 49.4   * 159 158 195 201 198 209 233   * 105* 104* 106* 106* 102* 100 102*     COAG:   Recent Labs     05/28/24  0521 02/19/23  1853 10/14/22  1450 10/14/22  0840 05/14/21  0827   INR 1.1 1.1 1.1  --  1.2*   DDIMERVTE  --  537*  --  610*  --      ABO: No results for input(s): \"ABO\" in the last 59500 hours.  HEME/ENDO:   Recent Labs     05/28/24  0521 12/12/22  2009 12/12/22  2007 10/14/22  1450   TSH 0.37* 1.58  --   --    FREET4 1.14  --   --   --    HGBA1C 6.2*  --  6.0* 6.0*     CARDIAC:   Recent Labs     05/26/24  2324 05/26/24  2209 02/13/24  1937 12/07/23  0909 09/19/23  1141 04/29/23  1039 02/19/23  1853 01/18/23  1605 12/12/22 2007 12/12/22  1233   TROPHS 48 44 9 14  --  8 9  --  11 13   BNP  --  373* 46 42 3 11 12 9  --  14     Recent Labs     12/12/22  2007 05/15/21  0222   CHOL 233*  --    LDLF 149*  --    HDL 54.3  --    TRIG 147 151*     TOX:  Recent Labs     05/27/24  1026 02/13/24 2328 12/07/23  1739   AMPHETAMINE Presumptive Negative Presumptive Negative Presumptive Negative   BENZO Presumptive Negative Presumptive Negative Presumptive Negative   CANNABINOID Presumptive Negative Presumptive Negative Presumptive Negative   COCAI Presumptive Negative Presumptive " Negative Presumptive Negative   FENTANYL Presumptive Negative Presumptive Negative Presumptive Negative   OPIATE Presumptive Negative Presumptive Negative Presumptive Negative   OXYCODONE Presumptive Negative Presumptive Negative Presumptive Negative   PCP Presumptive Positive* Presumptive Negative Presumptive Negative     MICRO:   Recent Labs     05/28/24  1102 05/17/21  0413 05/16/21  1001   PROCAL 0.07 0.16* 0.09*     No results found for the last 90 days.      FOLLOWUP: No future appointments.

## 2024-05-30 NOTE — CARE PLAN
The patient's goals for the shift include      The clinical goals for the shift include Patient will remain HDS and will tolerate weaning oxygen    Over the shift, patient remained HDS but did not tolerate weaning of oxygen.       Problem: Pain  Goal: My pain/discomfort is manageable  Outcome: Progressing     Problem: Safety  Goal: Patient will be injury free during hospitalization  Outcome: Progressing  Goal: I will remain free of falls  Outcome: Progressing     Problem: Daily Care  Goal: Daily care needs are met  Outcome: Progressing     Problem: Psychosocial Needs  Goal: Demonstrates ability to cope with hospitalization/illness  Outcome: Progressing  Goal: Collaborate with me, my family, and caregiver to identify my specific goals  Outcome: Progressing     Problem: Discharge Barriers  Goal: My discharge needs are met  Outcome: Progressing     Problem: Respiratory  Goal: Clear secretions with interventions this shift  Outcome: Progressing  Goal: Minimize anxiety/maximize coping throughout shift  Outcome: Progressing  Goal: Minimal/no exertional discomfort or dyspnea this shift  Outcome: Progressing  Goal: No signs of respiratory distress (eg. Use of accessory muscles. Peds grunting)  Outcome: Progressing  Goal: Patent airway maintained this shift  Outcome: Progressing  Goal: Tolerate mechanical ventilation evidenced by VS/agitation level this shift  Outcome: Progressing  Goal: Tolerate pulmonary toileting this shift  Outcome: Progressing  Goal: Verbalize decreased shortness of breath this shift  Outcome: Progressing  Goal: Wean oxygen to maintain O2 saturation per order/standard this shift  Outcome: Progressing  Goal: Increase self care and/or family involvement in next 24 hours  Outcome: Progressing     Problem: Safety - Medical Restraint  Goal: Remains free of injury from restraints (Restraint for Interference with Medical Device)  Outcome: Progressing  Goal: Free from restraint(s) (Restraint for Interference  with Medical Device)  Outcome: Progressing     Problem: Skin  Goal: Decreased wound size/increased tissue granulation at next dressing change  Outcome: Progressing  Goal: Participates in plan/prevention/treatment measures  Outcome: Progressing  Goal: Prevent/manage excess moisture  Outcome: Progressing  Goal: Prevent/minimize sheer/friction injuries  Outcome: Progressing  Goal: Promote/optimize nutrition  Outcome: Progressing  Goal: Promote skin healing  Outcome: Progressing     Problem: Fall/Injury  Goal: Not fall by end of shift  Outcome: Progressing  Goal: Be free from injury by end of the shift  Outcome: Progressing  Goal: Verbalize understanding of personal risk factors for fall in the hospital  Outcome: Progressing  Goal: Verbalize understanding of risk factor reduction measures to prevent injury from fall in the home  Outcome: Progressing  Goal: Use assistive devices by end of the shift  Outcome: Progressing  Goal: Pace activities to prevent fatigue by end of the shift  Outcome: Progressing     Problem: Heart Failure  Goal: Improved gas exchange this shift  Outcome: Progressing  Goal: Improved urinary output this shift  Outcome: Progressing  Goal: Reduction in peripheral edema within 24 hours  Outcome: Progressing  Goal: Report improvement of dyspnea/breathlessness this shift  Outcome: Progressing  Goal: Weight from fluid excess reduced over 2-3 days, then stabilize  Outcome: Progressing  Goal: Increase self care and/or family involvement in 24 hours  Outcome: Progressing

## 2024-05-30 NOTE — PROGRESS NOTES
Patric Arenas is a 48 y.o. male on day 3 of admission presenting with Acute on chronic congestive heart failure, unspecified heart failure type (Multi).    Subjective   Patient lying in bed this morning, sitting up at edge of bed during rounds, NAD. Patient reports breathing is at about baseline, denies any CP. Reports nose bleed 2/2 O2, now resolved.    - cont to hold home GDMT given SUZANNE  - SpO2 to 87% on RA, improved to 94% on 5L, TCC informed of need for home oxygen and home BiPAP  - mild epistaxis/hemoptysis, oxygen tent, PRN [place] nasal spray    Overnight Events:  Transferred to LT5 from MICU, otherwise no acute events overnight.     Objective     Physical Exam  Vitals reviewed.   Constitutional:       General: He is not in acute distress.     Appearance: He is obese.   HENT:      Head: Atraumatic.      Nose: Congestion present.      Comments: Dried blood noted to bilateral nares     Mouth/Throat:      Mouth: Mucous membranes are moist.   Eyes:      Extraocular Movements: Extraocular movements intact.      Conjunctiva/sclera: Conjunctivae normal.   Neck:      Comments: Unable to appreciate JVD at 60 degrees.  Cardiovascular:      Rate and Rhythm: Normal rate and regular rhythm.      Pulses: Normal pulses.      Heart sounds: Normal heart sounds.   Pulmonary:      Effort: No respiratory distress.      Comments: Mild dyspnea with conversation, diminished lung sounds throughout  Abdominal:      General: Bowel sounds are normal. There is no distension.      Palpations: Abdomen is soft.      Tenderness: There is no abdominal tenderness. There is no guarding.   Genitourinary:     Comments: [place] catheter in place  Musculoskeletal:         General: Normal range of motion.      Cervical back: Normal range of motion.      Right lower leg: Edema present.      Left lower leg: Edema present.      Comments: Trace generalized, non-pitting edema   Skin:     General: Skin is warm and dry.      Comments: Chronic venous changes  "noted to BLE   Neurological:      General: No focal deficit present.      Mental Status: He is alert and oriented to person, place, and time.   Psychiatric:         Mood and Affect: Mood normal.         Behavior: Behavior normal.       Last Recorded Vitals  Blood pressure 120/79, pulse 103, temperature 36.7 °C (98.1 °F), temperature source Temporal, resp. rate 18, height 1.854 m (6' 1\"), weight (!) 182 kg (401 lb 3.8 oz), SpO2 94%.  Intake/Output last 3 Shifts:  I/O last 3 completed shifts:  In: 245 (1.3 mL/kg) [I.V.:245 (1.3 mL/kg)]  Out: 2915 (16 mL/kg) [Urine:2915 (0.4 mL/kg/hr)]  Weight: 182 kg     Relevant Results              Results for orders placed or performed during the hospital encounter of 05/26/24 (from the past 24 hour(s))   ECG 12 Lead   Result Value Ref Range    Ventricular Rate 76 BPM    Atrial Rate 76 BPM    TX Interval 156 ms    QRS Duration 96 ms    QT Interval 440 ms    QTC Calculation(Bazett) 495 ms    P Axis 53 degrees    R Axis 53 degrees    T Axis -41 degrees    QRS Count 13 beats    Q Onset 223 ms    P Onset 145 ms    P Offset 196 ms    T Offset 443 ms    QTC Fredericia 475 ms   Renal function panel   Result Value Ref Range    Glucose 92 74 - 99 mg/dL    Sodium 142 136 - 145 mmol/L    Potassium 4.2 3.5 - 5.3 mmol/L    Chloride 89 (L) 98 - 107 mmol/L    Bicarbonate 39 (H) 21 - 32 mmol/L    Anion Gap 18 10 - 20 mmol/L    Urea Nitrogen 48 (H) 6 - 23 mg/dL    Creatinine 3.33 (H) 0.50 - 1.30 mg/dL    eGFR 22 (L) >60 mL/min/1.73m*2    Calcium 9.0 8.6 - 10.6 mg/dL    Phosphorus 6.5 (H) 2.5 - 4.9 mg/dL    Albumin 3.7 3.4 - 5.0 g/dL   Magnesium   Result Value Ref Range    Magnesium 2.76 (H) 1.60 - 2.40 mg/dL   CBC and Auto Differential   Result Value Ref Range    WBC 4.9 4.4 - 11.3 x10*3/uL    nRBC 0.0 0.0 - 0.0 /100 WBCs    RBC 5.08 4.50 - 5.90 x10*6/uL    Hemoglobin 16.1 13.5 - 17.5 g/dL    Hematocrit 54.9 (H) 41.0 - 52.0 %     (H) 80 - 100 fL    MCH 31.7 26.0 - 34.0 pg    MCHC 29.3 (L) " 32.0 - 36.0 g/dL    RDW 16.2 (H) 11.5 - 14.5 %    Platelets 144 (L) 150 - 450 x10*3/uL    Neutrophils % 74.5 40.0 - 80.0 %    Immature Granulocytes %, Automated 0.2 0.0 - 0.9 %    Lymphocytes % 14.6 13.0 - 44.0 %    Monocytes % 9.5 2.0 - 10.0 %    Eosinophils % 0.8 0.0 - 6.0 %    Basophils % 0.4 0.0 - 2.0 %    Neutrophils Absolute 3.61 1.20 - 7.70 x10*3/uL    Immature Granulocytes Absolute, Automated 0.01 0.00 - 0.70 x10*3/uL    Lymphocytes Absolute 0.71 (L) 1.20 - 4.80 x10*3/uL    Monocytes Absolute 0.46 0.10 - 1.00 x10*3/uL    Eosinophils Absolute 0.04 0.00 - 0.70 x10*3/uL    Basophils Absolute 0.02 0.00 - 0.10 x10*3/uL      ECG 12 Lead    Result Date: 5/30/2024  Normal sinus rhythm T wave abnormality, consider inferior ischemia T wave abnormality, consider anterolateral ischemia Prolonged QT Abnormal ECG When compared with ECG of 29-MAY-2024 09:12, Aberrant conduction is no longer Present    Transthoracic Echo (TTE) Complete    Result Date: 5/28/2024   [facility], [address] [place] [address]                Tel [phone] and Fax [phone] TRANSTHORACIC ECHOCARDIOGRAM REPORT  Patient Name:      MARILY Vallejo Physician:    [id number] Wilver Lambert MD Study Date:        5/28/2024            Ordering Provider:    [id number] GENIE TENA MRN/PID:           [id number]             Fellow: Accession#:        [id number]         Nurse: Date of Birth/Age: 1975 / 48 years  Sonographer:          Sarah JOHNSON Gender:            M                    Additional Staff: Height:            185.00 cm            Admit Date:           5/26/2024 Weight:            181.89 kg            Admission Status:     Inpatient -                                                               Routine BSA / BMI:         2.89 m2 / 53.15      Encounter#:           [id number]                     kg/m2                                         Department Location:  [facility] Non                                                               Invasive Blood Pressure: 142 /95 mmHg Study Type:    TRANSTHORACIC ECHO (TTE) COMPLETE Diagnosis/ICD: Acute on chronic systolic (congestive) heart failure (CHF)-I50.23 Indication:    Congestive Heart Failure CPT Code:      Echo Complete w Full Doppler-[id number] Patient History: Pertinent History: Cardiomyopathy, COPD, Dyslipidemia and CHF. Study Detail: The following Echo studies were performed: 2D, M-Mode, Doppler and               color flow. Technically challenging study due to body habitus.               Definity used as a contrast agent for endocardial border               definition. Total contrast used for this procedure was 2 mL via IV               push. Unable to obtain subcostal and suprasternal notch view.  PHYSICIAN INTERPRETATION: Left Ventricle: The left ventricular systolic function is low normal, with an estimated ejection fraction of 50-55%. There are no regional wall motion abnormalities. The left ventricular cavity size is normal. The left ventricular septal wall thickness is moderately increased. There is moderately increased left ventricular posterior wall thickness. There is left ventricular concentric remodeling. Abnormal (paradoxical) septal motion, consistent with RV pacemaker and the interventricular septum is flattened in systole, consistent with right ventricular pressure overload. Spectral Doppler shows a normal pattern of left ventricular diastolic filling. Left Atrium: The left atrium is normal in size. Right Ventricle: The right ventricle is mildly enlarged. There is reduced right ventricular systolic function. A device is visualized in the right ventricle. Right Atrium: The right atrium was not well visualized. Aortic Valve: The aortic valve is trileaflet. There is no evidence of aortic valve regurgitation. The peak  instantaneous gradient of the aortic valve is 4.2 mmHg. Mitral Valve: The mitral valve is normal in structure. There is no evidence of mitral valve regurgitation. Tricuspid Valve: The tricuspid valve is structurally normal. There is trace to mild tricuspid regurgitation. The Doppler estimated RVSP is within normal limits at 29.6 mmHg. The RV systolic pressure may be underestimated. Pulmonic Valve: The pulmonic valve is structurally normal. There is no indication of pulmonic valve regurgitation. Pericardium: There is a trivial pericardial effusion. Aorta: The aortic root is normal. The Ao Sinus is 3.20 cm. The Asc Ao is 3.20 cm. In comparison to the previous echocardiogram(s): Compared with study from 2/14/2024, no significant change.  CONCLUSIONS:  1. Left ventricular systolic function is low normal with a 50-55% estimated ejection fraction.  2. Poorly visualized anatomical structures due to suboptimal image quality.  3. Abnormal septal motion consistent with RV pacemaker and right ventricular pressure overload.  4. Moderately increased left ventricular septal thickness.  5. The left ventricular posterior wall thickness is moderately increased.  6. There is reduced right ventricular systolic function.  7. RVSP within normal limits.  8. The RV systolic pressure may be underestimated. QUANTITATIVE DATA SUMMARY: 2D MEASUREMENTS:                           Normal Ranges: Ao Root d:     3.20 cm    (2.0-3.7cm) LAs:           2.70 cm    (2.7-4.0cm) IVSd:          1.50 cm    (0.6-1.1cm) LVPWd:         1.50 cm    (0.6-1.1cm) LVIDd:         4.90 cm    (3.9-5.9cm) LVIDs:         3.60 cm LV Mass Index: 108.4 g/m2 LV % FS        26.5 % AORTA MEASUREMENTS:                      Normal Ranges: Ao Sinus, d: 3.20 cm (2.1-3.5cm) Asc Ao, d:   3.20 cm (2.1-3.4cm) LV DIASTOLIC FUNCTION:                        Normal Ranges: MV Peak E:    0.39 m/s (0.7-1.2 m/s) MV Peak A:    0.40 m/s (0.42-0.7 m/s) E/A Ratio:    0.98     (1.0-2.2) MV e'          0.04 m/s (>8.0) MV lateral e' 0.06 m/s MV medial e'  0.05 m/s E/e' Ratio:   8.67     (<8.0) MITRAL VALVE:                 Normal Ranges: MV DT: 345 msec (150-240msec) AORTIC VALVE:                         Normal Ranges: AoV Vmax:      1.02 m/s (<=1.7m/s) AoV Peak P.2 mmHg (<20mmHg) LVOT Max Cedric:  0.95 m/s (<=1.1m/s) LVOT VTI:      19.80 cm LVOT Diameter: 1.90 cm  (1.8-2.4cm) AoV Area,Vmax: 2.65 cm2 (2.5-4.5cm2)  RIGHT VENTRICLE: RV s' 0.09 m/s TRICUSPID VALVE/RVSP:                             Normal Ranges: Peak TR Velocity: 2.58 m/s RV Syst Pressure: 29.6 mmHg (< 30mmHg) PULMONIC VALVE:                         Normal Ranges: PV Accel Time: 77 msec  (>120ms) PV Max Cedric:    0.8 m/s  (0.6-0.9m/s) PV Max P.5 mmHg  [id number] Wilver Lambert MD Electronically signed on 2024 at 4:46:16 PM  ** Final **     XR chest 1 view    Result Date: 2024  Interpreted By:  Sammy Rivera  and Linda Adam STUDY: XR CHEST 1 VIEW;  2024 10:03 am   INDICATION: Signs/Symptoms:Intubated.   COMPARISON: Chest radiographs since 2024.   ACCESSION NUMBER(S): [id number]   ORDERING CLINICIAN: TOM PAYAN   FINDINGS: AP radiograph of the chest was provided.   MEDICAL DEVICES: ETT distal tip 4.0 cm from the danny. Left chest wall AICD. Enteric tube overlies the esophagus and stomach with distal tip not imaged.   CARDIOMEDIASTINAL SILHOUETTE: The cardiomediastinal silhouette is largely obscured by overlying airspace opacities. Within these limits, superior similarly enlarged to prior exam.   LUNGS: Interval worsening in pulmonary vascular congestion interstitial lung markings and predominantly left lower lung hazy opacities. Small left pleural effusion, similar to prior exam. Small right pleural effusion similar to prior exam. No pneumothorax.   ABDOMEN: No remarkable upper abdominal findings.   BONES: No acute osseous changes.       1.  Interval worsening in interstitial/alveolar pulmonary edema. Underlying infectious  etiology not excluded. 2. Similar small bilateral pleural effusions.   I personally reviewed the images/study and I agree with the findings as stated by Dr. Jair Mckeon. This study was interpreted at [facility], [place], [place].   MACRO: None   Signed by: Sammy Rivera 5/28/2024 10:31 AM Dictation workstation:   [id number]    XR chest 1 view    Result Date: 5/27/2024  Interpreted By:  Mac Grove and Baker Zachary STUDY: XR CHEST 1 VIEW; XR ABDOMEN 1 VIEW;  5/27/2024 8:55 am   INDICATION: Signs/Symptoms:post intubation; Signs/Symptoms:og placement.   COMPARISON: Sign   ACCESSION NUMBER(S): [id number]; [id number]   ORDERING CLINICIAN: DOMINIQUE REYES   FINDINGS: Single AP view of the chest.   Interval placement of endotracheal tube with the tip approximately 4.3 cm above the danny. Left subclavian vein approach pacemaker/AICD with leads projecting over the right ventricle, similar to prior exam.   Cardiomediastinal silhouette is stable in size and configuration, persistently enlarged.   Low lung volumes contributing to bronchovascular crowding. No new consolidation, pleural effusion, or pneumothorax.   2 AP views of the abdomen.   Interval placement of enteric tube, with the tip overlying the expected position of the gastric antrum.   Nonobstructive bowel gas pattern. Limited evaluation of pneumoperitoneum on supine imaging, however no gross evidence of free air is noted.   Osseous structures demonstrate no acute bony changes.       1. Interval placement of endotracheal tube with tip approximately 4.3 cm above the danny. 2. Similar cardiomegaly with mild pulmonary vascular congestion and low lung volumes. 3. Enteric tube tip overlies the expected position of the gastric antrum. 4. Nonobstructive bowel gas pattern.   I personally reviewed the images/study and I agree with the findings as stated by Dr. Jair Wood M.D. This study was interpreted at [facility]  [place], [place].   MACRO: None   Signed by: Mac Grove 5/27/2024 10:32 AM Dictation workstation:   [id number]    XR abdomen 1 view    Result Date: 5/27/2024  Interpreted By:  Mac Grove and Baker Zachary STUDY: XR CHEST 1 VIEW; XR ABDOMEN 1 VIEW;  5/27/2024 8:55 am   INDICATION: Signs/Symptoms:post intubation; Signs/Symptoms:og placement.   COMPARISON: Sign   ACCESSION NUMBER(S): [id number]; [id number]   ORDERING CLINICIAN: DOMINIQUE REYES   FINDINGS: Single AP view of the chest.   Interval placement of endotracheal tube with the tip approximately 4.3 cm above the danny. Left subclavian vein approach pacemaker/AICD with leads projecting over the right ventricle, similar to prior exam.   Cardiomediastinal silhouette is stable in size and configuration, persistently enlarged.   Low lung volumes contributing to bronchovascular crowding. No new consolidation, pleural effusion, or pneumothorax.   2 AP views of the abdomen.   Interval placement of enteric tube, with the tip overlying the expected position of the gastric antrum.   Nonobstructive bowel gas pattern. Limited evaluation of pneumoperitoneum on supine imaging, however no gross evidence of free air is noted.   Osseous structures demonstrate no acute bony changes.       1. Interval placement of endotracheal tube with tip approximately 4.3 cm above the danny. 2. Similar cardiomegaly with mild pulmonary vascular congestion and low lung volumes. 3. Enteric tube tip overlies the expected position of the gastric antrum. 4. Nonobstructive bowel gas pattern.   I personally reviewed the images/study and I agree with the findings as stated by Dr. Jair Wood M.D. This study was interpreted at [place], [place].   MACRO: None   Signed by: Mac Grove 5/27/2024 10:32 AM Dictation workstation:   [id number]    XR chest 1 view    Result Date: 5/27/2024  Interpreted By:  Mac Grove and  [place] STUDY: XR CHEST 1 VIEW;  5/27/2024 7:32 am   INDICATION: Signs/Symptoms:Intubation.   COMPARISON: Chest radiograph 05/26/2024   ACCESSION NUMBER(S): [id number]   ORDERING CLINICIAN: DOMINIQUE REYES   FINDINGS: AP radiograph of the chest was provided.   An endotracheal tube is not visualized. Left subclavian vein approach pacemaker/AICD in place with leads projecting over the right ventricle.   CARDIOMEDIASTINAL SILHOUETTE: Cardiomediastinal silhouette is stable in size and configuration, enlarged.   LUNGS: Low lung volumes contributing to bronchovascular crowding. No consolidation, effusion, or pneumothorax.   ABDOMEN: No remarkable upper abdominal findings.   BONES: No acute osseous changes.       Similar cardiomegaly with mild pulmonary vascular congestion and low lung volumes.   I personally reviewed the images/study and I agree with the findings as stated by Vivian Noble MD. This study was interpreted at [place], [place].   MACRO: None   Signed by: Mac Grove 5/27/2024 8:43 AM Dictation workstation:   [id number]    ECG 12 lead    Result Date: 5/26/2024  Normal sinus rhythm T wave abnormality, consider inferior ischemia Prolonged QT Abnormal ECG When compared with ECG of 13-FEB-2024 19:00, T wave inversion more evident in Inferior leads See ED provider note for full interpretation and clinical correlation Confirmed by Samantha Renee ([id number]) on 5/26/2024 11:33:09 PM    XR chest 1 view    Result Date: 5/26/2024  Interpreted By:  Indiana Jaramillo, STUDY: XR CHEST 1 VIEW;  5/26/2024 10:33 pm   INDICATION: Signs/Symptoms:chest pain.   COMPARISON: Chest x-ray 02/13/2024   ACCESSION NUMBER(S): [id number]   ORDERING CLINICIAN: ANASTASIIA OTT   FINDINGS: Left-sided single lead AICD is stable in position. Multiple overlying leads noted.   CARDIOMEDIASTINAL SILHOUETTE: Cardiac silhouette is enlarged. Mild pulmonary vascular congestion   LUNGS: No  consolidation, pleural effusion or pneumothorax.   ABDOMEN: No remarkable upper abdominal findings.   BONES: No acute osseous abnormality.       Cardiomegaly with mild pulmonary vascular congestion.   MACRO: None   Signed by: Indiana Jaramillo 5/26/2024 10:38 PM Dictation workstation:   [id number]      Scheduled medications  aspirin, 81 mg, oral, Daily  atorvastatin, 40 mg, oral, Nightly  heparin (porcine), 7,500 Units, subcutaneous, q8h STEVE  melatonin, 6 mg, oral, Nightly  [Held by provider] metoprolol succinate XL, 25 mg, oral, Daily  oxygen, , inhalation, Continuous - Inhalation  pantoprazole, 40 mg, intravenous, Daily  polyethylene glycol, 17 g, oral, Daily      Continuous medications     PRN medications  PRN medications: albuterol, OLANZapine          Assessment/Plan   Principal Problem:    Acute on chronic congestive heart failure, unspecified heart failure type (Multi)  Active Problems:    COPD with acute exacerbation (Multi)    48 y.o. male with CHF (EF 45 to 50%), COPD, nonischemic cardiomyopathy s/p AICD, hypertension, hyperlipidemia, MJ who presenting to the ICU for acute hypercapnic respiratory failure requiring intubation. Patient initially presented to the ED on 5/26 with shortness of breath x 3-4 days. Now transferred to HVI service for further management.    Acute on chronic Systolic heart failure, with now recovered EF  - dilated NICM 2/2 ETOH, s/p ICD, LHC 2019 clean coronaries, iso medication noncompliance   - TTE 2/14/24: EF 45%, TTE 5/2021: EF 35%  - TTE 5/28: EF 50-55%, no WMA, normal DF, RV mildly enlarged, reduced RV function  - admit BNP: 373 (previously 40's), repeat pending  - admit CXR: Cardiomegaly with mild pulmonary vascular congestion  - CXR 5/28: Interval worsening in interstitial/alveolar pulmonary edema. small bilateral pleural effusions   - s/p diuresis with Lasix boluses, drip, and Metolazone 5 mg on 5/27 & 5/28   - Lasix drip held 5/29 for soft BP's, decreased UO, and SUZANNE  - cont  to hold home GDMT given SUZANNE  - home meds: Empa 10, Toprol XL 25, Entresto 49-51, [place] 25, Torsemide 100 BID, Metolazone 5 daily  - 2gram sodium diet, 2L fluid restriction, daily standing weights, strict I&O's     Acute on Chronic hypercapnic respiratory failure   MJ/Asthma/?COPD  Hemoptysis/ Epistaxis, mild  - after chart review hx of Asthma, no COPD hx found  - suppose to be on 2L of oxygen at home and CPAP at night, but does not currently have proper supplies   - in ED, sat of 90% on 6L, placed on 12 L cannula bubbler, transitioned to BIPAP due to VBG with CO2 of 118 and pH of 7.24, given DuoNebs, continuous albuterol, IV Solu-Medrol, and diuresis with 100 mg of IV Lasix  - VBG did not show improvement with BIPAP and patient was intubated and transitioned to MICU  - s/p 1 x dose of 250 mg IV Diamox   - self extubated 5/28, placed on BiPap which he did not tolerated, becoming acutely agitated. Placed on 5 L NC, started on Precedex drip which was continued until 5/29   - 5/29 VBG: pH 7.38, CO2 79, HCO3 46.7  - SpO2 to 87% on RA, improved to 94% on 5L, TCC informed of need for home oxygen and home BiPAP  - mild hemoptysis reported today and nose bleed this morning  - RT contacted for oxygen tent, PRN [place] spray  - home meds: Ventolin 90 mcg/inh, Breo ellipta 200-25 mcg/dose  - cont PRN Albuterol, resume home Breo Ellipta    HTN/HLD  - SBP last 24 hrs: , this morning 127/83  - meds/diuresis as above     SUZANNE  - ATN 2/2 hypotension vs diuretics, FeUrea 36.2%   - b/l Cr ~1.2-1.5, admit Cr: 1.67  - Cr today: 3.33 [3.37, 2.90, 2.07, 1.44, 1.52]   - meds/diuresis as stated above  - Avoid nephrotoxins and hypotension, daily RFP    Encephalopathy/Agitation, resolved  ETOH/Illicit substance abuse  - delirium vs drug use (admits to ectasy & PCP use), drinks fifth of liquor ~daily  - admit Utox +PCP  - required Precedex drip until 5/29, sitter and restraints in MICU  - now A&O x3, alert and cooperative  - counseled on  complete cessation, pt expressing desire to quit  - cont PRN Zyprexa    Pre-DM  Morbid Obesity, BMI 5  - admit A1C 6.2%  - encourage dietary modifications, exercise, and weight loss  - follow up with PCP    Concern for Infection  - Venous Lactate: 3>2.6>1.9>5.7>1.9  - 5/27 Flu/Covid Neg, MRSA nares negative, 5/27 BC x2 NGTD, UA: neg for Leuk, Nitrites, ketones, or WBC  - s/p Azithro 5/28, Vanco (5/27- 5/28), Zosyn (5/27- 5/28)   - remains afebrile, WBC wnl, remove delgado today  - cont to trend CBC, temp    Dispo  Pending improvement in renal function, medical optimization  PT/OT rec Low Intensity    DVT ppx: subq Heparin    CODE STATUS: FULL CODE (confirmed with mother on admission)  SURROGATE DECISION MAKER: Hyun (Daughter) - [phone]    Seen and discussed with Dr. Rosy Kim, APRN-CNP

## 2024-05-30 NOTE — PROGRESS NOTES
05/30/24 1336   Discharge Planning   Living Arrangements Alone   Support Systems Family members   Assistance Needed needs   Type of Residence Private residence   Number of Stairs to Enter Residence 8   Number of Stairs Within Residence 0   Do you have animals or pets at home? No   Who is requesting discharge planning? Provider   Home or Post Acute Services None   Patient expects to be discharged to: TBD   Does the patient need discharge transport arranged? No   Financial Resource Strain   How hard is it for you to pay for the very basics like food, housing, medical care, and heating? Not very   Housing Stability   In the last 12 months, was there a time when you were not able to pay the mortgage or rent on time? N   In the last 12 months, how many places have you lived? 2   In the last 12 months, was there a time when you did not have a steady place to sleep or slept in a shelter (including now)? N   Transportation Needs   In the past 12 months, has lack of transportation kept you from medical appointments or from getting medications? no   In the past 12 months, has lack of transportation kept you from meetings, work, or from getting things needed for daily living? No   Patient Choice   Provider Choice list and CMS website (https://medicare.gov/care-compare#search) for post-acute Quality and Resource Measure Data were provided and reviewed with: Patient   Patient / Family choosing to utilize agency / facility established prior to hospitalization No     Will continie to monitor for all discharging needs

## 2024-05-30 NOTE — SIGNIFICANT EVENT
Transfer from MICU     Transferred from MICU service. Seen and examed at bedside.  HPI reviewed. Patient is appropriate for HVI service transfer. HD stable. On 5 L O2 NC, patient appear comfortable and has no complaints.

## 2024-05-30 NOTE — SIGNIFICANT EVENT
Patient requires non-invasive ventilation to treat COPD, Asthma, and Chronic hypercapnic respiratory failure. BiPAP not sufficient to treat hypercapnia as demonstrated by CO2 of 79 mmHg.

## 2024-05-30 NOTE — PROGRESS NOTES
Physical Therapy    Physical Therapy Evaluation    Patient Name: Patric Arenas  MRN: [id number]  Room: [address] [place] [address]  Today's Date: 5/30/2024   Time Calculation  Start Time: 1040  Stop Time: 1055  Time Calculation (min): 15 min    Assessment/Plan   PT Assessment  PT Assessment Results: Decreased endurance, Impaired balance, Decreased mobility  Rehab Prognosis: Good  Barriers to Discharge: medical acuity  End of Session Communication: Bedside nurse  End of Session Patient Position: Bed, 3 rail up  IP OR SWING BED PT PLAN  Inpatient or Swing Bed: Inpatient  PT Plan  Treatment/Interventions: Bed mobility, Transfer training, Gait training, Balance training, Strengthening, Endurance training, Range of motion, Therapeutic exercise, Therapeutic activity  PT Plan: Skilled PT  PT Frequency: 3 times per week  PT Discharge Recommendations: Low intensity level of continued care  PT Recommended Transfer Status: Assist x1  PT - OK to Discharge: Yes    Subjective     General Visit Information:  Subjective: Patient is alert, agreeable to PT.  In good spirits, fatigued but feels he is improving well.  Sister in room at end of session.  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) s/p ICD (2019), asthma?, HTN, HLD and MJ  Prior to Session Communication: Bedside nurse  Patient Position Received: Bed, 3 rail up, Alarm off, not on at start of session   Home Living:  Home Living  Type of Home: Apartment  Lives With: Alone  Home Adaptive Equipment: None  Home Layout: One level  Home Access: No concerns  Home Living Comments: May discharge to stay with sister  when medically appropriate,  Prior Level of Function:  Prior Function Per Pt/Caregiver Report  Level of [place]: Independent with ADLs and functional transfers (baseline 5L NC)  Precautions:  Precautions  Medical Precautions: Fall precautions, Cardiac precautions,  Oxygen therapy device and L/min  Vital Signs:  Vital Signs  Heart Rate: 101 (118 c activity, 105 end of session)  SpO2:  (desaturation to 78 c activity, required increased therpauetic rest break > 4 minutes to recover > 88%)  Medical Gas Therapy: Supplemental oxygen    Lines/Tubes/Drains:  Urethral Catheter (Active)   Number of days: 3     Continuous Medications/Drips:       Objective   Pain:  Pain Assessment  Pain Score: 0 - No pain (post 0)    Cognition:  Cognition  Overall Cognitive Status: Within Functional Limits  Orientation Level: Oriented X4    General Assessments:  Extremity/Trunk Assessments:  Tone: No abnormalities noted  Sensation  Light Touch: No apparent deficits  Coordination  Movements are Fluid and Coordinated: Yes  Upper Extremity  ROM: WNL  Strength:WFL  Lower Extremity  ROM: WNL limited by body habitus  Strength: WFL limited by body habitus  Sitting Static Balance Normal  Sitting Dynamic Balance Normal  Standing Static Balance Not NormalUE Support No UE support and Assist Level Supervision  Standing Dynamic Balance Not NormalUE Support No UE support and Assist Level Supervision    Functional Assessments:  Bed Mobility  Bed Mobility: Yes  Bed Mobility 1  Bed Mobility 1: Sitting to supine  Level of Assistance 1: Minimum assistance  Bed Mobility Comments 1: LE assist    Transfers  Transfer: Yes  Transfer 1  Transfer From 1: Sit to  Transfer to 1: Stand  Transfer Level of Assistance 1: Close supervision  Transfers 2  Transfer From 2: Stand to  Transfer to 2: Sit  Transfer Level of Assistance 2: Close supervision  Transfers 3  Transfer From 3: Bed to  Transfer to 3: Bed  Transfer Level of Assistance 3: Close supervision    Ambulation/Gait Training  Ambulation/Gait Training Performed: Yes  Ambulation/Gait Training 1  Surface 1: Level tile  Device 1: No device  Assistance 1: Close supervision  Quality of Gait 1:  (wide AZALEA, decreased step length, decreased gait speed, desaturation c activity  noted)  Comments/Distance (ft) 1: 15              Outcome Measures:  [facility] Basic Mobility  Turning from your back to your side while in a flat bed without using bedrails: A little  Moving from lying on your back to sitting on the side of a flat bed without using bedrails: A little  Moving to and from bed to chair (including a wheelchair): A little  Standing up from a chair using your arms (e.g. wheelchair or bedside chair): A little  To walk in hospital room: A little  Climbing 3-5 steps with railing: Total  Basic Mobility - Total Score: 16                            Encounter Problems       Encounter Problems (Active)       PT Problem       Patient will complete supine to sit and sit to supine Independent  (Progressing)       Start:  05/30/24    Expected End:  06/13/24            Patient will perform sit<>stand transfer with LRAD, and Independent  (Progressing)       Start:  05/30/24    Expected End:  06/13/24            Patient will ambulate >50' with LRAD and Independent c SpO2 > 88%  (Progressing)       Start:  05/30/24    Expected End:  06/13/24                 Assessment: Patient presents 2/2 hypoxic hypercapnic respiratory failure.  Currently supervision for mobility with endurance and respiratory status main limiting factors.  Patient would benefit from further therapy to increase functional independence and safety.  Will continue to follow during acute stay.      Education Documentation  Precautions, taught by New Sandra PT at 5/30/2024  1:41 PM.  Learner: Patient  Readiness: Acceptance  Method: Explanation  Response: Needs Reinforcement    Body Mechanics, taught by New Sandra PT at 5/30/2024  1:41 PM.  Learner: Patient  Readiness: Acceptance  Method: Explanation  Response: Needs Reinforcement    Mobility Training, taught by New Sandra PT at 5/30/2024  1:41 PM.  Learner: Patient  Readiness: Acceptance  Method: Explanation  Response: Needs Reinforcement    Education Comments  No  comments found.          05/30/24 at 1:41 PM   New Sandra, PT   Rehab Office: [phone]

## 2024-05-31 DIAGNOSIS — I50.43 ACUTE ON CHRONIC COMBINED SYSTOLIC AND DIASTOLIC CONGESTIVE HEART FAILURE (MULTI): Primary | ICD-10-CM

## 2024-05-31 DIAGNOSIS — Z09 HOSPITAL DISCHARGE FOLLOW-UP: ICD-10-CM

## 2024-05-31 LAB
ALBUMIN SERPL BCP-MCNC: 3.8 G/DL (ref 3.4–5)
ANION GAP BLDV CALCULATED.4IONS-SCNC: -3 MMOL/L (ref 10–25)
ANION GAP SERPL CALC-SCNC: 18 MMOL/L (ref 10–20)
ATRIAL RATE: 76 BPM
BACTERIA BLD CULT: NORMAL
BACTERIA BLD CULT: NORMAL
BASE EXCESS BLDV CALC-SCNC: 17 MMOL/L (ref -2–3)
BODY TEMPERATURE: 37 DEGREES CELSIUS
BUN SERPL-MCNC: 47 MG/DL (ref 6–23)
CA-I BLDV-SCNC: 1.17 MMOL/L (ref 1.1–1.33)
CALCIUM SERPL-MCNC: 9.4 MG/DL (ref 8.6–10.6)
CHLORIDE BLDV-SCNC: 95 MMOL/L (ref 98–107)
CHLORIDE SERPL-SCNC: 91 MMOL/L (ref 98–107)
CO2 SERPL-SCNC: 37 MMOL/L (ref 21–32)
CREAT SERPL-MCNC: 2.58 MG/DL (ref 0.5–1.3)
EGFRCR SERPLBLD CKD-EPI 2021: 29 ML/MIN/1.73M*2
GLUCOSE BLDV-MCNC: 135 MG/DL (ref 74–99)
GLUCOSE SERPL-MCNC: 133 MG/DL (ref 74–99)
HCO3 BLDV-SCNC: 47.9 MMOL/L (ref 22–26)
HCT VFR BLD EST: 52 % (ref 41–52)
HGB BLDV-MCNC: 17.4 G/DL (ref 13.5–17.5)
INHALED O2 CONCENTRATION: 44 %
LACTATE BLDV-SCNC: 1.2 MMOL/L (ref 0.4–2)
OXYHGB MFR BLDV: 88.6 % (ref 45–75)
P AXIS: 53 DEGREES
P OFFSET: 196 MS
P ONSET: 145 MS
PCO2 BLDV: 81 MM HG (ref 41–51)
PH BLDV: 7.38 PH (ref 7.33–7.43)
PHOSPHATE SERPL-MCNC: 4.2 MG/DL (ref 2.5–4.9)
PO2 BLDV: 62 MM HG (ref 35–45)
POTASSIUM BLDV-SCNC: 3.8 MMOL/L (ref 3.5–5.3)
POTASSIUM SERPL-SCNC: 4.5 MMOL/L (ref 3.5–5.3)
PR INTERVAL: 156 MS
Q ONSET: 223 MS
QRS COUNT: 13 BEATS
QRS DURATION: 96 MS
QT INTERVAL: 440 MS
QTC CALCULATION(BAZETT): 495 MS
QTC FREDERICIA: 475 MS
R AXIS: 53 DEGREES
SAO2 % BLDV: 91 % (ref 45–75)
SODIUM BLDV-SCNC: 136 MMOL/L (ref 136–145)
SODIUM SERPL-SCNC: 141 MMOL/L (ref 136–145)
T AXIS: -41 DEGREES
T OFFSET: 443 MS
VENTRICULAR RATE: 76 BPM

## 2024-05-31 PROCEDURE — 2500000001 HC RX 250 WO HCPCS SELF ADMINISTERED DRUGS (ALT 637 FOR MEDICARE OP)

## 2024-05-31 PROCEDURE — 2500000004 HC RX 250 GENERAL PHARMACY W/ HCPCS (ALT 636 FOR OP/ED)

## 2024-05-31 PROCEDURE — 36415 COLL VENOUS BLD VENIPUNCTURE: CPT | Performed by: PHYSICIAN ASSISTANT

## 2024-05-31 PROCEDURE — 1200000002 HC GENERAL ROOM WITH TELEMETRY DAILY

## 2024-05-31 PROCEDURE — 94640 AIRWAY INHALATION TREATMENT: CPT

## 2024-05-31 PROCEDURE — 2500000004 HC RX 250 GENERAL PHARMACY W/ HCPCS (ALT 636 FOR OP/ED): Performed by: INTERNAL MEDICINE

## 2024-05-31 PROCEDURE — 82435 ASSAY OF BLOOD CHLORIDE: CPT

## 2024-05-31 PROCEDURE — 2500000005 HC RX 250 GENERAL PHARMACY W/O HCPCS: Performed by: INTERNAL MEDICINE

## 2024-05-31 PROCEDURE — C9113 INJ PANTOPRAZOLE SODIUM, VIA: HCPCS

## 2024-05-31 PROCEDURE — 94660 CPAP INITIATION&MGMT: CPT

## 2024-05-31 PROCEDURE — 84132 ASSAY OF SERUM POTASSIUM: CPT | Performed by: PHYSICIAN ASSISTANT

## 2024-05-31 PROCEDURE — 2500000001 HC RX 250 WO HCPCS SELF ADMINISTERED DRUGS (ALT 637 FOR MEDICARE OP): Performed by: NURSE PRACTITIONER

## 2024-05-31 PROCEDURE — 99233 SBSQ HOSP IP/OBS HIGH 50: CPT | Performed by: INTERNAL MEDICINE

## 2024-05-31 PROCEDURE — 2500000002 HC RX 250 W HCPCS SELF ADMINISTERED DRUGS (ALT 637 FOR MEDICARE OP, ALT 636 FOR OP/ED): Performed by: INTERNAL MEDICINE

## 2024-05-31 PROCEDURE — 2500000005 HC RX 250 GENERAL PHARMACY W/O HCPCS: Performed by: NURSE PRACTITIONER

## 2024-05-31 RX ORDER — IPRATROPIUM BROMIDE AND ALBUTEROL SULFATE 2.5; .5 MG/3ML; MG/3ML
3 SOLUTION RESPIRATORY (INHALATION) 3 TIMES DAILY
Status: DISCONTINUED | OUTPATIENT
Start: 2024-06-01 | End: 2024-06-02

## 2024-05-31 RX ORDER — METOPROLOL SUCCINATE 25 MG/1
25 TABLET, EXTENDED RELEASE ORAL DAILY
Status: DISCONTINUED | OUTPATIENT
Start: 2024-05-31 | End: 2024-06-02

## 2024-05-31 RX ADMIN — HEPARIN SODIUM 7500 UNITS: 5000 INJECTION INTRAVENOUS; SUBCUTANEOUS at 21:45

## 2024-05-31 RX ADMIN — ASPIRIN 81 MG: 81 TABLET, COATED ORAL at 10:47

## 2024-05-31 RX ADMIN — Medication 50 PERCENT: at 03:24

## 2024-05-31 RX ADMIN — IPRATROPIUM BROMIDE AND ALBUTEROL SULFATE 3 ML: .5; 3 SOLUTION RESPIRATORY (INHALATION) at 20:12

## 2024-05-31 RX ADMIN — IPRATROPIUM BROMIDE AND ALBUTEROL SULFATE 3 ML: .5; 3 SOLUTION RESPIRATORY (INHALATION) at 10:51

## 2024-05-31 RX ADMIN — ATORVASTATIN CALCIUM 40 MG: 40 TABLET, FILM COATED ORAL at 20:31

## 2024-05-31 RX ADMIN — Medication 8 L/MIN: at 08:00

## 2024-05-31 RX ADMIN — PANTOPRAZOLE SODIUM 40 MG: 40 INJECTION, POWDER, FOR SOLUTION INTRAVENOUS at 10:47

## 2024-05-31 RX ADMIN — HEPARIN SODIUM 7500 UNITS: 5000 INJECTION INTRAVENOUS; SUBCUTANEOUS at 06:28

## 2024-05-31 RX ADMIN — FLUTICASONE FUROATE AND VILANTEROL TRIFENATATE 1 PUFF: 200; 25 POWDER RESPIRATORY (INHALATION) at 10:48

## 2024-05-31 RX ADMIN — METOPROLOL SUCCINATE 25 MG: 25 TABLET, EXTENDED RELEASE ORAL at 10:47

## 2024-05-31 RX ADMIN — Medication 6 MG: at 20:31

## 2024-05-31 RX ADMIN — HEPARIN SODIUM 7500 UNITS: 5000 INJECTION INTRAVENOUS; SUBCUTANEOUS at 16:34

## 2024-05-31 RX ADMIN — METHYLPREDNISOLONE SODIUM SUCCINATE 40 MG: 40 INJECTION, POWDER, FOR SOLUTION INTRAMUSCULAR; INTRAVENOUS at 00:38

## 2024-05-31 RX ADMIN — IPRATROPIUM BROMIDE AND ALBUTEROL SULFATE 3 ML: .5; 3 SOLUTION RESPIRATORY (INHALATION) at 00:24

## 2024-05-31 ASSESSMENT — PAIN SCALES - GENERAL
PAINLEVEL_OUTOF10: 0 - NO PAIN

## 2024-05-31 ASSESSMENT — COGNITIVE AND FUNCTIONAL STATUS - GENERAL
DRESSING REGULAR LOWER BODY CLOTHING: A LITTLE
STANDING UP FROM CHAIR USING ARMS: A LITTLE
DAILY ACTIVITIY SCORE: 20
DRESSING REGULAR LOWER BODY CLOTHING: A LITTLE
TOILETING: A LITTLE
STANDING UP FROM CHAIR USING ARMS: A LITTLE
DRESSING REGULAR UPPER BODY CLOTHING: A LITTLE
TOILETING: A LITTLE
DRESSING REGULAR UPPER BODY CLOTHING: A LITTLE
CLIMB 3 TO 5 STEPS WITH RAILING: A LITTLE
TURNING FROM BACK TO SIDE WHILE IN FLAT BAD: A LITTLE
HELP NEEDED FOR BATHING: A LITTLE
CLIMB 3 TO 5 STEPS WITH RAILING: A LOT
MOVING TO AND FROM BED TO CHAIR: A LITTLE
MOBILITY SCORE: 17
WALKING IN HOSPITAL ROOM: A LITTLE
WALKING IN HOSPITAL ROOM: A LITTLE
MOVING FROM LYING ON BACK TO SITTING ON SIDE OF FLAT BED WITH BEDRAILS: A LITTLE
MOVING TO AND FROM BED TO CHAIR: A LITTLE
MOVING FROM LYING ON BACK TO SITTING ON SIDE OF FLAT BED WITH BEDRAILS: A LITTLE
HELP NEEDED FOR BATHING: A LITTLE
DAILY ACTIVITIY SCORE: 20
MOBILITY SCORE: 18
TURNING FROM BACK TO SIDE WHILE IN FLAT BAD: A LITTLE

## 2024-05-31 ASSESSMENT — PAIN - FUNCTIONAL ASSESSMENT
PAIN_FUNCTIONAL_ASSESSMENT: 0-10

## 2024-05-31 NOTE — NURSING NOTE
Placed on 12L High Flow. Remains in low 80s%. Notified MD. He is trying to contract MICU Fellow for CPAP/BPAP orders.

## 2024-05-31 NOTE — SIGNIFICANT EVENT
Rapid Response RN Note    Rapid response RN at bedside for RADAR score 9 due to the following VS: T 36.7 °Celsius; ; RR 29; /122; SPO2 88%.     Reviewed above VS with bedside RN.  VS within patient's current trends. GOAL POX= 88%-92% per primary team MD; pt with MJ & at times when sleeping desats; when pt woken up, POX improves; Patient denied pain, shortness of breath, dizziness or lightheadedness.  No interventions by rapid response team indicated at this time.  Pt remains on continuous tele & POX    Staff to page rapid response for any concerns or acute change in condition/VS.     @0300 spoke with MICU fellow & MICU attending regarding pt desatting while pt is sleeping; POX immediately improves once pt woken up; primary MD & MICU fellow spoke via phone; MICU fellow to place orders for CPAP; RT called to notify them of need for CPAP & that orders were placed; pt to remain on continuous tele & POX on T5 @ this time per team

## 2024-05-31 NOTE — PROGRESS NOTES
[facility] Attending Shared Visit Note    This is a shared visit. Please see Advanced Practice Provider's encounter note for additional details.        Overnight events/Subjective: creatinine improving. Tired.     Exam:   Physical exam: tired, no distress. Normal rate, regular rhythm, mildly labored breathing, clear to auscultation, abdomen non distended, no significant LE edema, no focal neuro deficits.     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     1. Respiratory failure: was supposed to be on O2 at home and CPAP but did not have proper supplies. Unclear compliance with other meds. Suspect his respiratory failure may be partially volume related but more likely related to chronic underlying lung disease and body habitus. He appears to have chronic CO2 retention and will need O2 going home and CPAP. May trial AVAPS prior to his sleep appointment.   2. SUZANNE: improving, likely from overdiuresis  3. HFrecEF: Continue aspirin and atorvastatin. Restart metoprolol. Holding below:  -Home meds: torsemide 100 mg BID, metolazone 5 mg, Spironolactone 25 mg, Jardiance 10 mg, Entresto 49-51 mg BID,  4. Drug use: counseled on complete cessation.     Dispo: Will need follow up with pulmonary, sleep medicine (for CPAP re initiation) and HF. Also will need home O2 most likely.     Jayden Gallegos MD    ________________________________________________________________________________  Problems:   Active Problems:    COPD with acute exacerbation (Multi)      Objective   Admit Date: 5/26/2024  Hospital Length of Stay: 4   Home: [facility] [address]    Vitals:      5/31/2024    11:56 AM 5/31/2024     9:16 AM 5/31/2024     7:25 AM 5/31/2024     6:47 AM 5/31/2024     5:13 AM 5/31/2024     3:24 AM 5/31/2024     1:25 AM   Vitals   Systolic  137  140  143  148   Diastolic 93  96  114  122   Heart Rate 102  98 101 62  102   Temp 36.7 °C (98.1 °F)  36.8 °C (98.2 °F)    36.7 °C (98.1 °F)   Resp 23 29 28 29 34 34 29     Wt Readings from Last 5 Encounters:   05/27/24 (!) 182 kg (401 lb 3.8 oz)   02/13/24 (!) 172 kg (380 lb)   01/26/24 (!) 181 kg (399 lb)   12/18/23 (!) 180 kg (397 lb 6.4 oz)   12/09/23 (!) 181 kg (399 lb 3.2 oz)       Intake/Output Summary (Last 24 hours) at 5/31/2024 1249  Last data filed at 5/31/2024 1200  Gross per 24 hour   Intake 120 ml   Output 1150 ml   Net -1030 ml         MEDICATIONS  Infusions:     Scheduled:  aspirin, 81 mg, Daily  atorvastatin, 40 mg, Nightly  fluticasone furoate-vilanteroL, 1 puff, Daily  heparin (porcine), 7,500 Units, q8h STEVE  ipratropium-albuteroL, 3 mL, q6h  melatonin, 6 mg, Nightly  metoprolol succinate XL, 25 mg, Daily  oxygen, , Continuous - Inhalation  oxygen, , Continuous - Inhalation  pantoprazole, 40 mg, Daily  polyethylene glycol, 17 g, Daily      PRN:  albuterol, 2 puff, q4h PRN  OLANZapine, 2.5 mg, q6h PRN  sodium chloride, 1 spray, 4x daily PRN      Prior to Admission Meds:  Facility-Administered Medications Prior to Admission   Medication Dose Route Frequency Provider Last Rate Last Admin    fluticasone furoate-vilanteroL (Breo Ellipta) 200-25 mcg/dose inhaler 1 puff  1 puff inhalation Once Shabbir Guzmán MD         Medications Prior to Admission   Medication Sig Dispense Refill Last Dose    albuterol (Ventolin HFA) 90 mcg/actuation inhaler Inhale 2 puffs every 4 hours if needed for wheezing or shortness of breath. If you are using your rescue inhaler frequently, seek medical evaluation 18 g 3 Unknown at patient request refills    aspirin 81 mg EC tablet Take 1 tablet (81 mg) by mouth once daily.   Unknown at patient request refills    atorvastatin (Lipitor) 40 mg tablet TAKE 1 TABLET BY MOUTH ONCE DAILY 30 tablet 3 Unknown at patient request refills    empagliflozin (Jardiance) 10 mg TAKE 1  TABLET BY MOUTH ONCE A DAY 30 tablet 3 Unknown at patient request refills    inhalational spacing device inhaler Use as directed with inhalers 1 each 0     metOLazone (Zaroxolyn) 5 mg tablet Take 1 tablet (5 mg) by mouth once daily. (Patient not taking: Reported on 5/27/2024) 30 tablet 0 Not Taking    metoprolol succinate XL (Toprol-XL) 25 mg 24 hr tablet Take 1 tablet (25 mg) by mouth once daily. 90 tablet 0 Unknown at patient request refills    oxygen (O2) gas therapy Inhale.   Unknown    sacubitriL-valsartan (Entresto) 49-51 mg tablet TAKE 1 TABLET BY MOUTH TWO TIMES A DAY 60 tablet 3 Unknown at patient request refills    sertraline (Zoloft) 25 mg tablet TAKE 1 TABLET BY MOUTH ONCE DAILY (Patient not taking: Reported on 5/27/2024) 30 tablet 3 Not Taking    spironolactone (Aldactone) 25 mg tablet TAKE 1/2 TABLET BY MOUTH ONCE DAILY 15 tablet 3 Unknown at patient request refills    torsemide (Demadex) 100 mg tablet Take 1 tablet (100 mg) by mouth 2 times a day. 180 tablet 0 Unknown at patient request refills       DATA:  CMP:  Recent Labs     05/31/24  0851 05/30/24  2031 05/30/24  0722 05/29/24  0527 05/28/24  2310 05/28/24  0521 05/27/24  1811 05/27/24  0500 05/26/24  2209    142 142 145 144 143 132* 141 140   K 4.5 3.7 4.2 4.1 4.1 3.7 3.5 4.1 3.7   CL 91* 88* 89* 90* 91* 93* 90* 92* 92*   CO2 37* 43* 39* 41* 40* 39* 31 37* 39*   ANIONGAP 18 15 18 18 17 15 15 16 13   BUN 47* 47* 48* 38* 32* 26* 22 18 18   CREATININE 2.58* 2.96* 3.33* 3.37* 2.90* 2.07* 1.44* 1.52* 1.67*   EGFR 29* 25* 22* 21* 26* 38* 59* 55* 50*   MG  --  2.70* 2.76* 2.33 2.24 2.29 2.49* 2.44* 1.98     Recent Labs     05/31/24  0851 05/30/24  2031 05/30/24  0722 05/29/24  0527 05/28/24  2310 05/28/24  0521 05/27/24  1811 05/27/24  0500 05/26/24  2209 02/14/24  0524 02/13/24  1937 12/07/23  0909 04/29/23  1039 02/19/23  1853 01/18/23  1605 12/16/22  0700 12/15/22  2028 12/15/22  0803 12/14/22  1608 12/14/22  0751 05/15/21  0222 05/14/21  1936  "  ALBUMIN 3.8 3.9 3.7 3.5 3.6 3.4 3.5 4.3 4.0   < > 3.7 3.9   < > 4.0   < > 4.2   < > 4.2   < > 4.1   < > 3.5  3.5   ALT  --   --   --   --   --   --   --  14 13  --  12 10  --  12  --  16  --  17  --  16   < > 38   AST  --   --   --   --   --   --   --  15 18  --  13 14  --  18  --  28  --  29  --  28   < > 42*   BILITOT  --   --   --   --   --   --   --  0.7 0.5  --  0.4 0.5  --  0.6  --  0.9  --  0.7  --  0.8   < > 1.2   LIPASE  --   --   --   --   --   --   --   --   --   --   --   --   --   --   --   --   --   --   --   --   --  10    < > = values in this interval not displayed.     CBC:  Recent Labs     05/30/24 2031 05/30/24  0723 05/29/24  0527 05/28/24  0521 05/27/24  0500 05/26/24  2209 02/14/24  0524 02/13/24  1937   WBC 5.2 4.9 6.6 5.6 6.0 5.6 3.4* 3.8*   HGB 17.1 16.1 14.8 15.7 16.9 16.1 14.4 14.6   HCT 58.2* 54.9* 49.9 51.5 58.1* 54.9* 47.1 46.5    144* 159 158 195 201 198 209   * 108* 105* 104* 106* 106* 102* 100     COAG:   Recent Labs     05/28/24  0521 02/19/23  1853 10/14/22  1450 10/14/22  0840 05/14/21  0827   INR 1.1 1.1 1.1  --  1.2*   DDIMERVTE  --  537*  --  610*  --      ABO: No results for input(s): \"ABO\" in the last 54062 hours.  HEME/ENDO:   Recent Labs     05/28/24  0521 12/12/22  2009 12/12/22  2007 10/14/22  1450   TSH 0.37* 1.58  --   --    FREET4 1.14  --   --   --    HGBA1C 6.2*  --  6.0* 6.0*     CARDIAC:   Recent Labs     05/30/24  0723 05/26/24  2324 05/26/24  2209 02/13/24  1937 12/07/23  0909 09/19/23  1141 04/29/23  1039 02/19/23  1853 01/18/23  1605 12/12/22 2007 12/12/22  1233   TROPHS  --  48 44 9 14  --  8 9  --  11 13   *  --  373* 46 42 3 11 12 9  --  14     Recent Labs     12/12/22  2007 05/15/21  0222   CHOL 233*  --    LDLF 149*  --    HDL 54.3  --    TRIG 147 151*     TOX:  Recent Labs     05/27/24  1026 02/13/24 2328 12/07/23  1739   AMPHETAMINE Presumptive Negative Presumptive Negative Presumptive Negative   BENZO Presumptive Negative " Presumptive Negative Presumptive Negative   CANNABINOID Presumptive Negative Presumptive Negative Presumptive Negative   COCAI Presumptive Negative Presumptive Negative Presumptive Negative   FENTANYL Presumptive Negative Presumptive Negative Presumptive Negative   OPIATE Presumptive Negative Presumptive Negative Presumptive Negative   OXYCODONE Presumptive Negative Presumptive Negative Presumptive Negative   PCP Presumptive Positive* Presumptive Negative Presumptive Negative     MICRO:   Recent Labs     05/28/24  1102 05/17/21  0413 05/16/21  1001   PROCAL 0.07 0.16* 0.09*     No results found for the last 90 days.      FOLLOWUP:   Future Appointments   Date Time Provider Department [place]   6/20/2024 10:40 AM William Urrutia APRN-CNP [id number] [facility]   6/25/2024  3:30 PM Nito Morales MD [id number] [facility]   7/1/2024  2:00 PM Suyapa Chacon MD PhD [id number] [place]   8/13/2024  4:00 PM Mateo Rainey MD [id number] [facility]

## 2024-05-31 NOTE — SIGNIFICANT EVENT
Rapid response called for acute desat into high 70;s after pt climbed OOB & pt had taken oxygen off; upon my arrival, pt A&Ox4, sitting in chair without complaints; pt on 100% NRB with POX 92%; ABG had been drawn shortly after pt was placed on 100% NRB; lungs diminished bilat; pt with known COPD & is supposed to wear BIPAP @ HS & naps but non-compliant; pt also states he is supposed to use oxygen during the day but is also non-compliant with that; pt in no apparent distress & denies SOB; primary MD called MICU fellow for Pulmonology consult & to assess if pt requires BIPAP at this time; neb tx ordered; per primary MD & MICU fellow GOAL POX=88%-92%; Fio2 eventually weaned to 40% VM by end of rapid with POX~89%-92%; pt already on continuous tele & POX on T5; staff to call with any further concerns

## 2024-05-31 NOTE — CARE PLAN
The patient's goals for the shift include Pt will have at least 4 hours of uninterrupted sleep during shift    The clinical goals for the shift include Maintain SPO2 above 88%    Refer to Rapid Response Note. Patient has new order for CPAP.     Problem: Respiratory  Goal: Minimize anxiety/maximize coping throughout shift  Outcome: Not Progressing  Goal: Minimal/no exertional discomfort or dyspnea this shift  Outcome: Not Progressing  Goal: No signs of respiratory distress (eg. Use of accessory muscles. Peds grunting)  Outcome: Not Progressing  Goal: Patent airway maintained this shift  Outcome: Not Progressing  Goal: Tolerate pulmonary toileting this shift  Outcome: Not Progressing  Goal: Verbalize decreased shortness of breath this shift  Outcome: Not Progressing  Goal: Wean oxygen to maintain O2 saturation per order/standard this shift  Outcome: Not Progressing  Goal: Increase self care and/or family involvement in next 24 hours  Outcome: Not Progressing

## 2024-05-31 NOTE — PROGRESS NOTES
Interval events:    Desaturation overnight when patient took off his oxygen to ambulate to restroom. Rapid response called. ABG with pCO2 82. Improvement in O2 sats with CPAP. Patient intermittently refusing mask. This morning on 15L NC. Lethargic. Resumed CPAP use; simultaneous VBG with elevated pCO2 81.    Subjective:  Tired since he did not sleep well. No chest pain.    Today in brief:  - Continue CPAP while sleeping/ awake to keep O2 sats >90%. NC while awake OK.  - Resume home Toprol XL 25 daily  - Follow slow creatinine improvement prior to resuming additional home GDMT stepwise  - TCC working to obtain home oxygen equipment    Objective:  Vitals:    05/31/24 0916   BP:    Pulse:    Resp: (!) 29   Temp:    SpO2: 92%     Weight         5/26/2024  2155 5/27/2024  0947          Weight: 172 kg (380 lb) 182 kg (401 lb 3.8 oz)              Intake/Output Summary (Last 24 hours) at 5/31/2024 1005  Last data filed at 5/31/2024 0725  Gross per 24 hour   Intake 120 ml   Output 850 ml   Net -730 ml     Recent Results (from the past 24 hour(s))   CBC and Auto Differential    Collection Time: 05/30/24  8:31 PM   Result Value Ref Range    WBC 5.2 4.4 - 11.3 x10*3/uL    nRBC 0.0 0.0 - 0.0 /100 WBCs    RBC 5.45 4.50 - 5.90 x10*6/uL    Hemoglobin 17.1 13.5 - 17.5 g/dL    Hematocrit 58.2 (H) 41.0 - 52.0 %     (H) 80 - 100 fL    MCH 31.4 26.0 - 34.0 pg    MCHC 29.4 (L) 32.0 - 36.0 g/dL    RDW 15.9 (H) 11.5 - 14.5 %    Platelets 163 150 - 450 x10*3/uL    Neutrophils % 74.6 40.0 - 80.0 %    Immature Granulocytes %, Automated 0.0 0.0 - 0.9 %    Lymphocytes % 15.1 13.0 - 44.0 %    Monocytes % 9.1 2.0 - 10.0 %    Eosinophils % 1.0 0.0 - 6.0 %    Basophils % 0.2 0.0 - 2.0 %    Neutrophils Absolute 3.87 1.20 - 7.70 x10*3/uL    Immature Granulocytes Absolute, Automated 0.00 0.00 - 0.70 x10*3/uL    Lymphocytes Absolute 0.78 (L) 1.20 - 4.80 x10*3/uL    Monocytes Absolute 0.47 0.10 - 1.00 x10*3/uL    Eosinophils Absolute 0.05 0.00 -  0.70 x10*3/uL    Basophils Absolute 0.01 0.00 - 0.10 x10*3/uL   Magnesium    Collection Time: 05/30/24  8:31 PM   Result Value Ref Range    Magnesium 2.70 (H) 1.60 - 2.40 mg/dL   Renal function panel    Collection Time: 05/30/24  8:31 PM   Result Value Ref Range    Glucose 123 (H) 74 - 99 mg/dL    Sodium 142 136 - 145 mmol/L    Potassium 3.7 3.5 - 5.3 mmol/L    Chloride 88 (L) 98 - 107 mmol/L    Bicarbonate 43 (HH) 21 - 32 mmol/L    Anion Gap 15 10 - 20 mmol/L    Urea Nitrogen 47 (H) 6 - 23 mg/dL    Creatinine 2.96 (H) 0.50 - 1.30 mg/dL    eGFR 25 (L) >60 mL/min/1.73m*2    Calcium 9.8 8.6 - 10.6 mg/dL    Phosphorus 4.1 2.5 - 4.9 mg/dL    Albumin 3.9 3.4 - 5.0 g/dL   BLOOD GAS ARTERIAL FULL PANEL    Collection Time: 05/30/24 11:32 PM   Result Value Ref Range    POCT pH, Arterial 7.39 7.38 - 7.42 pH    POCT pCO2, Arterial 82 (HH) 38 - 42 mm Hg    POCT pO2, Arterial 57 (L) 85 - 95 mm Hg    POCT SO2, Arterial 88 (L) 94 - 100 %    POCT Oxy Hemoglobin, Arterial 85.0 (L) 94.0 - 98.0 %    POCT Hematocrit Calculated, Arterial 50.0 41.0 - 52.0 %    POCT Sodium, Arterial 135 (L) 136 - 145 mmol/L    POCT Potassium, Arterial 3.4 (L) 3.5 - 5.3 mmol/L    POCT Chloride, Arterial 95 (L) 98 - 107 mmol/L    POCT Ionized Calcium, Arterial 1.14 1.10 - 1.33 mmol/L    POCT Glucose, Arterial 144 (H) 74 - 99 mg/dL    POCT Lactate, Arterial 1.1 0.4 - 2.0 mmol/L    POCT Base Excess, Arterial 18.8 (H) -2.0 - 3.0 mmol/L    POCT HCO3 Calculated, Arterial 49.6 (H) 22.0 - 26.0 mmol/L    POCT Hemoglobin, Arterial 16.7 13.5 - 17.5 g/dL    POCT Anion Gap, Arterial -6 (L) 10 - 25 mmo/L    Patient Temperature 37.0 degrees Celsius    FiO2 44 %    Site of Arterial Puncture Arterial Line    BLOOD GAS VENOUS FULL PANEL    Collection Time: 05/31/24  8:49 AM   Result Value Ref Range    POCT pH, Venous 7.38 7.33 - 7.43 pH    POCT pCO2, Venous 81 (HH) 41 - 51 mm Hg    POCT pO2, Venous 62 (H) 35 - 45 mm Hg    POCT SO2, Venous 91 (H) 45 - 75 %    POCT Oxy  Hemoglobin, Venous 88.6 (H) 45.0 - 75.0 %    POCT Hematocrit Calculated, Venous 52.0 41.0 - 52.0 %    POCT Sodium, Venous 136 136 - 145 mmol/L    POCT Potassium, Venous 3.8 3.5 - 5.3 mmol/L    POCT Chloride, Venous 95 (L) 98 - 107 mmol/L    POCT Ionized [place], Venous 1.17 1.10 - 1.33 mmol/L    POCT Glucose, Venous 135 (H) 74 - 99 mg/dL    POCT Lactate, Venous 1.2 0.4 - 2.0 mmol/L    POCT Base Excess, Venous 17.0 (H) -2.0 - 3.0 mmol/L    POCT HCO3 Calculated, Venous 47.9 (H) 22.0 - 26.0 mmol/L    POCT Hemoglobin, Venous 17.4 13.5 - 17.5 g/dL    POCT Anion Gap, Venous -3.0 (L) 10.0 - 25.0 mmol/L    Patient Temperature 37.0 degrees Celsius    FiO2 44 %   Renal Function Panel    Collection Time: 05/31/24  8:51 AM   Result Value Ref Range    Glucose 133 (H) 74 - 99 mg/dL    Sodium 141 136 - 145 mmol/L    Potassium 4.5 3.5 - 5.3 mmol/L    Chloride 91 (L) 98 - 107 mmol/L    Bicarbonate 37 (H) 21 - 32 mmol/L    Anion Gap 18 10 - 20 mmol/L    Urea Nitrogen 47 (H) 6 - 23 mg/dL    Creatinine 2.58 (H) 0.50 - 1.30 mg/dL    eGFR 29 (L) >60 mL/min/1.73m*2    Calcium 9.4 8.6 - 10.6 mg/dL    Phosphorus 4.2 2.5 - 4.9 mg/dL    Albumin 3.8 3.4 - 5.0 g/dL     Inpatient Medications:  Scheduled medications   Medication Dose Route Frequency    aspirin  81 mg oral Daily    atorvastatin  40 mg oral Nightly    fluticasone furoate-vilanteroL  1 puff inhalation Daily    heparin (porcine)  7,500 Units subcutaneous q8h STEVE    ipratropium-albuteroL  3 mL nebulization q6h    melatonin  6 mg oral Nightly    metoprolol succinate XL  25 mg oral Daily    oxygen   inhalation Continuous - Inhalation    oxygen   inhalation Continuous - Inhalation    pantoprazole  40 mg intravenous Daily    polyethylene glycol  17 g oral Daily     PRN medications   Medication    albuterol    OLANZapine    sodium chloride     Continuous Medications   Medication Dose Last Rate       Telemetry 5/31/2024 (personally reviewed): SR TWI 90-100s    Physical exam:  General:  Obese, tired, sitting in chair  Head/ neck: unable to assess JVD due to body habitus  Cardiac: RRR, regular S1 S2 , no murmur, no rub, no gallop  Pulm: Diminished lung sounds throughout, NC in place> CPAP  Vascular: Radial intact bilaterally  GI: Obesity limits evaluation of distension  Extremities: trace bilateral non pitting LE edema   Neuro: no focal neuro deficits   Psych: tired, appropriate mood and behavior   Skin: warm and dry     Assessment/Plan   Patric Arenas is a 48 yr old male with a PMHx of  nonischemic cardiomyopathy s/p AICD with recovered EF to 45-50%, hypertension, hyperlipidemia, morbid obesity, COPD, and MJ who presented with SOB of 3-4days duration and admitted to the ICU for acute hypercapnic respiratory failure requiring intubation.  Now transferred to HVI service for further management.     Acute on chronic Systolic heart failure, with recovered EF, 50-55%  - Dilated NICM 2/2 ETOH, s/p ICD, LHC 2019 clean coronaries, in setting of medication noncompliance   - TTE 2/14/24: EF 45%, TTE 5/2021: EF 35%  - TTE 5/28: EF 50-55%, no WMA, normal DF, RV mildly enlarged, reduced RV function  - Admit BNP: 373 (previously 40's)  - Admit CXR: Cardiomegaly with mild pulmonary vascular congestion  - CXR 5/28: Interval worsening in interstitial/alveolar pulmonary edema. small bilateral pleural effusions   - s/p diuresis with Lasix boluses, drip, and Metolazone 5 mg on 5/27 & 5/28   - Lasix drip held 5/29 for soft BP's, decreased UO, and SUZANNE  - Repeat BNP (5/30): 159 improved  - Resume home Toprol XL 25   - Cont to hold home additional GDMT until SUZANNE resolves  - Home meds: Empa 10, Toprol XL 25, Entresto 49-51, [place] 25, Torsemide 100 BID, Metolazone 5 daily  - Cont 2gram sodium diet, 2L fluid restriction, daily standing weights, strict I&O's      Acute on chronic hypercapnic respiratory failure   MJ/Asthma/ possible COPD  Suspected obesity hypoventilation syndrome  Hemoptysis/ Epistaxis, mild  - After chart  review hx of Asthma, no COPD hx found  - Recommended to be on 2L O2 at home and CPAP at night, but does not currently have proper supplies   - ED course in brief: sat of 90% on 6L, placed on 12 L cannula bubbler, transitioned to BIPAP due to VBG with elevated CO2 of 118 and pH  7.24. Given DuoNebs, continuous albuterol, IV Solu-Medrol, and 100 mg IV Lasix for diuresis.   - VBG did not show improvement with BIPAP and patient was intubated and transitioned to MICU  - s/p 1 x dose of 250 mg IV Diamox   - Self extubated 5/28, placed on BiPap which he did not tolerate, becoming acutely agitated. Placed on 5 L NC, started on Precedex drip which was continued until 5/29   - VBG 5/29: pH 7.38WNL, CO2 79H, HCO3 46.7H  - Oxygen desaturation on room air to  87% improved to 94% on 5L NC. TCC informed of need for home oxygen and home BiPAP  - Mild hemoptysis and epistaxis re ported. RT contacted for oxygen tent, PRN [place] spray. Bubbler in place.  - Acute desaturation due to oxygen noncompliance evening of 5/30. Retaining CO2. Oxygen compliance encouraged. NC while awake and CPAP while asleep while here. TCC assisting with home oxygen equipment. Outpatient pulmonary follow up arranged.  - Home meds: Ventolin 90 mcg/inh, Breo ellipta 200-25 mcg/dose  - Cont PRN Albuterol, resumed home Breo Ellipta (5/30)    Hypertension  Hyperlipidemia  - BP last 24 hrs:124-140s/80-100s)  - Meds/diuresis as above   - Continue home statin     SUZANNE  - ATN 2/2 hypotension vs aggressive diuresis, FeUrea 36.2%   - Admit Cr: 1.67  - Baseline Cr ~1.2-1.5,   - Cr today: 2.96 mildly improved (3.33, 3.37, 2.90, 2.07, 1.44, 1.52)  - Meds/diuresis as stated above  - Avoid nephrotoxins and hypotension. Monitor daily RFP     Encephalopathy/Agitation, resolved  ETOH/Illicit substance abuse  - Delirium vs drug use (admits to ectasy & PCP use), drinks fifth of liquor ~daily  - Admit Utox +PCP  - Required Precedex drip until 5/29, sitter and restraints in MICU  -  Now A&O x3, alert and cooperative   - Counseled on complete cessation, pt expressing desire to quit  - Cont PRN Zyprexa     Pre-DM  Morbid Obesity, BMI 5  - Admit A1C 6.2%  - Encouraged dietary modifications, exercise, and weight loss  - Follow up with PCP     Concern for pulmonary infection  - Venous Lactate: 3>2.6>1.9>5.7>1.9 normalized  - 5/27 Flu/Covid Neg, MRSA nares negative, 5/27 BC x2 NGTD, UA: neg for Leuk, Nitrites, ketones, or WBC  - S/p Azithro 5/28, Vanco (5/27- 5/28), Zosyn (5/27- 5/28)   - Remains afebrile, WBC wnl, delgado discontinued  - Cont to trend CBC, temp     DVT ppx: subQ heparin  DISPO: Pending improvement in renal function and medication optimization  Code status: Full Code    Patient seen and discussed with Dr. Jayden Gallegos.  Fara Tripathi PA-C

## 2024-05-31 NOTE — NURSING NOTE
Found patient to be sat'ing in 80s. Bumped patient up to 6L NC and educated patient to cough and deep breathe. Came back up to 91% after 10 minutes. Notified resident.

## 2024-05-31 NOTE — NURSING NOTE
Found patient trying to climb back into bariatric bed. Did not use call bell to use urinal even after multiple education sessions. Found tachypneic. Denies SOB, but appeared very labored. SPO2 was as low as 78%. Got him back to 80s with breathing exercises. Continued to desat, Notified MD. Placed on rover for continuous monitoring. Needed to place patient on NRB then called Rapid Response team. SPO2 remained low 80s on NRB. Patient would not take deep breaths.

## 2024-05-31 NOTE — SIGNIFICANT EVENT
Rapid Response RN Note    RADAR score 9 due to the following VS: T 36.7 °Celsius; ; RR 29; /114; SPO2 88%.     Reviewed above VS with bedside RN via phone.  Per RN, pt had climbed OOB & taken CPAP mask off; Vital signs within patient's current trends.  No acute change in condition.  No interventions by rapid response team indicated at this time.    Staff to page rapid response for any concerns or acute change in condition/VS.

## 2024-05-31 NOTE — NURSING NOTE
Found patient on side of the bed and SPO2 in 70%. Patient continues to rip off CPAP. States he has a headache. Placed patient on 15L High Flow. SPO2 86-91%. Re-educated patient to use call bell when he ready to get into chair. After about 10 minutes later. Patient was already in chair. High flow remains on. Call bell in reach.

## 2024-05-31 NOTE — NURSING NOTE
Patient started to fall asleep and would not take deep breaths. Had to place on [place] starting 8L.

## 2024-06-01 ENCOUNTER — APPOINTMENT (OUTPATIENT)
Dept: RADIOLOGY | Facility: HOSPITAL | Age: 49
End: 2024-06-01
Payer: COMMERCIAL

## 2024-06-01 LAB
ALBUMIN SERPL BCP-MCNC: 3.9 G/DL (ref 3.4–5)
ANION GAP BLDA CALCULATED.4IONS-SCNC: -7 MMO/L (ref 10–25)
ANION GAP SERPL CALC-SCNC: 11 MMOL/L (ref 10–20)
BASE EXCESS BLDA CALC-SCNC: 22.8 MMOL/L (ref -2–3)
BASOPHILS # BLD AUTO: 0.01 X10*3/UL (ref 0–0.1)
BASOPHILS NFR BLD AUTO: 0.2 %
BODY TEMPERATURE: ABNORMAL
BUN SERPL-MCNC: 44 MG/DL (ref 6–23)
CA-I BLDA-SCNC: 1.19 MMOL/L (ref 1.1–1.33)
CALCIUM SERPL-MCNC: 9.9 MG/DL (ref 8.6–10.6)
CHLORIDE BLDA-SCNC: 92 MMOL/L (ref 98–107)
CHLORIDE SERPL-SCNC: 90 MMOL/L (ref 98–107)
CO2 SERPL-SCNC: 45 MMOL/L (ref 21–32)
CREAT SERPL-MCNC: 2.17 MG/DL (ref 0.5–1.3)
EGFRCR SERPLBLD CKD-EPI 2021: 36 ML/MIN/1.73M*2
EOSINOPHIL # BLD AUTO: 0.09 X10*3/UL (ref 0–0.7)
EOSINOPHIL NFR BLD AUTO: 1.6 %
ERYTHROCYTE [DISTWIDTH] IN BLOOD BY AUTOMATED COUNT: 15.3 % (ref 11.5–14.5)
GLUCOSE BLDA-MCNC: 122 MG/DL (ref 74–99)
GLUCOSE SERPL-MCNC: 109 MG/DL (ref 74–99)
HCO3 BLDA-SCNC: 56.7 MMOL/L (ref 22–26)
HCT VFR BLD AUTO: 53.4 % (ref 41–52)
HCT VFR BLD EST: 49 % (ref 41–52)
HGB BLD-MCNC: 16.2 G/DL (ref 13.5–17.5)
HGB BLDA-MCNC: 16.2 G/DL (ref 13.5–17.5)
IMM GRANULOCYTES # BLD AUTO: 0 X10*3/UL (ref 0–0.7)
IMM GRANULOCYTES NFR BLD AUTO: 0 % (ref 0–0.9)
INHALED O2 CONCENTRATION: 55 %
LACTATE BLDA-SCNC: 1.6 MMOL/L (ref 0.4–2)
LYMPHOCYTES # BLD AUTO: 0.99 X10*3/UL (ref 1.2–4.8)
LYMPHOCYTES NFR BLD AUTO: 17.3 %
MAGNESIUM SERPL-MCNC: 2.58 MG/DL (ref 1.6–2.4)
MCH RBC QN AUTO: 32.5 PG (ref 26–34)
MCHC RBC AUTO-ENTMCNC: 30.3 G/DL (ref 32–36)
MCV RBC AUTO: 107 FL (ref 80–100)
MONOCYTES # BLD AUTO: 0.64 X10*3/UL (ref 0.1–1)
MONOCYTES NFR BLD AUTO: 11.2 %
NEUTROPHILS # BLD AUTO: 3.99 X10*3/UL (ref 1.2–7.7)
NEUTROPHILS NFR BLD AUTO: 69.7 %
NRBC BLD-RTO: 0 /100 WBCS (ref 0–0)
OXYHGB MFR BLDA: 45.4 % (ref 94–98)
PCO2 BLDA: 110 MM HG (ref 38–42)
PH BLDA: 7.32 PH (ref 7.38–7.42)
PHOSPHATE SERPL-MCNC: 2.2 MG/DL (ref 2.5–4.9)
PLATELET # BLD AUTO: 146 X10*3/UL (ref 150–450)
PO2 BLDA: 31 MM HG (ref 85–95)
POTASSIUM BLDA-SCNC: 3.5 MMOL/L (ref 3.5–5.3)
POTASSIUM SERPL-SCNC: 3.3 MMOL/L (ref 3.5–5.3)
RBC # BLD AUTO: 4.99 X10*6/UL (ref 4.5–5.9)
SAO2 % BLDA: 46 % (ref 94–100)
SODIUM BLDA-SCNC: 138 MMOL/L (ref 136–145)
SODIUM SERPL-SCNC: 143 MMOL/L (ref 136–145)
WBC # BLD AUTO: 5.7 X10*3/UL (ref 4.4–11.3)

## 2024-06-01 PROCEDURE — 94640 AIRWAY INHALATION TREATMENT: CPT

## 2024-06-01 PROCEDURE — 2500000005 HC RX 250 GENERAL PHARMACY W/O HCPCS: Performed by: NURSE PRACTITIONER

## 2024-06-01 PROCEDURE — 2500000004 HC RX 250 GENERAL PHARMACY W/ HCPCS (ALT 636 FOR OP/ED)

## 2024-06-01 PROCEDURE — 83605 ASSAY OF LACTIC ACID: CPT | Performed by: INTERNAL MEDICINE

## 2024-06-01 PROCEDURE — 71250 CT THORAX DX C-: CPT | Performed by: RADIOLOGY

## 2024-06-01 PROCEDURE — 99233 SBSQ HOSP IP/OBS HIGH 50: CPT | Performed by: INTERNAL MEDICINE

## 2024-06-01 PROCEDURE — 85025 COMPLETE CBC W/AUTO DIFF WBC: CPT

## 2024-06-01 PROCEDURE — 71045 X-RAY EXAM CHEST 1 VIEW: CPT

## 2024-06-01 PROCEDURE — 71250 CT THORAX DX C-: CPT

## 2024-06-01 PROCEDURE — 2500000001 HC RX 250 WO HCPCS SELF ADMINISTERED DRUGS (ALT 637 FOR MEDICARE OP): Performed by: NURSE PRACTITIONER

## 2024-06-01 PROCEDURE — 2500000002 HC RX 250 W HCPCS SELF ADMINISTERED DRUGS (ALT 637 FOR MEDICARE OP, ALT 636 FOR OP/ED)

## 2024-06-01 PROCEDURE — 2500000001 HC RX 250 WO HCPCS SELF ADMINISTERED DRUGS (ALT 637 FOR MEDICARE OP)

## 2024-06-01 PROCEDURE — 94660 CPAP INITIATION&MGMT: CPT

## 2024-06-01 PROCEDURE — 2020000001 HC ICU ROOM DAILY

## 2024-06-01 PROCEDURE — 2500000005 HC RX 250 GENERAL PHARMACY W/O HCPCS: Performed by: INTERNAL MEDICINE

## 2024-06-01 PROCEDURE — 84132 ASSAY OF SERUM POTASSIUM: CPT

## 2024-06-01 PROCEDURE — 71045 X-RAY EXAM CHEST 1 VIEW: CPT | Performed by: RADIOLOGY

## 2024-06-01 PROCEDURE — 83735 ASSAY OF MAGNESIUM: CPT

## 2024-06-01 PROCEDURE — 36415 COLL VENOUS BLD VENIPUNCTURE: CPT

## 2024-06-01 PROCEDURE — 94760 N-INVAS EAR/PLS OXIMETRY 1: CPT

## 2024-06-01 PROCEDURE — C9113 INJ PANTOPRAZOLE SODIUM, VIA: HCPCS

## 2024-06-01 RX ORDER — FUROSEMIDE 10 MG/ML
80 INJECTION INTRAMUSCULAR; INTRAVENOUS ONCE
Status: COMPLETED | OUTPATIENT
Start: 2024-06-02 | End: 2024-06-01

## 2024-06-01 RX ADMIN — PANTOPRAZOLE SODIUM 40 MG: 40 INJECTION, POWDER, FOR SOLUTION INTRAVENOUS at 10:55

## 2024-06-01 RX ADMIN — ASPIRIN 81 MG: 81 TABLET, COATED ORAL at 10:55

## 2024-06-01 RX ADMIN — FUROSEMIDE 80 MG: 10 INJECTION, SOLUTION INTRAMUSCULAR; INTRAVENOUS at 23:47

## 2024-06-01 RX ADMIN — FLUTICASONE FUROATE AND VILANTEROL TRIFENATATE 1 PUFF: 200; 25 POWDER RESPIRATORY (INHALATION) at 11:02

## 2024-06-01 RX ADMIN — IPRATROPIUM BROMIDE AND ALBUTEROL SULFATE 3 ML: .5; 3 SOLUTION RESPIRATORY (INHALATION) at 21:30

## 2024-06-01 RX ADMIN — Medication 6 MG: at 20:32

## 2024-06-01 RX ADMIN — IPRATROPIUM BROMIDE AND ALBUTEROL SULFATE 3 ML: .5; 3 SOLUTION RESPIRATORY (INHALATION) at 10:55

## 2024-06-01 RX ADMIN — HEPARIN SODIUM 7500 UNITS: 5000 INJECTION INTRAVENOUS; SUBCUTANEOUS at 23:42

## 2024-06-01 RX ADMIN — Medication 55 PERCENT: at 20:00

## 2024-06-01 RX ADMIN — HEPARIN SODIUM 7500 UNITS: 5000 INJECTION INTRAVENOUS; SUBCUTANEOUS at 05:49

## 2024-06-01 RX ADMIN — ATORVASTATIN CALCIUM 40 MG: 40 TABLET, FILM COATED ORAL at 20:32

## 2024-06-01 RX ADMIN — IPRATROPIUM BROMIDE AND ALBUTEROL SULFATE 3 ML: .5; 3 SOLUTION RESPIRATORY (INHALATION) at 15:56

## 2024-06-01 RX ADMIN — Medication 8 L/MIN: at 08:00

## 2024-06-01 RX ADMIN — HEPARIN SODIUM 7500 UNITS: 5000 INJECTION INTRAVENOUS; SUBCUTANEOUS at 15:56

## 2024-06-01 RX ADMIN — METOPROLOL SUCCINATE 25 MG: 25 TABLET, EXTENDED RELEASE ORAL at 10:55

## 2024-06-01 ASSESSMENT — PAIN - FUNCTIONAL ASSESSMENT
PAIN_FUNCTIONAL_ASSESSMENT: 0-10

## 2024-06-01 ASSESSMENT — COGNITIVE AND FUNCTIONAL STATUS - GENERAL
DRESSING REGULAR UPPER BODY CLOTHING: A LITTLE
MOVING FROM LYING ON BACK TO SITTING ON SIDE OF FLAT BED WITH BEDRAILS: A LITTLE
TURNING FROM BACK TO SIDE WHILE IN FLAT BAD: A LITTLE
MOBILITY SCORE: 18
MOVING TO AND FROM BED TO CHAIR: A LITTLE
TOILETING: A LITTLE
STANDING UP FROM CHAIR USING ARMS: A LITTLE
DAILY ACTIVITIY SCORE: 20
CLIMB 3 TO 5 STEPS WITH RAILING: A LITTLE
DRESSING REGULAR LOWER BODY CLOTHING: A LITTLE
WALKING IN HOSPITAL ROOM: A LITTLE
HELP NEEDED FOR BATHING: A LITTLE

## 2024-06-01 ASSESSMENT — PAIN SCALES - GENERAL
PAINLEVEL_OUTOF10: 0 - NO PAIN

## 2024-06-01 NOTE — SIGNIFICANT EVENT
06/01/24 1125   Onset Documentation   Rapid Response Initiated By Radar auto page   Location/Room [facility]  (LT [id number])   Pager Time 1122   Arrival Time 1125   Event End Time 1130   Level II Called No   Primary Reason for Call Radar auto page     Rapid Response Note    Radar auto-page received for a radar score of 6 with the following vital signs: 36.1, 103, 24, 122/91, 95%. Vital signs were confirmed and reviewed with primary RN. He is at his current baseline. There are no indications for interventions by Rapid Response at this time. RN to contact Rapid Response with any future concerns or signs of clinical decompensation.

## 2024-06-01 NOTE — NURSING NOTE
Allowed patient to take a break from CPAP and placed on [place] and titrated to goal. Found patient to have RR low 40s. Woke patient and reinforced the need to be placed back on CPAP. Patient appeared a bit confused and fighted placing the mask. Reoriented and reeducated patient. Notified RT. Closely monitoring patient's HR, RR and SPO2.

## 2024-06-01 NOTE — PROGRESS NOTES
Interval events:    Intermittent low oxygens with high flow oxygen. Nose bleed noted with high flow oxygen, nasal spray and humidification of oxygen      Today in brief:  - Continue CPAP while sleeping/ awake to keep O2 sats >90%. NC while awake OK > currently high flow of 10 liters   - Follow slow creatinine improvement prior to resuming additional home GDMT stepwise  - CT of chest non contrast   - TCC working to obtain home oxygen equipment    Objective:  Vitals:    06/01/24 1106   BP: (!) 122/91   Pulse: 103   Resp: 24   Temp: 36.1 °C (97 °F)   SpO2: 95%     Weight         5/26/2024  2155 5/27/2024  0947 5/31/2024  2137 6/1/2024  0433    Weight: 172 kg (380 lb) 182 kg (401 lb 3.8 oz) 174 kg (383 lb 13.1 oz) 174 kg (383 lb 13.1 oz)            Intake/Output Summary (Last 24 hours) at 6/1/2024 1313  Last data filed at 6/1/2024 0600  Gross per 24 hour   Intake 1040 ml   Output 1225 ml   Net -185 ml     No results found for this or any previous visit (from the past 24 hour(s)).    Inpatient Medications:  Scheduled medications   Medication Dose Route Frequency    aspirin  81 mg oral Daily    atorvastatin  40 mg oral Nightly    fluticasone furoate-vilanteroL  1 puff inhalation Daily    heparin (porcine)  7,500 Units subcutaneous q8h STEVE    ipratropium-albuteroL  3 mL nebulization TID    melatonin  6 mg oral Nightly    metoprolol succinate XL  25 mg oral Daily    oxygen   inhalation Continuous - Inhalation    oxygen   inhalation Continuous - Inhalation    pantoprazole  40 mg intravenous Daily    polyethylene glycol  17 g oral Daily     PRN medications   Medication    albuterol    OLANZapine    sodium chloride     Continuous Medications   Medication Dose Last Rate       Telemetry 6/1/2024 (personally reviewed): SR TWI 90-100s    Physical exam:  General: Obese, tired, sitting in chair  Head/ neck: unable to assess JVD due to body habitus  Cardiac: RRR, regular S1 S2 , no murmur, no rub, no gallop  Pulm: Diminished lung  sounds throughout, NC in place> CPAP  Vascular: Radial intact bilaterally  GI: Obesity limits evaluation of distension  Extremities: trace bilateral non pitting LE edema   Neuro: no focal neuro deficits   Psych: tired, appropriate mood and behavior   Skin: warm and dry     Assessment/Plan   Patric Arenas is a 48 yr old male with a PMHx of  nonischemic cardiomyopathy s/p AICD with recovered EF to 45-50%, hypertension, hyperlipidemia, morbid obesity, COPD, and MJ who presented with SOB of 3-4days duration and admitted to the ICU for acute hypercapnic respiratory failure requiring intubation.  Now transferred to HVI service for further management.     Acute on chronic Systolic heart failure, with recovered EF, 50-55%  - Dilated NICM 2/2 ETOH, s/p ICD, LHC 2019 clean coronaries, in setting of medication noncompliance   - TTE 2/14/24: EF 45%, TTE 5/2021: EF 35%  - TTE 5/28: EF 50-55%, no WMA, normal DF, RV mildly enlarged, reduced RV function  - Admit BNP: 373 (previously 40's)  - Admit CXR: Cardiomegaly with mild pulmonary vascular congestion  - CXR 5/28: Interval worsening in interstitial/alveolar pulmonary edema. small bilateral pleural effusions   - s/p diuresis with Lasix boluses, drip, and Metolazone 5 mg on 5/27 & 5/28   - Lasix drip held 5/29 for soft BP's, decreased UO, and SUZANNE  - Repeat BNP (5/30): 159 improved  - c/w  home Toprol XL 25   - Cont to hold home additional GDMT until SUZANNE resolves  - Home meds: Empa 10, Toprol XL 25, Entresto 49-51, [place] 25, Torsemide 100 BID, Metolazone 5 daily  - Cont 2gram sodium diet, 2L fluid restriction, daily standing weights, strict I&O's      Acute on chronic hypercapnic respiratory failure   MJ/Asthma/ possible COPD  Suspected obesity hypoventilation syndrome  Hemoptysis/ Epistaxis, mild  - After chart review hx of Asthma, no COPD hx found  - Recommended to be on 2L O2 at home and CPAP at night, but does not currently have proper supplies   - ED course in brief: sat of  90% on 6L, placed on 12 L cannula bubbler, transitioned to BIPAP due to VBG with elevated CO2 of 118 and pH  7.24. Given DuoNebs, continuous albuterol, IV Solu-Medrol, and 100 mg IV Lasix for diuresis.   - VBG did not show improvement with BIPAP and patient was intubated and transitioned to MICU  - s/p 1 x dose of 250 mg IV Diamox   - Self extubated 5/28, placed on BiPap which he did not tolerate, becoming acutely agitated. Placed on 5 L NC, started on Precedex drip which was continued until 5/29   - VBG 5/29: pH 7.38WNL, CO2 79H, HCO3 46.7H  - Oxygen desaturation on room air to  87% improved to 94% on 5L NC. TCC informed of need for home oxygen and home BiPAP  - Mild hemoptysis and epistaxis re ported. RT contacted for oxygen tent, PRN [place] spray. Bubbler in place.  - c/w with  desaturation and retaining CO2.   Oxygen compliance encouraged. NC currently high flow 8 to 10 liters  while awake and CPAP while asleep while here. TCC assisting with home oxygen equipment. Outpatient pulmonary follow up arranged.  - 6/1 CT of chest non contrast ordered   - Home meds: Ventolin 90 mcg/inh, Breo ellipta 200-25 mcg/dose  - Cont PRN Albuterol, resumed home Breo Ellipta (5/30)    Hypertension  Hyperlipidemia  - BP last 24 hrs: 98 - 139   - Meds/diuresis as above   - Continue home statin     SUZANNE  - ATN 2/2 hypotension vs aggressive diuresis, FeUrea 36.2%   - Admit Cr: 1.67  - Baseline Cr ~1.2-1.5,   - Cr today: 2.58, 2.96 mildly improved (3.33, 3.37, 2.90, 2.07, 1.44, 1.52)  - Meds/diuresis as stated above  - Avoid nephrotoxins and hypotension. Monitor daily RFP     Encephalopathy/Agitation, resolved  ETOH/Illicit substance abuse  - Delirium vs drug use (admits to ectasy & PCP use), drinks fifth of liquor ~daily  - Admit Utox +PCP  - Required Precedex drip until 5/29, sitter and restraints in MICU  - Now A&O x3, alert and cooperative   - Counseled on complete cessation, pt expressing desire to quit  - Cont PRN Zyprexa      Pre-DM  Morbid Obesity, BMI 5  - Admit A1C 6.2%  - Encouraged dietary modifications, exercise, and weight loss  - Follow up with PCP     Concern for pulmonary infection  - Venous Lactate: 3>2.6>1.9>5.7>1.9 normalized  - 5/27 Flu/Covid Neg, MRSA nares negative, 5/27 BC x2 NGTD, UA: neg for Leuk, Nitrites, ketones, or WBC  - S/p Azithro 5/28, Vanco (5/27- 5/28), Zosyn (5/27- 5/28)   - Remains afebrile, WBC wnl, delgado discontinued  - Cont to trend CBC, temp     DVT ppx: subQ heparin  DISPO: Pending improvement in renal function and medication optimization  Code status: Full Code      Patient seen and discussed with Dr. Jayden Gallegos.  Livia Olivares, APRN-CNP

## 2024-06-01 NOTE — PROGRESS NOTES
[facility] Attending Shared Visit Note    This is a shared visit. Please see Advanced Practice Provider's encounter note for additional details.        Overnight events/Subjective: Remained hypoxemic overnight.     Exam:   Physical exam: tired, no distress. Normal rate, regular rhythm, mildly labored breathing, clear to auscultation, abdomen non distended, no significant LE edema, no focal neuro deficits.     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     1. Respiratory failure: was supposed to be on O2 at home and CPAP but did not have proper supplies. Unclear compliance with other meds. Suspect his respiratory failure may be partially volume related but more likely related to chronic underlying lung disease and body habitus. He appears to have chronic CO2 retention and will need O2 going home and CPAP. May trial AVAPS prior to his sleep appointment. Plan CT chest to assess underlying lung pathology on 6/1, may need pulmonary involvement for chronic respiratory failure.   2. SUZANNE: improving, likely from overdiuresis. Continue to hold GDMT.   3. HFrecEF: Continue aspirin and atorvastatin. Continue metoprolol. Holding below:  -Home meds: torsemide 100 mg BID, metolazone 5 mg, Spironolactone 25 mg, Jardiance 10 mg, Entresto 49-51 mg BID,  4. Drug use: counseled on complete cessation.     Dispo: Will need follow up with pulmonary, sleep medicine (for CPAP re initiation) and HF. Also will need home O2 most likely.     Jayden Gallegos MD    ________________________________________________________________________________  Problems:   Active Problems:    COPD with acute exacerbation (Multi)      Objective   Admit Date: 5/26/2024  Hospital Length of Stay: 5   Home: [facility] [id number]    Vitals:      6/1/2024    11:06 AM  6/1/2024     7:12 AM 6/1/2024     4:33 AM 6/1/2024     3:49 AM 6/1/2024     3:12 AM 6/1/2024     1:23 AM 6/1/2024    12:27 AM   Vitals   Systolic 122 119 122   112    Diastolic 91 85 95   79    Heart Rate 103 99 95   96    Temp 36.1 °C (97 °F) 36.3 °C (97.3 °F) 36.3 °C (97.4 °F)   36.1 °C (97 °F)    Resp 24 24 28 26 31 31 27   Weight (lb)   383.82       BMI   50.64 kg/m2       BSA (m2)   2.99 m2         Wt Readings from Last 5 Encounters:   06/01/24 (!) 174 kg (383 lb 13.1 oz)   02/13/24 (!) 172 kg (380 lb)   01/26/24 (!) 181 kg (399 lb)   12/18/23 (!) 180 kg (397 lb 6.4 oz)   12/09/23 (!) 181 kg (399 lb 3.2 oz)       Intake/Output Summary (Last 24 hours) at 6/1/2024 1615  Last data filed at 6/1/2024 0600  Gross per 24 hour   Intake 800 ml   Output 900 ml   Net -100 ml         MEDICATIONS  Infusions:     Scheduled:  aspirin, 81 mg, Daily  atorvastatin, 40 mg, Nightly  fluticasone furoate-vilanteroL, 1 puff, Daily  heparin (porcine), 7,500 Units, q8h STEVE  ipratropium-albuteroL, 3 mL, TID  melatonin, 6 mg, Nightly  metoprolol succinate XL, 25 mg, Daily  oxygen, , Continuous - Inhalation  oxygen, , Continuous - Inhalation  pantoprazole, 40 mg, Daily  polyethylene glycol, 17 g, Daily      PRN:  albuterol, 2 puff, q4h PRN  OLANZapine, 2.5 mg, q6h PRN  sodium chloride, 1 spray, 4x daily PRN      Prior to Admission Meds:  Facility-Administered Medications Prior to Admission   Medication Dose Route Frequency Provider Last Rate Last Admin    fluticasone furoate-vilanteroL (Breo Ellipta) 200-25 mcg/dose inhaler 1 puff  1 puff inhalation Once Shabbir Guzmán MD         Medications Prior to Admission   Medication Sig Dispense Refill Last Dose    albuterol (Ventolin HFA) 90 mcg/actuation inhaler Inhale 2 puffs every 4 hours if needed for wheezing or shortness of breath. If you are using your rescue inhaler frequently, seek medical evaluation 18 g 3 Unknown at patient request refills    aspirin 81 mg EC tablet Take 1 tablet  (81 mg) by mouth once daily.   Unknown at patient request refills    atorvastatin (Lipitor) 40 mg tablet TAKE 1 TABLET BY MOUTH ONCE DAILY 30 tablet 3 Unknown at patient request refills    empagliflozin (Jardiance) 10 mg TAKE 1 TABLET BY MOUTH ONCE A DAY 30 tablet 3 Unknown at patient request refills    inhalational spacing device inhaler Use as directed with inhalers 1 each 0     metOLazone (Zaroxolyn) 5 mg tablet Take 1 tablet (5 mg) by mouth once daily. (Patient not taking: Reported on 5/27/2024) 30 tablet 0 Not Taking    metoprolol succinate XL (Toprol-XL) 25 mg 24 hr tablet Take 1 tablet (25 mg) by mouth once daily. 90 tablet 0 Unknown at patient request refills    oxygen (O2) gas therapy Inhale.   Unknown    sacubitriL-valsartan (Entresto) 49-51 mg tablet TAKE 1 TABLET BY MOUTH TWO TIMES A DAY 60 tablet 3 Unknown at patient request refills    sertraline (Zoloft) 25 mg tablet TAKE 1 TABLET BY MOUTH ONCE DAILY (Patient not taking: Reported on 5/27/2024) 30 tablet 3 Not Taking    spironolactone (Aldactone) 25 mg tablet TAKE 1/2 TABLET BY MOUTH ONCE DAILY 15 tablet 3 Unknown at patient request refills    torsemide (Demadex) 100 mg tablet Take 1 tablet (100 mg) by mouth 2 times a day. 180 tablet 0 Unknown at patient request refills       DATA:  CMP:  Recent Labs     05/31/24  0851 05/30/24  2031 05/30/24  0722 05/29/24  0527 05/28/24  2310 05/28/24  0521 05/27/24  1811 05/27/24  0500 05/26/24  2209    142 142 145 144 143 132* 141 140   K 4.5 3.7 4.2 4.1 4.1 3.7 3.5 4.1 3.7   CL 91* 88* 89* 90* 91* 93* 90* 92* 92*   CO2 37* 43* 39* 41* 40* 39* 31 37* 39*   ANIONGAP 18 15 18 18 17 15 15 16 13   BUN 47* 47* 48* 38* 32* 26* 22 18 18   CREATININE 2.58* 2.96* 3.33* 3.37* 2.90* 2.07* 1.44* 1.52* 1.67*   EGFR 29* 25* 22* 21* 26* 38* 59* 55* 50*   MG  --  2.70* 2.76* 2.33 2.24 2.29 2.49* 2.44* 1.98     Recent Labs     05/31/24  0851 05/30/24  2031 05/30/24  0722 05/29/24  0527 05/28/24  2310 05/28/24  0521  "05/27/24  1811 05/27/24  0500 05/26/24 2209 02/14/24 0524 02/13/24 1937 12/07/23  0909 04/29/23  1039 02/19/23  1853 01/18/23  1605 12/16/22  0700 12/15/22  2028 12/15/22  0803 12/14/22  1608 12/14/22  0751 05/15/21  0222 05/14/21  1936   ALBUMIN 3.8 3.9 3.7 3.5 3.6 3.4 3.5 4.3 4.0   < > 3.7 3.9   < > 4.0   < > 4.2   < > 4.2   < > 4.1   < > 3.5  3.5   ALT  --   --   --   --   --   --   --  14 13  --  12 10  --  12  --  16  --  17  --  16   < > 38   AST  --   --   --   --   --   --   --  15 18  --  13 14  --  18  --  28  --  29  --  28   < > 42*   BILITOT  --   --   --   --   --   --   --  0.7 0.5  --  0.4 0.5  --  0.6  --  0.9  --  0.7  --  0.8   < > 1.2   LIPASE  --   --   --   --   --   --   --   --   --   --   --   --   --   --   --   --   --   --   --   --   --  10    < > = values in this interval not displayed.     CBC:  Recent Labs     05/30/24 2031 05/30/24 0723 05/29/24 0527 05/28/24  0521 05/27/24 0500 05/26/24 2209 02/14/24 0524 02/13/24 1937   WBC 5.2 4.9 6.6 5.6 6.0 5.6 3.4* 3.8*   HGB 17.1 16.1 14.8 15.7 16.9 16.1 14.4 14.6   HCT 58.2* 54.9* 49.9 51.5 58.1* 54.9* 47.1 46.5    144* 159 158 195 201 198 209   * 108* 105* 104* 106* 106* 102* 100     COAG:   Recent Labs     05/28/24  0521 02/19/23  1853 10/14/22  1450 10/14/22  0840 05/14/21  0827   INR 1.1 1.1 1.1  --  1.2*   DDIMERVTE  --  537*  --  610*  --      ABO: No results for input(s): \"ABO\" in the last 16029 hours.  HEME/ENDO:   Recent Labs     05/28/24  0521 12/12/22  2009 12/12/22  2007 10/14/22  1450   TSH 0.37* 1.58  --   --    FREET4 1.14  --   --   --    HGBA1C 6.2*  --  6.0* 6.0*     CARDIAC:   Recent Labs     05/30/24  0723 05/26/24  2324 05/26/24  2209 02/13/24  1937 12/07/23  0909 09/19/23  1141 04/29/23  1039 02/19/23  1853 01/18/23  1605 12/12/22 2007 12/12/22  1233   TROPHS  --  48 44 9 14  --  8 9  --  11 13   *  --  373* 46 42 3 11 12 9  --  14     Recent Labs     12/12/22  2007 05/15/21  0222   CHOL " 233*  --    LDLF 149*  --    HDL 54.3  --    TRIG 147 151*     TOX:  Recent Labs     05/27/24  1026 02/13/24  2328 12/07/23  1739   AMPHETAMINE Presumptive Negative Presumptive Negative Presumptive Negative   BENZO Presumptive Negative Presumptive Negative Presumptive Negative   CANNABINOID Presumptive Negative Presumptive Negative Presumptive Negative   COCAI Presumptive Negative Presumptive Negative Presumptive Negative   FENTANYL Presumptive Negative Presumptive Negative Presumptive Negative   OPIATE Presumptive Negative Presumptive Negative Presumptive Negative   OXYCODONE Presumptive Negative Presumptive Negative Presumptive Negative   PCP Presumptive Positive* Presumptive Negative Presumptive Negative     MICRO:   Recent Labs     05/28/24  1102 05/17/21  0413 05/16/21  1001   PROCAL 0.07 0.16* 0.09*     No results found for the last 90 days.      FOLLOWUP:   Future Appointments   Date Time Provider Department [place]   6/20/2024 10:40 AM ANANT Rutledge-CNP [id number] [facility]   6/25/2024  3:30 PM Nito Morales MD [id number] [facility]   7/1/2024  2:00 PM Suyapa Chacon MD PhD [id number]   8/13/2024  4:00 PM Mateo Rainey MD [id number] [facility]

## 2024-06-01 NOTE — SIGNIFICANT EVENT
06/01/24 0720   Onset Documentation   Rapid Response Initiated By Radar auto page   Location/Room [facility]  (LT 7215)   Pager Time [phone]   Arrival Time 0720   Event End Time 0730   Level II Called No   Primary Reason for Call Radar auto page     Rapid Response Note    Radar auto-page received for a radar score of 8 with the following vital signs: 36.3, 99, 24, 119/85, 90%.  Vital signs were confirmed and reviewed with primary RN.  He is at his current baseline.  There are no indications for interventions by Rapid Response at this time.  RN to contact Rapid Response with any future concerns or signs of clinical decompensation.

## 2024-06-01 NOTE — SIGNIFICANT EVENT
Rapid Response RN Note    Rapid response RN at bedside for RADAR score 6 due to the recent VS listed below:     Vitals:    05/31/24 1156 05/31/24 1528 05/31/24 2012 05/31/24 2102   BP: (!) 137/93 128/85  111/78   BP Location:    Left arm   Patient Position:    Sitting   Pulse: 102 90  96   Resp: 23 24  24   Temp: 36.7 °C (98.1 °F) 36.9 °C (98.4 °F)  36.1 °C (97 °F)   TempSrc:    Temporal   SpO2: 91% 94% 97% 94%   Weight:       Height:            Reviewed above VS with bedside RN.  Per RN, pt's looks better than he has in past  VS within patient's current trends.  No interventions by rapid response team indicated at this time.  Plan for CPAP HS.  Staff to page rapid response for any concerns or acute change in condition/VS.

## 2024-06-01 NOTE — NURSING NOTE
Patient took off CPAP. Found him picking his nose. L Nares bleeding. Educated patient to not pick his nose, use nasal saline spray, and increase water intake during day shift. Replaced him back on CPAP.

## 2024-06-01 NOTE — CARE PLAN
The patient's goals for the shift include Pt will have at least 4 hours of uninterrupted sleep during shift    The clinical goals for the shift include SPO2 will remain 88-92% throughout shift    Today is patient's birthday. He appears to be in better mood this morning. More compliant with CPAP at Night. Tolerated for 1-2 at a time, multiple times throughout shift. No acute events. Still requires HFNC to maintain SPO2 88-92 while not on CPAP.      Problem: Pain  Goal: My pain/discomfort is manageable  Outcome: Progressing     Problem: Safety  Goal: Patient will be injury free during hospitalization  Outcome: Progressing  Goal: I will remain free of falls  Outcome: Progressing     Problem: Daily Care  Goal: Daily care needs are met  Outcome: Progressing     Problem: Psychosocial Needs  Goal: Demonstrates ability to cope with hospitalization/illness  Outcome: Progressing  Goal: Collaborate with me, my family, and caregiver to identify my specific goals  Outcome: Progressing     Problem: Discharge Barriers  Goal: My discharge needs are met  Outcome: Progressing     Problem: Respiratory  Goal: Clear secretions with interventions this shift  Outcome: Progressing  Goal: Minimize anxiety/maximize coping throughout shift  Outcome: Progressing  Goal: Minimal/no exertional discomfort or dyspnea this shift  Outcome: Progressing  Goal: No signs of respiratory distress (eg. Use of accessory muscles. Peds grunting)  Outcome: Progressing  Goal: Patent airway maintained this shift  Outcome: Progressing  Goal: Tolerate mechanical ventilation evidenced by VS/agitation level this shift  Outcome: Progressing  Goal: Tolerate pulmonary toileting this shift  Outcome: Progressing  Goal: Verbalize decreased shortness of breath this shift  Outcome: Progressing  Goal: Wean oxygen to maintain O2 saturation per order/standard this shift  Outcome: Progressing  Goal: Increase self care and/or family involvement in next 24 hours  Outcome:  Progressing     Problem: Safety - Medical Restraint  Goal: Remains free of injury from restraints (Restraint for Interference with Medical Device)  Outcome: Progressing  Goal: Free from restraint(s) (Restraint for Interference with Medical Device)  Outcome: Progressing     Problem: Fall/Injury  Goal: Not fall by end of shift  Outcome: Progressing  Goal: Be free from injury by end of the shift  Outcome: Progressing  Goal: Verbalize understanding of personal risk factors for fall in the hospital  Outcome: Progressing  Goal: Verbalize understanding of risk factor reduction measures to prevent injury from fall in the home  Outcome: Progressing  Goal: Use assistive devices by end of the shift  Outcome: Progressing  Goal: Pace activities to prevent fatigue by end of the shift  Outcome: Progressing     Problem: Heart Failure  Goal: Improved gas exchange this shift  Outcome: Progressing  Goal: Improved urinary output this shift  Outcome: Progressing  Goal: Reduction in peripheral edema within 24 hours  Outcome: Progressing  Goal: Report improvement of dyspnea/breathlessness this shift  Outcome: Progressing  Goal: Weight from fluid excess reduced over 2-3 days, then stabilize  Outcome: Progressing  Goal: Increase self care and/or family involvement in 24 hours  Outcome: Progressing

## 2024-06-02 ENCOUNTER — APPOINTMENT (OUTPATIENT)
Dept: CARDIOLOGY | Facility: HOSPITAL | Age: 49
End: 2024-06-02
Payer: COMMERCIAL

## 2024-06-02 LAB
ALBUMIN SERPL BCP-MCNC: 3.5 G/DL (ref 3.4–5)
ALBUMIN SERPL BCP-MCNC: 3.8 G/DL (ref 3.4–5)
ANION GAP BLDV CALCULATED.4IONS-SCNC: -8 MMOL/L (ref 10–25)
ANION GAP BLDV CALCULATED.4IONS-SCNC: -9 MMOL/L (ref 10–25)
ANION GAP BLDV CALCULATED.4IONS-SCNC: -9 MMOL/L (ref 10–25)
ANION GAP SERPL CALC-SCNC: ABNORMAL MMOL/L
ANION GAP SERPL CALC-SCNC: ABNORMAL MMOL/L
BASE EXCESS BLDV CALC-SCNC: 24.6 MMOL/L (ref -2–3)
BASE EXCESS BLDV CALC-SCNC: 25.2 MMOL/L (ref -2–3)
BASE EXCESS BLDV CALC-SCNC: 27.3 MMOL/L (ref -2–3)
BASOPHILS # BLD AUTO: 0.03 X10*3/UL (ref 0–0.1)
BASOPHILS NFR BLD AUTO: 0.5 %
BNP SERPL-MCNC: 86 PG/ML (ref 0–99)
BODY TEMPERATURE: 37 DEGREES CELSIUS
BUN SERPL-MCNC: 40 MG/DL (ref 6–23)
BUN SERPL-MCNC: 42 MG/DL (ref 6–23)
CA-I BLDV-SCNC: 1.07 MMOL/L (ref 1.1–1.33)
CA-I BLDV-SCNC: 1.13 MMOL/L (ref 1.1–1.33)
CA-I BLDV-SCNC: 1.19 MMOL/L (ref 1.1–1.33)
CALCIUM SERPL-MCNC: 9 MG/DL (ref 8.6–10.6)
CALCIUM SERPL-MCNC: 9.6 MG/DL (ref 8.6–10.6)
CHLORIDE BLDV-SCNC: 93 MMOL/L (ref 98–107)
CHLORIDE BLDV-SCNC: 93 MMOL/L (ref 98–107)
CHLORIDE BLDV-SCNC: 97 MMOL/L (ref 98–107)
CHLORIDE SERPL-SCNC: 89 MMOL/L (ref 98–107)
CHLORIDE SERPL-SCNC: 91 MMOL/L (ref 98–107)
CO2 SERPL-SCNC: >45 MMOL/L (ref 21–32)
CO2 SERPL-SCNC: >45 MMOL/L (ref 21–32)
CREAT SERPL-MCNC: 2.14 MG/DL (ref 0.5–1.3)
CREAT SERPL-MCNC: 2.16 MG/DL (ref 0.5–1.3)
EGFRCR SERPLBLD CKD-EPI 2021: 36 ML/MIN/1.73M*2
EGFRCR SERPLBLD CKD-EPI 2021: 37 ML/MIN/1.73M*2
EOSINOPHIL # BLD AUTO: 0.11 X10*3/UL (ref 0–0.7)
EOSINOPHIL NFR BLD AUTO: 2 %
ERYTHROCYTE [DISTWIDTH] IN BLOOD BY AUTOMATED COUNT: 15.5 % (ref 11.5–14.5)
GLUCOSE BLD MANUAL STRIP-MCNC: 114 MG/DL (ref 74–99)
GLUCOSE BLDV-MCNC: 116 MG/DL (ref 74–99)
GLUCOSE BLDV-MCNC: 118 MG/DL (ref 74–99)
GLUCOSE BLDV-MCNC: 138 MG/DL (ref 74–99)
GLUCOSE SERPL-MCNC: 118 MG/DL (ref 74–99)
GLUCOSE SERPL-MCNC: 84 MG/DL (ref 74–99)
HCO3 BLDV-SCNC: 55.8 MMOL/L (ref 22–26)
HCO3 BLDV-SCNC: 58 MMOL/L (ref 22–26)
HCO3 BLDV-SCNC: 59.1 MMOL/L (ref 22–26)
HCT VFR BLD AUTO: 52.2 % (ref 41–52)
HCT VFR BLD EST: 47 % (ref 41–52)
HCT VFR BLD EST: 49 % (ref 41–52)
HCT VFR BLD EST: 50 % (ref 41–52)
HGB BLD-MCNC: 15.9 G/DL (ref 13.5–17.5)
HGB BLDV-MCNC: 15.8 G/DL (ref 13.5–17.5)
HGB BLDV-MCNC: 16.2 G/DL (ref 13.5–17.5)
HGB BLDV-MCNC: 16.6 G/DL (ref 13.5–17.5)
IMM GRANULOCYTES # BLD AUTO: 0.01 X10*3/UL (ref 0–0.7)
IMM GRANULOCYTES NFR BLD AUTO: 0.2 % (ref 0–0.9)
INHALED O2 CONCENTRATION: 50 %
INHALED O2 CONCENTRATION: 50 %
INHALED O2 CONCENTRATION: 55 %
LACTATE BLDV-SCNC: 1 MMOL/L (ref 0.4–2)
LACTATE BLDV-SCNC: 1 MMOL/L (ref 0.4–2)
LACTATE BLDV-SCNC: 1.5 MMOL/L (ref 0.4–2)
LYMPHOCYTES # BLD AUTO: 0.76 X10*3/UL (ref 1.2–4.8)
LYMPHOCYTES NFR BLD AUTO: 13.8 %
MAGNESIUM SERPL-MCNC: 2.04 MG/DL (ref 1.6–2.4)
MAGNESIUM SERPL-MCNC: 2.37 MG/DL (ref 1.6–2.4)
MCH RBC QN AUTO: 31.6 PG (ref 26–34)
MCHC RBC AUTO-ENTMCNC: 30.5 G/DL (ref 32–36)
MCV RBC AUTO: 104 FL (ref 80–100)
MONOCYTES # BLD AUTO: 0.66 X10*3/UL (ref 0.1–1)
MONOCYTES NFR BLD AUTO: 12 %
NEUTROPHILS # BLD AUTO: 3.94 X10*3/UL (ref 1.2–7.7)
NEUTROPHILS NFR BLD AUTO: 71.5 %
NRBC BLD-RTO: 0 /100 WBCS (ref 0–0)
OXYHGB MFR BLDV: 85.5 % (ref 45–75)
OXYHGB MFR BLDV: 86.7 % (ref 45–75)
OXYHGB MFR BLDV: 89.4 % (ref 45–75)
PCO2 BLDV: 86 MM HG (ref 41–51)
PCO2 BLDV: 87 MM HG (ref 41–51)
PCO2 BLDV: 98 MM HG (ref 41–51)
PH BLDV: 7.38 PH (ref 7.33–7.43)
PH BLDV: 7.42 PH (ref 7.33–7.43)
PH BLDV: 7.44 PH (ref 7.33–7.43)
PHOSPHATE SERPL-MCNC: 3.3 MG/DL (ref 2.5–4.9)
PHOSPHATE SERPL-MCNC: 3.7 MG/DL (ref 2.5–4.9)
PLATELET # BLD AUTO: 157 X10*3/UL (ref 150–450)
PO2 BLDV: 59 MM HG (ref 35–45)
PO2 BLDV: 60 MM HG (ref 35–45)
PO2 BLDV: 64 MM HG (ref 35–45)
POTASSIUM BLDV-SCNC: 3.4 MMOL/L (ref 3.5–5.3)
POTASSIUM BLDV-SCNC: 3.5 MMOL/L (ref 3.5–5.3)
POTASSIUM BLDV-SCNC: 3.8 MMOL/L (ref 3.5–5.3)
POTASSIUM SERPL-SCNC: 3.3 MMOL/L (ref 3.5–5.3)
POTASSIUM SERPL-SCNC: 3.6 MMOL/L (ref 3.5–5.3)
RBC # BLD AUTO: 5.03 X10*6/UL (ref 4.5–5.9)
SAO2 % BLDV: 88 % (ref 45–75)
SAO2 % BLDV: 89 % (ref 45–75)
SAO2 % BLDV: 92 % (ref 45–75)
SODIUM BLDV-SCNC: 139 MMOL/L (ref 136–145)
SODIUM BLDV-SCNC: 140 MMOL/L (ref 136–145)
SODIUM BLDV-SCNC: 141 MMOL/L (ref 136–145)
SODIUM SERPL-SCNC: 145 MMOL/L (ref 136–145)
SODIUM SERPL-SCNC: 146 MMOL/L (ref 136–145)
WBC # BLD AUTO: 5.5 X10*3/UL (ref 4.4–11.3)

## 2024-06-02 PROCEDURE — 94660 CPAP INITIATION&MGMT: CPT

## 2024-06-02 PROCEDURE — 2500000002 HC RX 250 W HCPCS SELF ADMINISTERED DRUGS (ALT 637 FOR MEDICARE OP, ALT 636 FOR OP/ED): Performed by: NURSE PRACTITIONER

## 2024-06-02 PROCEDURE — 2500000002 HC RX 250 W HCPCS SELF ADMINISTERED DRUGS (ALT 637 FOR MEDICARE OP, ALT 636 FOR OP/ED)

## 2024-06-02 PROCEDURE — 2500000004 HC RX 250 GENERAL PHARMACY W/ HCPCS (ALT 636 FOR OP/ED)

## 2024-06-02 PROCEDURE — 2500000004 HC RX 250 GENERAL PHARMACY W/ HCPCS (ALT 636 FOR OP/ED): Performed by: NURSE PRACTITIONER

## 2024-06-02 PROCEDURE — 94640 AIRWAY INHALATION TREATMENT: CPT

## 2024-06-02 PROCEDURE — 2500000001 HC RX 250 WO HCPCS SELF ADMINISTERED DRUGS (ALT 637 FOR MEDICARE OP): Performed by: NURSE PRACTITIONER

## 2024-06-02 PROCEDURE — 2500000004 HC RX 250 GENERAL PHARMACY W/ HCPCS (ALT 636 FOR OP/ED): Performed by: INTERNAL MEDICINE

## 2024-06-02 PROCEDURE — 36415 COLL VENOUS BLD VENIPUNCTURE: CPT

## 2024-06-02 PROCEDURE — 82947 ASSAY GLUCOSE BLOOD QUANT: CPT

## 2024-06-02 PROCEDURE — 85025 COMPLETE CBC W/AUTO DIFF WBC: CPT

## 2024-06-02 PROCEDURE — 85018 HEMOGLOBIN: CPT

## 2024-06-02 PROCEDURE — C9113 INJ PANTOPRAZOLE SODIUM, VIA: HCPCS

## 2024-06-02 PROCEDURE — 83880 ASSAY OF NATRIURETIC PEPTIDE: CPT

## 2024-06-02 PROCEDURE — 83735 ASSAY OF MAGNESIUM: CPT

## 2024-06-02 PROCEDURE — 84100 ASSAY OF PHOSPHORUS: CPT

## 2024-06-02 PROCEDURE — 99291 CRITICAL CARE FIRST HOUR: CPT

## 2024-06-02 PROCEDURE — 2060000001 HC INTERMEDIATE ICU ROOM DAILY

## 2024-06-02 PROCEDURE — 84295 ASSAY OF SERUM SODIUM: CPT

## 2024-06-02 PROCEDURE — 80069 RENAL FUNCTION PANEL: CPT

## 2024-06-02 PROCEDURE — 93005 ELECTROCARDIOGRAM TRACING: CPT

## 2024-06-02 PROCEDURE — 82330 ASSAY OF CALCIUM: CPT

## 2024-06-02 RX ORDER — FUROSEMIDE 10 MG/ML
80 INJECTION INTRAMUSCULAR; INTRAVENOUS ONCE
Status: COMPLETED | OUTPATIENT
Start: 2024-06-02 | End: 2024-06-02

## 2024-06-02 RX ORDER — POTASSIUM CHLORIDE 14.9 MG/ML
20 INJECTION INTRAVENOUS ONCE
Status: COMPLETED | OUTPATIENT
Start: 2024-06-02 | End: 2024-06-02

## 2024-06-02 RX ORDER — HEPARIN SODIUM 5000 [USP'U]/ML
7500 INJECTION, SOLUTION INTRAVENOUS; SUBCUTANEOUS EVERY 8 HOURS SCHEDULED
Status: DISCONTINUED | OUTPATIENT
Start: 2024-06-02 | End: 2024-06-09 | Stop reason: HOSPADM

## 2024-06-02 RX ORDER — POLYETHYLENE GLYCOL 3350 17 G/17G
17 POWDER, FOR SOLUTION ORAL DAILY
Status: DISCONTINUED | OUTPATIENT
Start: 2024-06-02 | End: 2024-06-09 | Stop reason: HOSPADM

## 2024-06-02 RX ORDER — ALBUTEROL SULFATE 90 UG/1
2 AEROSOL, METERED RESPIRATORY (INHALATION) EVERY 6 HOURS PRN
Status: DISCONTINUED | OUTPATIENT
Start: 2024-06-02 | End: 2024-06-09 | Stop reason: HOSPADM

## 2024-06-02 RX ORDER — FLUTICASONE FUROATE AND VILANTEROL 100; 25 UG/1; UG/1
1 POWDER RESPIRATORY (INHALATION) DAILY
Status: DISCONTINUED | OUTPATIENT
Start: 2024-06-02 | End: 2024-06-09 | Stop reason: HOSPADM

## 2024-06-02 RX ORDER — ATORVASTATIN CALCIUM 40 MG/1
40 TABLET, FILM COATED ORAL NIGHTLY
Status: DISCONTINUED | OUTPATIENT
Start: 2024-06-02 | End: 2024-06-09 | Stop reason: HOSPADM

## 2024-06-02 RX ORDER — NAPROXEN SODIUM 220 MG/1
81 TABLET, FILM COATED ORAL DAILY
Status: DISCONTINUED | OUTPATIENT
Start: 2024-06-02 | End: 2024-06-09 | Stop reason: HOSPADM

## 2024-06-02 RX ORDER — TALC
6 POWDER (GRAM) TOPICAL NIGHTLY PRN
Status: DISCONTINUED | OUTPATIENT
Start: 2024-06-02 | End: 2024-06-09 | Stop reason: HOSPADM

## 2024-06-02 RX ORDER — HEPARIN SODIUM 5000 [USP'U]/ML
7500 INJECTION, SOLUTION INTRAVENOUS; SUBCUTANEOUS EVERY 8 HOURS
Status: DISCONTINUED | OUTPATIENT
Start: 2024-06-02 | End: 2024-06-02

## 2024-06-02 RX ORDER — IPRATROPIUM BROMIDE AND ALBUTEROL SULFATE 2.5; .5 MG/3ML; MG/3ML
3 SOLUTION RESPIRATORY (INHALATION)
Status: DISCONTINUED | OUTPATIENT
Start: 2024-06-02 | End: 2024-06-04

## 2024-06-02 RX ORDER — POTASSIUM CHLORIDE 20 MEQ/1
40 TABLET, EXTENDED RELEASE ORAL ONCE
Status: DISCONTINUED | OUTPATIENT
Start: 2024-06-02 | End: 2024-06-05

## 2024-06-02 RX ORDER — PANTOPRAZOLE SODIUM 40 MG/10ML
40 INJECTION, POWDER, LYOPHILIZED, FOR SOLUTION INTRAVENOUS DAILY
Status: DISCONTINUED | OUTPATIENT
Start: 2024-06-02 | End: 2024-06-09 | Stop reason: HOSPADM

## 2024-06-02 RX ORDER — METOPROLOL SUCCINATE 25 MG/1
25 TABLET, EXTENDED RELEASE ORAL DAILY
Status: DISCONTINUED | OUTPATIENT
Start: 2024-06-02 | End: 2024-06-09 | Stop reason: HOSPADM

## 2024-06-02 RX ORDER — OLANZAPINE 10 MG/2ML
2.5 INJECTION, POWDER, FOR SOLUTION INTRAMUSCULAR EVERY 6 HOURS PRN
Status: DISCONTINUED | OUTPATIENT
Start: 2024-06-02 | End: 2024-06-09

## 2024-06-02 RX ADMIN — HEPARIN SODIUM 7500 UNITS: 5000 INJECTION INTRAVENOUS; SUBCUTANEOUS at 14:35

## 2024-06-02 RX ADMIN — PANTOPRAZOLE SODIUM 40 MG: 40 INJECTION, POWDER, FOR SOLUTION INTRAVENOUS at 08:18

## 2024-06-02 RX ADMIN — ATORVASTATIN CALCIUM 40 MG: 80 TABLET, FILM COATED ORAL at 22:10

## 2024-06-02 RX ADMIN — FUROSEMIDE 80 MG: 10 INJECTION, SOLUTION INTRAMUSCULAR; INTRAVENOUS at 12:28

## 2024-06-02 RX ADMIN — IPRATROPIUM BROMIDE AND ALBUTEROL SULFATE 3 ML: .5; 3 SOLUTION RESPIRATORY (INHALATION) at 00:57

## 2024-06-02 RX ADMIN — POTASSIUM CHLORIDE 20 MEQ: 14.9 INJECTION, SOLUTION INTRAVENOUS at 01:36

## 2024-06-02 RX ADMIN — HEPARIN SODIUM 7500 UNITS: 5000 INJECTION INTRAVENOUS; SUBCUTANEOUS at 06:42

## 2024-06-02 RX ADMIN — IPRATROPIUM BROMIDE AND ALBUTEROL SULFATE 3 ML: .5; 3 SOLUTION RESPIRATORY (INHALATION) at 21:20

## 2024-06-02 RX ADMIN — HEPARIN SODIUM 7500 UNITS: 5000 INJECTION INTRAVENOUS; SUBCUTANEOUS at 22:10

## 2024-06-02 RX ADMIN — POTASSIUM CHLORIDE 20 MEQ: 14.9 INJECTION, SOLUTION INTRAVENOUS at 08:17

## 2024-06-02 ASSESSMENT — ENCOUNTER SYMPTOMS
EYE ITCHING: 0
ABDOMINAL PAIN: 0
DIZZINESS: 0
NAUSEA: 0
PALPITATIONS: 0
DIFFICULTY URINATING: 0
FLANK PAIN: 0
BLOOD IN STOOL: 0
HEADACHES: 0
APPETITE CHANGE: 0
SHORTNESS OF BREATH: 0
DYSURIA: 0
EYE DISCHARGE: 0
VOMITING: 0
DIARRHEA: 0
WHEEZING: 0
HEMATURIA: 0
COUGH: 0

## 2024-06-02 ASSESSMENT — PAIN SCALES - GENERAL
PAINLEVEL_OUTOF10: 0 - NO PAIN

## 2024-06-02 ASSESSMENT — PAIN - FUNCTIONAL ASSESSMENT
PAIN_FUNCTIONAL_ASSESSMENT: 0-10

## 2024-06-02 NOTE — CARE PLAN
The patient's goals for the shift include Pt will have at least 4 hours of uninterrupted sleep during shift    The clinical goals for the shift include SPO2 will remain 88-92% throughout shift    Patient remained safe and free of falls during shift. No reports of pain during shift. Patient desatted down to 76-83 during shift, respiratory and team notified and patient started on airvo.

## 2024-06-02 NOTE — CARE PLAN
The patient's goals for the shift include Pt will have at least 4 hours of uninterrupted sleep during shift    The clinical goals for the shift include patients SP02 will stay above 90%    Problem: Skin  Goal: Prevent/minimize sheer/friction injuries  Outcome: Progressing  Flowsheets (Taken 6/2/2024 0447)  Prevent/minimize sheer/friction injuries:   HOB 30 degrees or less   Turn/reposition every 2 hours/use positioning/transfer devices

## 2024-06-02 NOTE — SIGNIFICANT EVENT
- Patient has a persistent increase in requirement oxygen and increasing requirement of ventilation.  - - Patient is taking intermittently CPAP off and his saturation is going down to 70 to 80%.  - Patient also appears to be more sleepy than before.  - The chest x-ray showed bibasilar congestion and VBG showed pCO2 of 110 with normal pH and normal lactate.  - After discussing with the MICU fellow, MICU fellow was not comfortable taking patient to the MICU with heart failure and we decided to take the patient to CICU for further management of acute hypoxic and hypercarbic respiratory failure due to multifactorial etiology [including but not limited to obesity hypoventilation syndrome, obstructive sleep apnea with intermittent noncompliance with the CPAP/BiPAP, acute on chronic recorder heart failure,?  COPD,?  Asthma]  - Plan to aggressively diurese and monitor serial ABGs to make a decision about how to proceed with the patient.  - CICU fellow and CICU team and CICU resident were informed about the patient and was transferred.

## 2024-06-02 NOTE — NURSING NOTE
Made team aware that patient 02 going down to 88-89 on cpap. It did go back up after seeing respiratory. Dr said to make him aware of patient starting to downtrend

## 2024-06-02 NOTE — PROGRESS NOTES
Patric Arenas is a 49 y.o. male on day 6 of admission presenting with Acute on chronic congestive heart failure, unspecified heart failure type (Multi).    Subjective   Pt alert this morning, oriented x2-3 (oriented to self, location, thought year was 2023 but knew that month was June). Bipap was on this morning, was on airvo during rounds. Pt with no cp or palpitations.        Hospital Course     Patric Arenas is a 49 y.o. male  with a PMHx of Nonischemic cardiomyopathy s/p AICD with recovered EF to 45-50%, hypertension, hyperlipidemia, morbid obesity, Asthma & MJ who is coming to CICU from [facility] for respiratory failure requiring high settings on NIPPV.     Patient originally presented TO [facility] ED on 5/26 with SOB of 3-4 days duration. He was found to be hypoxic and tachycardic on presentation. Patient was put on 6L NC & diuresed with Lasix 100 x1 for  & CXR showing cardiomegaly with vascular congestion. VBG with Co2 of 118 and pH of 7.24. He was switched to BiPAP with no improvement in VBG, he ended up getting intubated and was managed in the MICU for acute hypoxic hypercapnic respiratory failure, thought to be 2/2 JM+ADHF. He was given empiric PNA coverage with Vanc/Zosyn/Azithro 5/27-5/28 which was then discontinued with low concern for infectious process given negative workup, no white count and negative procalcitonin. Received Solumedrol 5/26+5/31 and breathing treatment for c/f COPD/Asthma exacerbation. Patient self extubated on 5/28, placed on BiPAP which he didn't tolerate, placed on 5L NC and started on Precedex ggt. Patient was diuresed with Lasix ggt as CXR showed worsening vascular congestion which was stopped 5/29 AM for soft BP and SUZANNE (FeUrea 36%, intrinisic, likely ATN 2/2 hypotension vs diuretics). Precedex was discontinued 5/29 and patient was deemed appropriate for floor transfer.      Throughout his stay on floors 5/29-6/1, patients' kidney function continued to improve off diuresis and  other nephrotoxic GDMT. His weights have been stable @ 174 kg (172 kg on 5/26). His oxygen requirement was 5-10 L NC during the day and Bipap/CPAP overnight. Patient has been removing his mask overnight over the last 2 nights. 6/1 night, patient got agitated and continued to refuse NIPV with desatuaration episodes to 70s. ABG showed pH 7.32, pCO2 110, pO2 46, Lactate 1.6. CXR showed vascular congestion given Lasix 60mg x1 on floors.      Upon presentation to CICU, patient reported feeling anxious. Denied any active CP, SOB, cough, fever, chills, abdominal pain, N/V/D, urinary symptoms, new numbness or weakness. He is currently on BiPAP 55% FiO2, 12/5 IPAP/EPAP satting 96%. On 6/2, pt's mentation was greatly improved, VBG showed pH 7.42 and CO2 86 (also improved). BNP down to 86, CXR 6/1 shows improved interstitial edema. BiPap overnight, pt currently on airvo. Will give another dose of IV lasix 80 and plan for transfer to SDU for hypercarbic respiratory failure.     Objective     Physical Exam  Constitutional:       Appearance: Normal appearance. He is not ill-appearing or diaphoretic.   HENT:      Head: Normocephalic and atraumatic.   Eyes:      Pupils: Pupils are equal, round, and reactive to light.   Cardiovascular:      Difficult to auscultate given bipap; regular rate    Pulmonary:      Effort: No respiratory distress.      Breath sounds: Difficult to auscultate given bipap   Abdominal:      Palpations: Abdomen is soft. There is no mass.      Tenderness: There is no abdominal tenderness.   Musculoskeletal:         General: 1+ pitting edema bilaterally   Skin:     General: Skin is warm and dry.      Coloration: Skin is not pale.   Neurological:      General: No focal deficit present.      Mental Status: He is alert and oriented to person, place, and partially time (knows month/day but said year was 2023).      Cranial Nerves: No cranial nerve deficit.      Sensory: No sensory deficit.     Last Recorded  "Vitals  Blood pressure 129/84, pulse 87, temperature 36 °C (96.8 °F), temperature source Temporal, resp. rate 18, height 1.854 m (6' 1\"), weight (!) 177 kg (391 lb 1.5 oz), SpO2 98%.  Intake/Output last 3 Shifts:  I/O last 3 completed shifts:  In: 900 (5.1 mL/kg) [P.O.:800; IV Piggyback:100]  Out: 2575 (14.5 mL/kg) [Urine:2575 (0.4 mL/kg/hr)]  Weight: 177.4 kg     Relevant Results  Scheduled medications  aspirin, 81 mg, oral, Daily  atorvastatin, 40 mg, oral, Nightly  fluticasone furoate-vilanteroL, 1 puff, inhalation, Daily  [Transfer Hold] fluticasone furoate-vilanteroL, 1 puff, inhalation, Daily  furosemide, 80 mg, intravenous, Once  heparin (porcine), 7,500 Units, subcutaneous, q8h STEVE  ipratropium-albuteroL, 3 mL, nebulization, q6h  metoprolol succinate XL, 25 mg, oral, Daily  pantoprazole, 40 mg, intravenous, Daily  polyethylene glycol, 17 g, oral, Daily  potassium chloride, 20 mEq, intravenous, Once  potassium chloride CR, 40 mEq, oral, Once      Continuous medications     PRN medications  PRN medications: albuterol, melatonin, OLANZapine, sodium chloride    Results for orders placed or performed during the hospital encounter of 05/26/24 (from the past 24 hour(s))   CBC and Auto Differential   Result Value Ref Range    WBC 5.7 4.4 - 11.3 x10*3/uL    nRBC 0.0 0.0 - 0.0 /100 WBCs    RBC 4.99 4.50 - 5.90 x10*6/uL    Hemoglobin 16.2 13.5 - 17.5 g/dL    Hematocrit 53.4 (H) 41.0 - 52.0 %     (H) 80 - 100 fL    MCH 32.5 26.0 - 34.0 pg    MCHC 30.3 (L) 32.0 - 36.0 g/dL    RDW 15.3 (H) 11.5 - 14.5 %    Platelets 146 (L) 150 - 450 x10*3/uL    Neutrophils % 69.7 40.0 - 80.0 %    Immature Granulocytes %, Automated 0.0 0.0 - 0.9 %    Lymphocytes % 17.3 13.0 - 44.0 %    Monocytes % 11.2 2.0 - 10.0 %    Eosinophils % 1.6 0.0 - 6.0 %    Basophils % 0.2 0.0 - 2.0 %    Neutrophils Absolute 3.99 1.20 - 7.70 x10*3/uL    Immature Granulocytes Absolute, Automated 0.00 0.00 - 0.70 x10*3/uL    Lymphocytes Absolute 0.99 (L) " 1.20 - 4.80 x10*3/uL    Monocytes Absolute 0.64 0.10 - 1.00 x10*3/uL    Eosinophils Absolute 0.09 0.00 - 0.70 x10*3/uL    Basophils Absolute 0.01 0.00 - 0.10 x10*3/uL   Magnesium   Result Value Ref Range    Magnesium 2.58 (H) 1.60 - 2.40 mg/dL   Renal function panel   Result Value Ref Range    Glucose 109 (H) 74 - 99 mg/dL    Sodium 143 136 - 145 mmol/L    Potassium 3.3 (L) 3.5 - 5.3 mmol/L    Chloride 90 (L) 98 - 107 mmol/L    Bicarbonate 45 (HH) 21 - 32 mmol/L    Anion Gap 11 10 - 20 mmol/L    Urea Nitrogen 44 (H) 6 - 23 mg/dL    Creatinine 2.17 (H) 0.50 - 1.30 mg/dL    eGFR 36 (L) >60 mL/min/1.73m*2    Calcium 9.9 8.6 - 10.6 mg/dL    Phosphorus 2.2 (L) 2.5 - 4.9 mg/dL    Albumin 3.9 3.4 - 5.0 g/dL   BLOOD GAS ARTERIAL FULL PANEL   Result Value Ref Range    POCT pH, Arterial 7.32 (L) 7.38 - 7.42 pH    POCT pCO2, Arterial 110 (HH) 38 - 42 mm Hg    POCT pO2, Arterial 31 (LL) 85 - 95 mm Hg    POCT SO2, Arterial 46 (L) 94 - 100 %    POCT Oxy Hemoglobin, Arterial 45.4 (L) 94.0 - 98.0 %    POCT Hematocrit Calculated, Arterial 49.0 41.0 - 52.0 %    POCT Sodium, Arterial 138 136 - 145 mmol/L    POCT Potassium, Arterial 3.5 3.5 - 5.3 mmol/L    POCT Chloride, Arterial 92 (L) 98 - 107 mmol/L    POCT Ionized Calcium, Arterial 1.19 1.10 - 1.33 mmol/L    POCT Glucose, Arterial 122 (H) 74 - 99 mg/dL    POCT Lactate, Arterial 1.6 0.4 - 2.0 mmol/L    POCT Base Excess, Arterial 22.8 (H) -2.0 - 3.0 mmol/L    POCT HCO3 Calculated, Arterial 56.7 (H) 22.0 - 26.0 mmol/L    POCT Hemoglobin, Arterial 16.2 13.5 - 17.5 g/dL    POCT Anion Gap, Arterial -7 (L) 10 - 25 mmo/L    Patient Temperature      FiO2 55 %   POCT GLUCOSE   Result Value Ref Range    POCT Glucose 114 (H) 74 - 99 mg/dL   CBC and Auto Differential   Result Value Ref Range    WBC 5.5 4.4 - 11.3 x10*3/uL    nRBC 0.0 0.0 - 0.0 /100 WBCs    RBC 5.03 4.50 - 5.90 x10*6/uL    Hemoglobin 15.9 13.5 - 17.5 g/dL    Hematocrit 52.2 (H) 41.0 - 52.0 %     (H) 80 - 100 fL    MCH  31.6 26.0 - 34.0 pg    MCHC 30.5 (L) 32.0 - 36.0 g/dL    RDW 15.5 (H) 11.5 - 14.5 %    Platelets 157 150 - 450 x10*3/uL    Neutrophils % 71.5 40.0 - 80.0 %    Immature Granulocytes %, Automated 0.2 0.0 - 0.9 %    Lymphocytes % 13.8 13.0 - 44.0 %    Monocytes % 12.0 2.0 - 10.0 %    Eosinophils % 2.0 0.0 - 6.0 %    Basophils % 0.5 0.0 - 2.0 %    Neutrophils Absolute 3.94 1.20 - 7.70 x10*3/uL    Immature Granulocytes Absolute, Automated 0.01 0.00 - 0.70 x10*3/uL    Lymphocytes Absolute 0.76 (L) 1.20 - 4.80 x10*3/uL    Monocytes Absolute 0.66 0.10 - 1.00 x10*3/uL    Eosinophils Absolute 0.11 0.00 - 0.70 x10*3/uL    Basophils Absolute 0.03 0.00 - 0.10 x10*3/uL   Magnesium   Result Value Ref Range    Magnesium 2.37 1.60 - 2.40 mg/dL   Renal function panel   Result Value Ref Range    Glucose 118 (H) 74 - 99 mg/dL    Sodium 145 136 - 145 mmol/L    Potassium 3.3 (L) 3.5 - 5.3 mmol/L    Chloride 89 (L) 98 - 107 mmol/L    Bicarbonate >45 (HH) 21 - 32 mmol/L    Anion Gap      Urea Nitrogen 42 (H) 6 - 23 mg/dL    Creatinine 2.14 (H) 0.50 - 1.30 mg/dL    eGFR 37 (L) >60 mL/min/1.73m*2    Calcium 9.6 8.6 - 10.6 mg/dL    Phosphorus 3.7 2.5 - 4.9 mg/dL    Albumin 3.8 3.4 - 5.0 g/dL   Blood Gas Venous Full Panel   Result Value Ref Range    POCT pH, Venous 7.38 7.33 - 7.43 pH    POCT pCO2, Venous 98 (HH) 41 - 51 mm Hg    POCT pO2, Venous 59 (H) 35 - 45 mm Hg    POCT SO2, Venous 88 (H) 45 - 75 %    POCT Oxy Hemoglobin, Venous 85.5 (H) 45.0 - 75.0 %    POCT Hematocrit Calculated, Venous 49.0 41.0 - 52.0 %    POCT Sodium, Venous 139 136 - 145 mmol/L    POCT Potassium, Venous 3.4 (L) 3.5 - 5.3 mmol/L    POCT Chloride, Venous 93 (L) 98 - 107 mmol/L    POCT Ionized Calicum, Venous 1.19 1.10 - 1.33 mmol/L    POCT Glucose, Venous 138 (H) 74 - 99 mg/dL    POCT Lactate, Venous 1.0 0.4 - 2.0 mmol/L    POCT Base Excess, Venous 25.2 (H) -2.0 - 3.0 mmol/L    POCT HCO3 Calculated, Venous 58.0 (H) 22.0 - 26.0 mmol/L    POCT Hemoglobin, Venous 16.2  13.5 - 17.5 g/dL    POCT Anion Gap, Venous -9.0 (L) 10.0 - 25.0 mmol/L    Patient Temperature 37.0 degrees Celsius    FiO2 50 %   B-type natriuretic peptide   Result Value Ref Range    BNP 86 0 - 99 pg/mL   Blood Gas Venous Full Panel   Result Value Ref Range    POCT pH, Venous 7.44 (H) 7.33 - 7.43 pH    POCT pCO2, Venous 87 (HH) 41 - 51 mm Hg    POCT pO2, Venous 64 (H) 35 - 45 mm Hg    POCT SO2, Venous 92 (H) 45 - 75 %    POCT Oxy Hemoglobin, Venous 89.4 (H) 45.0 - 75.0 %    POCT Hematocrit Calculated, Venous 50.0 41.0 - 52.0 %    POCT Sodium, Venous 141 136 - 145 mmol/L    POCT Potassium, Venous 3.5 3.5 - 5.3 mmol/L    POCT Chloride, Venous 93 (L) 98 - 107 mmol/L    POCT Ionized Calicum, Venous 1.07 (L) 1.10 - 1.33 mmol/L    POCT Glucose, Venous 118 (H) 74 - 99 mg/dL    POCT Lactate, Venous 1.5 0.4 - 2.0 mmol/L    POCT Base Excess, Venous 27.3 (H) -2.0 - 3.0 mmol/L    POCT HCO3 Calculated, Venous 59.1 (H) 22.0 - 26.0 mmol/L    POCT Hemoglobin, Venous 16.6 13.5 - 17.5 g/dL    POCT Anion Gap, Venous -8.0 (L) 10.0 - 25.0 mmol/L    Patient Temperature 37.0 degrees Celsius    FiO2 50 %   Blood Gas Venous Full Panel   Result Value Ref Range    POCT pH, Venous 7.42 7.33 - 7.43 pH    POCT pCO2, Venous 86 (HH) 41 - 51 mm Hg    POCT pO2, Venous 60 (H) 35 - 45 mm Hg    POCT SO2, Venous 89 (H) 45 - 75 %    POCT Oxy Hemoglobin, Venous 86.7 (H) 45.0 - 75.0 %    POCT Hematocrit Calculated, Venous 47.0 41.0 - 52.0 %    POCT Sodium, Venous 140 136 - 145 mmol/L    POCT Potassium, Venous 3.8 3.5 - 5.3 mmol/L    POCT Chloride, Venous 97 (L) 98 - 107 mmol/L    POCT Ionized Calicum, Venous 1.13 1.10 - 1.33 mmol/L    POCT Glucose, Venous 116 (H) 74 - 99 mg/dL    POCT Lactate, Venous 1.0 0.4 - 2.0 mmol/L    POCT Base Excess, Venous 24.6 (H) -2.0 - 3.0 mmol/L    POCT HCO3 Calculated, Venous 55.8 (H) 22.0 - 26.0 mmol/L    POCT Hemoglobin, Venous 15.8 13.5 - 17.5 g/dL    POCT Anion Gap, Venous -9.0 (L) 10.0 - 25.0 mmol/L    Patient  Temperature 37.0 degrees Celsius    FiO2 55 %     XR chest 1 view    Result Date: 6/2/2024  STUDY: XR CHEST 1 VIEW;  6/1/2024 11:26 pm   INDICATION: Signs/Symptoms:respiratory failure.   COMPARISON: Chest radiograph 05/28/2024 Same day CT chest   ACCESSION NUMBER(S): [id number]   ORDERING CLINICIAN: CAIT WAHL   FINDINGS: AP radiograph of the chest was provided.   Left chest wall cardiac pacemaker/AICD with leads overlying the cardiomediastinal silhouette.   CARDIOMEDIASTINAL SILHOUETTE: The cardiomediastinal silhouette is enlarged, stable in size and configuration.   LUNGS: Low lung volumes contributing to bronchovascular crowding. Prominent perihilar interstitial markings bilaterally, slightly improved from prior. Interval improvement in hazy bibasilar opacities with persistent blunting of the left costophrenic angle. No pneumothorax.   ABDOMEN: No remarkable upper abdominal findings.   BONES: No acute osseous changes.       1.  Interval improvement in pulmonary interstitial edema and left-greater-than-right bibasilar airspace opacities. 2. Similar small left pleural effusion.   I personally reviewed the images/study and I agree with Felicia Rondon DO's (radiology resident) findings as stated. This study was interpreted at [facility], [place], [place].   MACRO: None     Dictation workstation:   [id number]         Assessment/Plan   Active Problems:    COPD with acute exacerbation (Multi)    A 49 yr old male with a PMHx of Nonischemic cardiomyopathy s/p AICD with recovered EF to 45-50%, hypertension, hyperlipidemia, morbid obesity, Asthma & MJ who is coming to CICU from [facility] for respiratory failure requiring high settings on NIPPV, presentation likely 2/2 non compliance with CPAP with element of volume overload. Plan to maintain BiPAP and do serial blood gas target resolution of hypercapnia.     Updated Plan:   -improved mentation   -AM VBG to assess hypercapnia -  improved: pH 7.42 and CO2 86   -Will give another dose of IV lasix 80  -on bipap this AM, transitioned to airvo    -BNP now 86   - CXR 6/1 shows improved interstitial edema   -net neg 1.5L   -transfer to SDU for hypercarbic respiratory failure.        NEURO/PSYCH  #Encephalopathy/Agitation, resolved  #ETOH/Illicit substance abuse  - Delirium vs drug use (admits to ectasy & PCP use), drinks fifth of liquor ~daily  - Admit Utox +PCP  - Required Precedex drip until 5/29, sitter and restraints in MICU  - Now A&O x2-3, alert and cooperative   - Counseled on complete cessation, pt expressing desire to quit  Plan:   - Cont PRN Zyprexa       CARDIOVASCULAR  #Acute on chronic Systolic heart failure, with recovered EF, 50-55%  - Dilated NICM 2/2 ETOH, s/p ICD, LHC 2019 clean coronaries, in setting of medication noncompliance   - TTE 2/14/24: EF 45%, TTE 5/2021: EF 35%  - TTE 5/28: EF 50-55%, no WMA, normal DF, RV mildly enlarged, reduced RV function  - Admit BNP: 373 (previously 40's)  - Admit CXR: Cardiomegaly with mild pulmonary vascular congestion  - CXR 5/28: Interval worsening in interstitial/alveolar pulmonary edema. small bilateral pleural effusions   - s/p diuresis with Lasix boluses, drip, and Metolazone 5 mg on 5/27 & 5/28   - Lasix drip held 5/29 for soft BP's, decreased UO, and SUZANNE  - Repeat BNP (5/30): 159 improved, 6/2 was 86   -CXR 6/1 shows improved interstitial edema   Plan:   - c/w  home Toprol XL 25   - Cont to hold home additional GDMT until SUZANNE resolves  - Home meds: Empa 10, Toprol XL 25, Entresto 49-51, [place] 25, Torsemide 100 BID, Metolazone 5 daily  - Cont 2gram sodium diet, 2L fluid restriction, daily standing weights, strict I&O's   - cont to diurese target 0.5-1 L net -ve as able   - will give IV 80 lasix today        RESP  #Acute on chronic hypercapnic respiratory failure   #MJ/Asthma/ possible COPD  #Suspected obesity hypoventilation syndrome  - BiPAP, settings per RT. Serial gases  - Home meds:  Ventolin 90 mcg/inh, Breo ellipta 200-25 mcg/dose  - CXR 6/1 shows improved interstitial edema   Plan:   - Cont PRN Albuterol, resumed home Breo Ellipta (5/30)  - Trend blood gas  - bipap overnight        GI  -No active issues        RENAL  SUZANNE - improving   - ATN 2/2 hypotension vs aggressive diuresis, FeUrea 36.2%   - Admit Cr: 1.67  - Baseline Cr ~1-1.2   - Cr today: 2.14, improving   Plan:   - Meds/diuresis as stated above  - Avoid nephrotoxins and hypotension. Monitor daily RFP       ID  -No active issues       ENDO  Morbid Obesity, BMI 5  - Admit A1C 6.2%  - Encourage dietary modifications, exercise, and weight loss  - Follow up with PCP       HEME/ONC  -No active issues       MSK/RHEUM  -No active issues        E: replete as needed  F: bolus as needed  N: low sodium   DVT ppx: Heparin 7500U SQ Q8  GI ppx: PPI  Code status: Full Code  NOK: Jayne Capps (Mother) [phone]   A: external cathter, PIV  Abx: None   O2: BiPAP overnight, airvo at this time     Pt seen and discussed with Dr. Thuy Ramirez MD  IM PGY-2

## 2024-06-02 NOTE — NURSING NOTE
Patient desating while cpap on. Everardo came to bedside to check on patient and saw desat. Called  to come to bedside and discuss patient. See new orders

## 2024-06-02 NOTE — SIGNIFICANT EVENT
Rapid Response RN Note    Rapid response RN spoke to RN via phone regarding RADAR score 7 based on vital signs :  36.3,,93,23,116/79,92 % on CPAP.  Per RN, pt was switched from AirVo to CPAP by RN.  Pox maintained at 94 % but on higher FIO2.  RT to assess pt while this RN on another call.  Spoke to RN and told her that I will be up to assess once finished with call.      Follow up: 2230  pt on 55 % CPAP.  Dr Rahman ([facility]) notified regarding increased FIO2 needs.  Chest x ray, VBG obtained.  CICU fellow notified regarding concerns.  0000 Pt was accepted to CICU.    0015 Transferred on monitored bed.  Updated bedside report given.

## 2024-06-02 NOTE — CARE PLAN
Problem: Pain  Goal: My pain/discomfort is manageable  Outcome: Progressing     Problem: Safety  Goal: Patient will be injury free during hospitalization  Outcome: Progressing  Goal: I will remain free of falls  Outcome: Progressing     Problem: Daily Care  Goal: Daily care needs are met  Outcome: Progressing     Problem: Psychosocial Needs  Goal: Demonstrates ability to cope with hospitalization/illness  Outcome: Progressing  Goal: Collaborate with me, my family, and caregiver to identify my specific goals  Outcome: Progressing     Problem: Discharge Barriers  Goal: My discharge needs are met  Outcome: Progressing     Problem: Respiratory  Goal: Clear secretions with interventions this shift  Outcome: Progressing  Goal: Minimize anxiety/maximize coping throughout shift  Outcome: Progressing  Goal: Minimal/no exertional discomfort or dyspnea this shift  Outcome: Progressing  Goal: No signs of respiratory distress (eg. Use of accessory muscles. Peds grunting)  Outcome: Progressing  Goal: Patent airway maintained this shift  Outcome: Progressing  Goal: Tolerate mechanical ventilation evidenced by VS/agitation level this shift  Outcome: Progressing  Goal: Tolerate pulmonary toileting this shift  Outcome: Progressing  Goal: Verbalize decreased shortness of breath this shift  Outcome: Progressing  Goal: Wean oxygen to maintain O2 saturation per order/standard this shift  Outcome: Progressing  Goal: Increase self care and/or family involvement in next 24 hours  Outcome: Progressing     Problem: Safety - Medical Restraint  Goal: Remains free of injury from restraints (Restraint for Interference with Medical Device)  Outcome: Progressing  Goal: Free from restraint(s) (Restraint for Interference with Medical Device)  Outcome: Progressing     Problem: Skin  Goal: Decreased wound size/increased tissue granulation at next dressing change  Outcome: Progressing  Flowsheets (Taken 6/2/2024 2987)  Decreased wound size/increased  tissue granulation at next dressing change: Promote sleep for wound healing  Goal: Participates in plan/prevention/treatment measures  Outcome: Progressing  Flowsheets (Taken 6/2/2024 1648)  Participates in plan/prevention/treatment measures: Elevate heels  Goal: Prevent/manage excess moisture  Outcome: Progressing  Flowsheets (Taken 6/2/2024 1648)  Prevent/manage excess moisture:   Moisturize dry skin   Cleanse incontinence/protect with barrier cream  Goal: Prevent/minimize sheer/friction injuries  Outcome: Progressing  Flowsheets (Taken 6/2/2024 0447 by Janelle No RN)  Prevent/minimize sheer/friction injuries:   HOB 30 degrees or less   Turn/reposition every 2 hours/use positioning/transfer devices  Goal: Promote/optimize nutrition  Outcome: Progressing  Flowsheets (Taken 6/2/2024 1648)  Promote/optimize nutrition:   Monitor/record intake including meals   Discuss with provider if NPO > 2 days  Goal: Promote skin healing  Outcome: Progressing  Flowsheets (Taken 6/2/2024 1648)  Promote skin healing: Assess skin/pad under line(s)/device(s)     Problem: Fall/Injury  Goal: Not fall by end of shift  Outcome: Progressing  Goal: Be free from injury by end of the shift  Outcome: Progressing  Goal: Verbalize understanding of personal risk factors for fall in the hospital  Outcome: Progressing  Goal: Verbalize understanding of risk factor reduction measures to prevent injury from fall in the home  Outcome: Progressing  Goal: Use assistive devices by end of the shift  Outcome: Progressing  Goal: Pace activities to prevent fatigue by end of the shift  Outcome: Progressing     Problem: Heart Failure  Goal: Improved gas exchange this shift  Outcome: Progressing  Goal: Improved urinary output this shift  Outcome: Progressing  Goal: Reduction in peripheral edema within 24 hours  Outcome: Progressing  Goal: Report improvement of dyspnea/breathlessness this shift  Outcome: Progressing  Goal: Weight from fluid excess reduced  over 2-3 days, then stabilize  Outcome: Progressing  Goal: Increase self care and/or family involvement in 24 hours  Outcome: Progressing

## 2024-06-02 NOTE — H&P
History Of Present Illness  Patric Arenas is a 49 y.o. male  with a PMHx of Nonischemic cardiomyopathy s/p AICD with recovered EF to 45-50%, hypertension, hyperlipidemia, morbid obesity, Asthma & MJ who is coming to CICU from [facility] for respiratory failure requiring high settings on NIPPV.    Patient originally presented TO [facility] ED on 5/26 with SOB of 3-4 days duration. He was found to be hypoxic and tachycardic on presentation. Patient was put on 6L NC & diuresed with Lasix 100 x1 for  & CXR showing cardiomegaly with vascular congestion. VBG with Co2 of 118 and pH of 7.24. He was switched to BiPAP with no improvement in VBG, he ended up getting intubated and was managed in the MICU for acute hypoxic hypercapnic respiratory failure, thought to be 2/2 MJ+ADHF. He was given empiric PNA coverage with Vanc/Zosyn/Azithro 5/27-5/28 which was then discontinued with low concern for infectious process given negative workup, no white count and negative procalcitonin. Received Solumedrol 5/26+5/31 and breathing treatment for c/f COPD/Asthma exacerbation. Patient self extubated on 5/28, placed on BiPAP which he didn't tolerate, placed on 5L NC and started on Precedex ggt. Patient was diuresed with Lasix ggt as CXR showed worsening vascular congestion which was stopped 5/29 AM for soft BP and SUZANNE (FeUrea 36%, intrinisic, likely ATN 2/2 hypotension vs diuretics). Precedex was discontinued 5/29 and patient was deemed appropriate for floor transfer.     Throughout his stay on floors 5/29-6/1, patients' kidney function continued to improve off diuresis and other nephrotoxic GDMT. His weights have been stable @ 174 kg (172 kg on 5/26). His oxygen requirement was 5-10 L NC during the day and Bipap/CPAP overnight. Patient has been removing his mask overnight over the last 2 nights. 6/1 night, patient got agitated and continued to refuse NIPV with desatuaration episodes to 70s. ABG showed pH 7.32, pCO2 110, pO2 46, Lactate 1.6.  CXR showed vascular congestion given Lasix 60mg x1 on floors.     Upon presentation to CICU, patient reported feeling anxious. Denied any active CP, SOB, cough, fever, chills, abdominal pain, N/V/D, urinary symptoms, new numbness or weakness. He is currently on BiPAP 55% FiO2, 12/5 IPAP/EPAP satting 96%.    Social Hx: denied smoking, drinks occasionally, denied illicit drug use   Surgical Hx: AICD placement  Allergies: NKFDA     Cardiac Workup:  TTE 5/28/24:   1. Left ventricular systolic function is low normal with a 50-55% estimated ejection fraction.   2. Poorly visualized anatomical structures due to suboptimal image quality.   3. Abnormal septal motion consistent with RV pacemaker and right ventricular pressure overload.   4. Moderately increased left ventricular septal thickness.   5. The left ventricular posterior wall thickness is moderately increased.   6. There is reduced right ventricular systolic function.   7. RVSP within normal limits.   8. The RV systolic pressure may be underestimated.     Past Medical History  Past Medical History:   Diagnosis Date    Laceration of liver 06/05/2006    Formatting of this note might be different from the original. Liver injury without mention of open wound into cavity, unspecified laceration [id number]       Surgical History  Past Surgical History:   Procedure Laterality Date    CARDIAC DEFIBRILLATOR PLACEMENT          Social History  He reports that he has never smoked. He has never used smokeless tobacco. He reports current alcohol use of about 10.0 standard drinks of alcohol per week. He reports that he does not currently use drugs after having used the following drugs: MDMA (ecstacy).    Family History  Family History   Problem Relation Name Age of Onset    Hypertension Mother          Allergies  Patient has no known allergies.    Review of Systems   Constitutional:  Negative for appetite change.   Eyes:  Negative for discharge and itching.   Respiratory:  Negative  "for cough, shortness of breath and wheezing.    Cardiovascular:  Negative for chest pain, palpitations and leg swelling.   Gastrointestinal:  Negative for abdominal pain, blood in stool, diarrhea, nausea and vomiting.   Endocrine: Negative for cold intolerance and heat intolerance.   Genitourinary:  Negative for difficulty urinating, dysuria, flank pain and hematuria.   Neurological:  Negative for dizziness and headaches.   Psychiatric/Behavioral:  Negative for behavioral problems and self-injury.           Physical Exam  Constitutional:       Appearance: Normal appearance. He is not ill-appearing or diaphoretic.   HENT:      Head: Normocephalic and atraumatic.      Nose: Nose normal.      Mouth/Throat:      Mouth: Mucous membranes are moist.   Eyes:      Pupils: Pupils are equal, round, and reactive to light.   Cardiovascular:      Rate and Rhythm: Regular rhythm. Tachycardia present.      Heart sounds: Normal heart sounds.   Pulmonary:      Effort: No respiratory distress.      Breath sounds: Normal breath sounds. No wheezing.   Abdominal:      General: Bowel sounds are normal.      Palpations: Abdomen is soft. There is no mass.      Tenderness: There is no abdominal tenderness.   Musculoskeletal:         General: No swelling or tenderness.   Skin:     General: Skin is warm and dry.      Coloration: Skin is not pale.   Neurological:      General: No focal deficit present.      Mental Status: He is alert and oriented to person, place, and time.      Cranial Nerves: No cranial nerve deficit.      Sensory: No sensory deficit.       Last Recorded Vitals  Blood pressure 116/79, pulse 93, temperature 36.3 °C (97.3 °F), resp. rate (!) 28, height 1.854 m (6' 1\"), weight (!) 174 kg (383 lb 13.1 oz), SpO2 92%.       Assessment/Plan   Active Problems:    COPD with acute exacerbation (Multi)    A 49 yr old male with a PMHx of Nonischemic cardiomyopathy s/p AICD with recovered EF to 45-50%, hypertension, hyperlipidemia, morbid " obesity, Asthma & MJ [facility] requiring high settings on NIPPV, presentation likely 2/2 non compliance with CPAP with element of volume overload. Plan to maintain BiPAP and do serial blood gas target resolution of hypercapnia.     NEURO  #Encephalopathy/Agitation, resolved  #ETOH/Illicit substance abuse  - Delirium vs drug use (admits to ectasy & PCP use), drinks fifth of liquor ~daily  - Admit Utox +PCP  - Required Precedex drip until 5/29, sitter and restraints in MICU  - Now A&O x3, alert and cooperative   - Counseled on complete cessation, pt expressing desire to quit  - Cont PRN Zyprexa    CARDS  #Acute on chronic Systolic heart failure, with recovered EF, 50-55%  - Dilated NICM 2/2 ETOH, s/p ICD, LHC 2019 clean coronaries, in setting of medication noncompliance   - TTE 2/14/24: EF 45%, TTE 5/2021: EF 35%  - TTE 5/28: EF 50-55%, no WMA, normal DF, RV mildly enlarged, reduced RV function  - Admit BNP: 373 (previously 40's)  - Admit CXR: Cardiomegaly with mild pulmonary vascular congestion  - CXR 5/28: Interval worsening in interstitial/alveolar pulmonary edema. small bilateral pleural effusions   - s/p diuresis with Lasix boluses, drip, and Metolazone 5 mg on 5/27 & 5/28   - Lasix drip held 5/29 for soft BP's, decreased UO, and SUZANNE  - Repeat BNP (5/30): 159 improved  - c/w  home Toprol XL 25   - Cont to hold home additional GDMT until SUZANNE resolves  - Home meds: Empa 10, Toprol XL 25, Entresto 49-51, [place] 25, Torsemide 100 BID, Metolazone 5 daily  - Cont 2gram sodium diet, 2L fluid restriction, daily standing weights, strict I&O's   [ ] cont to diurese target 0.5-1 L net -ve as able     RESP  #Acute on chronic hypercapnic respiratory failure   #MJ/Asthma/ possible COPD  #Suspected obesity hypoventilation syndrome  - BiPAP, settings per RT. Serial gases  - Home meds: Ventolin 90 mcg/inh, Breo ellipta 200-25 mcg/dose  - Cont PRN Albuterol, resumed home Breo Ellipta  (5/30)  [ ] Trend blood gas     GI  -No active issues     RENAL  - ATN 2/2 hypotension vs aggressive diuresis, FeUrea 36.2%   - Admit Cr: 1.67  - Baseline Cr ~1.2-1.5,   - Cr today: 2.58, 2.96 mildly improved (3.33, 3.37, 2.90, 2.07, 1.44, 1.52)  - Meds/diuresis as stated above  - Avoid nephrotoxins and hypotension. Monitor daily RFP    ID  -No active issues    ENDO  Morbid Obesity, BMI 5  - Admit A1C 6.2%  - Encouraged dietary modifications, exercise, and weight loss  - Follow up with PCP    HEMOMC  -No active issues    MSK/RHEUM  -No active issues     E: replete as needed  F: bolus as needed  N: low sodium   DVT ppx: Heparin 7500U SQ Q8  GI ppx: PPI  Code status: Full Code  NOK: Jayne Capps (Mother) [phone]   A: external cathter, PIV  Abx: None   O2: BiPAP          Swapna Bridges MD  Internal Medicine, PGY3

## 2024-06-03 LAB
ALBUMIN SERPL BCP-MCNC: 3.9 G/DL (ref 3.4–5)
ANION GAP SERPL CALC-SCNC: ABNORMAL MMOL/L
ATRIAL RATE: 93 BPM
BASE EXCESS BLDV CALC-SCNC: 24.5 MMOL/L (ref -2–3)
BASOPHILS # BLD AUTO: 0.02 X10*3/UL (ref 0–0.1)
BASOPHILS NFR BLD AUTO: 0.5 %
BODY TEMPERATURE: 37 DEGREES CELSIUS
BUN SERPL-MCNC: 44 MG/DL (ref 6–23)
CALCIUM SERPL-MCNC: 9.8 MG/DL (ref 8.6–10.6)
CHLORIDE SERPL-SCNC: 88 MMOL/L (ref 98–107)
CO2 SERPL-SCNC: >45 MMOL/L (ref 21–32)
CREAT SERPL-MCNC: 2.28 MG/DL (ref 0.5–1.3)
EGFRCR SERPLBLD CKD-EPI 2021: 34 ML/MIN/1.73M*2
EOSINOPHIL # BLD AUTO: 0.1 X10*3/UL (ref 0–0.7)
EOSINOPHIL NFR BLD AUTO: 2.4 %
ERYTHROCYTE [DISTWIDTH] IN BLOOD BY AUTOMATED COUNT: 15.3 % (ref 11.5–14.5)
GLUCOSE SERPL-MCNC: 90 MG/DL (ref 74–99)
HCO3 BLDV-SCNC: 54.5 MMOL/L (ref 22–26)
HCT VFR BLD AUTO: 52.5 % (ref 41–52)
HGB BLD-MCNC: 15.3 G/DL (ref 13.5–17.5)
IMM GRANULOCYTES # BLD AUTO: 0.01 X10*3/UL (ref 0–0.7)
IMM GRANULOCYTES NFR BLD AUTO: 0.2 % (ref 0–0.9)
INHALED O2 CONCENTRATION: 50 %
LYMPHOCYTES # BLD AUTO: 0.78 X10*3/UL (ref 1.2–4.8)
LYMPHOCYTES NFR BLD AUTO: 18.6 %
MAGNESIUM SERPL-MCNC: 2.23 MG/DL (ref 1.6–2.4)
MCH RBC QN AUTO: 31.5 PG (ref 26–34)
MCHC RBC AUTO-ENTMCNC: 29.1 G/DL (ref 32–36)
MCV RBC AUTO: 108 FL (ref 80–100)
MONOCYTES # BLD AUTO: 0.51 X10*3/UL (ref 0.1–1)
MONOCYTES NFR BLD AUTO: 12.1 %
NEUTROPHILS # BLD AUTO: 2.78 X10*3/UL (ref 1.2–7.7)
NEUTROPHILS NFR BLD AUTO: 66.2 %
NRBC BLD-RTO: 0 /100 WBCS (ref 0–0)
OXYHGB MFR BLDV: 84.5 % (ref 45–75)
P AXIS: 64 DEGREES
P OFFSET: 209 MS
P ONSET: 154 MS
PCO2 BLDV: 84 MM HG (ref 41–51)
PH BLDV: 7.42 PH (ref 7.33–7.43)
PHOSPHATE SERPL-MCNC: 3.9 MG/DL (ref 2.5–4.9)
PLATELET # BLD AUTO: 144 X10*3/UL (ref 150–450)
PO2 BLDV: 54 MM HG (ref 35–45)
POTASSIUM SERPL-SCNC: 3.3 MMOL/L (ref 3.5–5.3)
PR INTERVAL: 136 MS
Q ONSET: 222 MS
QRS COUNT: 15 BEATS
QRS DURATION: 94 MS
QT INTERVAL: 414 MS
QTC CALCULATION(BAZETT): 514 MS
QTC FREDERICIA: 479 MS
R AXIS: 47 DEGREES
RBC # BLD AUTO: 4.86 X10*6/UL (ref 4.5–5.9)
SAO2 % BLDV: 87 % (ref 45–75)
SODIUM SERPL-SCNC: 144 MMOL/L (ref 136–145)
T AXIS: -75 DEGREES
T OFFSET: 429 MS
VENTRICULAR RATE: 93 BPM
WBC # BLD AUTO: 4.2 X10*3/UL (ref 4.4–11.3)

## 2024-06-03 PROCEDURE — 36415 COLL VENOUS BLD VENIPUNCTURE: CPT

## 2024-06-03 PROCEDURE — 2060000001 HC INTERMEDIATE ICU ROOM DAILY

## 2024-06-03 PROCEDURE — 80069 RENAL FUNCTION PANEL: CPT

## 2024-06-03 PROCEDURE — 83735 ASSAY OF MAGNESIUM: CPT

## 2024-06-03 PROCEDURE — 82805 BLOOD GASES W/O2 SATURATION: CPT | Performed by: NURSE PRACTITIONER

## 2024-06-03 PROCEDURE — 82810 BLOOD GASES O2 SAT ONLY: CPT | Performed by: NURSE PRACTITIONER

## 2024-06-03 PROCEDURE — 2500000001 HC RX 250 WO HCPCS SELF ADMINISTERED DRUGS (ALT 637 FOR MEDICARE OP): Performed by: NURSE PRACTITIONER

## 2024-06-03 PROCEDURE — C9113 INJ PANTOPRAZOLE SODIUM, VIA: HCPCS | Performed by: NURSE PRACTITIONER

## 2024-06-03 PROCEDURE — 36415 COLL VENOUS BLD VENIPUNCTURE: CPT | Performed by: NURSE PRACTITIONER

## 2024-06-03 PROCEDURE — 94660 CPAP INITIATION&MGMT: CPT

## 2024-06-03 PROCEDURE — 94640 AIRWAY INHALATION TREATMENT: CPT

## 2024-06-03 PROCEDURE — 97535 SELF CARE MNGMENT TRAINING: CPT | Mod: GO | Performed by: OCCUPATIONAL THERAPIST

## 2024-06-03 PROCEDURE — 2500000004 HC RX 250 GENERAL PHARMACY W/ HCPCS (ALT 636 FOR OP/ED): Performed by: NURSE PRACTITIONER

## 2024-06-03 PROCEDURE — 97166 OT EVAL MOD COMPLEX 45 MIN: CPT | Mod: GO | Performed by: OCCUPATIONAL THERAPIST

## 2024-06-03 PROCEDURE — 2500000002 HC RX 250 W HCPCS SELF ADMINISTERED DRUGS (ALT 637 FOR MEDICARE OP, ALT 636 FOR OP/ED): Performed by: NURSE PRACTITIONER

## 2024-06-03 PROCEDURE — 99233 SBSQ HOSP IP/OBS HIGH 50: CPT | Performed by: STUDENT IN AN ORGANIZED HEALTH CARE EDUCATION/TRAINING PROGRAM

## 2024-06-03 PROCEDURE — 85025 COMPLETE CBC W/AUTO DIFF WBC: CPT

## 2024-06-03 RX ORDER — POTASSIUM CHLORIDE 20 MEQ/1
40 TABLET, EXTENDED RELEASE ORAL ONCE
Status: COMPLETED | OUTPATIENT
Start: 2024-06-03 | End: 2024-06-03

## 2024-06-03 RX ORDER — ACETAZOLAMIDE 500 MG/5ML
250 INJECTION, POWDER, LYOPHILIZED, FOR SOLUTION INTRAVENOUS ONCE
Status: COMPLETED | OUTPATIENT
Start: 2024-06-03 | End: 2024-06-03

## 2024-06-03 RX ORDER — FUROSEMIDE 10 MG/ML
80 INJECTION INTRAMUSCULAR; INTRAVENOUS ONCE
Status: DISCONTINUED | OUTPATIENT
Start: 2024-06-03 | End: 2024-06-03

## 2024-06-03 RX ADMIN — PANTOPRAZOLE SODIUM 40 MG: 40 INJECTION, POWDER, FOR SOLUTION INTRAVENOUS at 08:41

## 2024-06-03 RX ADMIN — HEPARIN SODIUM 7500 UNITS: 5000 INJECTION INTRAVENOUS; SUBCUTANEOUS at 06:15

## 2024-06-03 RX ADMIN — IPRATROPIUM BROMIDE AND ALBUTEROL SULFATE 3 ML: .5; 3 SOLUTION RESPIRATORY (INHALATION) at 02:57

## 2024-06-03 RX ADMIN — HEPARIN SODIUM 7500 UNITS: 5000 INJECTION INTRAVENOUS; SUBCUTANEOUS at 14:23

## 2024-06-03 RX ADMIN — ATORVASTATIN CALCIUM 40 MG: 80 TABLET, FILM COATED ORAL at 20:15

## 2024-06-03 RX ADMIN — IPRATROPIUM BROMIDE AND ALBUTEROL SULFATE 3 ML: .5; 3 SOLUTION RESPIRATORY (INHALATION) at 14:53

## 2024-06-03 RX ADMIN — POLYETHYLENE GLYCOL 3350 17 G: 17 POWDER, FOR SOLUTION ORAL at 08:41

## 2024-06-03 RX ADMIN — ACETAZOLAMIDE 250 MG: 500 INJECTION, POWDER, LYOPHILIZED, FOR SOLUTION INTRAVENOUS at 16:58

## 2024-06-03 RX ADMIN — METOPROLOL SUCCINATE 25 MG: 25 TABLET, EXTENDED RELEASE ORAL at 08:41

## 2024-06-03 RX ADMIN — HEPARIN SODIUM 7500 UNITS: 5000 INJECTION INTRAVENOUS; SUBCUTANEOUS at 22:08

## 2024-06-03 RX ADMIN — IPRATROPIUM BROMIDE AND ALBUTEROL SULFATE 3 ML: .5; 3 SOLUTION RESPIRATORY (INHALATION) at 20:15

## 2024-06-03 RX ADMIN — POTASSIUM CHLORIDE 40 MEQ: 1500 TABLET, EXTENDED RELEASE ORAL at 08:41

## 2024-06-03 RX ADMIN — IPRATROPIUM BROMIDE AND ALBUTEROL SULFATE 3 ML: .5; 3 SOLUTION RESPIRATORY (INHALATION) at 08:21

## 2024-06-03 RX ADMIN — POTASSIUM CHLORIDE 40 MEQ: 1500 TABLET, EXTENDED RELEASE ORAL at 12:58

## 2024-06-03 RX ADMIN — ASPIRIN 81 MG CHEWABLE TABLET 81 MG: 81 TABLET CHEWABLE at 08:41

## 2024-06-03 ASSESSMENT — COGNITIVE AND FUNCTIONAL STATUS - GENERAL
HELP NEEDED FOR BATHING: A LITTLE
DRESSING REGULAR UPPER BODY CLOTHING: A LITTLE
DAILY ACTIVITIY SCORE: 18
TOILETING: A LITTLE
DRESSING REGULAR LOWER BODY CLOTHING: A LOT
EATING MEALS: A LITTLE

## 2024-06-03 ASSESSMENT — PAIN - FUNCTIONAL ASSESSMENT
PAIN_FUNCTIONAL_ASSESSMENT: 0-10
PAIN_FUNCTIONAL_ASSESSMENT: 0-10

## 2024-06-03 ASSESSMENT — PAIN SCALES - GENERAL
PAINLEVEL_OUTOF10: 10 - WORST POSSIBLE PAIN
PAINLEVEL_OUTOF10: 0 - NO PAIN
PAINLEVEL_OUTOF10: 0 - NO PAIN

## 2024-06-03 ASSESSMENT — ACTIVITIES OF DAILY LIVING (ADL)
ADL_ASSISTANCE: INDEPENDENT
BATHING_LEVEL_OF_ASSISTANCE: MAXIMUM ASSISTANCE
BATHING_ASSISTANCE: MINIMAL
HOME_MANAGEMENT_TIME_ENTRY: 25

## 2024-06-03 NOTE — CARE PLAN
Problem: Pain  Goal: My pain/discomfort is manageable  Outcome: Progressing     Problem: Safety  Goal: Patient will be injury free during hospitalization  Outcome: Progressing  Goal: I will remain free of falls  Outcome: Progressing     Problem: Daily Care  Goal: Daily care needs are met  Outcome: Progressing     Problem: Psychosocial Needs  Goal: Demonstrates ability to cope with hospitalization/illness  Outcome: Progressing  Goal: Collaborate with me, my family, and caregiver to identify my specific goals  Outcome: Progressing     Problem: Discharge Barriers  Goal: My discharge needs are met  Outcome: Progressing     Problem: Respiratory  Goal: Clear secretions with interventions this shift  Outcome: Progressing  Goal: Minimize anxiety/maximize coping throughout shift  Outcome: Progressing  Goal: Minimal/no exertional discomfort or dyspnea this shift  Outcome: Progressing  Goal: No signs of respiratory distress (eg. Use of accessory muscles. Peds grunting)  Outcome: Progressing  Goal: Patent airway maintained this shift  Outcome: Progressing  Goal: Tolerate mechanical ventilation evidenced by VS/agitation level this shift  Outcome: Progressing  Goal: Tolerate pulmonary toileting this shift  Outcome: Progressing  Goal: Verbalize decreased shortness of breath this shift  Outcome: Progressing  Goal: Wean oxygen to maintain O2 saturation per order/standard this shift  Outcome: Progressing  Goal: Increase self care and/or family involvement in next 24 hours  Outcome: Progressing     Problem: Safety - Medical Restraint  Goal: Remains free of injury from restraints (Restraint for Interference with Medical Device)  Outcome: Progressing  Goal: Free from restraint(s) (Restraint for Interference with Medical Device)  Outcome: Progressing     Problem: Skin  Goal: Decreased wound size/increased tissue granulation at next dressing change  Outcome: Progressing  Flowsheets (Taken 6/3/2024 8611)  Decreased wound size/increased  tissue granulation at next dressing change: Promote sleep for wound healing  Goal: Participates in plan/prevention/treatment measures  Outcome: Progressing  Flowsheets (Taken 6/3/2024 0935)  Participates in plan/prevention/treatment measures: Discuss with provider PT/OT consult  Goal: Prevent/manage excess moisture  Outcome: Progressing  Flowsheets (Taken 6/3/2024 0935)  Prevent/manage excess moisture:   Moisturize dry skin   Cleanse incontinence/protect with barrier cream   Use wicking fabric (obtain order)  Goal: Prevent/minimize sheer/friction injuries  Outcome: Progressing  Flowsheets (Taken 6/3/2024 0935)  Prevent/minimize sheer/friction injuries:   Complete micro-shifts as needed if patient unable. Adjust patient position to relieve pressure points, not a full turn   Increase activity/out of bed for meals   Use pull sheet  Goal: Promote/optimize nutrition  Outcome: Progressing  Flowsheets (Taken 6/3/2024 0935)  Promote/optimize nutrition:   Assist with feeding   Monitor/record intake including meals   Consume > 50% meals/supplements  Goal: Promote skin healing  Outcome: Progressing  Flowsheets (Taken 6/3/2024 0935)  Promote skin healing:   Assess skin/pad under line(s)/device(s)   Protective dressings over bony prominences   Turn/reposition every 2 hours/use positioning/transfer devices     Problem: Fall/Injury  Goal: Not fall by end of shift  Outcome: Progressing  Goal: Be free from injury by end of the shift  Outcome: Progressing  Goal: Verbalize understanding of personal risk factors for fall in the hospital  Outcome: Progressing  Goal: Verbalize understanding of risk factor reduction measures to prevent injury from fall in the home  Outcome: Progressing  Goal: Use assistive devices by end of the shift  Outcome: Progressing  Goal: Pace activities to prevent fatigue by end of the shift  Outcome: Progressing     Problem: Heart Failure  Goal: Improved gas exchange this shift  Outcome: Progressing  Goal:  Improved urinary output this shift  Outcome: Progressing  Goal: Reduction in peripheral edema within 24 hours  Outcome: Progressing  Goal: Report improvement of dyspnea/breathlessness this shift  Outcome: Progressing  Goal: Weight from fluid excess reduced over 2-3 days, then stabilize  Outcome: Progressing  Goal: Increase self care and/or family involvement in 24 hours  Outcome: Progressing

## 2024-06-03 NOTE — PROGRESS NOTES
"Patric Arenas is a 49 y.o. male on day 7 of admission presenting with Acute on chronic congestive heart failure, unspecified heart failure type (Multi).    Subjective   Patient tolerated BIPAP over night, on Airvo this am       Objective   Physical Exam:  Constitutional: morbidly obese male, NAD, alert and cooperative  Eyes: PERRL, EOMI, no icterus   ENMT: mucous membranes moist, no apparent injury, no lesions seen  Head/Neck: Neck supple, no apparent injury  Respiratory/Thorax: Lungs diminished bilaterally,  non-labored breathing, no cough, on Airvo  Cardiovascular: Regular, rate and rhythm  Gastrointestinal: Nondistended, soft, non-tender, BS x 4  : external urine catheter  Musculoskeletal: ROM intact, no joint swelling, normal strength  Extremities: +1BLE edema  Neurological: alert and oriented x 3, speech clear, follows commands appropriately, without focal deficits, sensation grossly intact, motor 5/5 throughout  Skin: Warm and dry, no lesions, no rashes     Last Recorded Vitals  Blood pressure 139/81, pulse 95, temperature 36.6 °C (97.9 °F), temperature source Temporal, resp. rate 17, height 1.854 m (6' 1\"), weight (!) 177 kg (391 lb 1.5 oz), SpO2 96%.    Intake/Output last 3 Shifts:  I/O last 3 completed shifts:  In: 540 (3 mL/kg) [P.O.:440; IV Piggyback:100]  Out: 3175 (17.9 mL/kg) [Urine:3175 (0.5 mL/kg/hr)]  Weight: 177.4 kg     Scheduled medications  aspirin, 81 mg, oral, Daily  atorvastatin, 40 mg, oral, Nightly  fluticasone furoate-vilanteroL, 1 puff, inhalation, Daily  fluticasone furoate-vilanteroL, 1 puff, inhalation, Daily  heparin (porcine), 7,500 Units, subcutaneous, q8h STEVE  ipratropium-albuteroL, 3 mL, nebulization, q6h  metoprolol succinate XL, 25 mg, oral, Daily  pantoprazole, 40 mg, intravenous, Daily  polyethylene glycol, 17 g, oral, Daily  potassium chloride CR, 40 mEq, oral, Once      PRN medications  PRN medications: albuterol, melatonin, OLANZapine, sodium chloride     Relevant " Results    Results for orders placed or performed during the hospital encounter of 05/26/24 (from the past 24 hour(s))   Magnesium   Result Value Ref Range    Magnesium 2.04 1.60 - 2.40 mg/dL   Renal function panel   Result Value Ref Range    Glucose 84 74 - 99 mg/dL    Sodium 146 (H) 136 - 145 mmol/L    Potassium 3.6 3.5 - 5.3 mmol/L    Chloride 91 (L) 98 - 107 mmol/L    Bicarbonate >45 (HH) 21 - 32 mmol/L    Anion Gap      Urea Nitrogen 40 (H) 6 - 23 mg/dL    Creatinine 2.16 (H) 0.50 - 1.30 mg/dL    eGFR 36 (L) >60 mL/min/1.73m*2    Calcium 9.0 8.6 - 10.6 mg/dL    Phosphorus 3.3 2.5 - 4.9 mg/dL    Albumin 3.5 3.4 - 5.0 g/dL   CBC and Auto Differential   Result Value Ref Range    WBC 4.2 (L) 4.4 - 11.3 x10*3/uL    nRBC 0.0 0.0 - 0.0 /100 WBCs    RBC 4.86 4.50 - 5.90 x10*6/uL    Hemoglobin 15.3 13.5 - 17.5 g/dL    Hematocrit 52.5 (H) 41.0 - 52.0 %     (H) 80 - 100 fL    MCH 31.5 26.0 - 34.0 pg    MCHC 29.1 (L) 32.0 - 36.0 g/dL    RDW 15.3 (H) 11.5 - 14.5 %    Platelets 144 (L) 150 - 450 x10*3/uL    Neutrophils % 66.2 40.0 - 80.0 %    Immature Granulocytes %, Automated 0.2 0.0 - 0.9 %    Lymphocytes % 18.6 13.0 - 44.0 %    Monocytes % 12.1 2.0 - 10.0 %    Eosinophils % 2.4 0.0 - 6.0 %    Basophils % 0.5 0.0 - 2.0 %    Neutrophils Absolute 2.78 1.20 - 7.70 x10*3/uL    Immature Granulocytes Absolute, Automated 0.01 0.00 - 0.70 x10*3/uL    Lymphocytes Absolute 0.78 (L) 1.20 - 4.80 x10*3/uL    Monocytes Absolute 0.51 0.10 - 1.00 x10*3/uL    Eosinophils Absolute 0.10 0.00 - 0.70 x10*3/uL    Basophils Absolute 0.02 0.00 - 0.10 x10*3/uL   Renal function panel   Result Value Ref Range    Glucose 90 74 - 99 mg/dL    Sodium 144 136 - 145 mmol/L    Potassium 3.3 (L) 3.5 - 5.3 mmol/L    Chloride 88 (L) 98 - 107 mmol/L    Bicarbonate >45 (HH) 21 - 32 mmol/L    Anion Gap      Urea Nitrogen 44 (H) 6 - 23 mg/dL    Creatinine 2.28 (H) 0.50 - 1.30 mg/dL    eGFR 34 (L) >60 mL/min/1.73m*2    Calcium 9.8 8.6 - 10.6 mg/dL     Phosphorus 3.9 2.5 - 4.9 mg/dL    Albumin 3.9 3.4 - 5.0 g/dL   Magnesium   Result Value Ref Range    Magnesium 2.23 1.60 - 2.40 mg/dL   BLOOD GAS VENOUS   Result Value Ref Range    POCT pH, Venous 7.42 7.33 - 7.43 pH    POCT pCO2, Venous 84 (HH) 41 - 51 mm Hg    POCT pO2, Venous 54 (H) 35 - 45 mm Hg    POCT SO2, Venous 87 (H) 45 - 75 %    POCT Oxy Hemoglobin, Venous 84.5 (H) 45.0 - 75.0 %    POCT Base Excess, Venous 24.5 (H) -2.0 - 3.0 mmol/L    POCT HCO3 Calculated, Venous 54.5 (H) 22.0 - 26.0 mmol/L    Patient Temperature 37.0 degrees Celsius    FiO2 50 %      Imaging:     XR chest 1 view 6/3/2024  1.  Interval improvement in pulmonary interstitial edema and left-greater-than-right bibasilar airspace opacities. 2. Similar small left pleural effusion.       CT chest wo IV contrast  6/3/2024  1. Mild bibasilar atelectasis no evidence of focal airspace disease/edema. Prominent mosaic perfusion pattern with tortuosity of the peripheral pulmonary arteries concerning for a component of pulmonary artery hypertension. Increased right-sided cardiac chambers and correlate with echocardiogram for evaluation of right-sided cardiac function and pressures.   2. Ca not exclude a web like narrowing of the trachea at the level of the thoracic inlet. Correlate with any clinical evidence of upper airway obstruction.       Transthoracic Echo (TTE) Complete 5/28/2024  1. Left ventricular systolic function is low normal with a 50-55% estimated ejection fraction.    2. Poorly visualized anatomical structures due to suboptimal image quality.    3. Abnormal septal motion consistent with RV pacemaker and right ventricular pressure overload.    4. Moderately increased left ventricular septal thickness.    5. The left ventricular posterior wall thickness is moderately increased.    6. There is reduced right ventricular systolic function.    7. RVSP within normal limits.    8. The RV systolic pressure may be underestimated.      Assessment/Plan   Active Problems:    COPD with acute exacerbation (Multi)    Patric Arenas is a 48 y.o. male with PMH of CHF (EF 45 to 50%), COPD, nonischemic cardiomyopathy s/p AICD, HTN, HLD, MJ  who presenting to the ICU for acute hypercapnic respiratory failure requiring intubation.    Patient admitted to MICU for acute hypercapnic resp faillure and acute decompensated HF I/s/o noncompliance with meds, O2 and CPAP.  Intubated and sedated on admit.  Aggressively diuresed with IV Lasix drip & 1 x dose of 250 mg IV Diamox.  Patient self extubated 5/28, placed on BiPap which he did not tolerated, becoming acutely agitated.  Placed on 5 L NC, started on Precedex drip which was continued until 5/29.  Mental status since resolved.  Lasix drip continued until 5/29 am, until decreased renal function likely ATN in the setting of diuresis.    Transferred to cardiology service.  Code white and transfer to CICU 6/2 for hypercapnic resp failure, not wearing bipap.  Transferred from CICU to SDU 6/2 pm on Airvo and Bipap at night    Neuro: hx ETOH, liicid substance abuse, encephalopathy/agitation now resolved  - Delirium vs drug use (admits to ectasy & PCP use), drinks fifth of liquor ~daily  - Admit Utox +PCP  - Required Precedex drip until 5/29, sitter and restraints in MICU, now A&Ox3, appropriate behavior  - Cont PRN Zyprexa    Cardio:  Acute on chronic systolic HF, with recovered EF 50-55%  - Dilated NICM 2/2 ETOH, s/p ICD, LHC 2019 clean coronaries, in setting of medication noncompliance   - TTE 2/14/24: EF 45%, TTE 5/2021: EF 35%  - TTE 5/28: EF 50-55%, no WMA, normal DF, RV mildly enlarged, reduced RV function  - Admit BNP: 373 (previously 40's)  - Admit CXR: Cardiomegaly with mild pulmonary vascular congestion  - CXR 5/28: Interval worsening in interstitial/alveolar pulmonary edema. small bilateral pleural effusions   - s/p diuresis with Lasix boluses, drip, and Metolazone 5 mg on 5/27 & 5/28   - Lasix drip held  5/29 for soft BP's, decreased UO, and SUZANNE  - Repeat BNP (5/30): 159 improved  - c/w  home Toprol XL 25   - Cont to hold home additional GDMT until SUZANNE resolves  - Home meds: Empa 10, Toprol XL 25, Entresto 49-51, [place] 25, Torsemide 100 BID, Metolazone 5 daily  - Cont 2gram sodium diet, 2L fluid restriction, daily standing weights, strict I&O's   -gave 250 mg Diamax today for resp alkalosis, and held off on Lasix     Pulm: Acute on chronic hypercapnic respiratory failure, hx MJ/Asthma and possible COPD , likely obesity hopoventilation syndrome  -continue with BIPAP- setting adjusted today to 16/8   Home meds: Ventolin 90 mcg/inh, Breo ellipta 200-25 mcg/dose  - Cont PRN Albuterol, resumed home Breo Ellipta (5/30)  -VBG today 7.42/84, bicarb >45-> gave dose of diamox 250 mg IV  -wean O2 as tolerating- on Airvo 40L, 50%, goal Pox >92%    FEN/GI:  -tolerating low Na diet  -bowel regimen  -replace electrolytes as needed (got K today)  -daily RFP      Renal: ATN 2/2 hypotension vs aggressive diuresis, FeUrea 36.2%  - Admit Cr: 1.67  - Baseline Cr ~1.2-1.5,   - Cr today: 2.16 <-2.58, <-2.96 peaked at 3.37  - Avoid nephrotoxins and hypotension  -daily RFP    Heme:  -no s/sx bleeding  -daily CBC    Endo: Morbid obesity, BMI 51.6  -admit A1C 6.2%   Encouraged dietary modifications, exercise, and weight loss  - Follow up with PCP    ID: no active issues    Prophy:  -PPI  -Heparin subcutaneous     Access:  PIVs    Code Status:  Full code  NOK: Jayne Capps (Mother) [phone]     Dispo  will continue Step down care for now while high O2 needs    D/w Dr Mims     I spent >45 minutes in the professional and overall care of this patient.    Tania Chow, APRN-CNP

## 2024-06-03 NOTE — CARE PLAN
Problem: Pain  Goal: My pain/discomfort is manageable  Outcome: Progressing     Problem: Safety  Goal: Patient will be injury free during hospitalization  Outcome: Progressing  Goal: I will remain free of falls  Outcome: Progressing     Problem: Daily Care  Goal: Daily care needs are met  Outcome: Progressing     Problem: Psychosocial Needs  Goal: Demonstrates ability to cope with hospitalization/illness  Outcome: Progressing  Goal: Collaborate with me, my family, and caregiver to identify my specific goals  Outcome: Progressing     Problem: Discharge Barriers  Goal: My discharge needs are met  Outcome: Progressing     Problem: Respiratory  Goal: Clear secretions with interventions this shift  Outcome: Progressing  Goal: Minimize anxiety/maximize coping throughout shift  Outcome: Progressing  Goal: Minimal/no exertional discomfort or dyspnea this shift  Outcome: Progressing  Goal: No signs of respiratory distress (eg. Use of accessory muscles. Peds grunting)  Outcome: Progressing  Goal: Patent airway maintained this shift  Outcome: Progressing  Goal: Tolerate mechanical ventilation evidenced by VS/agitation level this shift  Outcome: Progressing  Goal: Tolerate pulmonary toileting this shift  Outcome: Progressing  Goal: Verbalize decreased shortness of breath this shift  Outcome: Progressing  Goal: Wean oxygen to maintain O2 saturation per order/standard this shift  Outcome: Progressing  Goal: Increase self care and/or family involvement in next 24 hours  Outcome: Progressing     Problem: Safety - Medical Restraint  Goal: Remains free of injury from restraints (Restraint for Interference with Medical Device)  Outcome: Progressing  Goal: Free from restraint(s) (Restraint for Interference with Medical Device)  Outcome: Progressing     Problem: Skin  Goal: Decreased wound size/increased tissue granulation at next dressing change  Outcome: Progressing  Goal: Participates in plan/prevention/treatment measures  Outcome:  Progressing  Goal: Prevent/manage excess moisture  Outcome: Progressing  Goal: Prevent/minimize sheer/friction injuries  Outcome: Progressing  Goal: Promote/optimize nutrition  Outcome: Progressing  Goal: Promote skin healing  Outcome: Progressing     Problem: Fall/Injury  Goal: Not fall by end of shift  Outcome: Progressing  Goal: Be free from injury by end of the shift  Outcome: Progressing  Goal: Verbalize understanding of personal risk factors for fall in the hospital  Outcome: Progressing  Goal: Verbalize understanding of risk factor reduction measures to prevent injury from fall in the home  Outcome: Progressing  Goal: Use assistive devices by end of the shift  Outcome: Progressing  Goal: Pace activities to prevent fatigue by end of the shift  Outcome: Progressing     Problem: Heart Failure  Goal: Improved gas exchange this shift  Outcome: Progressing  Goal: Improved urinary output this shift  Outcome: Progressing  Goal: Reduction in peripheral edema within 24 hours  Outcome: Progressing  Goal: Report improvement of dyspnea/breathlessness this shift  Outcome: Progressing  Goal: Weight from fluid excess reduced over 2-3 days, then stabilize  Outcome: Progressing  Goal: Increase self care and/or family involvement in 24 hours  Outcome: Progressing   The patient's goals for the shift include Pt will have at least 4 hours of uninterrupted sleep during shift    The clinical goals for the shift include pt spo2

## 2024-06-03 NOTE — PROGRESS NOTES
Occupational Therapy    Occupational Therapy    Evaluation and Treatment    Patient Name: Patric Arenas  MRN: [id number]  Today's Date: 6/3/2024  [facility]  Time Calculation  Start Time: 1123  Stop Time: 1218  Time Calculation (min): 55 min    Assessment  IP OT Assessment  OT Assessment: Patient is a 49 year old male who presents with impaired ADL, functional mobility, functional transfer. He would benefit from skilled OT services while in hospital and post discharge.  Prognosis: Good  Medical Staff Made Aware: Yes  End of Session Communication: Bedside nurse, PCT/NA/CTA  End of Session Patient Position: Up in chair  Plan:  Treatment Interventions: ADL retraining, Functional transfer training, Endurance training, Patient/family training, Compensatory technique education  OT Frequency: 3 times per week  OT Discharge Recommendations: Low intensity level of continued care  Equipment Recommended upon Discharge:  (hip kit)  OT Recommended Transfer Status: Assist of 1  OT - OK to Discharge: Yes    Subjective   Current Problem:  1. Acute on chronic congestive heart failure, unspecified heart failure type (Multi)  Transthoracic Echo (TTE) Complete    Transthoracic Echo (TTE) Complete      2. COPD with acute exacerbation (Multi)        3. Chest pain, unspecified type        4. COPD exacerbation (Multi)  Intubation    Intubation    DISCONTINUED: fluticasone furoate-vilanteroL (Breo Ellipta) 200-25 mcg/dose inhaler 1 puff      5. Heart failure, unspecified HF chronicity, unspecified heart failure type (Multi)        6. Acute on chronic systolic (congestive) heart failure (Multi)  Transthoracic Echo (TTE) Complete      7. Acute and chronic respiratory failure with hypercapnia (Multi)  Oxygen therapy for home        General:  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) s/p ICD (2019), asthma, HTN, HLD and  MJ  Prior to Session Communication: Bedside nurse  Patient Position Received:  (Patient up in bathroom.)   Precautions:  Hearing/Visual Limitations: hearing WFL. patient indicates that he needs glasses. He noticed more difficulty reading during his visual assessment when renewing drivers lisc.  Medical Precautions: Fall precautions, Cardiac precautions, Oxygen therapy device and L/min  Vital Signs:  Heart Rate: 95  Heart Rate Source: Monitor  Resp: 19  SpO2: 92 %  Pain:  Pain Assessment  Pain Assessment: 0-10  Pain Score: 10 - Worst possible pain  Pain Type: Acute pain  Pain Location: Hand (patient reports arthritis pain)  Pain Orientation: Right  Response to Interventions: movement, repositioning, nurse notified, pain decreased but remains high  Lines/Tubes/Drains:  External Urinary Catheter Male (Active)   Number of days: 1         Objective   Cognition:  Overall Cognitive Status: Within Functional Limits  Orientation Level: Oriented X4           Home Living:  Type of Home: Apartment  Lives With: Alone  Home Adaptive Equipment: None  Home Layout: One level  Home Living Comments: may stay with sister after discharge   Prior Function:  Level of [place]: Independent with ADLs and functional transfers, Independent with homemaking with ambulation  ADL Assistance: Independent  Homemaking Assistance: Independent  Ambulatory Assistance: Independent  Vocational:  (currently not working)  Hand Dominance: Right  IADL History:  Current License: Yes  Mode of Transportation: Car  ADL:  Eating Assistance: Independent  Grooming Assistance: Minimal  Bathing Assistance: Minimal  UE Dressing Assistance: Minimal  LE Dressing Assistance: Maximal  Toileting Assistance with Device: Minimal (Patient demonstrates awkward lean placing himself at risk of falls, due to difficulty reaching to peform toileting hygiene.)  Activity Tolerance:  Endurance: Tolerates 30 min exercise with multiple rests  Balance:  Dynamic Sitting  Balance  Dynamic Sitting-Balance:  (SBA)  Dynamic Standing Balance  Dynamic Standing-Balance:  (CGA)  Static Sitting Balance  Static Sitting-Level of Assistance: Distant supervision (when unsupported, independent in supported position)  Static Standing Balance  Static Standing-Level of Assistance: Contact guard  Bed Mobility/Transfers: Bed Mobility/Transfers: Bed Mobility  Bed Mobility: No   and Transfers  Transfer: Yes  Transfer 1  Transfer From 1: Sit to, Stand to  Transfer to 1: Stand, Sit  Transfer Level of Assistance 1: Contact guard  Transfers 2  Transfer From 2: Toilet to  Transfer to 2: Chair with arms  Transfer Level of Assistance 2: Contact guard  IADL's:   Current License: Yes  Mode of Transportation: Car  Vision:     and    Sensation:  Light Touch: No apparent deficits       Coordination:  Movements are Fluid and Coordinated: Yes   Hand Function:  Hand Function  Gross Grasp: Functional  Extremities:   RUE   RUE : Within Functional Limits, LUE   LUE: Within Functional Limits,  , and        Outcome Measures: [facility] Daily Activity  Putting on and taking off regular lower body clothing: A lot  Bathing (including washing, rinsing, drying): A little  Putting on and taking off regular upper body clothing: A little  Toileting, which includes using toilet, bedpan or urinal: A little  Taking care of personal grooming such as brushing teeth: None  Eating Meals: A little  Daily Activity - Total Score: 18         ,     OT Adult Other Outcome Measures  4AT: 0- cognitive deficits, delirium unlikely    Education Documentation  ADL Training, taught by Sharee Bach OT at 6/3/2024  2:50 PM.  Learner: Patient  Readiness: Acceptance  Method: Explanation  Response: Verbalizes Understanding  Comment: role of OT, discharge recommendations, use of AE, adl technique            Goals:   Encounter Problems       Encounter Problems (Active)       ADLs       Patient will perform UB and LB bathing standing sink side with modified  independent level of assistance and long-handled sponge. (Progressing)       Start:  06/03/24    Expected End:  06/17/24            Patient with complete upper body dressing with modified independent level of assistance donning and doffing all UE clothes with PRN adaptive equipment while edge of bed  (Progressing)       Start:  06/03/24    Expected End:  06/17/24            Patient with complete lower body dressing with modified independent level of assistance donning and doffing all LE clothes  with PRN adaptive equipment while edge of bed  (Progressing)       Start:  06/03/24    Expected End:  06/17/24            Patient will complete daily grooming tasks  with modified independent level of assistance and PRN adaptive equipment while standing. (Progressing)       Start:  06/03/24    Expected End:  06/17/24            Patient will complete toileting including hygiene clothing management/hygiene with modified independent level of assistance and raised toilet seat and toilet tissue aide. (Progressing)       Start:  06/03/24    Expected End:  06/17/24               MOBILITY       Patient will perform Functional mobility mod  Household distances/Community Distances with independent level of assistance and no device in order to improve safety and functional mobility. (Progressing)       Start:  06/03/24    Expected End:  06/17/24               TRANSFERS       Patient will perform bed mobility independent level of assistance and bed rails in order to improve safety and independence with mobility (Progressing)       Start:  06/03/24    Expected End:  06/17/24            Patient will complete functional transfer to all surfaces with no device with independent level of assistance. (Progressing)       Start:  06/03/24    Expected End:  06/17/24                   Treatment Completed on Evaluation    Activities of Daily Living:       UE Bathing  UE Bathing Level of Assistance: Minimum assistance (for back, SBA for chest, UEs,  abdomen in sitting, max assist for back)  UE Bathing Where Assessed:  (seated on toilet)  LE Bathing  LE Bathing Level of Assistance: Maximum assistance (below ankle, provided instruction on use of long sponge, patient required SBA and set up above ankles, he had difficulty reaching buttocks  and feet and would benefit from long sponge or bath brush use)  UE Dressing  UE Dressing Level of Assistance: Minimum assistance (to don and doff gown)  LE Dressing  LE Dressing: Yes  LE Dressing Adaptive Equipment: Reacher, Sock aide  Sock Level of Assistance: Moderate assistance (to doff, min assist to don using AE)  LE Dressing Comments: Patient instructed in use of lower body adaptive equipment to complete dressing. He completed return demo of socks, but did not complete shoes. Patient able to verbalize shoe donning process.  Toileting  Toileting Adaptive Equipment: Toilet aide  Toileting Comments: Patient instructed in use of toilet tissue aide to allow increased safety during toileting. Patient demonstrated good understanding and was able to verbalize technique.         06/03/24 at 2:51 PM   Sharee Bach OT   Rehab Office: [phone]

## 2024-06-04 LAB
ALBUMIN SERPL BCP-MCNC: 4 G/DL (ref 3.4–5)
ANION GAP SERPL CALC-SCNC: 11 MMOL/L (ref 10–20)
BASOPHILS # BLD AUTO: 0.02 X10*3/UL (ref 0–0.1)
BASOPHILS NFR BLD AUTO: 0.5 %
BUN SERPL-MCNC: 42 MG/DL (ref 6–23)
CALCIUM SERPL-MCNC: 9.9 MG/DL (ref 8.6–10.6)
CHLORIDE SERPL-SCNC: 89 MMOL/L (ref 98–107)
CO2 SERPL-SCNC: 43 MMOL/L (ref 21–32)
CREAT SERPL-MCNC: 1.9 MG/DL (ref 0.5–1.3)
EGFRCR SERPLBLD CKD-EPI 2021: 42 ML/MIN/1.73M*2
EOSINOPHIL # BLD AUTO: 0.13 X10*3/UL (ref 0–0.7)
EOSINOPHIL NFR BLD AUTO: 3 %
ERYTHROCYTE [DISTWIDTH] IN BLOOD BY AUTOMATED COUNT: 14.7 % (ref 11.5–14.5)
GLUCOSE SERPL-MCNC: 100 MG/DL (ref 74–99)
HCT VFR BLD AUTO: 51.3 % (ref 41–52)
HGB BLD-MCNC: 15.4 G/DL (ref 13.5–17.5)
IMM GRANULOCYTES # BLD AUTO: 0.01 X10*3/UL (ref 0–0.7)
IMM GRANULOCYTES NFR BLD AUTO: 0.2 % (ref 0–0.9)
LYMPHOCYTES # BLD AUTO: 0.83 X10*3/UL (ref 1.2–4.8)
LYMPHOCYTES NFR BLD AUTO: 19.2 %
MAGNESIUM SERPL-MCNC: 2.17 MG/DL (ref 1.6–2.4)
MAGNESIUM SERPL-MCNC: 2.54 MG/DL (ref 1.6–2.4)
MCH RBC QN AUTO: 31.4 PG (ref 26–34)
MCHC RBC AUTO-ENTMCNC: 30 G/DL (ref 32–36)
MCV RBC AUTO: 105 FL (ref 80–100)
MONOCYTES # BLD AUTO: 0.44 X10*3/UL (ref 0.1–1)
MONOCYTES NFR BLD AUTO: 10.2 %
NEUTROPHILS # BLD AUTO: 2.9 X10*3/UL (ref 1.2–7.7)
NEUTROPHILS NFR BLD AUTO: 66.9 %
NRBC BLD-RTO: 0 /100 WBCS (ref 0–0)
PHOSPHATE SERPL-MCNC: 2.9 MG/DL (ref 2.5–4.9)
PLATELET # BLD AUTO: 155 X10*3/UL (ref 150–450)
POTASSIUM SERPL-SCNC: 3.1 MMOL/L (ref 3.5–5.3)
RBC # BLD AUTO: 4.91 X10*6/UL (ref 4.5–5.9)
SODIUM SERPL-SCNC: 140 MMOL/L (ref 136–145)
WBC # BLD AUTO: 4.3 X10*3/UL (ref 4.4–11.3)

## 2024-06-04 PROCEDURE — 2500000002 HC RX 250 W HCPCS SELF ADMINISTERED DRUGS (ALT 637 FOR MEDICARE OP, ALT 636 FOR OP/ED): Performed by: NURSE PRACTITIONER

## 2024-06-04 PROCEDURE — 80069 RENAL FUNCTION PANEL: CPT

## 2024-06-04 PROCEDURE — C9113 INJ PANTOPRAZOLE SODIUM, VIA: HCPCS | Performed by: NURSE PRACTITIONER

## 2024-06-04 PROCEDURE — 99233 SBSQ HOSP IP/OBS HIGH 50: CPT | Performed by: STUDENT IN AN ORGANIZED HEALTH CARE EDUCATION/TRAINING PROGRAM

## 2024-06-04 PROCEDURE — 83735 ASSAY OF MAGNESIUM: CPT

## 2024-06-04 PROCEDURE — 2500000005 HC RX 250 GENERAL PHARMACY W/O HCPCS: Performed by: NURSE PRACTITIONER

## 2024-06-04 PROCEDURE — 85025 COMPLETE CBC W/AUTO DIFF WBC: CPT

## 2024-06-04 PROCEDURE — 94660 CPAP INITIATION&MGMT: CPT

## 2024-06-04 PROCEDURE — 2500000001 HC RX 250 WO HCPCS SELF ADMINISTERED DRUGS (ALT 637 FOR MEDICARE OP): Performed by: NURSE PRACTITIONER

## 2024-06-04 PROCEDURE — 97116 GAIT TRAINING THERAPY: CPT | Mod: GP | Performed by: PHYSICAL THERAPIST

## 2024-06-04 PROCEDURE — 36415 COLL VENOUS BLD VENIPUNCTURE: CPT

## 2024-06-04 PROCEDURE — 2500000004 HC RX 250 GENERAL PHARMACY W/ HCPCS (ALT 636 FOR OP/ED): Performed by: NURSE PRACTITIONER

## 2024-06-04 PROCEDURE — 2060000001 HC INTERMEDIATE ICU ROOM DAILY

## 2024-06-04 PROCEDURE — 97530 THERAPEUTIC ACTIVITIES: CPT | Mod: GP | Performed by: PHYSICAL THERAPIST

## 2024-06-04 PROCEDURE — 94640 AIRWAY INHALATION TREATMENT: CPT

## 2024-06-04 RX ORDER — POTASSIUM CHLORIDE 20 MEQ/1
40 TABLET, EXTENDED RELEASE ORAL
Status: COMPLETED | OUTPATIENT
Start: 2024-06-04 | End: 2024-06-04

## 2024-06-04 RX ORDER — SPIRONOLACTONE 25 MG/1
25 TABLET ORAL DAILY
Status: DISCONTINUED | OUTPATIENT
Start: 2024-06-05 | End: 2024-06-08

## 2024-06-04 RX ORDER — IPRATROPIUM BROMIDE AND ALBUTEROL SULFATE 2.5; .5 MG/3ML; MG/3ML
3 SOLUTION RESPIRATORY (INHALATION)
Status: DISCONTINUED | OUTPATIENT
Start: 2024-06-05 | End: 2024-06-09 | Stop reason: HOSPADM

## 2024-06-04 RX ADMIN — IPRATROPIUM BROMIDE AND ALBUTEROL SULFATE 3 ML: .5; 3 SOLUTION RESPIRATORY (INHALATION) at 20:06

## 2024-06-04 RX ADMIN — POLYETHYLENE GLYCOL 3350 17 G: 17 POWDER, FOR SOLUTION ORAL at 09:10

## 2024-06-04 RX ADMIN — HEPARIN SODIUM 7500 UNITS: 5000 INJECTION INTRAVENOUS; SUBCUTANEOUS at 14:59

## 2024-06-04 RX ADMIN — PANTOPRAZOLE SODIUM 40 MG: 40 INJECTION, POWDER, FOR SOLUTION INTRAVENOUS at 09:10

## 2024-06-04 RX ADMIN — Medication 40 PERCENT: at 20:00

## 2024-06-04 RX ADMIN — IPRATROPIUM BROMIDE AND ALBUTEROL SULFATE 3 ML: .5; 3 SOLUTION RESPIRATORY (INHALATION) at 08:10

## 2024-06-04 RX ADMIN — HEPARIN SODIUM 7500 UNITS: 5000 INJECTION INTRAVENOUS; SUBCUTANEOUS at 21:41

## 2024-06-04 RX ADMIN — IPRATROPIUM BROMIDE AND ALBUTEROL SULFATE 3 ML: .5; 3 SOLUTION RESPIRATORY (INHALATION) at 14:42

## 2024-06-04 RX ADMIN — POTASSIUM CHLORIDE 40 MEQ: 1500 TABLET, EXTENDED RELEASE ORAL at 12:22

## 2024-06-04 RX ADMIN — ASPIRIN 81 MG CHEWABLE TABLET 81 MG: 81 TABLET CHEWABLE at 09:10

## 2024-06-04 RX ADMIN — METOPROLOL SUCCINATE 25 MG: 25 TABLET, EXTENDED RELEASE ORAL at 09:10

## 2024-06-04 RX ADMIN — POTASSIUM CHLORIDE 40 MEQ: 1500 TABLET, EXTENDED RELEASE ORAL at 14:59

## 2024-06-04 RX ADMIN — IPRATROPIUM BROMIDE AND ALBUTEROL SULFATE 3 ML: .5; 3 SOLUTION RESPIRATORY (INHALATION) at 03:02

## 2024-06-04 RX ADMIN — Medication 40 L/MIN: at 14:42

## 2024-06-04 RX ADMIN — ATORVASTATIN CALCIUM 40 MG: 80 TABLET, FILM COATED ORAL at 20:56

## 2024-06-04 RX ADMIN — HEPARIN SODIUM 7500 UNITS: 5000 INJECTION INTRAVENOUS; SUBCUTANEOUS at 06:05

## 2024-06-04 ASSESSMENT — PAIN SCALES - GENERAL
PAINLEVEL_OUTOF10: 0 - NO PAIN
PAINLEVEL_OUTOF10: 0 - NO PAIN

## 2024-06-04 ASSESSMENT — COGNITIVE AND FUNCTIONAL STATUS - GENERAL
CLIMB 3 TO 5 STEPS WITH RAILING: A LITTLE
MOVING TO AND FROM BED TO CHAIR: A LITTLE
WALKING IN HOSPITAL ROOM: A LITTLE
MOVING FROM LYING ON BACK TO SITTING ON SIDE OF FLAT BED WITH BEDRAILS: A LITTLE
STANDING UP FROM CHAIR USING ARMS: A LITTLE
TURNING FROM BACK TO SIDE WHILE IN FLAT BAD: A LITTLE
MOBILITY SCORE: 18

## 2024-06-04 ASSESSMENT — PAIN - FUNCTIONAL ASSESSMENT
PAIN_FUNCTIONAL_ASSESSMENT: 0-10
PAIN_FUNCTIONAL_ASSESSMENT: 0-10

## 2024-06-04 NOTE — PROGRESS NOTES
Physical Therapy    Physical Therapy Treatment    Patient Name: Patric Arenas  MRN: [id number]  Today's Date: 6/4/2024  Time Calculation  Start Time: 1530  Stop Time: 1555  Time Calculation (min): 25 min    Assessment/Plan   PT Assessment  PT Assessment Results: Decreased endurance, Impaired balance, Decreased mobility, Decreased strength  Rehab Prognosis: Good  End of Session Communication: Bedside nurse  End of Session Patient Position: Up in chair, Alarm off, not on at start of session  PT Plan  Inpatient/Swing Bed or Outpatient: Inpatient  PT Plan  Treatment/Interventions: Bed mobility, Transfer training, Gait training, Balance training, Strengthening, Endurance training, Therapeutic activity, Therapeutic exercise  PT Plan: Skilled PT  PT Frequency: 3 times per week  PT Discharge Recommendations: Low intensity level of continued care  PT Recommended Transfer Status: Assist x1  PT - OK to Discharge: Yes      General Visit Information:   PT  Visit  PT Received On: 06/04/24  General  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) s/p ICD (2019), asthma, HTN, HLD and MJ  Family/Caregiver Present: Yes (sister present and was supportive)  Prior to Session Communication: Bedside nurse  Patient Position Received: Up in room, Alarm off, not on at start of session  Preferred Learning Style: verbal  General Comment: Pt seated in chair upon arrival and willing to participate in PT session    Subjective   Precautions:  Precautions  Medical Precautions: Fall precautions, Cardiac precautions, Oxygen therapy device and L/min  Vital Signs:  Vital Signs  Heart Rate: 86  SpO2:  (SpO2 ranged 93% to 99%)  BP: (!) 110/91  Patient Position: Sitting    Objective   Pain:  Pain Assessment  Pain Assessment: 0-10  Pain Score: 0 - No pain  Cognition:  Cognition  Orientation Level: Oriented X4          Treatments:  Therapeutic  Exercise  Therapeutic Exercise Performed: Yes  Therapeutic Exercise Activity 1: Seated BLE: AP, LAQ 1x10 reps         Ambulation/Gait Training  Ambulation/Gait Training Performed: Yes  Ambulation/Gait Training 1  Surface 1: Level tile  Device 1: No device  Assistance 1: Close supervision  Comments/Distance (ft) 1: 2x15 and 10 ft (cues for taking rest breaks as needed)  Transfers  Transfer: Yes  Transfer 1  Transfer From 1: Sit to, Stand to  Transfer to 1: Stand, Sit  Technique 1: Sit to stand, Stand to sit  Transfer Level of Assistance 1: Close supervision, Minimal verbal cues  Trials/Comments 1: cues for vazquez hand placement and conftrolled descend    Outcome Measures:  [facility] Basic Mobility  Turning from your back to your side while in a flat bed without using bedrails: A little  Moving from lying on your back to sitting on the side of a flat bed without using bedrails: A little  Moving to and from bed to chair (including a wheelchair): A little  Standing up from a chair using your arms (e.g. wheelchair or bedside chair): A little  To walk in hospital room: A little  Climbing 3-5 steps with railing: A little  Basic Mobility - Total Score: 18    Education Documentation  Precautions, taught by Jenni Evans PT at 6/4/2024  4:23 PM.  Learner: Patient  Readiness: Acceptance  Method: Explanation  Response: Verbalizes Understanding, Needs Reinforcement    Mobility Training, taught by Jenni Evans PT at 6/4/2024  4:23 PM.  Learner: Patient  Readiness: Acceptance  Method: Explanation  Response: Verbalizes Understanding, Needs Reinforcement    Education Comments  No comments found.        OP EDUCATION:       Encounter Problems       Encounter Problems (Active)       PT Problem       Patient will complete supine to sit and sit to supine Independent  (Progressing)       Start:  05/30/24    Expected End:  06/13/24            Patient will perform sit<>stand transfer with LRAD, and Independent  (Progressing)       Start:  05/30/24     Expected End:  06/13/24            Patient will ambulate >50' with LRAD and Independent c SpO2 > 88%  (Progressing)       Start:  05/30/24    Expected End:  06/13/24

## 2024-06-04 NOTE — CARE PLAN
Problem: Pain  Goal: My pain/discomfort is manageable  6/4/2024 1118 by Lynn Ashley RN  Outcome: Progressing  6/4/2024 1118 by Lynn Ashley RN  Outcome: Progressing     Problem: Safety  Goal: Patient will be injury free during hospitalization  Outcome: Progressing  Goal: I will remain free of falls  Outcome: Progressing     Problem: Daily Care  Goal: Daily care needs are met  Outcome: Progressing     Problem: Psychosocial Needs  Goal: Demonstrates ability to cope with hospitalization/illness  Outcome: Progressing  Goal: Collaborate with me, my family, and caregiver to identify my specific goals  Outcome: Progressing     Problem: Discharge Barriers  Goal: My discharge needs are met  Outcome: Progressing     Problem: Respiratory  Goal: Clear secretions with interventions this shift  Outcome: Progressing  Goal: Minimize anxiety/maximize coping throughout shift  Outcome: Progressing  Goal: Minimal/no exertional discomfort or dyspnea this shift  Outcome: Progressing  Goal: No signs of respiratory distress (eg. Use of accessory muscles. Peds grunting)  Outcome: Progressing  Goal: Patent airway maintained this shift  Outcome: Progressing  Goal: Tolerate mechanical ventilation evidenced by VS/agitation level this shift  Outcome: Progressing  Goal: Tolerate pulmonary toileting this shift  Outcome: Progressing  Goal: Verbalize decreased shortness of breath this shift  Outcome: Progressing  Goal: Wean oxygen to maintain O2 saturation per order/standard this shift  Outcome: Progressing  Goal: Increase self care and/or family involvement in next 24 hours  Outcome: Progressing     Problem: Safety - Medical Restraint  Goal: Remains free of injury from restraints (Restraint for Interference with Medical Device)  Outcome: Progressing  Goal: Free from restraint(s) (Restraint for Interference with Medical Device)  Outcome: Progressing     Problem: Skin  Goal: Decreased wound size/increased tissue granulation at next dressing  change  Outcome: Progressing  Goal: Participates in plan/prevention/treatment measures  Outcome: Progressing  Goal: Prevent/manage excess moisture  Outcome: Progressing  Goal: Prevent/minimize sheer/friction injuries  Outcome: Progressing  Goal: Promote/optimize nutrition  Outcome: Progressing  Goal: Promote skin healing  Outcome: Progressing     Problem: Fall/Injury  Goal: Not fall by end of shift  Outcome: Progressing  Goal: Be free from injury by end of the shift  Outcome: Progressing  Goal: Verbalize understanding of personal risk factors for fall in the hospital  Outcome: Progressing  Goal: Verbalize understanding of risk factor reduction measures to prevent injury from fall in the home  Outcome: Progressing  Goal: Use assistive devices by end of the shift  Outcome: Progressing  Goal: Pace activities to prevent fatigue by end of the shift  Outcome: Progressing     Problem: Heart Failure  Goal: Improved gas exchange this shift  Outcome: Progressing  Goal: Improved urinary output this shift  Outcome: Progressing  Goal: Reduction in peripheral edema within 24 hours  Outcome: Progressing  Goal: Report improvement of dyspnea/breathlessness this shift  Outcome: Progressing  Goal: Weight from fluid excess reduced over 2-3 days, then stabilize  Outcome: Progressing  Goal: Increase self care and/or family involvement in 24 hours  Outcome: Progressing

## 2024-06-04 NOTE — CARE PLAN
Problem: Pain  Goal: My pain/discomfort is manageable  Outcome: Progressing     Problem: Safety  Goal: Patient will be injury free during hospitalization  Outcome: Progressing  Goal: I will remain free of falls  Outcome: Progressing     Problem: Daily Care  Goal: Daily care needs are met  Outcome: Progressing     Problem: Psychosocial Needs  Goal: Demonstrates ability to cope with hospitalization/illness  Outcome: Progressing  Goal: Collaborate with me, my family, and caregiver to identify my specific goals  Outcome: Progressing     Problem: Discharge Barriers  Goal: My discharge needs are met  Outcome: Progressing     Problem: Respiratory  Goal: Clear secretions with interventions this shift  Outcome: Progressing  Goal: Minimize anxiety/maximize coping throughout shift  Outcome: Progressing  Goal: Minimal/no exertional discomfort or dyspnea this shift  Outcome: Progressing  Goal: No signs of respiratory distress (eg. Use of accessory muscles. Peds grunting)  Outcome: Progressing  Goal: Patent airway maintained this shift  Outcome: Progressing  Goal: Tolerate mechanical ventilation evidenced by VS/agitation level this shift  Outcome: Progressing  Goal: Tolerate pulmonary toileting this shift  Outcome: Progressing  Goal: Verbalize decreased shortness of breath this shift  Outcome: Progressing  Goal: Wean oxygen to maintain O2 saturation per order/standard this shift  Outcome: Progressing  Goal: Increase self care and/or family involvement in next 24 hours  Outcome: Progressing     Problem: Safety - Medical Restraint  Goal: Remains free of injury from restraints (Restraint for Interference with Medical Device)  Outcome: Progressing  Goal: Free from restraint(s) (Restraint for Interference with Medical Device)  Outcome: Progressing     Problem: Skin  Goal: Decreased wound size/increased tissue granulation at next dressing change  Outcome: Progressing  Goal: Participates in plan/prevention/treatment measures  Outcome:  Progressing  Goal: Prevent/manage excess moisture  Outcome: Progressing  Goal: Prevent/minimize sheer/friction injuries  Outcome: Progressing  Goal: Promote/optimize nutrition  Outcome: Progressing  Goal: Promote skin healing  Outcome: Progressing     Problem: Fall/Injury  Goal: Not fall by end of shift  Outcome: Progressing  Goal: Be free from injury by end of the shift  Outcome: Progressing  Goal: Verbalize understanding of personal risk factors for fall in the hospital  Outcome: Progressing  Goal: Verbalize understanding of risk factor reduction measures to prevent injury from fall in the home  Outcome: Progressing  Goal: Use assistive devices by end of the shift  Outcome: Progressing  Goal: Pace activities to prevent fatigue by end of the shift  Outcome: Progressing     Problem: Heart Failure  Goal: Improved gas exchange this shift  Outcome: Progressing  Goal: Improved urinary output this shift  Outcome: Progressing  Goal: Reduction in peripheral edema within 24 hours  Outcome: Progressing  Goal: Report improvement of dyspnea/breathlessness this shift  Outcome: Progressing  Goal: Weight from fluid excess reduced over 2-3 days, then stabilize  Outcome: Progressing  Goal: Increase self care and/or family involvement in 24 hours  Outcome: Progressing   The patient's goals for the shift include Pt will have at least 4 hours of uninterrupted sleep during shift    The clinical goals for the shift include pt remain fall free respiratory status well controlled

## 2024-06-04 NOTE — PROGRESS NOTES
"Patric Arenas is a 49 y.o. male on day 8 of admission presenting with Acute on chronic congestive heart failure, unspecified heart failure type (Multi).    Subjective   tolerated nocturnal BIPAP    Objective   Physical Exam:  Constitutional: morbidly obese male, NAD, alert and cooperative  Eyes: PERRL, EOMI, no icterus   ENMT: mucous membranes moist, no apparent injury, no lesions seen  Head/Neck: Neck supple, no apparent injury  Respiratory/Thorax: Lungs diminished bilaterally,  non-labored breathing, no cough, on Airvo 40L, 50%  Cardiovascular: Regular, rate and rhythm  Gastrointestinal: Nondistended, soft, non-tender, BS x 4  : external urine catheter  Musculoskeletal: ROM intact, no joint swelling, normal strength  Extremities: +1BLE edema  Neurological: alert and oriented x 3, speech clear, follows commands appropriately, without focal deficits, sensation grossly intact, motor 5/5 throughout  Skin: Warm and dry, no lesions, no rashes     Last Recorded Vitals  Blood pressure 117/85, pulse 78, temperature 36.8 °C (98.2 °F), temperature source Temporal, resp. rate 22, height 1.854 m (6' 1\"), weight (!) 177 kg (391 lb 1.5 oz), SpO2 96%.    Intake/Output last 3 Shifts:  I/O last 3 completed shifts:  In: 1040 (5.9 mL/kg) [P.O.:1040]  Out: 2375 (13.4 mL/kg) [Urine:2375 (0.4 mL/kg/hr)]  Weight: 177.4 kg     Scheduled medications  aspirin, 81 mg, oral, Daily  atorvastatin, 40 mg, oral, Nightly  fluticasone furoate-vilanteroL, 1 puff, inhalation, Daily  fluticasone furoate-vilanteroL, 1 puff, inhalation, Daily  heparin (porcine), 7,500 Units, subcutaneous, q8h STEVE  ipratropium-albuteroL, 3 mL, nebulization, q6h  metoprolol succinate XL, 25 mg, oral, Daily  pantoprazole, 40 mg, intravenous, Daily  polyethylene glycol, 17 g, oral, Daily  potassium chloride CR, 40 mEq, oral, Once      PRN medications  PRN medications: albuterol, melatonin, OLANZapine, sodium chloride     Relevant Results    Results for orders placed or " performed during the hospital encounter of 05/26/24 (from the past 24 hour(s))   BLOOD GAS VENOUS   Result Value Ref Range    POCT pH, Venous 7.42 7.33 - 7.43 pH    POCT pCO2, Venous 84 (HH) 41 - 51 mm Hg    POCT pO2, Venous 54 (H) 35 - 45 mm Hg    POCT SO2, Venous 87 (H) 45 - 75 %    POCT Oxy Hemoglobin, Venous 84.5 (H) 45.0 - 75.0 %    POCT Base Excess, Venous 24.5 (H) -2.0 - 3.0 mmol/L    POCT HCO3 Calculated, Venous 54.5 (H) 22.0 - 26.0 mmol/L    Patient Temperature 37.0 degrees Celsius    FiO2 50 %   CBC and Auto Differential   Result Value Ref Range    WBC 4.3 (L) 4.4 - 11.3 x10*3/uL    nRBC 0.0 0.0 - 0.0 /100 WBCs    RBC 4.91 4.50 - 5.90 x10*6/uL    Hemoglobin 15.4 13.5 - 17.5 g/dL    Hematocrit 51.3 41.0 - 52.0 %     (H) 80 - 100 fL    MCH 31.4 26.0 - 34.0 pg    MCHC 30.0 (L) 32.0 - 36.0 g/dL    RDW 14.7 (H) 11.5 - 14.5 %    Platelets 155 150 - 450 x10*3/uL    Neutrophils % 66.9 40.0 - 80.0 %    Immature Granulocytes %, Automated 0.2 0.0 - 0.9 %    Lymphocytes % 19.2 13.0 - 44.0 %    Monocytes % 10.2 2.0 - 10.0 %    Eosinophils % 3.0 0.0 - 6.0 %    Basophils % 0.5 0.0 - 2.0 %    Neutrophils Absolute 2.90 1.20 - 7.70 x10*3/uL    Immature Granulocytes Absolute, Automated 0.01 0.00 - 0.70 x10*3/uL    Lymphocytes Absolute 0.83 (L) 1.20 - 4.80 x10*3/uL    Monocytes Absolute 0.44 0.10 - 1.00 x10*3/uL    Eosinophils Absolute 0.13 0.00 - 0.70 x10*3/uL    Basophils Absolute 0.02 0.00 - 0.10 x10*3/uL      Imaging:   XR chest 1 view 6/3/2024  1.  Interval improvement in pulmonary interstitial edema and left-greater-than-right bibasilar airspace opacities. 2. Similar small left pleural effusion.       CT chest wo IV contrast  6/3/2024  1. Mild bibasilar atelectasis no evidence of focal airspace disease/edema. Prominent mosaic perfusion pattern with tortuosity of the peripheral pulmonary arteries concerning for a component of pulmonary artery hypertension. Increased right-sided cardiac chambers and correlate with  echocardiogram for evaluation of right-sided cardiac function and pressures.   2. Ca not exclude a web like narrowing of the trachea at the level of the thoracic inlet. Correlate with any clinical evidence of upper airway obstruction.       Transthoracic Echo (TTE) Complete 5/28/2024  1. Left ventricular systolic function is low normal with a 50-55% estimated ejection fraction.    2. Poorly visualized anatomical structures due to suboptimal image quality.    3. Abnormal septal motion consistent with RV pacemaker and right ventricular pressure overload.    4. Moderately increased left ventricular septal thickness.    5. The left ventricular posterior wall thickness is moderately increased.    6. There is reduced right ventricular systolic function.    7. RVSP within normal limits.    8. The RV systolic pressure may be underestimated.     Assessment/Plan   Active Problems:    COPD with acute exacerbation (Multi)    Patric Arenas is a 48 y.o. male with PMH of CHF (EF 45 to 50%), COPD, nonischemic cardiomyopathy s/p AICD, HTN, HLD, MJ  who presenting to the ICU for acute hypercapnic respiratory failure requiring intubation.    Patient admitted to MICU for acute hypercapnic resp faillure and acute decompensated HF I/s/o noncompliance with meds, O2 and CPAP.  Intubated and sedated on admit.  Aggressively diuresed with IV Lasix drip & 1 x dose of 250 mg IV Diamox.  Patient self extubated 5/28, placed on BiPap which he did not tolerated, becoming acutely agitated.  Placed on 5 L NC, started on Precedex drip which was continued until 5/29.  Mental status since resolved.  Lasix drip continued until 5/29 am, until decreased renal function likely ATN in the setting of diuresis.    Transferred to cardiology service.  Code white and transfer to CICU 6/2 for hypercapnic resp failure, not wearing bipap.  Transferred from CICU to SDU 6/2 pm on Airvo and Bipap at night    Neuro: hx ETOH, illicit  substance abuse,  encephalopathy/agitation now resolved  - Delirium vs drug use (admits to ectasy & PCP use), drinks fifth of liquor ~daily  - Admit Utox +PCP  - Required Precedex drip until 5/29, sitter and restraints in MICU, now A&Ox3, appropriate behavior  - Cont PRN Zyprexa    Cardio:  Acute on chronic systolic HF, with recovered EF 50-55%  - Dilated NICM 2/2 ETOH, s/p ICD, LHC 2019 clean coronaries, in setting of medication noncompliance   - TTE 2/14/24: EF 45%, TTE 5/2021: EF 35%  - TTE 5/28: EF 50-55%, no WMA, normal DF, RV mildly enlarged, reduced RV function  - Admit BNP: 373 (previously 40's)  - Admit CXR: Cardiomegaly with mild pulmonary vascular congestion  - CXR 5/28: Interval worsening in interstitial/alveolar pulmonary edema. small bilateral pleural effusions   - s/p diuresis with Lasix boluses, drip, and Metolazone 5 mg on 5/27 & 5/28   - Lasix drip held 5/29 for soft BP's, decreased UO, and SUZANNE  - Repeat BNP (5/30): 159 improved  - c/w  home Toprol XL 25   - held off on home additional GDMT until SUZANNE resolves  - Home meds: Empa 10, Toprol XL 25, Entresto 49-51, [place] 25, Torsemide 100 BID, Metolazone 5 daily  - Cont 2gram sodium diet, 2L fluid restriction, daily standing weights, strict I&O's   -s/p  250 mg Diamax on 6/3 for resp alkalosis, and held off on Lasix  -restarting Torsemide 50 mg BID and [place] 25 mg starting 6/5     Pulm: Acute on chronic hypercapnic respiratory failure, hx MJ/Asthma and possible COPD , likely obesity hopoventilation syndrome  -continue with BIPAP- setting adjusted to 16/8 on 6/3   Home meds: Ventolin 90 mcg/inh, Breo ellipta 200-25 mcg/dose  - Cont PRN Albuterol, resumed home Breo Ellipta (5/30)  -VBG from 6/3  7.42/84, bicarb >45-> gave dose of diamox 250 mg IV  -wean O2 as tolerating- on Airvo 40L, 50%, goal Pox >92%  -will need home BIPAP- (patient had BIPAP prior, not using and family unable to find machine at home)- will need to set up as outpatient     FEN/GI:  -tolerating low Na  diet  -bowel regimen  -replace electrolytes as needed - gave K 40 mg x2 doses  -nutrition consult for diet management   -daily RFP    Renal: ATN 2/2 hypotension vs aggressive diuresis, FeUrea 36.2%  - Admit Cr: 1.67  - Baseline Cr ~1.2-1.5,   - Cr  trending down - 1.9 today <-2.16 <-2.58, <-2.96 peaked at 3.37  - Avoid nephrotoxins and hypotension  -daily RFP    Heme:  -no s/sx bleeding  -daily CBC    Endo: Morbid obesity, BMI 51.6  -admit A1C 6.2%   Encouraged dietary modifications, exercise, and weight loss  - Follow up with PCP    ID: no active issues    Prophy:  -PPI  -Heparin subcutaneous     Access:  PIVs    Code Status:  Full code  NOK: Jayne Capps (Mother) [phone]     Dispo  will continue Step down care for now while high O2 needs    D/w Dr Mims     I spent >45 minutes in the professional and overall care of this patient.    ANANT Ken-CNP

## 2024-06-04 NOTE — SIGNIFICANT EVENT
RAPID RESPONSE RN NOTE- RADAR 7   06/04/24 1625   Onset Documentation   Rapid Response Initiated By Radar auto page   Location/Room [facility]  ([facility] 6015)   Pager Time 1624   Arrival Time 1625   Event End Time 1635   Level II Called No   Primary Reason for Call Radar auto page  (RADAR 7)     36.6, 88, 22, 109/67, 92%.  Reviewed VS with Lynn RN - VS consistent with patient's trend, no acute concerns from Nursing at this time.  RN to contact Rapid Response Team with any further issues or patient deterioration.

## 2024-06-04 NOTE — CONSULTS
COPD Education    Patient Characteristics:   Comorbidities:    COPD, Asthma, CKD3, HTN, MJ, HF    Exacerbation last year:   None noted                 Moderate: >= 2 exacerbations or >= 1 exacerbation leading to hospitalization    Spirometry: No  Date:            FVC:    (   %) FEV1:   (  %)  FEV1/FVC:    (   %)    Uses of Oxygen/CPAP/BIPAP:   Per patient has a machine for sleep and has not used it or knows the setting. There is a sleep study from 1/28/19 for [place]                                                         Smoking status:  Smoker: No    PPY:  Quit date:  Smoking cessation counseling:     Booklet given:           Pulmonary physician:  None noted                  Name:                  Date last seen:                                     Pharmacotherapy:   Maintenance medications   LABA, LAMA, LABA/LAMA, ICS/LABA, ICS/LAMA, ICS/LABA/LAMA,   Name of the medication: Albuterol    Prednisone: No Theophylline: No  Roflumilast: No  Macrolides: No     If not on maintenance meds:  Consider LAMA or LAMA/LABA   Consider ICS (if peripheral eosinophilia >300cells/microl, COPD exacerbation requiring hospitalization, >= 2 moderated COPD exacerbation, History of or concomitant asthma)    If low inspiratory force or challenges with inhalers   Consider Nebulizers   Consider RESPIMAT   Spacer Given     COPD Education   COPD patient education book: Yes     Inhaled medication teach-back: Yes     JEOVANNY, other videos:    COPD, Asthma, Action Plan  Patient demonstrated understanding/teach-back method: Yes         Home Oxygen Evaluation:    No                                        Pulmonary Rehabilitation referral:   Already attended:  No     When:                                Info in COPD patient education book              Vaccines                   Influenza:  1/26/24        Pneumococcal:   1/26/24   COVID 19: 1/28/22     Pertussis:                  It is recommended that the patient follow up with a  pulmonary team within two weeks of discharge. The patient may also benefit from attending a pulmonary rehabilitation program. It is noted that there are no pulmonary function tests (PFTs) mentioned in the patient's chart, so it may be beneficial to have these done. Discuss with the patient to use the machine when sleeping or taking naps. Additionally, explain the need to follow up with the sleep team.

## 2024-06-05 LAB
ALBUMIN SERPL BCP-MCNC: 3.7 G/DL (ref 3.4–5)
ANION GAP SERPL CALC-SCNC: 13 MMOL/L (ref 10–20)
BASOPHILS # BLD AUTO: 0.02 X10*3/UL (ref 0–0.1)
BASOPHILS NFR BLD AUTO: 0.5 %
BUN SERPL-MCNC: 34 MG/DL (ref 6–23)
CALCIUM SERPL-MCNC: 9.9 MG/DL (ref 8.6–10.6)
CHLORIDE SERPL-SCNC: 90 MMOL/L (ref 98–107)
CO2 SERPL-SCNC: 38 MMOL/L (ref 21–32)
CREAT SERPL-MCNC: 1.72 MG/DL (ref 0.5–1.3)
EGFRCR SERPLBLD CKD-EPI 2021: 48 ML/MIN/1.73M*2
EOSINOPHIL # BLD AUTO: 0.16 X10*3/UL (ref 0–0.7)
EOSINOPHIL NFR BLD AUTO: 3.8 %
ERYTHROCYTE [DISTWIDTH] IN BLOOD BY AUTOMATED COUNT: 14.7 % (ref 11.5–14.5)
GLUCOSE SERPL-MCNC: 138 MG/DL (ref 74–99)
HCT VFR BLD AUTO: 49.2 % (ref 41–52)
HGB BLD-MCNC: 15 G/DL (ref 13.5–17.5)
IMM GRANULOCYTES # BLD AUTO: 0.01 X10*3/UL (ref 0–0.7)
IMM GRANULOCYTES NFR BLD AUTO: 0.2 % (ref 0–0.9)
LYMPHOCYTES # BLD AUTO: 0.79 X10*3/UL (ref 1.2–4.8)
LYMPHOCYTES NFR BLD AUTO: 19 %
MAGNESIUM SERPL-MCNC: 2.09 MG/DL (ref 1.6–2.4)
MCH RBC QN AUTO: 31.7 PG (ref 26–34)
MCHC RBC AUTO-ENTMCNC: 30.5 G/DL (ref 32–36)
MCV RBC AUTO: 104 FL (ref 80–100)
MONOCYTES # BLD AUTO: 0.33 X10*3/UL (ref 0.1–1)
MONOCYTES NFR BLD AUTO: 7.9 %
NEUTROPHILS # BLD AUTO: 2.85 X10*3/UL (ref 1.2–7.7)
NEUTROPHILS NFR BLD AUTO: 68.6 %
NRBC BLD-RTO: 0 /100 WBCS (ref 0–0)
PHOSPHATE SERPL-MCNC: 2.8 MG/DL (ref 2.5–4.9)
PLATELET # BLD AUTO: 171 X10*3/UL (ref 150–450)
POTASSIUM SERPL-SCNC: 3.5 MMOL/L (ref 3.5–5.3)
RBC # BLD AUTO: 4.73 X10*6/UL (ref 4.5–5.9)
SODIUM SERPL-SCNC: 137 MMOL/L (ref 136–145)
WBC # BLD AUTO: 4.2 X10*3/UL (ref 4.4–11.3)

## 2024-06-05 PROCEDURE — 83735 ASSAY OF MAGNESIUM: CPT

## 2024-06-05 PROCEDURE — 2060000001 HC INTERMEDIATE ICU ROOM DAILY

## 2024-06-05 PROCEDURE — 2500000001 HC RX 250 WO HCPCS SELF ADMINISTERED DRUGS (ALT 637 FOR MEDICARE OP): Performed by: NURSE PRACTITIONER

## 2024-06-05 PROCEDURE — 2500000005 HC RX 250 GENERAL PHARMACY W/O HCPCS: Performed by: NURSE PRACTITIONER

## 2024-06-05 PROCEDURE — 36415 COLL VENOUS BLD VENIPUNCTURE: CPT

## 2024-06-05 PROCEDURE — 2500000004 HC RX 250 GENERAL PHARMACY W/ HCPCS (ALT 636 FOR OP/ED): Performed by: NURSE PRACTITIONER

## 2024-06-05 PROCEDURE — 94660 CPAP INITIATION&MGMT: CPT

## 2024-06-05 PROCEDURE — 2500000002 HC RX 250 W HCPCS SELF ADMINISTERED DRUGS (ALT 637 FOR MEDICARE OP, ALT 636 FOR OP/ED)

## 2024-06-05 PROCEDURE — 2500000002 HC RX 250 W HCPCS SELF ADMINISTERED DRUGS (ALT 637 FOR MEDICARE OP, ALT 636 FOR OP/ED): Performed by: NURSE PRACTITIONER

## 2024-06-05 PROCEDURE — 80069 RENAL FUNCTION PANEL: CPT

## 2024-06-05 PROCEDURE — 94640 AIRWAY INHALATION TREATMENT: CPT

## 2024-06-05 PROCEDURE — 85025 COMPLETE CBC W/AUTO DIFF WBC: CPT

## 2024-06-05 PROCEDURE — C9113 INJ PANTOPRAZOLE SODIUM, VIA: HCPCS | Performed by: NURSE PRACTITIONER

## 2024-06-05 RX ORDER — POTASSIUM CHLORIDE 20 MEQ/1
20 TABLET, EXTENDED RELEASE ORAL ONCE
Status: COMPLETED | OUTPATIENT
Start: 2024-06-05 | End: 2024-06-05

## 2024-06-05 RX ADMIN — IPRATROPIUM BROMIDE AND ALBUTEROL SULFATE 3 ML: .5; 3 SOLUTION RESPIRATORY (INHALATION) at 13:34

## 2024-06-05 RX ADMIN — METOPROLOL SUCCINATE 25 MG: 25 TABLET, EXTENDED RELEASE ORAL at 10:15

## 2024-06-05 RX ADMIN — ATORVASTATIN CALCIUM 40 MG: 80 TABLET, FILM COATED ORAL at 21:04

## 2024-06-05 RX ADMIN — TORSEMIDE 50 MG: 20 TABLET ORAL at 10:14

## 2024-06-05 RX ADMIN — Medication 7 L/MIN: at 20:00

## 2024-06-05 RX ADMIN — IPRATROPIUM BROMIDE AND ALBUTEROL SULFATE 3 ML: .5; 3 SOLUTION RESPIRATORY (INHALATION) at 19:33

## 2024-06-05 RX ADMIN — HEPARIN SODIUM 7500 UNITS: 5000 INJECTION INTRAVENOUS; SUBCUTANEOUS at 21:03

## 2024-06-05 RX ADMIN — IPRATROPIUM BROMIDE AND ALBUTEROL SULFATE 3 ML: .5; 3 SOLUTION RESPIRATORY (INHALATION) at 08:06

## 2024-06-05 RX ADMIN — HEPARIN SODIUM 7500 UNITS: 5000 INJECTION INTRAVENOUS; SUBCUTANEOUS at 06:04

## 2024-06-05 RX ADMIN — PANTOPRAZOLE SODIUM 40 MG: 40 INJECTION, POWDER, FOR SOLUTION INTRAVENOUS at 10:15

## 2024-06-05 RX ADMIN — Medication 40 L/MIN: at 08:00

## 2024-06-05 RX ADMIN — TORSEMIDE 50 MG: 20 TABLET ORAL at 21:04

## 2024-06-05 RX ADMIN — POTASSIUM CHLORIDE 20 MEQ: 1500 TABLET, EXTENDED RELEASE ORAL at 13:22

## 2024-06-05 RX ADMIN — SPIRONOLACTONE 25 MG: 25 TABLET, FILM COATED ORAL at 10:15

## 2024-06-05 RX ADMIN — HEPARIN SODIUM 7500 UNITS: 5000 INJECTION INTRAVENOUS; SUBCUTANEOUS at 13:22

## 2024-06-05 RX ADMIN — ASPIRIN 81 MG CHEWABLE TABLET 81 MG: 81 TABLET CHEWABLE at 10:15

## 2024-06-05 ASSESSMENT — COGNITIVE AND FUNCTIONAL STATUS - GENERAL
MOVING TO AND FROM BED TO CHAIR: A LITTLE
MOBILITY SCORE: 21
WALKING IN HOSPITAL ROOM: A LITTLE
WALKING IN HOSPITAL ROOM: A LITTLE
HELP NEEDED FOR BATHING: A LITTLE
MOBILITY SCORE: 21
DRESSING REGULAR UPPER BODY CLOTHING: A LITTLE
DRESSING REGULAR LOWER BODY CLOTHING: A LITTLE
DRESSING REGULAR UPPER BODY CLOTHING: A LITTLE
CLIMB 3 TO 5 STEPS WITH RAILING: A LITTLE
DRESSING REGULAR LOWER BODY CLOTHING: A LITTLE
DAILY ACTIVITIY SCORE: 21
MOVING TO AND FROM BED TO CHAIR: A LITTLE
HELP NEEDED FOR BATHING: A LITTLE
DAILY ACTIVITIY SCORE: 21
CLIMB 3 TO 5 STEPS WITH RAILING: A LITTLE

## 2024-06-05 ASSESSMENT — PAIN SCALES - GENERAL
PAINLEVEL_OUTOF10: 0 - NO PAIN

## 2024-06-05 ASSESSMENT — PAIN - FUNCTIONAL ASSESSMENT: PAIN_FUNCTIONAL_ASSESSMENT: 0-10

## 2024-06-05 NOTE — PROGRESS NOTES
6/5 Per care team, patient is currently on Airvo and unable to do the walking desat study for Oxygen qualification. Will continue to follow for medical readiness and  oxygen needs. Per [employer]- Adrienne vent was approved thru insurance but O2 testing needed 48 hours prior to discharge. Mariela Magana RN

## 2024-06-05 NOTE — CARE PLAN
The clinical goals for the shift include safety    Problem: Safety  Goal: Patient will be injury free during hospitalization  Outcome: Progressing  Goal: I will remain free of falls  Outcome: Progressing     Problem: Daily Care  Goal: Daily care needs are met  Outcome: Progressing

## 2024-06-05 NOTE — CONSULTS
COPD Follow-up Education    Review:  Medication with spacer use: Yes  Triggers and ways to avoid triggers: Yes    Recognize having trouble and need to call the doctor: Yes    View the JEOVANNY's and book: Encourage use           It is recommended that the patient follow up with a pulmonary team within two weeks of discharge. The patient may also benefit from attending a pulmonary rehabilitation program. It is noted that there are no pulmonary function tests (PFTs) mentioned in the patient's chart, so it may be beneficial to have these done. Discuss with the patient to use the machine when sleeping or taking naps. Additionally, explain the need to follow up with the sleep team.

## 2024-06-05 NOTE — PROGRESS NOTES
"Patric Arenas is a 49 y.o. male on day 9 of admission presenting with Acute on chronic congestive heart failure, unspecified heart failure type (Multi).    Subjective     No acute events overnight.  Patient remains on Airvo during waking hours and Bipap HS and with naps.   ROS: Denies SOB, MEDINA, fever, chills, cp, abdominal pain, and n/v     Objective   Physical Exam:  Constitutional: obese male pt in NAD, alert and cooperative  Eyes: PERRL, EOMI, no icterus   ENMT: mucous membranes moist  Head/Neck: Neck supple, no apparent injury  Respiratory/Thorax: Lungs diminished bilaterally, non-labored breathing, no cough, on Airvo 40L/40%  Cardiovascular: Regular, rate and rhythm, no murmurs, normal S1 and S2  Gastrointestinal: Obese, nondistended, soft, non-tender, BS present x 4  Musculoskeletal: ROM intact  Extremities: no edema  Neurological: alert and oriented x 3, speech clear, follows commands appropriately, without focal deficits, sensation grossly intact, motor 5/5 throughout  Skin: Warm and dry    Vital Signs  Blood pressure 119/82, pulse 73, temperature 36.3 °C (97.3 °F), temperature source Temporal, resp. rate 23, height 1.854 m (6' 1\"), weight (!) 177 kg (391 lb 1.5 oz), SpO2 94%.  Oxygen Therapy  SpO2: 94 %  Medical Gas Therapy: Supplemental oxygen  O2 Delivery Method: CPAP/Bi-PAP mask  FiO2 (%):  [40 %-60 %] 60 %  S RR:  [12] 12    Intake/Output last 3 Shifts:  I/O last 3 completed shifts:  In: 1040 (5.9 mL/kg) [P.O.:1040]  Out: 2600 (14.7 mL/kg) [Urine:2600 (0.4 mL/kg/hr)]  Weight: 177.4 kg     Lines and Tubes:  Peripheral IV 05/28/24 20 G Right Antecubital (Active)   Placement Date/Time: 05/28/24 1407   Size (Gauge): 20 G  Orientation: Right  Location: Antecubital  Site Prep: Usual sterile procedure followed;Chlorhexidine   Technique: Ultrasound guidance  Placed by: DENVER  Insertion attempts: 1   Number of days: 7       Peripheral IV 05/28/24 22 G Left;Ventral Forearm (Active)   Placement Date/Time: 05/28/24 " 1407   Size (Gauge): 22 G  Orientation: Left;Ventral  Location: Forearm  Site Prep: Usual sterile procedure followed;Chlorhexidine   Placed by: VAST  Insertion attempts: 1   Number of days: 7       External Urinary Catheter Male (Active)   Placement Date/Time: 06/02/24 0028   Hand Hygiene Completed: Yes  External Catheter Type: Male   Number of days: 3         Relevant Results  Scheduled medications  aspirin, 81 mg, oral, Daily  atorvastatin, 40 mg, oral, Nightly  fluticasone furoate-vilanteroL, 1 puff, inhalation, Daily  heparin (porcine), 7,500 Units, subcutaneous, q8h STEVE  ipratropium-albuteroL, 3 mL, nebulization, TID  metoprolol succinate XL, 25 mg, oral, Daily  oxygen, , inhalation, Continuous - Inhalation  pantoprazole, 40 mg, intravenous, Daily  polyethylene glycol, 17 g, oral, Daily  spironolactone, 25 mg, oral, Daily  torsemide, 50 mg, oral, BID      Continuous medications     PRN medications  PRN medications: albuterol, melatonin, OLANZapine, sodium chloride    Results for orders placed or performed during the hospital encounter of 05/26/24 (from the past 24 hour(s))   Magnesium   Result Value Ref Range    Magnesium 2.54 (H) 1.60 - 2.40 mg/dL   CBC and Auto Differential   Result Value Ref Range    WBC 4.2 (L) 4.4 - 11.3 x10*3/uL    nRBC 0.0 0.0 - 0.0 /100 WBCs    RBC 4.73 4.50 - 5.90 x10*6/uL    Hemoglobin 15.0 13.5 - 17.5 g/dL    Hematocrit 49.2 41.0 - 52.0 %     (H) 80 - 100 fL    MCH 31.7 26.0 - 34.0 pg    MCHC 30.5 (L) 32.0 - 36.0 g/dL    RDW 14.7 (H) 11.5 - 14.5 %    Platelets 171 150 - 450 x10*3/uL    Neutrophils % 68.6 40.0 - 80.0 %    Immature Granulocytes %, Automated 0.2 0.0 - 0.9 %    Lymphocytes % 19.0 13.0 - 44.0 %    Monocytes % 7.9 2.0 - 10.0 %    Eosinophils % 3.8 0.0 - 6.0 %    Basophils % 0.5 0.0 - 2.0 %    Neutrophils Absolute 2.85 1.20 - 7.70 x10*3/uL    Immature Granulocytes Absolute, Automated 0.01 0.00 - 0.70 x10*3/uL    Lymphocytes Absolute 0.79 (L) 1.20 - 4.80 x10*3/uL     Monocytes Absolute 0.33 0.10 - 1.00 x10*3/uL    Eosinophils Absolute 0.16 0.00 - 0.70 x10*3/uL    Basophils Absolute 0.02 0.00 - 0.10 x10*3/uL   Renal function panel   Result Value Ref Range    Glucose 138 (H) 74 - 99 mg/dL    Sodium 137 136 - 145 mmol/L    Potassium 3.5 3.5 - 5.3 mmol/L    Chloride 90 (L) 98 - 107 mmol/L    Bicarbonate 38 (H) 21 - 32 mmol/L    Anion Gap 13 10 - 20 mmol/L    Urea Nitrogen 34 (H) 6 - 23 mg/dL    Creatinine 1.72 (H) 0.50 - 1.30 mg/dL    eGFR 48 (L) >60 mL/min/1.73m*2    Calcium 9.9 8.6 - 10.6 mg/dL    Phosphorus 2.8 2.5 - 4.9 mg/dL    Albumin 3.7 3.4 - 5.0 g/dL   Magnesium   Result Value Ref Range    Magnesium 2.09 1.60 - 2.40 mg/dL     No results found.    XR chest 1 view 06/01/2024    Narrative  Interpreted By:  Tre Lopez and Stephens Katherine  STUDY:  XR CHEST 1 VIEW;  6/1/2024 11:26 pm    INDICATION:  Signs/Symptoms:respiratory failure.    COMPARISON:  Chest radiograph 05/28/2024  Same day CT chest    ACCESSION NUMBER(S):  [id number]    ORDERING CLINICIAN:  CAIT WAHL    FINDINGS:  AP radiograph of the chest was provided.    Left chest wall cardiac pacemaker/AICD with leads overlying the  cardiomediastinal silhouette.    CARDIOMEDIASTINAL SILHOUETTE:  The cardiomediastinal silhouette is enlarged, stable in size and  configuration.    LUNGS:  Low lung volumes contributing to bronchovascular crowding. Prominent  perihilar interstitial markings bilaterally, slightly improved from  prior. Interval improvement in hazy bibasilar opacities with  persistent blunting of the left costophrenic angle. No pneumothorax.    ABDOMEN:  No remarkable upper abdominal findings.    BONES:  No acute osseous changes.    Impression  1.  Interval improvement in pulmonary interstitial edema and  left-greater-than-right bibasilar airspace opacities.  2. Similar small left pleural effusion.    I personally reviewed the images/study and I agree with Felicia Rondon DO's (radiology  resident) findings as stated. This study  was interpreted at [facility],  [place], [place].    MACRO:  None    Signed by: Tre Lopez 6/3/2024 6:58 AM  Dictation workstation:   [id number]      Assessment/Plan   Active Problems:    COPD with acute exacerbation (Multi)    Patric Arenas is a 48 y.o. male with PMH of CHF (EF 45 to 50%), COPD, nonischemic cardiomyopathy s/p AICD, HTN, HLD, MJ  who presenting to the ICU for acute hypercapnic respiratory failure requiring intubation.    Patient admitted to MICU for acute hypercapnic resp faillure and acute decompensated HF I/s/o noncompliance with meds, O2 and CPAP.  Intubated and sedated on admit.  Aggressively diuresed with IV Lasix drip & 1 x dose of 250 mg IV Diamox.  Patient self extubated 5/28, placed on BiPap which he did not tolerated, becoming acutely agitated.  Placed on 5 L NC, started on Precedex drip which was continued until 5/29.  Mental status since resolved.  Lasix drip continued until 5/29 am, until decreased renal function likely ATN in the setting of diuresis.    Transferred to cardiology service.  Code white and transfer to CICU 6/2 for hypercapnic resp failure, not wearing bipap.  Transferred from CICU to SDU 6/2 pm on Airvo and Bipap at night     Neuro:   #Hx ETOH, illicit  substance abuse  #Encephalopathy/agitation, resolved  - A&Ox3  - Delirium vs drug use (admits to ectasy & PCP use), drinks fifth of liquor ~daily  - Admit Utox +PCP  - Continue PRN Zyprexa q6hrs for agitation  - PT/OT/OOB     CV:    #Hx HTN, HLD, CHF, and nonischemic cardiomyopathy s/p AICD  #Acute on chronic systolic HF, with recovered EF 50-55%  - HDS   - Dilated NICM 2/2 ETOH, s/p ICD, LHC 2019 clean coronaries, in setting of medication noncompliance   - TTE 2/14/24: EF 45%, TTE 5/2021: EF 35%  - TTE 5/28: EF 50-55%, no WMA, normal DF, RV mildly enlarged, reduced RV function  - Admit BNP: 373, repeat on 6/2 86  - Admit CXR: Cardiomegaly  with mild pulmonary vascular congestion  - CXR 5/28: Interval worsening in interstitial/alveolar pulmonary edema. small bilateral pleural effusions   - s/p diuresis with Lasix boluses, drip, and Metolazone 5 mg on 5/27 & 5/28   - Continue with home Toprol XL 25mg daily, Atorvastatin, ASA, Torsemide 50 mg BID, and Dieudonne 25 mg   - Home meds: Empa 10, Toprol XL 25, Entresto 49-51, [place] 25, Torsemide 100 BID, Metolazone 5 daily  - Daily standing weights, and strict I&O's   - Continue on tele while in SDU  - Maintain Map >65       Pulm:   #Hx MJ/Asthma and possible COPD, likely obesity hopoventilation syndrome  #Acute on chronic hypercapnic respiratory failure,   - Remains on Airvo 40L/45% during the day and Bipap 16/8 HS and with naps  - Home meds: Ventolin 90 mcg/inh, Breo ellipta 200-25 mcg/dose  - Continue Albuterol q6 hrs prn wheezing, Breo Ellipta daily, and Duo-nebs TID  - VBG from 6/3  7.42/84, bicarb >45-> gave dose of diamox 250 mg IV  - SpO2 goal >92%  - Wean O2 as tolerating  - Will need home BIPAP- (patient had BIPAP prior, not using and family unable to find machine at home)     FEN/GI:  #Hypokalemia  - Diet: low Na diet  - Bowel regimen: Miralax daily   - Continue PPI daily   - Replete K  - Nutrition consult for diet management   - Daily RFP/mag     Renal:   #ATN 2/2 hypotension vs aggressive diuresis, FeUrea 36.2%  - Baseline Cr ~1.2-1.5. Admit Cr: 1.67.   - Cr  trending down - 1.7 today from 1.9 yesterday  - Avoid nephrotoxins and hypotension  - Daily RFP     Heme: No acute issues   - No s/sx bleeding  - Daily CBC     Endo:   #Morbid obesity, BMI 51.6  - Admit A1C 6.2%  - Encouraged dietary modifications, exercise, and weight loss  - Follow up with PCP     ID: no active issues     Ppx:  GI: PPI  DVT: Heparin subcutaneous      Access:  PIVs     Code Status:  Full code  NOK: Jayne Capps (Mother) [phone]      Dispo: continue Step down care for now while high O2 needs    Pt discussed with   Prasanna, seen and examined. All labs, VS and previous plan of care reviewed.        Estuardo Dick, APRN-CNP

## 2024-06-05 NOTE — NURSING NOTE
Walking Oxygen and Dissat    Sitting 7lts Oxygen Highflow NC - 93%    Standing 7lts Oxygen Highflow NC 89%    Walking 7lts Oxygen Highflow NC - 66%

## 2024-06-06 LAB
ALBUMIN SERPL BCP-MCNC: 3.9 G/DL (ref 3.4–5)
ANION GAP SERPL CALC-SCNC: 13 MMOL/L (ref 10–20)
BASOPHILS # BLD AUTO: 0.02 X10*3/UL (ref 0–0.1)
BASOPHILS NFR BLD AUTO: 0.5 %
BUN SERPL-MCNC: 35 MG/DL (ref 6–23)
CALCIUM SERPL-MCNC: 9.8 MG/DL (ref 8.6–10.6)
CHLORIDE SERPL-SCNC: 91 MMOL/L (ref 98–107)
CO2 SERPL-SCNC: 37 MMOL/L (ref 21–32)
CREAT SERPL-MCNC: 2.09 MG/DL (ref 0.5–1.3)
EGFRCR SERPLBLD CKD-EPI 2021: 38 ML/MIN/1.73M*2
EOSINOPHIL # BLD AUTO: 0.13 X10*3/UL (ref 0–0.7)
EOSINOPHIL NFR BLD AUTO: 3.1 %
ERYTHROCYTE [DISTWIDTH] IN BLOOD BY AUTOMATED COUNT: 14.4 % (ref 11.5–14.5)
GLUCOSE SERPL-MCNC: 88 MG/DL (ref 74–99)
HCT VFR BLD AUTO: 48.2 % (ref 41–52)
HGB BLD-MCNC: 14.8 G/DL (ref 13.5–17.5)
IMM GRANULOCYTES # BLD AUTO: 0.01 X10*3/UL (ref 0–0.7)
IMM GRANULOCYTES NFR BLD AUTO: 0.2 % (ref 0–0.9)
LYMPHOCYTES # BLD AUTO: 0.89 X10*3/UL (ref 1.2–4.8)
LYMPHOCYTES NFR BLD AUTO: 21.5 %
MAGNESIUM SERPL-MCNC: 2.05 MG/DL (ref 1.6–2.4)
MCH RBC QN AUTO: 31.3 PG (ref 26–34)
MCHC RBC AUTO-ENTMCNC: 30.7 G/DL (ref 32–36)
MCV RBC AUTO: 102 FL (ref 80–100)
MONOCYTES # BLD AUTO: 0.44 X10*3/UL (ref 0.1–1)
MONOCYTES NFR BLD AUTO: 10.6 %
NEUTROPHILS # BLD AUTO: 2.65 X10*3/UL (ref 1.2–7.7)
NEUTROPHILS NFR BLD AUTO: 64.1 %
NRBC BLD-RTO: 0 /100 WBCS (ref 0–0)
PHOSPHATE SERPL-MCNC: 3.4 MG/DL (ref 2.5–4.9)
PLATELET # BLD AUTO: 174 X10*3/UL (ref 150–450)
POTASSIUM SERPL-SCNC: 3.3 MMOL/L (ref 3.5–5.3)
RBC # BLD AUTO: 4.73 X10*6/UL (ref 4.5–5.9)
SODIUM SERPL-SCNC: 138 MMOL/L (ref 136–145)
WBC # BLD AUTO: 4.1 X10*3/UL (ref 4.4–11.3)

## 2024-06-06 PROCEDURE — 2500000005 HC RX 250 GENERAL PHARMACY W/O HCPCS: Performed by: NURSE PRACTITIONER

## 2024-06-06 PROCEDURE — 2500000004 HC RX 250 GENERAL PHARMACY W/ HCPCS (ALT 636 FOR OP/ED): Performed by: NURSE PRACTITIONER

## 2024-06-06 PROCEDURE — 36415 COLL VENOUS BLD VENIPUNCTURE: CPT

## 2024-06-06 PROCEDURE — 85025 COMPLETE CBC W/AUTO DIFF WBC: CPT

## 2024-06-06 PROCEDURE — 2500000002 HC RX 250 W HCPCS SELF ADMINISTERED DRUGS (ALT 637 FOR MEDICARE OP, ALT 636 FOR OP/ED)

## 2024-06-06 PROCEDURE — 83735 ASSAY OF MAGNESIUM: CPT

## 2024-06-06 PROCEDURE — 94660 CPAP INITIATION&MGMT: CPT

## 2024-06-06 PROCEDURE — 2500000002 HC RX 250 W HCPCS SELF ADMINISTERED DRUGS (ALT 637 FOR MEDICARE OP, ALT 636 FOR OP/ED): Performed by: NURSE PRACTITIONER

## 2024-06-06 PROCEDURE — 80069 RENAL FUNCTION PANEL: CPT

## 2024-06-06 PROCEDURE — 2060000001 HC INTERMEDIATE ICU ROOM DAILY

## 2024-06-06 PROCEDURE — C9113 INJ PANTOPRAZOLE SODIUM, VIA: HCPCS | Performed by: NURSE PRACTITIONER

## 2024-06-06 PROCEDURE — 2500000001 HC RX 250 WO HCPCS SELF ADMINISTERED DRUGS (ALT 637 FOR MEDICARE OP): Performed by: NURSE PRACTITIONER

## 2024-06-06 PROCEDURE — 94640 AIRWAY INHALATION TREATMENT: CPT

## 2024-06-06 RX ORDER — POTASSIUM CHLORIDE 20 MEQ/1
40 TABLET, EXTENDED RELEASE ORAL ONCE
Status: COMPLETED | OUTPATIENT
Start: 2024-06-06 | End: 2024-06-06

## 2024-06-06 RX ADMIN — PANTOPRAZOLE SODIUM 40 MG: 40 INJECTION, POWDER, FOR SOLUTION INTRAVENOUS at 08:12

## 2024-06-06 RX ADMIN — Medication 8 L/MIN: at 20:00

## 2024-06-06 RX ADMIN — METOPROLOL SUCCINATE 25 MG: 25 TABLET, EXTENDED RELEASE ORAL at 08:12

## 2024-06-06 RX ADMIN — IPRATROPIUM BROMIDE AND ALBUTEROL SULFATE 3 ML: .5; 3 SOLUTION RESPIRATORY (INHALATION) at 14:17

## 2024-06-06 RX ADMIN — POTASSIUM CHLORIDE 40 MEQ: 1500 TABLET, EXTENDED RELEASE ORAL at 11:16

## 2024-06-06 RX ADMIN — IPRATROPIUM BROMIDE AND ALBUTEROL SULFATE 3 ML: .5; 3 SOLUTION RESPIRATORY (INHALATION) at 20:08

## 2024-06-06 RX ADMIN — HEPARIN SODIUM 7500 UNITS: 5000 INJECTION INTRAVENOUS; SUBCUTANEOUS at 13:12

## 2024-06-06 RX ADMIN — SPIRONOLACTONE 25 MG: 25 TABLET, FILM COATED ORAL at 08:12

## 2024-06-06 RX ADMIN — HEPARIN SODIUM 7500 UNITS: 5000 INJECTION INTRAVENOUS; SUBCUTANEOUS at 06:08

## 2024-06-06 RX ADMIN — ASPIRIN 81 MG CHEWABLE TABLET 81 MG: 81 TABLET CHEWABLE at 08:12

## 2024-06-06 RX ADMIN — FLUTICASONE FUROATE AND VILANTEROL TRIFENATATE 1 PUFF: 100; 25 POWDER RESPIRATORY (INHALATION) at 14:32

## 2024-06-06 RX ADMIN — IPRATROPIUM BROMIDE AND ALBUTEROL SULFATE 3 ML: .5; 3 SOLUTION RESPIRATORY (INHALATION) at 07:55

## 2024-06-06 RX ADMIN — HEPARIN SODIUM 7500 UNITS: 5000 INJECTION INTRAVENOUS; SUBCUTANEOUS at 21:06

## 2024-06-06 RX ADMIN — Medication 8 L/MIN: at 08:00

## 2024-06-06 RX ADMIN — ATORVASTATIN CALCIUM 40 MG: 80 TABLET, FILM COATED ORAL at 21:06

## 2024-06-06 RX ADMIN — TORSEMIDE 50 MG: 20 TABLET ORAL at 08:12

## 2024-06-06 ASSESSMENT — PAIN - FUNCTIONAL ASSESSMENT
PAIN_FUNCTIONAL_ASSESSMENT: 0-10

## 2024-06-06 ASSESSMENT — PAIN SCALES - GENERAL
PAINLEVEL_OUTOF10: 0 - NO PAIN

## 2024-06-06 ASSESSMENT — COGNITIVE AND FUNCTIONAL STATUS - GENERAL
MOBILITY SCORE: 20
CLIMB 3 TO 5 STEPS WITH RAILING: A LITTLE
DRESSING REGULAR UPPER BODY CLOTHING: A LITTLE
DRESSING REGULAR UPPER BODY CLOTHING: A LITTLE
DAILY ACTIVITIY SCORE: 20
HELP NEEDED FOR BATHING: A LITTLE
MOVING TO AND FROM BED TO CHAIR: A LITTLE
DRESSING REGULAR LOWER BODY CLOTHING: A LITTLE
MOBILITY SCORE: 20
HELP NEEDED FOR BATHING: A LITTLE
STANDING UP FROM CHAIR USING ARMS: A LITTLE
MOVING TO AND FROM BED TO CHAIR: A LITTLE
PERSONAL GROOMING: A LITTLE
DRESSING REGULAR LOWER BODY CLOTHING: A LITTLE
WALKING IN HOSPITAL ROOM: A LITTLE
DAILY ACTIVITIY SCORE: 21
CLIMB 3 TO 5 STEPS WITH RAILING: A LITTLE
STANDING UP FROM CHAIR USING ARMS: A LITTLE
WALKING IN HOSPITAL ROOM: A LITTLE

## 2024-06-06 NOTE — PROGRESS NOTES
"Patric Arenas is a 49 y.o. male on day 10 of admission presenting with Acute on chronic congestive heart failure, unspecified heart failure type (Multi).    Subjective     No acute events overnight.  Patient continues to wear Bipap HS and on HFNC 7-8l during the day with continued desaturation with ambulation  ROS: Denies fever, chills, SOB, cough, cp, abdominal pain, and n/v     Objective   Physical Exam:  Constitutional: obese male pt in NAD, alert and cooperative  Eyes: PERRL, EOMI, no icterus   ENMT: mucous membranes moist  Head/Neck: Neck supple, no apparent injury  Respiratory/Thorax: Lungs diminished bilaterally, non-labored breathing, no cough, on Airvo 40L/40%  Cardiovascular: Regular, rate and rhythm, no murmurs, normal S1 and S2  Gastrointestinal: Obese, nondistended, soft, non-tender, BS present x 4  Musculoskeletal: ROM intact  Extremities: no edema  Neurological: alert and oriented x 3, speech clear, follows commands appropriately, without focal deficits, sensation grossly intact, motor 5/5 throughout  Skin: Warm and dry    Vital Signs  Blood pressure 118/80, pulse 75, temperature 35.8 °C (96.4 °F), temperature source Temporal, resp. rate 24, height 1.854 m (6' 1\"), weight (!) 177 kg (391 lb 1.5 oz), SpO2 99%.  Oxygen Therapy  SpO2: 99 %  Medical Gas Therapy: Supplemental oxygen  O2 Delivery Method: High flow nasal cannula  FiO2 (%):  [45 %] 45 %  S RR:  [12] 12    Intake/Output last 3 Shifts:  I/O last 3 completed shifts:  In: 640 (3.6 mL/kg) [P.O.:640]  Out: 1775 (10 mL/kg) [Urine:1775 (0.3 mL/kg/hr)]  Weight: 177.4 kg     Lines and Tubes:  Peripheral IV 05/28/24 20 G Right Antecubital (Active)   Placement Date/Time: 05/28/24 1407   Size (Gauge): 20 G  Orientation: Right  Location: Antecubital  Site Prep: Usual sterile procedure followed;Chlorhexidine   Technique: Ultrasound guidance  Placed by: DENVER  Insertion attempts: 1   Number of days: 8       Peripheral IV 05/28/24 22 G Left;Ventral Forearm " (Active)   Placement Date/Time: 05/28/24 1407   Size (Gauge): 22 G  Orientation: Left;Ventral  Location: Forearm  Site Prep: Usual sterile procedure followed;Chlorhexidine   Placed by: DENVER  Insertion attempts: 1   Number of days: 8       External Urinary Catheter Male (Active)   Placement Date/Time: 06/02/24 0028   Hand Hygiene Completed: Yes  External Catheter Type: Male   Number of days: 4         Relevant Results  Scheduled medications  aspirin, 81 mg, oral, Daily  atorvastatin, 40 mg, oral, Nightly  fluticasone furoate-vilanteroL, 1 puff, inhalation, Daily  heparin (porcine), 7,500 Units, subcutaneous, q8h SETVE  ipratropium-albuteroL, 3 mL, nebulization, TID  metoprolol succinate XL, 25 mg, oral, Daily  oxygen, , inhalation, Continuous - Inhalation  pantoprazole, 40 mg, intravenous, Daily  polyethylene glycol, 17 g, oral, Daily  potassium chloride CR, 40 mEq, oral, Once  spironolactone, 25 mg, oral, Daily  [Held by provider] torsemide, 50 mg, oral, BID      Continuous medications     PRN medications  PRN medications: albuterol, melatonin, OLANZapine, sodium chloride    Results for orders placed or performed during the hospital encounter of 05/26/24 (from the past 24 hour(s))   Magnesium   Result Value Ref Range    Magnesium 2.05 1.60 - 2.40 mg/dL   CBC and Auto Differential   Result Value Ref Range    WBC 4.1 (L) 4.4 - 11.3 x10*3/uL    nRBC 0.0 0.0 - 0.0 /100 WBCs    RBC 4.73 4.50 - 5.90 x10*6/uL    Hemoglobin 14.8 13.5 - 17.5 g/dL    Hematocrit 48.2 41.0 - 52.0 %     (H) 80 - 100 fL    MCH 31.3 26.0 - 34.0 pg    MCHC 30.7 (L) 32.0 - 36.0 g/dL    RDW 14.4 11.5 - 14.5 %    Platelets 174 150 - 450 x10*3/uL    Neutrophils % 64.1 40.0 - 80.0 %    Immature Granulocytes %, Automated 0.2 0.0 - 0.9 %    Lymphocytes % 21.5 13.0 - 44.0 %    Monocytes % 10.6 2.0 - 10.0 %    Eosinophils % 3.1 0.0 - 6.0 %    Basophils % 0.5 0.0 - 2.0 %    Neutrophils Absolute 2.65 1.20 - 7.70 x10*3/uL    Immature Granulocytes  Absolute, Automated 0.01 0.00 - 0.70 x10*3/uL    Lymphocytes Absolute 0.89 (L) 1.20 - 4.80 x10*3/uL    Monocytes Absolute 0.44 0.10 - 1.00 x10*3/uL    Eosinophils Absolute 0.13 0.00 - 0.70 x10*3/uL    Basophils Absolute 0.02 0.00 - 0.10 x10*3/uL   Renal function panel   Result Value Ref Range    Glucose 88 74 - 99 mg/dL    Sodium 138 136 - 145 mmol/L    Potassium 3.3 (L) 3.5 - 5.3 mmol/L    Chloride 91 (L) 98 - 107 mmol/L    Bicarbonate 37 (H) 21 - 32 mmol/L    Anion Gap 13 10 - 20 mmol/L    Urea Nitrogen 35 (H) 6 - 23 mg/dL    Creatinine 2.09 (H) 0.50 - 1.30 mg/dL    eGFR 38 (L) >60 mL/min/1.73m*2    Calcium 9.8 8.6 - 10.6 mg/dL    Phosphorus 3.4 2.5 - 4.9 mg/dL    Albumin 3.9 3.4 - 5.0 g/dL     No results found.    XR chest 1 view 06/01/2024    Narrative  Interpreted By:  Tre Lopez and Stephens Katherine  STUDY:  XR CHEST 1 VIEW;  6/1/2024 11:26 pm    INDICATION:  Signs/Symptoms:respiratory failure.    COMPARISON:  Chest radiograph 05/28/2024  Same day CT chest    ACCESSION NUMBER(S):  [id number]    ORDERING CLINICIAN:  CAIT WAHL    FINDINGS:  AP radiograph of the chest was provided.    Left chest wall cardiac pacemaker/AICD with leads overlying the  cardiomediastinal silhouette.    CARDIOMEDIASTINAL SILHOUETTE:  The cardiomediastinal silhouette is enlarged, stable in size and  configuration.    LUNGS:  Low lung volumes contributing to bronchovascular crowding. Prominent  perihilar interstitial markings bilaterally, slightly improved from  prior. Interval improvement in hazy bibasilar opacities with  persistent blunting of the left costophrenic angle. No pneumothorax.    ABDOMEN:  No remarkable upper abdominal findings.    BONES:  No acute osseous changes.    Impression  1.  Interval improvement in pulmonary interstitial edema and  left-greater-than-right bibasilar airspace opacities.  2. Similar small left pleural effusion.    I personally reviewed the images/study and I agree with  Felicia Rondon DO's (radiology resident) findings as stated. This study  was interpreted at [facility],  [place], [place].    MACRO:  None    Signed by: Tre Lopez 6/3/2024 6:58 AM  Dictation workstation:   [id number]      Assessment/Plan   Active Problems:    COPD with acute exacerbation (Multi)    Patric Arenas is a 48 y.o. male with PMH of CHF (EF 45 to 50%), COPD, nonischemic cardiomyopathy s/p AICD, HTN, HLD, MJ  who presenting to the ICU for acute hypercapnic respiratory failure requiring intubation.    Patient admitted to MICU for acute hypercapnic resp faillure and acute decompensated HF I/s/o noncompliance with meds, O2 and CPAP.  Intubated and sedated on admit.  Aggressively diuresed with IV Lasix drip & 1 x dose of 250 mg IV Diamox.  Patient self extubated 5/28, placed on BiPap which he did not tolerated, becoming acutely agitated.  Placed on 5 L NC, started on Precedex drip which was continued until 5/29.  Mental status since resolved.  Lasix drip continued until 5/29 am, until decreased renal function likely ATN in the setting of diuresis.    Transferred to cardiology service.  Code white and transfer to CICU 6/2 for hypercapnic resp failure, not wearing bipap.  Transferred from CICU to SDU 6/2 pm on Airvo and Bipap at night     Neuro:   #Hx ETOH, illicit  substance abuse  #Encephalopathy/agitation, resolved  - A&Ox3  - Delirium vs drug use (admits to ectasy & PCP use), drinks fifth of liquor ~daily  - Admit Utox +PCP  - Continue PRN Zyprexa q6hrs for agitation  - PT/OT/OOB     CV:    #Hx HTN, HLD, CHF, and nonischemic cardiomyopathy s/p AICD  #Acute on chronic systolic HF, with recovered EF 50-55%  - HDS   - Dilated NICM 2/2 ETOH, s/p ICD, LHC 2019 clean coronaries, in setting of medication noncompliance   - TTE 2/14/24: EF 45%, TTE 5/2021: EF 35%  - TTE 5/28: EF 50-55%, no WMA, normal DF, RV mildly enlarged, reduced RV function  - Admit BNP: 373, repeat  on 6/2 86  - Admit CXR: Cardiomegaly with mild pulmonary vascular congestion  - CXR 5/28: Interval worsening in interstitial/alveolar pulmonary edema. small bilateral pleural effusions   - s/p diuresis with Lasix boluses, drip, and Metolazone 5 mg on 5/27 & 5/28   - Continue with home Toprol XL 25mg daily, Atorvastatin, ASA, Torsemide 50 mg BID, and Dieudonne 25 mg --> Stopped Toresemid and Dieudonne today d/t SUZANNE.  If SUZANNE improving tomorrow consider restarting dieudonne.  - Home meds: Empa 10, Toprol XL 25, Entresto 49-51, Dieudonne 25, Torsemide 100 BID, Metolazone 5 daily  - Daily standing weights, and strict I&O's   - Continue on tele while in SDU  - Maintain Map >65        Pulm:   #Hx MJ/Asthma and possible COPD, likely obesity hopoventilation syndrome  #Acute on chronic hypercapnic respiratory failure,   - Weaned from Airvo 40L/45% yesterday to 7-8L HFNC today with Bipap 16/8 HS and with naps  - Still with intermittent oxygen desaturation with ambulation  - Home meds: Ventolin 90 mcg/inh, Breo ellipta 200-25 mcg/dose  - Continue Albuterol q6 hrs prn wheezing, Breo Ellipta daily, and Duo-nebs TID  - VBG from 6/3  7.42/84, bicarb >45-> gave dose of diamox 250 mg IV  - SpO2 goal >92%  - Wean O2 as tolerating  - Approved for home Adrienne vent and O2.      FEN/GI:  #Hypokalemia  - Diet: low Na diet  - Last BM yesterday per patient   - Bowel regimen: Miralax daily   - Continue PPI daily   - Replete K  - Nutrition consult for diet management   - Daily RFP/mag     Renal:   #ATN 2/2 hypotension vs aggressive diuresis, FeUrea 36.2%  - Baseline Cr ~1.2-1.5. Admit Cr: 1.67.   - Cr  uptrending, 2.09 today, from 1.72 yesterday  - Avoid nephrotoxins and hypotension  - Daily RFP     Heme: No acute issues   - No s/sx bleeding  - Daily CBC     Endo:   #Morbid obesity, BMI 51.6  - Admit A1C 6.2%  - Encouraged dietary modifications, exercise, and weight loss  - Follow up with PCP     ID: no active issues     Ppx:  GI: PPI  DVT: Heparin  subcutaneous      Access:  PIVs     Code Status:  Full code  NOK: Jayne Capps (Mother) [phone]      Dispo: continue Step down care for now while high O2 needs     Pt discussed with Dr. Mims, seen and examined. All labs, VS and previous plan of care reviewed.    Estuardo Dick, APRN-CNP

## 2024-06-06 NOTE — CONSULTS
"Nutrition Note:     Consulted to provide heart healthy/weight loss diet instructions to patient.  He is currently on a low sodium diet.  Met with patient.  Provided him with written and verbal diet instruction on a low fat, low sodium diet which should lead to him losing weight.  He reports doing a lot of [employer] and [employer] lately.  He says he can cook but he has just been eating our more than he should.  Told him to avoid fast food and focus on cooking foods at home that are grilled, roasted, broiled or baked and avoid deep fried options.  Encouraged him to fill his plate with veggies (fresh or cooked) and portion control other items like corn, potatoes, meats, etc. He does drink regular sugar lemonade and about 16oz of orange daily.  Encouraged calorie-free beverages for hydration to aid in weight loss.  He was asking about label-reading and how to determine what the serving size the label is referring to.  Discussed that each food label has a serving size associated with the calorie level that is on there and that he may need to start measuring and weighing out his foods to have a better idea of what a \"bowl\" of cereal looks like for him.  He asked about juicing.  Discussed that it is preferable to eat fresh fruits and veggies instead of drinking them as juices.  He is also buying pre-made fruit/veggie juices which can have added sugars.    Pt will need consistent follow-up with RDN as outpatient in order to help him stick to healthy eating habits.  Provided him this RDN card to call or email if questions arise as well as written instructions for a cardiac diet.  Did also give him outpatient RDN scheduling number to follow-up with RDN as an outpatient.        Time Spent/Follow-up Reminder:   Time Spent (min): 30 minutes  Last Date of Nutrition Visit: 06/06/24  Nutrition Follow-Up Needed?: Dietitian to reassess per policy  Follow up Comment: heart healthy diet ed            "

## 2024-06-06 NOTE — PROGRESS NOTES
6/6 Patient desat study/exertion testing attached to [facility] DME referral. Patient still on 7-8L HF NC. ADOD over the weekend or early next week. Awaiting response on O2 approval. Mariela Magana RN     For weekend discharges please call [facility] [phone]. Patient was approved for the vent and O2. Mariela Magana RN

## 2024-06-07 LAB
ALBUMIN SERPL BCP-MCNC: 3.7 G/DL (ref 3.4–5)
ANION GAP SERPL CALC-SCNC: 14 MMOL/L (ref 10–20)
BASOPHILS # BLD AUTO: 0.02 X10*3/UL (ref 0–0.1)
BASOPHILS NFR BLD AUTO: 0.5 %
BUN SERPL-MCNC: 31 MG/DL (ref 6–23)
BURR CELLS BLD QL SMEAR: NORMAL
CALCIUM SERPL-MCNC: 9.7 MG/DL (ref 8.6–10.6)
CHLORIDE SERPL-SCNC: 92 MMOL/L (ref 98–107)
CO2 SERPL-SCNC: 36 MMOL/L (ref 21–32)
CREAT SERPL-MCNC: 2.06 MG/DL (ref 0.5–1.3)
DACRYOCYTES BLD QL SMEAR: NORMAL
EGFRCR SERPLBLD CKD-EPI 2021: 38 ML/MIN/1.73M*2
EOSINOPHIL # BLD AUTO: 0.17 X10*3/UL (ref 0–0.7)
EOSINOPHIL NFR BLD AUTO: 4.2 %
ERYTHROCYTE [DISTWIDTH] IN BLOOD BY AUTOMATED COUNT: 14.5 % (ref 11.5–14.5)
GLUCOSE SERPL-MCNC: 89 MG/DL (ref 74–99)
HCT VFR BLD AUTO: 49.7 % (ref 41–52)
HGB BLD-MCNC: 14.9 G/DL (ref 13.5–17.5)
IMM GRANULOCYTES # BLD AUTO: 0.01 X10*3/UL (ref 0–0.7)
IMM GRANULOCYTES NFR BLD AUTO: 0.2 % (ref 0–0.9)
LYMPHOCYTES # BLD AUTO: 0.85 X10*3/UL (ref 1.2–4.8)
LYMPHOCYTES NFR BLD AUTO: 20.8 %
MAGNESIUM SERPL-MCNC: 2.09 MG/DL (ref 1.6–2.4)
MCH RBC QN AUTO: 31.5 PG (ref 26–34)
MCHC RBC AUTO-ENTMCNC: 30 G/DL (ref 32–36)
MCV RBC AUTO: 105 FL (ref 80–100)
MONOCYTES # BLD AUTO: 0.37 X10*3/UL (ref 0.1–1)
MONOCYTES NFR BLD AUTO: 9 %
NEUTROPHILS # BLD AUTO: 2.67 X10*3/UL (ref 1.2–7.7)
NEUTROPHILS NFR BLD AUTO: 65.3 %
NRBC BLD-RTO: 0 /100 WBCS (ref 0–0)
PHOSPHATE SERPL-MCNC: 3 MG/DL (ref 2.5–4.9)
PLATELET # BLD AUTO: 140 X10*3/UL (ref 150–450)
POLYCHROMASIA BLD QL SMEAR: NORMAL
POTASSIUM SERPL-SCNC: 4.1 MMOL/L (ref 3.5–5.3)
RBC # BLD AUTO: 4.73 X10*6/UL (ref 4.5–5.9)
RBC MORPH BLD: NORMAL
SODIUM SERPL-SCNC: 138 MMOL/L (ref 136–145)
WBC # BLD AUTO: 4.1 X10*3/UL (ref 4.4–11.3)

## 2024-06-07 PROCEDURE — 83735 ASSAY OF MAGNESIUM: CPT

## 2024-06-07 PROCEDURE — 2500000005 HC RX 250 GENERAL PHARMACY W/O HCPCS: Performed by: NURSE PRACTITIONER

## 2024-06-07 PROCEDURE — 36415 COLL VENOUS BLD VENIPUNCTURE: CPT

## 2024-06-07 PROCEDURE — 2500000004 HC RX 250 GENERAL PHARMACY W/ HCPCS (ALT 636 FOR OP/ED): Performed by: NURSE PRACTITIONER

## 2024-06-07 PROCEDURE — 2500000002 HC RX 250 W HCPCS SELF ADMINISTERED DRUGS (ALT 637 FOR MEDICARE OP, ALT 636 FOR OP/ED): Performed by: NURSE PRACTITIONER

## 2024-06-07 PROCEDURE — 85025 COMPLETE CBC W/AUTO DIFF WBC: CPT

## 2024-06-07 PROCEDURE — 94640 AIRWAY INHALATION TREATMENT: CPT

## 2024-06-07 PROCEDURE — 97110 THERAPEUTIC EXERCISES: CPT | Mod: GP,CQ

## 2024-06-07 PROCEDURE — 2500000001 HC RX 250 WO HCPCS SELF ADMINISTERED DRUGS (ALT 637 FOR MEDICARE OP): Performed by: NURSE PRACTITIONER

## 2024-06-07 PROCEDURE — 94660 CPAP INITIATION&MGMT: CPT

## 2024-06-07 PROCEDURE — C9113 INJ PANTOPRAZOLE SODIUM, VIA: HCPCS | Performed by: NURSE PRACTITIONER

## 2024-06-07 PROCEDURE — 1200000002 HC GENERAL ROOM WITH TELEMETRY DAILY

## 2024-06-07 PROCEDURE — 80069 RENAL FUNCTION PANEL: CPT

## 2024-06-07 PROCEDURE — 97116 GAIT TRAINING THERAPY: CPT | Mod: GP,CQ

## 2024-06-07 RX ADMIN — FLUTICASONE FUROATE AND VILANTEROL TRIFENATATE 1 PUFF: 100; 25 POWDER RESPIRATORY (INHALATION) at 09:16

## 2024-06-07 RX ADMIN — IPRATROPIUM BROMIDE AND ALBUTEROL SULFATE 3 ML: .5; 3 SOLUTION RESPIRATORY (INHALATION) at 23:57

## 2024-06-07 RX ADMIN — ATORVASTATIN CALCIUM 40 MG: 80 TABLET, FILM COATED ORAL at 21:32

## 2024-06-07 RX ADMIN — IPRATROPIUM BROMIDE AND ALBUTEROL SULFATE 3 ML: .5; 3 SOLUTION RESPIRATORY (INHALATION) at 09:16

## 2024-06-07 RX ADMIN — IPRATROPIUM BROMIDE AND ALBUTEROL SULFATE 3 ML: .5; 3 SOLUTION RESPIRATORY (INHALATION) at 15:03

## 2024-06-07 RX ADMIN — Medication 6 L/MIN: at 08:00

## 2024-06-07 RX ADMIN — HEPARIN SODIUM 7500 UNITS: 5000 INJECTION INTRAVENOUS; SUBCUTANEOUS at 06:16

## 2024-06-07 RX ADMIN — METOPROLOL SUCCINATE 25 MG: 25 TABLET, EXTENDED RELEASE ORAL at 08:01

## 2024-06-07 RX ADMIN — Medication 4 L/MIN: at 20:00

## 2024-06-07 RX ADMIN — HEPARIN SODIUM 7500 UNITS: 5000 INJECTION INTRAVENOUS; SUBCUTANEOUS at 22:03

## 2024-06-07 RX ADMIN — ASPIRIN 81 MG CHEWABLE TABLET 81 MG: 81 TABLET CHEWABLE at 08:01

## 2024-06-07 RX ADMIN — HEPARIN SODIUM 7500 UNITS: 5000 INJECTION INTRAVENOUS; SUBCUTANEOUS at 13:12

## 2024-06-07 RX ADMIN — PANTOPRAZOLE SODIUM 40 MG: 40 INJECTION, POWDER, FOR SOLUTION INTRAVENOUS at 09:00

## 2024-06-07 ASSESSMENT — COGNITIVE AND FUNCTIONAL STATUS - GENERAL
STANDING UP FROM CHAIR USING ARMS: A LITTLE
MOBILITY SCORE: 21
MOVING FROM LYING ON BACK TO SITTING ON SIDE OF FLAT BED WITH BEDRAILS: A LITTLE
STANDING UP FROM CHAIR USING ARMS: A LITTLE
DRESSING REGULAR UPPER BODY CLOTHING: A LITTLE
DRESSING REGULAR LOWER BODY CLOTHING: A LITTLE
MOBILITY SCORE: 17
TURNING FROM BACK TO SIDE WHILE IN FLAT BAD: A LITTLE
CLIMB 3 TO 5 STEPS WITH RAILING: A LOT
WALKING IN HOSPITAL ROOM: A LITTLE
WALKING IN HOSPITAL ROOM: A LITTLE
HELP NEEDED FOR BATHING: A LITTLE
CLIMB 3 TO 5 STEPS WITH RAILING: A LITTLE
DAILY ACTIVITIY SCORE: 21
MOVING TO AND FROM BED TO CHAIR: A LITTLE

## 2024-06-07 ASSESSMENT — PAIN SCALES - GENERAL
PAINLEVEL_OUTOF10: 0 - NO PAIN

## 2024-06-07 ASSESSMENT — PAIN - FUNCTIONAL ASSESSMENT
PAIN_FUNCTIONAL_ASSESSMENT: 0-10

## 2024-06-07 ASSESSMENT — ACTIVITIES OF DAILY LIVING (ADL): LACK_OF_TRANSPORTATION: NO

## 2024-06-07 NOTE — PROGRESS NOTES
06/07/24 1400   Discharge Planning   Living Arrangements Alone   Support Systems Family members   Type of Residence Private residence   Number of Stairs to Enter Residence 8   Number of Stairs Within Residence 0   Do you have animals or pets at home? No   Who is requesting discharge planning? Provider   Home or Post Acute Services None   Financial Resource Strain   How hard is it for you to pay for the very basics like food, housing, medical care, and heating? Not very   Housing Stability   In the last 12 months, was there a time when you were not able to pay the mortgage or rent on time? N   In the last 12 months, how many places have you lived? 2   In the last 12 months, was there a time when you did not have a steady place to sleep or slept in a shelter (including now)? N   Transportation Needs   In the past 12 months, has lack of transportation kept you from medical appointments or from getting medications? no   In the past 12 months, has lack of transportation kept you from meetings, work, or from getting things needed for daily living? No     Patient to be transferred to [place] [id number]. Called placed to [employer], spoke with [facility] [phone]. He will deliver vent and O2 today to patients room. Yumiko [facility] RN

## 2024-06-07 NOTE — PROGRESS NOTES
"Patric Arenas is a 49 y.o. male on day 11 of admission presenting with Acute on chronic congestive heart failure, unspecified heart failure type (Multi).    Subjective     No acute events overnight.   ROS: Denies fever, chills, SOB, cough, cp, abdominal pain, and n/v     Objective   Physical Exam:  Constitutional: obese male pt in NAD, alert and cooperative  Eyes: PERRL, EOMI, no icterus   ENMT: mucous membranes moist  Head/Neck: Neck supple, no apparent injury  Respiratory/Thorax: Lungs diminished bilaterally, non-labored breathing, no cough, on HFNC 6L  Cardiovascular: Regular, rate and rhythm, no murmurs, normal S1 and S2  Gastrointestinal: Obese, nondistended, soft, non-tender, BS present x 4  Musculoskeletal: ROM intact  Extremities: no edema  Neurological: alert and oriented x 3, speech clear, follows commands appropriately, without focal deficits, sensation grossly intact, motor 5/5 throughout  Skin: Warm and dry    Vital Signs  Blood pressure (!) 140/113, pulse 79, temperature 36.8 °C (98.2 °F), temperature source Temporal, resp. rate 20, height 1.854 m (6' 1\"), weight (!) 177 kg (391 lb 1.5 oz), SpO2 99%.  Oxygen Therapy  SpO2: 99 %  Medical Gas Therapy: Supplemental oxygen  O2 Delivery Method: High flow nasal cannula  FiO2 (%):  [52 %] 52 %  S RR:  [12] 12    Intake/Output last 3 Shifts:  I/O last 3 completed shifts:  In: 1800 (10.1 mL/kg) [P.O.:1800]  Out: 2020 (11.4 mL/kg) [Urine:2020 (0.3 mL/kg/hr)]  Weight: 177.4 kg     Lines and Tubes:  Peripheral IV 05/28/24 20 G Right Antecubital (Active)   Placement Date/Time: 05/28/24 1407   Size (Gauge): 20 G  Orientation: Right  Location: Antecubital  Site Prep: Usual sterile procedure followed;Chlorhexidine   Technique: Ultrasound guidance  Placed by: DENVER  Insertion attempts: 1   Number of days: 8       Peripheral IV 05/28/24 22 G Left;Ventral Forearm (Active)   Placement Date/Time: 05/28/24 1407   Size (Gauge): 22 G  Orientation: Left;Ventral  Location: " Forearm  Site Prep: Usual sterile procedure followed;Chlorhexidine   Placed by: DENVER  Insertion attempts: 1   Number of days: 8       External Urinary Catheter Male (Active)   Placement Date/Time: 06/02/24 0028   Hand Hygiene Completed: Yes  External Catheter Type: Male   Number of days: 4         Relevant Results  Scheduled medications  aspirin, 81 mg, oral, Daily  atorvastatin, 40 mg, oral, Nightly  fluticasone furoate-vilanteroL, 1 puff, inhalation, Daily  heparin (porcine), 7,500 Units, subcutaneous, q8h STEVE  ipratropium-albuteroL, 3 mL, nebulization, TID  metoprolol succinate XL, 25 mg, oral, Daily  oxygen, , inhalation, Continuous - Inhalation  pantoprazole, 40 mg, intravenous, Daily  polyethylene glycol, 17 g, oral, Daily  [Held by provider] spironolactone, 25 mg, oral, Daily  [Held by provider] torsemide, 50 mg, oral, BID      Continuous medications     PRN medications  PRN medications: albuterol, melatonin, OLANZapine, sodium chloride    No results found for this or any previous visit (from the past 24 hour(s)).    No results found.    XR chest 1 view 06/01/2024    Narrative  Interpreted By:  Tre Lopez and Stephens Katherine  STUDY:  XR CHEST 1 VIEW;  6/1/2024 11:26 pm    INDICATION:  Signs/Symptoms:respiratory failure.    COMPARISON:  Chest radiograph 05/28/2024  Same day CT chest    ACCESSION NUMBER(S):  [id number]    ORDERING CLINICIAN:  CAIT WAHL    FINDINGS:  AP radiograph of the chest was provided.    Left chest wall cardiac pacemaker/AICD with leads overlying the  cardiomediastinal silhouette.    CARDIOMEDIASTINAL SILHOUETTE:  The cardiomediastinal silhouette is enlarged, stable in size and  configuration.    LUNGS:  Low lung volumes contributing to bronchovascular crowding. Prominent  perihilar interstitial markings bilaterally, slightly improved from  prior. Interval improvement in hazy bibasilar opacities with  persistent blunting of the left costophrenic angle. No  pneumothorax.    ABDOMEN:  No remarkable upper abdominal findings.    BONES:  No acute osseous changes.    Impression  1.  Interval improvement in pulmonary interstitial edema and  left-greater-than-right bibasilar airspace opacities.  2. Similar small left pleural effusion.    I personally reviewed the images/study and I agree with Felicia Rondon DO's (radiology resident) findings as stated. This study  was interpreted at [facility],  [place], [place].    MACRO:  None    Signed by: Tre Lopez 6/3/2024 6:58 AM  Dictation workstation:   [id number]      Assessment/Plan   Active Problems:    COPD with acute exacerbation (Multi)    Patric Arenas is a 48 y.o. male with PMH of CHF (EF 45 to 50%), COPD, nonischemic cardiomyopathy s/p AICD, HTN, HLD, MJ  who presenting to the ICU for acute hypercapnic respiratory failure requiring intubation.    Patient admitted to MICU for acute hypercapnic resp faillure and acute decompensated HF I/s/o noncompliance with meds, O2 and CPAP.  Intubated and sedated on admit.  Aggressively diuresed with IV Lasix drip & 1 x dose of 250 mg IV Diamox.  Patient self extubated 5/28, placed on BiPap which he did not tolerated, becoming acutely agitated.  Placed on 5 L NC, started on Precedex drip which was continued until 5/29.  Mental status since resolved.  Lasix drip continued until 5/29 am, until decreased renal function likely ATN in the setting of diuresis.    Transferred to cardiology service.  Code white and transfer to CICU 6/2 for hypercapnic resp failure, not wearing bipap.  Transferred from CICU to SDU 6/2 pm on Airvo and Bipap at night     Neuro:   #Hx ETOH, illicit  substance abuse  #Encephalopathy/agitation, resolved  - A&Ox3  - Delirium vs drug use (admits to ectasy & PCP use), drinks fifth of liquor ~daily  - Admit Utox +PCP  - Continue PRN Zyprexa q6hrs for agitation  - PT/OT/OOB     CV:    #Hx HTN, HLD, CHF, and nonischemic  cardiomyopathy s/p AICD  #Acute on chronic systolic HF, with recovered EF 50-55%  - HDS   - Dilated NICM 2/2 ETOH, s/p ICD, LHC 2019 clean coronaries, in setting of medication noncompliance   - TTE 2/14/24: EF 45%, TTE 5/2021: EF 35%  - TTE 5/28: EF 50-55%, no WMA, normal DF, RV mildly enlarged, reduced RV function  - Admit BNP: 373, repeat on 6/2 86  - Admit CXR: Cardiomegaly with mild pulmonary vascular congestion  - CXR 5/28: Interval worsening in interstitial/alveolar pulmonary edema. small bilateral pleural effusions   - s/p diuresis with Lasix boluses, drip, and Metolazone 5 mg on 5/27 & 5/28   - Continue with home Toprol XL 25mg daily, Atorvastatin, ASA, Torsemide 50 mg BID, and Dieudonne 25 mg --> Stopped Toresemid and [place] 6/6 d/t SUZANNE. Will assess daily appropriateness to restart  - Home meds: Empa 10, Toprol XL 25, Entresto 49-51, Dieudonne 25, Torsemide 100 BID, Metolazone 5 daily  - Daily standing weights, and strict I&O's   - Continue on tele while in SDU  - Maintain Map >65        Pulm:   #Hx MJ/Asthma and possible COPD, likely obesity hopoventilation syndrome  #Acute on chronic hypercapnic respiratory failure,   - Weaned from Airvo 40L/45% yesterday to 4L NC at rest 6L NC with ambulation today with Bipap 16/8 HS and with naps  - Home meds: Ventolin 90 mcg/inh, Breo ellipta 200-25 mcg/dose  - Continue Albuterol q6 hrs prn wheezing, Breo Ellipta daily, and Duo-nebs TID  - VBG from 6/3  7.42/84, bicarb >45-> gave dose of diamox 250 mg IV  - SpO2 goal >92%  - Wean O2 as tolerating  - Approved for home Adrienne vent and O2.      FEN/GI:  - Diet: low Na diet  - Last BM yesterday per patient   - Bowel regimen: Miralax daily   - Continue PPI daily   - Nutrition consult for diet management   - Daily RFP/mag     Renal:   #ATN 2/2 hypotension vs aggressive diuresis, FeUrea 36.2%  - Baseline Cr ~1.2-1.5. Admit Cr: 1.67.   - Cr  uptrending, 2.09 today, from 1.72 yesterday  - Avoid nephrotoxins and hypotension  - Daily  RFP     Heme: No acute issues   - No s/sx bleeding  - Daily CBC     Endo:   #Morbid obesity, BMI 51.6  - Admit A1C 6.2%  - Encouraged dietary modifications, exercise, and weight loss  - Follow up with PCP     ID: no active issues     Ppx:  GI: PPI  DVT: Heparin subcutaneous      Access:  PIVs     Code Status:  Full code  NOK: Jayne Capps (Mother) [phone]      Dispo: continue Step down care for now while high O2 needs     Pt discussed with Dr. Mims, seen and examined. All labs, VS and previous plan of care reviewed.    Estuardo Dick, APRN-CNP

## 2024-06-07 NOTE — SIGNIFICANT EVENT
Patric Arenas is a 48 y.o. male with PMH of CHF (EF 45 to 50%), COPD, nonischemic cardiomyopathy s/p AICD, HTN, HLD, MJ  who presenting to the ICU for acute hypercapnic respiratory failure requiring intubation.    Patient admitted to MICU for acute hypercapnic resp faillure and acute decompensated HF I/s/o noncompliance with meds, O2 and CPAP.  Intubated and sedated on admit.  Aggressively diuresed with IV Lasix drip & 1 x dose of 250 mg IV Diamox.  Patient self extubated 5/28, placed on BiPap which he did not tolerated, becoming acutely agitated.  Placed on 5 L NC, started on Precedex drip which was continued until 5/29.  Mental status since resolved.  Lasix drip continued until 5/29 am, until decreased renal function likely ATN in the setting of diuresis.    Transferred to cardiology service.  Code white and transfer to CICU 6/2 for hypercapnic resp failure, not wearing bipap.  Transferred from CICU to SDU 6/2 pm on Airvo and Bipap at night     Patient seen on arrival to the floor , states he is feeling much better now. Patient is expecting bipap machine to be delivered to the room for home going use, will wear while inpatient so he is used to it for home. Patient with no complaints, VSS. Discussed with step down team prior to arrival. Orders reviewed and updated as needed. Bipap rep: please Call Pat. Cell number is [phone] if patient to be discharged to organize home OT.     To be staffed in the am    Jaswant Bronson DNP, APRN-CNP  Cardiology

## 2024-06-07 NOTE — PROGRESS NOTES
Physical Therapy    Physical Therapy Treatment    Patient Name: Patric Arenas  MRN: [id number]  Today's Date: 6/7/2024  Time Calculation  Start Time: 0933  Stop Time: 1003  Time Calculation (min): 30 min    Assessment/Plan   PT Assessment  Rehab Prognosis: Good  End of Session Communication: Bedside nurse  End of Session Patient Position:  (Toilet)  PT Plan  Inpatient/Swing Bed or Outpatient: Inpatient  PT Plan  Treatment/Interventions: Bed mobility, Transfer training, Gait training, Balance training, Strengthening, Endurance training, Therapeutic activity, Therapeutic exercise  PT Plan: Skilled PT  PT Frequency: 3 times per week  PT Discharge Recommendations: Low intensity level of continued care  PT Recommended Transfer Status: Assist x1  PT - OK to Discharge: Yes      General Visit Information:   PT  Visit  PT Received On: 06/07/24  General  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 seated in bedside chair upon arrival. Pt. has 4L of 02 running- continuous pulse 02 monitor Pt. willing to particpate.    Subjective   Precautions:  Precautions  Medical Precautions: Fall precautions, Cardiac precautions, Oxygen therapy device and L/min  Vital Signs:  Vital Signs  Heart Rate:  (83 avg.)  SpO2:  (83-94% Pt. desaturates with amb.)    Objective   Pain:  Pain Assessment  Pain Assessment: 0-10  Pain Score: 0 - No pain  Cognition:  Cognition  Overall Cognitive Status: Within Functional Limits  Coordination:     Postural Control:  Postural Control  Postural Control: Within Functional Limits  Static Sitting Balance  Static Sitting-Balance Support: Feet supported  Static Sitting-Level of Assistance: Close  supervision  Static Standing Balance  Static Standing-Balance Support: No upper extremity supported  Static Standing-Level of Assistance: Contact guard  Extremity/Trunk Assessments:                      Activity Tolerance:     Treatments:  Therapeutic Exercise  Therapeutic Exercise Performed: Yes  Therapeutic Exercise Activity 1: Standing bilat le ex MARCHES, HEEL RAISES  X15 REPS AND ABD X 2SETS OF 6 bilat'ly.    Therapeutic Activity  Therapeutic Activity Performed:  (Pt. amb. into bathroom with close sba and assist to manage equipment.)    Ambulation/Gait Training  Ambulation/Gait Training Performed: Yes  Ambulation/Gait Training 1  Surface 1:  (Pt. amb. 100' no device and close sba- Pt. has a naturally WBOS - sp02 dropped to 80% therefore RN increased 02 to 6L for the remainder of walk. 02 titrated back to 4 once sp02 wnl.)  Ambulation/Gait Training 2  Surface 2:  (Pt. amb. to bathroom no device and supervsion.)  Transfers  Transfer: Yes  Transfer 1  Transfer From 1:  (Sit to stand and back - supervision.)    Outcome Measures:  [facility] Basic Mobility  Turning from your back to your side while in a flat bed without using bedrails: A little  Moving from lying on your back to sitting on the side of a flat bed without using bedrails: A little  Moving to and from bed to chair (including a wheelchair): A little  Standing up from a chair using your arms (e.g. wheelchair or bedside chair): A little  To walk in hospital room: A little  Climbing 3-5 steps with railing: A lot  Basic Mobility - Total Score: 17    Education Documentation  Precautions, taught by Rahel Li PTA at 6/7/2024 10:19 AM.  Learner: Patient  Readiness: Acceptance  Method: Explanation, Demonstration  Response: Needs Reinforcement    Body Mechanics, taught by Rahel Li PTA at 6/7/2024 10:19 AM.  Learner: Patient  Readiness: Acceptance  Method: Explanation, Demonstration  Response: Needs Reinforcement    Mobility Training, taught by Rahel Li  PTA at 6/7/2024 10:19 AM.  Learner: Patient  Readiness: Acceptance  Method: Explanation, Demonstration  Response: Needs Reinforcement    Education Comments  No comments found.        OP EDUCATION:       Encounter Problems       Encounter Problems (Active)       PT Problem       Patient will complete supine to sit and sit to supine Independent  (Progressing)       Start:  05/30/24    Expected End:  06/13/24            Patient will perform sit<>stand transfer with LRAD, and Independent  (Progressing)       Start:  05/30/24    Expected End:  06/13/24            Patient will ambulate >50' with LRAD and Independent c SpO2 > 88%  (Progressing)       Start:  05/30/24    Expected End:  06/13/24

## 2024-06-08 LAB
ALBUMIN SERPL BCP-MCNC: 3.6 G/DL (ref 3.4–5)
ALBUMIN SERPL BCP-MCNC: 3.9 G/DL (ref 3.4–5)
ANION GAP SERPL CALC-SCNC: 12 MMOL/L (ref 10–20)
ANION GAP SERPL CALC-SCNC: 13 MMOL/L (ref 10–20)
BASOPHILS # BLD AUTO: 0.02 X10*3/UL (ref 0–0.1)
BASOPHILS NFR BLD AUTO: 0.4 %
BUN SERPL-MCNC: 27 MG/DL (ref 6–23)
BUN SERPL-MCNC: 30 MG/DL (ref 6–23)
CALCIUM SERPL-MCNC: 9.6 MG/DL (ref 8.6–10.6)
CALCIUM SERPL-MCNC: 9.7 MG/DL (ref 8.6–10.6)
CHLORIDE SERPL-SCNC: 92 MMOL/L (ref 98–107)
CHLORIDE SERPL-SCNC: 93 MMOL/L (ref 98–107)
CHOLEST SERPL-MCNC: 145 MG/DL (ref 0–199)
CHOLESTEROL/HDL RATIO: 3.2
CO2 SERPL-SCNC: 37 MMOL/L (ref 21–32)
CO2 SERPL-SCNC: 37 MMOL/L (ref 21–32)
CREAT SERPL-MCNC: 1.8 MG/DL (ref 0.5–1.3)
CREAT SERPL-MCNC: 1.82 MG/DL (ref 0.5–1.3)
EGFRCR SERPLBLD CKD-EPI 2021: 44 ML/MIN/1.73M*2
EGFRCR SERPLBLD CKD-EPI 2021: 45 ML/MIN/1.73M*2
EOSINOPHIL # BLD AUTO: 0.15 X10*3/UL (ref 0–0.7)
EOSINOPHIL NFR BLD AUTO: 3.4 %
ERYTHROCYTE [DISTWIDTH] IN BLOOD BY AUTOMATED COUNT: 14.2 % (ref 11.5–14.5)
ERYTHROCYTE [DISTWIDTH] IN BLOOD BY AUTOMATED COUNT: 14.4 % (ref 11.5–14.5)
GLUCOSE SERPL-MCNC: 137 MG/DL (ref 74–99)
GLUCOSE SERPL-MCNC: 161 MG/DL (ref 74–99)
HCT VFR BLD AUTO: 46.2 % (ref 41–52)
HCT VFR BLD AUTO: 46.8 % (ref 41–52)
HDLC SERPL-MCNC: 45.6 MG/DL
HGB BLD-MCNC: 14.2 G/DL (ref 13.5–17.5)
HGB BLD-MCNC: 14.4 G/DL (ref 13.5–17.5)
IMM GRANULOCYTES # BLD AUTO: 0.01 X10*3/UL (ref 0–0.7)
IMM GRANULOCYTES NFR BLD AUTO: 0.2 % (ref 0–0.9)
LDLC SERPL CALC-MCNC: 70 MG/DL
LYMPHOCYTES # BLD AUTO: 0.77 X10*3/UL (ref 1.2–4.8)
LYMPHOCYTES NFR BLD AUTO: 17.3 %
MAGNESIUM SERPL-MCNC: 1.73 MG/DL (ref 1.6–2.4)
MAGNESIUM SERPL-MCNC: 1.81 MG/DL (ref 1.6–2.4)
MCH RBC QN AUTO: 31.3 PG (ref 26–34)
MCH RBC QN AUTO: 32 PG (ref 26–34)
MCHC RBC AUTO-ENTMCNC: 30.7 G/DL (ref 32–36)
MCHC RBC AUTO-ENTMCNC: 30.8 G/DL (ref 32–36)
MCV RBC AUTO: 102 FL (ref 80–100)
MCV RBC AUTO: 104 FL (ref 80–100)
MONOCYTES # BLD AUTO: 0.28 X10*3/UL (ref 0.1–1)
MONOCYTES NFR BLD AUTO: 6.3 %
NEUTROPHILS # BLD AUTO: 3.22 X10*3/UL (ref 1.2–7.7)
NEUTROPHILS NFR BLD AUTO: 72.4 %
NON HDL CHOLESTEROL: 99 MG/DL (ref 0–149)
NRBC BLD-RTO: 0 /100 WBCS (ref 0–0)
NRBC BLD-RTO: 0 /100 WBCS (ref 0–0)
PHOSPHATE SERPL-MCNC: 2.4 MG/DL (ref 2.5–4.9)
PHOSPHATE SERPL-MCNC: 2.4 MG/DL (ref 2.5–4.9)
PLATELET # BLD AUTO: 169 X10*3/UL (ref 150–450)
PLATELET # BLD AUTO: 177 X10*3/UL (ref 150–450)
POTASSIUM SERPL-SCNC: 3.5 MMOL/L (ref 3.5–5.3)
POTASSIUM SERPL-SCNC: 3.7 MMOL/L (ref 3.5–5.3)
RBC # BLD AUTO: 4.5 X10*6/UL (ref 4.5–5.9)
RBC # BLD AUTO: 4.54 X10*6/UL (ref 4.5–5.9)
SODIUM SERPL-SCNC: 138 MMOL/L (ref 136–145)
SODIUM SERPL-SCNC: 138 MMOL/L (ref 136–145)
TRIGL SERPL-MCNC: 145 MG/DL (ref 0–149)
VLDL: 29 MG/DL (ref 0–40)
WBC # BLD AUTO: 4.5 X10*3/UL (ref 4.4–11.3)
WBC # BLD AUTO: 4.6 X10*3/UL (ref 4.4–11.3)

## 2024-06-08 PROCEDURE — 83735 ASSAY OF MAGNESIUM: CPT | Performed by: NURSE PRACTITIONER

## 2024-06-08 PROCEDURE — 80069 RENAL FUNCTION PANEL: CPT | Performed by: NURSE PRACTITIONER

## 2024-06-08 PROCEDURE — 84100 ASSAY OF PHOSPHORUS: CPT | Performed by: NURSE PRACTITIONER

## 2024-06-08 PROCEDURE — 80069 RENAL FUNCTION PANEL: CPT

## 2024-06-08 PROCEDURE — 2500000002 HC RX 250 W HCPCS SELF ADMINISTERED DRUGS (ALT 637 FOR MEDICARE OP, ALT 636 FOR OP/ED): Performed by: NURSE PRACTITIONER

## 2024-06-08 PROCEDURE — 2500000004 HC RX 250 GENERAL PHARMACY W/ HCPCS (ALT 636 FOR OP/ED): Performed by: NURSE PRACTITIONER

## 2024-06-08 PROCEDURE — 36415 COLL VENOUS BLD VENIPUNCTURE: CPT | Performed by: NURSE PRACTITIONER

## 2024-06-08 PROCEDURE — C9113 INJ PANTOPRAZOLE SODIUM, VIA: HCPCS | Performed by: NURSE PRACTITIONER

## 2024-06-08 PROCEDURE — 2500000005 HC RX 250 GENERAL PHARMACY W/O HCPCS: Performed by: NURSE PRACTITIONER

## 2024-06-08 PROCEDURE — 80061 LIPID PANEL: CPT | Performed by: NURSE PRACTITIONER

## 2024-06-08 PROCEDURE — 36415 COLL VENOUS BLD VENIPUNCTURE: CPT

## 2024-06-08 PROCEDURE — 83735 ASSAY OF MAGNESIUM: CPT

## 2024-06-08 PROCEDURE — 85025 COMPLETE CBC W/AUTO DIFF WBC: CPT

## 2024-06-08 PROCEDURE — 1200000002 HC GENERAL ROOM WITH TELEMETRY DAILY

## 2024-06-08 PROCEDURE — 2500000001 HC RX 250 WO HCPCS SELF ADMINISTERED DRUGS (ALT 637 FOR MEDICARE OP): Performed by: NURSE PRACTITIONER

## 2024-06-08 PROCEDURE — 85027 COMPLETE CBC AUTOMATED: CPT | Performed by: NURSE PRACTITIONER

## 2024-06-08 PROCEDURE — 94640 AIRWAY INHALATION TREATMENT: CPT

## 2024-06-08 PROCEDURE — 99233 SBSQ HOSP IP/OBS HIGH 50: CPT | Performed by: INTERNAL MEDICINE

## 2024-06-08 RX ORDER — LANOLIN ALCOHOL/MO/W.PET/CERES
800 CREAM (GRAM) TOPICAL ONCE
Status: COMPLETED | OUTPATIENT
Start: 2024-06-08 | End: 2024-06-08

## 2024-06-08 RX ORDER — POTASSIUM CHLORIDE 20 MEQ/1
40 TABLET, EXTENDED RELEASE ORAL ONCE
Status: COMPLETED | OUTPATIENT
Start: 2024-06-08 | End: 2024-06-08

## 2024-06-08 RX ORDER — LANOLIN ALCOHOL/MO/W.PET/CERES
400 CREAM (GRAM) TOPICAL ONCE
Status: COMPLETED | OUTPATIENT
Start: 2024-06-08 | End: 2024-06-08

## 2024-06-08 RX ORDER — ACETAMINOPHEN 325 MG/1
650 TABLET ORAL EVERY 4 HOURS PRN
Status: DISCONTINUED | OUTPATIENT
Start: 2024-06-08 | End: 2024-06-09 | Stop reason: HOSPADM

## 2024-06-08 RX ORDER — SPIRONOLACTONE 25 MG/1
25 TABLET ORAL DAILY
Status: DISCONTINUED | OUTPATIENT
Start: 2024-06-08 | End: 2024-06-09 | Stop reason: HOSPADM

## 2024-06-08 RX ORDER — TORSEMIDE 20 MG/1
40 TABLET ORAL DAILY
Status: DISCONTINUED | OUTPATIENT
Start: 2024-06-08 | End: 2024-06-09 | Stop reason: HOSPADM

## 2024-06-08 RX ADMIN — PANTOPRAZOLE SODIUM 40 MG: 40 INJECTION, POWDER, FOR SOLUTION INTRAVENOUS at 08:32

## 2024-06-08 RX ADMIN — POTASSIUM CHLORIDE 40 MEQ: 1500 TABLET, EXTENDED RELEASE ORAL at 22:20

## 2024-06-08 RX ADMIN — Medication 800 MG: at 22:20

## 2024-06-08 RX ADMIN — METOPROLOL SUCCINATE 25 MG: 25 TABLET, EXTENDED RELEASE ORAL at 08:32

## 2024-06-08 RX ADMIN — HEPARIN SODIUM 7500 UNITS: 5000 INJECTION INTRAVENOUS; SUBCUTANEOUS at 06:01

## 2024-06-08 RX ADMIN — POTASSIUM CHLORIDE 40 MEQ: 1500 TABLET, EXTENDED RELEASE ORAL at 11:24

## 2024-06-08 RX ADMIN — SPIRONOLACTONE 25 MG: 25 TABLET ORAL at 11:24

## 2024-06-08 RX ADMIN — IPRATROPIUM BROMIDE AND ALBUTEROL SULFATE 3 ML: .5; 3 SOLUTION RESPIRATORY (INHALATION) at 15:29

## 2024-06-08 RX ADMIN — TORSEMIDE 40 MG: 20 TABLET ORAL at 12:29

## 2024-06-08 RX ADMIN — IPRATROPIUM BROMIDE AND ALBUTEROL SULFATE 3 ML: .5; 3 SOLUTION RESPIRATORY (INHALATION) at 20:00

## 2024-06-08 RX ADMIN — FLUTICASONE FUROATE AND VILANTEROL TRIFENATATE 1 PUFF: 100; 25 POWDER RESPIRATORY (INHALATION) at 08:25

## 2024-06-08 RX ADMIN — Medication 400 MG: at 11:24

## 2024-06-08 RX ADMIN — HEPARIN SODIUM 7500 UNITS: 5000 INJECTION INTRAVENOUS; SUBCUTANEOUS at 22:20

## 2024-06-08 RX ADMIN — Medication 4 L/MIN: at 20:00

## 2024-06-08 RX ADMIN — HEPARIN SODIUM 7500 UNITS: 5000 INJECTION INTRAVENOUS; SUBCUTANEOUS at 15:00

## 2024-06-08 RX ADMIN — ASPIRIN 81 MG CHEWABLE TABLET 81 MG: 81 TABLET CHEWABLE at 08:32

## 2024-06-08 RX ADMIN — Medication 4 L/MIN: at 08:00

## 2024-06-08 RX ADMIN — ATORVASTATIN CALCIUM 40 MG: 80 TABLET, FILM COATED ORAL at 21:00

## 2024-06-08 RX ADMIN — IPRATROPIUM BROMIDE AND ALBUTEROL SULFATE 3 ML: .5; 3 SOLUTION RESPIRATORY (INHALATION) at 08:24

## 2024-06-08 RX ADMIN — ACETAMINOPHEN 650 MG: 325 TABLET ORAL at 18:29

## 2024-06-08 ASSESSMENT — PAIN SCALES - GENERAL: PAINLEVEL_OUTOF10: 0 - NO PAIN

## 2024-06-08 NOTE — PROGRESS NOTES
HVI Attending Shared Visit Note    This is a shared visit. Please see Advanced Practice Provider's encounter note for additional details.        Overnight events/Subjective: Returned from ICU    Exam:   Physical exam: no distress. Normal rate, regular rhythm, non labored breathing, clear to auscultation, abdomen non distended, no significant LE edema, no focal neuro deficits.     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     1. Respiratory failure: was supposed to be on O2 at home and CPAP but did not have proper supplies. Unclear compliance with other meds. Suspect his respiratory failure may be partially volume related but more likely related to chronic underlying lung disease and body habitus. Now has BiPAP.   2. SUZANNE: improving, likely from overdiuresis. Re-initiate narcisa and torsemide (lower dose) on 6/8.   3. HFrecEF: Continue aspirin and atorvastatin. Continue metoprolol. GDMT as above.  -Home meds: torsemide 100 mg BID, metolazone 5 mg, Spironolactone 25 mg, Jardiance 10 mg, Entresto 49-51 mg BID,  4. Drug use: counseled on complete cessation.     Dispo: Will need follow up with pulmonary, sleep medicine (for CPAP re initiation) and HF. Also will need home O2 most likely.     Jayden Gallegos MD    ________________________________________________________________________________  Problems:   Active Problems:    COPD with acute exacerbation (Multi)      Objective   Admit Date: 5/26/2024  Hospital Length of Stay: 12   Home: [facility] [id number]    Vitals:      6/8/2024     3:02 PM 6/8/2024    11:04 AM 6/8/2024     7:29 AM 6/8/2024     5:54 AM 6/8/2024     3:51 AM 6/8/2024     3:50 AM 6/7/2024    11:32 PM   Vitals   Systolic 105 128 147   113 94   Diastolic 71 87 88   72 48   Heart Rate 78 85 90  94 95 81   Temp 36.4 °C (97.5 °F) 36.2 °C (97.2 °F) 36.9 °C (98.4 °F)    36.5 °C (97.7 °F) 36.5 °C (97.7 °F)   Resp 18 18 18   17 17   Weight (lb)    384.7 384.7     BMI    50.76 kg/m2 50.76 kg/m2     BSA (m2)    3 m2 3 m2       Wt Readings from Last 5 Encounters:   06/08/24 (!) 174 kg (384 lb 11.2 oz)   02/13/24 (!) 172 kg (380 lb)   01/26/24 (!) 181 kg (399 lb)   12/18/23 (!) 180 kg (397 lb 6.4 oz)   12/09/23 (!) 181 kg (399 lb 3.2 oz)       Intake/Output Summary (Last 24 hours) at 6/8/2024 1621  Last data filed at 6/8/2024 1330  Gross per 24 hour   Intake 840 ml   Output 1425 ml   Net -585 ml         MEDICATIONS  Infusions:     Scheduled:  aspirin, 81 mg, Daily  atorvastatin, 40 mg, Nightly  fluticasone furoate-vilanteroL, 1 puff, Daily  heparin (porcine), 7,500 Units, q8h STEVE  ipratropium-albuteroL, 3 mL, TID  metoprolol succinate XL, 25 mg, Daily  oxygen, , Continuous - Inhalation  pantoprazole, 40 mg, Daily  polyethylene glycol, 17 g, Daily  spironolactone, 25 mg, Daily  torsemide, 40 mg, Daily      PRN:  albuterol, 2 puff, q6h PRN  melatonin, 6 mg, Nightly PRN  OLANZapine, 2.5 mg, q6h PRN  sodium chloride, 1 spray, 4x daily PRN      Prior to Admission Meds:  Facility-Administered Medications Prior to Admission   Medication Dose Route Frequency Provider Last Rate Last Admin    fluticasone furoate-vilanteroL (Breo Ellipta) 200-25 mcg/dose inhaler 1 puff  1 puff inhalation Once Shabbir Guzmán MD         Medications Prior to Admission   Medication Sig Dispense Refill Last Dose    albuterol (Ventolin HFA) 90 mcg/actuation inhaler Inhale 2 puffs every 4 hours if needed for wheezing or shortness of breath. If you are using your rescue inhaler frequently, seek medical evaluation 18 g 3 Unknown at patient request refills    aspirin 81 mg EC tablet Take 1 tablet (81 mg) by mouth once daily.   Unknown at patient request refills    atorvastatin (Lipitor) 40 mg tablet TAKE 1 TABLET BY MOUTH ONCE DAILY 30 tablet 3 Unknown at patient request refills    empagliflozin (Jardiance) 10 mg TAKE  1 TABLET BY MOUTH ONCE A DAY 30 tablet 3 Unknown at patient request refills    inhalational spacing device inhaler Use as directed with inhalers 1 each 0     metOLazone (Zaroxolyn) 5 mg tablet Take 1 tablet (5 mg) by mouth once daily. (Patient not taking: Reported on 5/27/2024) 30 tablet 0 Not Taking    metoprolol succinate XL (Toprol-XL) 25 mg 24 hr tablet Take 1 tablet (25 mg) by mouth once daily. 90 tablet 0 Unknown at patient request refills    oxygen (O2) gas therapy Inhale.   Unknown    sacubitriL-valsartan (Entresto) 49-51 mg tablet TAKE 1 TABLET BY MOUTH TWO TIMES A DAY 60 tablet 3 Unknown at patient request refills    sertraline (Zoloft) 25 mg tablet TAKE 1 TABLET BY MOUTH ONCE DAILY (Patient not taking: Reported on 5/27/2024) 30 tablet 3 Not Taking    spironolactone (Aldactone) 25 mg tablet TAKE 1/2 TABLET BY MOUTH ONCE DAILY 15 tablet 3 Unknown at patient request refills    torsemide (Demadex) 100 mg tablet Take 1 tablet (100 mg) by mouth 2 times a day. 180 tablet 0 Unknown at patient request refills       DATA:  CMP:  Recent Labs     06/08/24  0819 06/07/24  0628 06/06/24  0532 06/05/24  0926 06/04/24  1654 06/04/24  0554 06/03/24  0633 06/02/24  1723 06/02/24  0418 06/01/24  2111 05/31/24  0851 05/30/24 2031    138 138 137  --  140 144 146* 145 143 141 142   K 3.5 4.1 3.3* 3.5  --  3.1* 3.3* 3.6 3.3* 3.3* 4.5 3.7   CL 92* 92* 91* 90*  --  89* 88* 91* 89* 90* 91* 88*   CO2 37* 36* 37* 38*  --  43* >45* >45* >45* 45* 37* 43*   ANIONGAP 13 14 13 13  --  11  --   --   --  11 18 15   BUN 27* 31* 35* 34*  --  42* 44* 40* 42* 44* 47* 47*   CREATININE 1.82* 2.06* 2.09* 1.72*  --  1.90* 2.28* 2.16* 2.14* 2.17* 2.58* 2.96*   EGFR 44* 38* 38* 48*  --  42* 34* 36* 37* 36* 29* 25*   MG 1.81 2.09 2.05 2.09 2.54* 2.17 2.23 2.04 2.37 2.58*  --  2.70*     Recent Labs     06/08/24  0819 06/07/24  0628 06/06/24  0532 06/05/24  0926 06/04/24  0554 06/03/24  0633 06/02/24  1723 06/02/24  0418 05/27/24  1811  "05/27/24  0500 05/26/24  2209 02/14/24  0524 02/13/24  1937 12/07/23  0909 04/29/23  1039 02/19/23  1853 01/18/23  1605 12/16/22  0700 12/15/22  2028 12/15/22  0803 12/14/22  1608 12/14/22  0751 05/15/21  0222 05/14/21  1936   ALBUMIN 3.6 3.7 3.9 3.7 4.0 3.9 3.5 3.8   < > 4.3 4.0   < > 3.7 3.9   < > 4.0   < > 4.2   < > 4.2   < > 4.1   < > 3.5  3.5   ALT  --   --   --   --   --   --   --   --   --  14 13  --  12 10  --  12  --  16  --  17  --  16   < > 38   AST  --   --   --   --   --   --   --   --   --  15 18  --  13 14  --  18  --  28  --  29  --  28   < > 42*   BILITOT  --   --   --   --   --   --   --   --   --  0.7 0.5  --  0.4 0.5  --  0.6  --  0.9  --  0.7  --  0.8   < > 1.2   LIPASE  --   --   --   --   --   --   --   --   --   --   --   --   --   --   --   --   --   --   --   --   --   --   --  10    < > = values in this interval not displayed.     CBC:  Recent Labs     06/08/24  0819 06/07/24  0628 06/06/24  0532 06/05/24  0925 06/04/24  0554 06/03/24  0633 06/02/24  0418 06/01/24  2111   WBC 4.5 4.1* 4.1* 4.2* 4.3* 4.2* 5.5 5.7   HGB 14.4 14.9 14.8 15.0 15.4 15.3 15.9 16.2   HCT 46.8 49.7 48.2 49.2 51.3 52.5* 52.2* 53.4*    140* 174 171 155 144* 157 146*   * 105* 102* 104* 105* 108* 104* 107*     COAG:   Recent Labs     05/28/24  0521 02/19/23  1853 10/14/22  1450 10/14/22  0840 05/14/21  0827   INR 1.1 1.1 1.1  --  1.2*   DDIMERVTE  --  537*  --  610*  --      ABO: No results for input(s): \"ABO\" in the last 58737 hours.  HEME/ENDO:   Recent Labs     05/28/24  0521 12/12/22  2009 12/12/22  2007 10/14/22  1450   TSH 0.37* 1.58  --   --    FREET4 1.14  --   --   --    HGBA1C 6.2*  --  6.0* 6.0*     CARDIAC:   Recent Labs     06/02/24  0418 05/30/24  0723 05/26/24  2324 05/26/24  2209 02/13/24  1937 12/07/23  0909 09/19/23  1141 04/29/23  1039 02/19/23  1853 01/18/23  1605 12/12/22 2007 12/12/22  1233   TROPHS  --   --  48 44 9 14  --  8 9  --  11 13   BNP 86 159*  --  373* 46 42 3 11 12   < " >  --  14    < > = values in this interval not displayed.     Recent Labs     12/12/22  2007 05/15/21  0222   CHOL 233*  --    LDLF 149*  --    HDL 54.3  --    TRIG 147 151*     TOX:  Recent Labs     05/27/24  1026 02/13/24  2328 12/07/23  1739   AMPHETAMINE Presumptive Negative Presumptive Negative Presumptive Negative   BENZO Presumptive Negative Presumptive Negative Presumptive Negative   CANNABINOID Presumptive Negative Presumptive Negative Presumptive Negative   COCAI Presumptive Negative Presumptive Negative Presumptive Negative   FENTANYL Presumptive Negative Presumptive Negative Presumptive Negative   OPIATE Presumptive Negative Presumptive Negative Presumptive Negative   OXYCODONE Presumptive Negative Presumptive Negative Presumptive Negative   PCP Presumptive Positive* Presumptive Negative Presumptive Negative     MICRO:   Recent Labs     05/28/24  1102 05/17/21  0413 05/16/21  1001   PROCAL 0.07 0.16* 0.09*     No results found for the last 90 days.      FOLLOWUP:   Future Appointments   Date Time Provider Department [place]   6/20/2024 10:40 AM William Urrutia APRN-CNP [id number] [facility]   6/25/2024  3:30 PM Nito Morales MD [id number] [facility]   7/1/2024  2:00 PM Suyapa Chacon MD PhD [id number] [place]   8/13/2024  4:00 PM Mateo Rainey MD [id number] [facility]

## 2024-06-08 NOTE — SIGNIFICANT EVENT
Rapid Response RN Note    Rapid response RN at bedside for RADAR score 6 due to the recent VS listed below:     Vitals:    06/07/24 2332 06/08/24 0300 06/08/24 0350 06/08/24 0351   BP: (!) 94/48  113/72    BP Location:       Patient Position:       Pulse: 81  95 94   Resp: 17  17    Temp: 36.5 °C (97.7 °F)  36.5 °C (97.7 °F)    TempSrc:       SpO2: 98% 97% 91% 93%   Weight:       Height:            Reviewed above VS with bedside RN.  Pox rechecked and back within patient's current trends.  No interventions by rapid response team indicated at this time.  No concerns from RN at this time.  Staff to page rapid response for any concerns or acute change in condition/VS.

## 2024-06-09 ENCOUNTER — DOCUMENTATION (OUTPATIENT)
Dept: HOME HEALTH SERVICES | Facility: HOME HEALTH | Age: 49
End: 2024-06-09
Payer: COMMERCIAL

## 2024-06-09 ENCOUNTER — PHARMACY VISIT (OUTPATIENT)
Dept: PHARMACY | Facility: CLINIC | Age: 49
End: 2024-06-09
Payer: MEDICAID

## 2024-06-09 ENCOUNTER — HOME HEALTH ADMISSION (OUTPATIENT)
Dept: HOME HEALTH SERVICES | Facility: HOME HEALTH | Age: 49
End: 2024-06-09
Payer: COMMERCIAL

## 2024-06-09 VITALS
BODY MASS INDEX: 41.75 KG/M2 | OXYGEN SATURATION: 96 % | TEMPERATURE: 97.9 F | WEIGHT: 315 LBS | SYSTOLIC BLOOD PRESSURE: 115 MMHG | HEART RATE: 81 BPM | RESPIRATION RATE: 18 BRPM | HEIGHT: 73 IN | DIASTOLIC BLOOD PRESSURE: 91 MMHG

## 2024-06-09 PROBLEM — R73.03 PRE-DIABETES: Status: ACTIVE | Noted: 2024-06-09

## 2024-06-09 PROBLEM — F19.10 POLYSUBSTANCE ABUSE (MULTI): Status: ACTIVE | Noted: 2024-06-09

## 2024-06-09 PROBLEM — J96.01 ACUTE HYPOXIC RESPIRATORY FAILURE (MULTI): Status: ACTIVE | Noted: 2024-06-09

## 2024-06-09 PROBLEM — J96.01 ACUTE HYPOXIC RESPIRATORY FAILURE (MULTI): Status: RESOLVED | Noted: 2024-06-09 | Resolved: 2024-06-09

## 2024-06-09 PROBLEM — I50.32 HEART FAILURE WITH RECOVERED EJECTION FRACTION (HFRECEF) (MULTI): Status: ACTIVE | Noted: 2024-06-09

## 2024-06-09 PROBLEM — N18.9 ACUTE KIDNEY INJURY SUPERIMPOSED ON CKD (CMS-HCC): Status: ACTIVE | Noted: 2024-06-09

## 2024-06-09 PROBLEM — N17.9 ACUTE KIDNEY INJURY SUPERIMPOSED ON CKD (CMS-HCC): Status: ACTIVE | Noted: 2024-06-09

## 2024-06-09 PROCEDURE — 2500000001 HC RX 250 WO HCPCS SELF ADMINISTERED DRUGS (ALT 637 FOR MEDICARE OP): Performed by: NURSE PRACTITIONER

## 2024-06-09 PROCEDURE — 2500000002 HC RX 250 W HCPCS SELF ADMINISTERED DRUGS (ALT 637 FOR MEDICARE OP, ALT 636 FOR OP/ED): Performed by: NURSE PRACTITIONER

## 2024-06-09 PROCEDURE — RXMED WILLOW AMBULATORY MEDICATION CHARGE

## 2024-06-09 PROCEDURE — 2500000004 HC RX 250 GENERAL PHARMACY W/ HCPCS (ALT 636 FOR OP/ED): Performed by: NURSE PRACTITIONER

## 2024-06-09 PROCEDURE — 99239 HOSP IP/OBS DSCHRG MGMT >30: CPT | Performed by: INTERNAL MEDICINE

## 2024-06-09 PROCEDURE — 94640 AIRWAY INHALATION TREATMENT: CPT

## 2024-06-09 PROCEDURE — C9113 INJ PANTOPRAZOLE SODIUM, VIA: HCPCS | Performed by: NURSE PRACTITIONER

## 2024-06-09 PROCEDURE — 2500000005 HC RX 250 GENERAL PHARMACY W/O HCPCS: Performed by: NURSE PRACTITIONER

## 2024-06-09 RX ORDER — ASPIRIN 81 MG/1
81 TABLET ORAL DAILY
Qty: 30 TABLET | Refills: 1 | Status: SHIPPED | OUTPATIENT
Start: 2024-06-09 | End: 2024-08-08

## 2024-06-09 RX ORDER — PANTOPRAZOLE SODIUM 40 MG/1
40 TABLET, DELAYED RELEASE ORAL
Qty: 30 TABLET | Refills: 1 | Status: SHIPPED | OUTPATIENT
Start: 2024-06-09 | End: 2024-08-08

## 2024-06-09 RX ORDER — POTASSIUM CHLORIDE 20 MEQ/1
20 TABLET, EXTENDED RELEASE ORAL DAILY
Qty: 30 TABLET | Refills: 1 | Status: SHIPPED | OUTPATIENT
Start: 2024-06-09 | End: 2024-08-08

## 2024-06-09 RX ORDER — SPIRONOLACTONE 25 MG/1
25 TABLET ORAL DAILY
Qty: 30 TABLET | Refills: 1 | Status: SHIPPED | OUTPATIENT
Start: 2024-06-10 | End: 2024-08-09

## 2024-06-09 RX ORDER — METOPROLOL SUCCINATE 25 MG/1
25 TABLET, EXTENDED RELEASE ORAL DAILY
Qty: 30 TABLET | Refills: 1 | Status: SHIPPED | OUTPATIENT
Start: 2024-06-09 | End: 2024-08-08

## 2024-06-09 RX ORDER — TORSEMIDE 20 MG/1
40 TABLET ORAL DAILY
Qty: 60 TABLET | Refills: 1 | Status: SHIPPED | OUTPATIENT
Start: 2024-06-10 | End: 2024-08-09

## 2024-06-09 RX ORDER — LANOLIN ALCOHOL/MO/W.PET/CERES
400 CREAM (GRAM) TOPICAL DAILY
Qty: 30 TABLET | Refills: 1 | Status: SHIPPED | OUTPATIENT
Start: 2024-06-09

## 2024-06-09 RX ADMIN — IPRATROPIUM BROMIDE AND ALBUTEROL SULFATE 3 ML: .5; 3 SOLUTION RESPIRATORY (INHALATION) at 08:35

## 2024-06-09 RX ADMIN — FLUTICASONE FUROATE AND VILANTEROL TRIFENATATE 1 PUFF: 100; 25 POWDER RESPIRATORY (INHALATION) at 09:00

## 2024-06-09 RX ADMIN — TORSEMIDE 40 MG: 20 TABLET ORAL at 08:11

## 2024-06-09 RX ADMIN — HEPARIN SODIUM 7500 UNITS: 5000 INJECTION INTRAVENOUS; SUBCUTANEOUS at 06:17

## 2024-06-09 RX ADMIN — ASPIRIN 81 MG CHEWABLE TABLET 81 MG: 81 TABLET CHEWABLE at 08:11

## 2024-06-09 RX ADMIN — METOPROLOL SUCCINATE 25 MG: 25 TABLET, EXTENDED RELEASE ORAL at 08:11

## 2024-06-09 RX ADMIN — Medication 4 L/MIN: at 08:00

## 2024-06-09 RX ADMIN — SPIRONOLACTONE 25 MG: 25 TABLET ORAL at 08:11

## 2024-06-09 RX ADMIN — PANTOPRAZOLE SODIUM 40 MG: 40 INJECTION, POWDER, FOR SOLUTION INTRAVENOUS at 08:10

## 2024-06-09 ASSESSMENT — PAIN SCALES - GENERAL: PAINLEVEL_OUTOF10: 0 - NO PAIN

## 2024-06-09 NOTE — DISCHARGE INSTRUCTIONS
Medications:  Your medications and/or doses may have changed. Please take the medications listed on these instructions as prescribed until you are seen at a follow up appointment. It was a pleasure to have met and cared for you!     Follow-up appointments:  The appropriate follow-up appointments have already been arranged for you.    Lab work:  Please go to any  outpatient lab to have your blood drawn PRIOR to your follow-up appointment with Dr. Suyapa Chacon. You may go to the lab on the same day as your appointment, so the provider can discuss results with you. You do not need a paper prescription, it is electronically ordered.

## 2024-06-09 NOTE — HH CARE COORDINATION
Home Care received a Referral for Physical Therapy and Occupational Therapy. We have processed the referral for a Start of Care on 6.10.24 to 6.11.24.     If you have any questions or concerns, please feel free to contact us at [phone]. Follow the prompts, enter your five digit zip code, and you will be directed to your care team on CENTL 3.

## 2024-06-09 NOTE — PROGRESS NOTES
Discharge Planning Note:      Patric Arenas is a 49 y.o. male on day 13 of admission presenting with Acute on chronic congestive heart failure, unspecified heart failure type (Multi).     Per MD medically ready for discharge today will need therapy for homecare. Placing referral for services. Message to homecare for SOC.       Louise De La Rosa RN

## 2024-06-09 NOTE — DISCHARGE SUMMARY
Discharge Diagnosis  Issues Requiring Follow-Up  Active Problems:    Obstructive sleep apnea    COPD with acute exacerbation (Multi)    Heart failure with recovered ejection fraction (HFrecEF) (Multi)    Acute kidney injury superimposed on CKD (CMS-HCC)    Polysubstance abuse (Multi)    Pre-diabetes    Hospital Course  Patric Arenas is a 48 y.o. male with PMH of CHF (EF 45 to 50%), COPD, nonischemic cardiomyopathy s/p AICD, HTN, HLD, MJ  who presenting to the ICU for acute hypercapnic respiratory failure requiring intubation after coming to the ED with c/o SOB x3-4 days.    ED Course:  On arrival to the ED, patient was afebrile, tachycardic to 109, tachypneic to 22 bpm and satting on 90% on 6L. Patient was then placed on 12 L cannula bubbler. Patient then transitioned to BIPAP due to VBG with Co2 of 118 and pH of 7.24. Patient also given DuoNebs, continuous albuterol, IV Solu-Medrol and, diuresis with 100 mg of IV Lasix.  VBG did not show improvement with BIPAP and patient was intubated and transitioned to the ICU.    ICU Course:  Patient admitted to MICU for acute hypercapnic resp faillure and acute decompensated HF I/s/o noncompliance with meds, O2 and CPAP.  Intubated and sedated on admit.  Aggressively diuresed with IV Lasix drip & 1 x dose of 250 mg IV Diamox.  Patient self extubated 5/28, placed on BiPap which he did not tolerated, becoming acutely agitated.  Placed on 5 L NC, started on Precedex drip which was continued until 5/29.  Patient now awake, alert, oriented x3 and behaving appropriately.  Lasix drip continued until 5/29 am, started to have soft BP's with decreased UO and SUZANNE (FeUrea 36%, intrinisic, likely ATN 2/2 hypotension vs diuretics).  Patient passed bedside swallow so was started on low sodium diet.  He was transferred to [facility] on 5/30.  However he became more restless, taking off oxygen and requiring up to 15L, airvo and bipap.  He became more sleepy and CO2 was found to be up to 110; CXR  with vascular congestion so given IV lasix then was transferred back to CICU on 6/1.  He was placed on bipap which was switched to airvo during day.  On 6/2 he was transferred to SDU on airvo during day and tolerating bipap at night.     Floor Course:  Transferred to the floor 6/7 evening on 2L O2 N/C during the day, BiPAP at night satting >92%. Home bipap machine & home oxygen tank/equipement delivered at bedside on day of discharge. Discussed importance of complying to supplemental O2/Bipap, medication regimen, and follow-up appointments; also discussed importance of abstaining from drugs/illegal substances, to which patient verbalized understanding.     GDMT titrated as patient tolerated; resumed empa 10mg daily on day of discharge, recommend introducing entresto on an OP basis d/t SUZANNE on CKD (which improved prior to discharge with discharge Cr of 1.80). Initiated Torsemide 40mg daily which was proven to be sufficient, as patient voided 2.1L (net negative 1.2L) with administration.     [facility] for PT/OT referral sent/to be arranged.    Discharge weight: 175kg    After all labs and VS were reviewed the decision was made that the patient was medically stable for discharge.  The patient was discharged in satisfactory condition.    More than 60 minutes were spent in coordinating patient discharge.     Pertinent Physical Exam At Time of Discharge  Physical Exam  General: NAD, sitting up in chair  Skin: warm and dry throughout  Head/ neck: unable to appreciate JVD 2/2 body habitus   Cardiac: RRR, S1, S2, NSR HR 70's on tele   Pulm: diminished in BLL, on 2L O2 N/C (during day, BIPAP at night)  GI: soft, nontender, non-distended  Extremities: trace, non-pitting BLE edema   Neuro: no focal neuro deficits, a+ox4  Psych: appropriate mood and behavior     Home Medications     Medication List      START taking these medications     magnesium oxide 400 mg (241.3 mg magnesium) tablet; Commonly known as:   Mag-Ox; Take 1 tablet (400  mg) by mouth once daily.   pantoprazole 40 mg EC tablet; Commonly known as: ProtoNix; Take 1 tablet   (40 mg) by mouth once daily in the morning. Take before meals. Do not   crush, chew, or split.   potassium chloride CR 20 mEq ER tablet; Commonly known as: Klor-Con M20;   Take 1 tablet (20 mEq) by mouth once daily. Do not crush or chew.     CHANGE how you take these medications     spironolactone 25 mg tablet; Commonly known as: Aldactone; Take 1 tablet   (25 mg) by mouth once daily.; Start taking on: Estee 10, 2024; What   changed: how much to take   torsemide 20 mg tablet; Commonly known as: Demadex; Take 2 tablets (40   mg) by mouth once daily.; Start taking on: Estee 10, 2024; What changed:   medication strength, how much to take, when to take this     CONTINUE taking these medications     albuterol 90 mcg/actuation inhaler; Commonly known as: Ventolin HFA;   Inhale 2 puffs every 4 hours if needed for wheezing or shortness of   breath. If you are using your rescue inhaler frequently, seek medical   evaluation   aspirin 81 mg EC tablet; Take 1 tablet (81 mg) by mouth once daily.   atorvastatin 40 mg tablet; Commonly known as: Lipitor; TAKE 1 TABLET BY   MOUTH ONCE DAILY   inhalational spacing device inhaler; Use as directed with inhalers   Jardiance 10 mg; Generic drug: empagliflozin; Take 1 tablet (10 mg) by   mouth once daily.   metoprolol succinate XL 25 mg 24 hr tablet; Commonly known as:   Toprol-XL; Take 1 tablet (25 mg) by mouth once daily.   oxygen gas therapy; Commonly known as: O2     Outpatient Follow-Up  Future Appointments   Date Time Provider Department [place]   6/20/2024 10:40 AM William Urrutia APRN-CNP [id number] [facility]   6/25/2024  3:30 PM Nito Morales MD [id number] [facility]   7/1/2024  2:00 PM Suyapa Chacon MD PhD [id number] [facility]   8/13/2024  4:00 PM Mateo Rainey MD [id number] [facility]       Angelica Valera APRN-CNP

## 2024-06-10 NOTE — SIGNIFICANT EVENT
COPD Follow Up Call    Patient Reports Feelings (Symptoms) are: doing okay     The Patient discharged With the following Diagnosis: COPD    If You were Referred to Pulmonary Rehab, Have You Followed up With them?    How is your breathing? Doing okay    How is your activity? Back to NL     How is your cough? none    Mucus: none     How often Are you using a Quick Relief/ Rescue Inhaler / Nebulizer:    as scheduled / rarely

## 2024-06-11 ENCOUNTER — HOME CARE VISIT (OUTPATIENT)
Dept: HOME HEALTH SERVICES | Facility: HOME HEALTH | Age: 49
End: 2024-06-11

## 2024-06-12 ENCOUNTER — PATIENT OUTREACH (OUTPATIENT)
Dept: CARE COORDINATION | Facility: CLINIC | Age: 49
End: 2024-06-12

## 2024-06-12 NOTE — PROGRESS NOTES
Outreach call to patient to support a smooth transition of care from recent admission. Pt not available.    Enrolled patient in [employer] chatbot for additional support and patient education through transition period.  Will continue to monitor through transition period.

## 2024-06-18 LAB
ATRIAL RATE: 93 BPM
P AXIS: 64 DEGREES
P OFFSET: 209 MS
P ONSET: 154 MS
PR INTERVAL: 136 MS
Q ONSET: 222 MS
QRS COUNT: 15 BEATS
QRS DURATION: 94 MS
QT INTERVAL: 414 MS
QTC CALCULATION(BAZETT): 514 MS
QTC FREDERICIA: 479 MS
R AXIS: 47 DEGREES
T AXIS: -75 DEGREES
T OFFSET: 429 MS
VENTRICULAR RATE: 93 BPM

## 2024-06-20 ENCOUNTER — APPOINTMENT (OUTPATIENT)
Dept: PULMONOLOGY | Facility: HOSPITAL | Age: 49
End: 2024-06-20
Payer: COMMERCIAL

## 2024-06-25 ENCOUNTER — LAB (OUTPATIENT)
Dept: LAB | Facility: LAB | Age: 49
End: 2024-06-25
Payer: COMMERCIAL

## 2024-06-25 ENCOUNTER — APPOINTMENT (OUTPATIENT)
Dept: PRIMARY CARE | Facility: CLINIC | Age: 49
End: 2024-06-25
Payer: COMMERCIAL

## 2024-06-25 VITALS
HEIGHT: 68 IN | DIASTOLIC BLOOD PRESSURE: 90 MMHG | BODY MASS INDEX: 47.74 KG/M2 | TEMPERATURE: 97.3 F | WEIGHT: 315 LBS | OXYGEN SATURATION: 90 % | HEART RATE: 83 BPM | SYSTOLIC BLOOD PRESSURE: 133 MMHG

## 2024-06-25 DIAGNOSIS — I50.32 HEART FAILURE WITH RECOVERED EJECTION FRACTION (HFRECEF) (MULTI): ICD-10-CM

## 2024-06-25 DIAGNOSIS — N17.9 ACUTE KIDNEY INJURY SUPERIMPOSED ON CKD (CMS-HCC): ICD-10-CM

## 2024-06-25 DIAGNOSIS — E66.01 CLASS 3 SEVERE OBESITY DUE TO EXCESS CALORIES WITH SERIOUS COMORBIDITY AND BODY MASS INDEX (BMI) OF 50.0 TO 59.9 IN ADULT (MULTI): ICD-10-CM

## 2024-06-25 DIAGNOSIS — I50.32 HEART FAILURE WITH RECOVERED EJECTION FRACTION (HFRECEF) (MULTI): Primary | ICD-10-CM

## 2024-06-25 DIAGNOSIS — N18.9 ACUTE KIDNEY INJURY SUPERIMPOSED ON CKD (CMS-HCC): ICD-10-CM

## 2024-06-25 DIAGNOSIS — I50.43 ACUTE ON CHRONIC COMBINED SYSTOLIC AND DIASTOLIC CONGESTIVE HEART FAILURE (MULTI): ICD-10-CM

## 2024-06-25 LAB
ALBUMIN SERPL BCP-MCNC: 4.1 G/DL (ref 3.4–5)
ANION GAP SERPL CALC-SCNC: 15 MMOL/L (ref 10–20)
BUN SERPL-MCNC: 12 MG/DL (ref 6–23)
CALCIUM SERPL-MCNC: 9.4 MG/DL (ref 8.6–10.6)
CHLORIDE SERPL-SCNC: 98 MMOL/L (ref 98–107)
CO2 SERPL-SCNC: 32 MMOL/L (ref 21–32)
CREAT SERPL-MCNC: 1.61 MG/DL (ref 0.5–1.3)
EGFRCR SERPLBLD CKD-EPI 2021: 51 ML/MIN/1.73M*2
ERYTHROCYTE [DISTWIDTH] IN BLOOD BY AUTOMATED COUNT: 14.1 % (ref 11.5–14.5)
GLUCOSE SERPL-MCNC: 91 MG/DL (ref 74–99)
HCT VFR BLD AUTO: 49.7 % (ref 41–52)
HGB BLD-MCNC: 15.3 G/DL (ref 13.5–17.5)
MAGNESIUM SERPL-MCNC: 2.01 MG/DL (ref 1.6–2.4)
MCH RBC QN AUTO: 31.3 PG (ref 26–34)
MCHC RBC AUTO-ENTMCNC: 30.8 G/DL (ref 32–36)
MCV RBC AUTO: 102 FL (ref 80–100)
NRBC BLD-RTO: 0 /100 WBCS (ref 0–0)
PHOSPHATE SERPL-MCNC: 3.1 MG/DL (ref 2.5–4.9)
PLATELET # BLD AUTO: 186 X10*3/UL (ref 150–450)
POTASSIUM SERPL-SCNC: 3.9 MMOL/L (ref 3.5–5.3)
RBC # BLD AUTO: 4.89 X10*6/UL (ref 4.5–5.9)
SODIUM SERPL-SCNC: 141 MMOL/L (ref 136–145)
WBC # BLD AUTO: 3.7 X10*3/UL (ref 4.4–11.3)

## 2024-06-25 PROCEDURE — 80069 RENAL FUNCTION PANEL: CPT

## 2024-06-25 PROCEDURE — 85027 COMPLETE CBC AUTOMATED: CPT

## 2024-06-25 PROCEDURE — 3080F DIAST BP >= 90 MM HG: CPT | Performed by: STUDENT IN AN ORGANIZED HEALTH CARE EDUCATION/TRAINING PROGRAM

## 2024-06-25 PROCEDURE — 83735 ASSAY OF MAGNESIUM: CPT

## 2024-06-25 PROCEDURE — 99213 OFFICE O/P EST LOW 20 MIN: CPT | Performed by: STUDENT IN AN ORGANIZED HEALTH CARE EDUCATION/TRAINING PROGRAM

## 2024-06-25 PROCEDURE — 3075F SYST BP GE 130 - 139MM HG: CPT | Performed by: STUDENT IN AN ORGANIZED HEALTH CARE EDUCATION/TRAINING PROGRAM

## 2024-06-25 PROCEDURE — 36415 COLL VENOUS BLD VENIPUNCTURE: CPT

## 2024-06-25 PROCEDURE — 3008F BODY MASS INDEX DOCD: CPT | Performed by: STUDENT IN AN ORGANIZED HEALTH CARE EDUCATION/TRAINING PROGRAM

## 2024-06-25 PROCEDURE — 1036F TOBACCO NON-USER: CPT | Performed by: STUDENT IN AN ORGANIZED HEALTH CARE EDUCATION/TRAINING PROGRAM

## 2024-06-25 RX ORDER — LANOLIN ALCOHOL/MO/W.PET/CERES
400 CREAM (GRAM) TOPICAL DAILY
Qty: 90 TABLET | Refills: 1 | Status: SHIPPED | OUTPATIENT
Start: 2024-06-25 | End: 2024-12-22

## 2024-06-25 RX ORDER — ATORVASTATIN CALCIUM 40 MG/1
40 TABLET, FILM COATED ORAL DAILY
Qty: 90 TABLET | Refills: 0 | Status: SHIPPED | OUTPATIENT
Start: 2024-06-25 | End: 2025-06-25

## 2024-06-25 ASSESSMENT — ENCOUNTER SYMPTOMS
RHINORRHEA: 0
DIARRHEA: 0
DEPRESSION: 0
FREQUENCY: 0
CHILLS: 0
FEVER: 0
RECTAL PAIN: 0
PALPITATIONS: 0
DIFFICULTY URINATING: 0
HEADACHES: 0
DIZZINESS: 0
SPEECH DIFFICULTY: 0
SHORTNESS OF BREATH: 0
VOMITING: 0
SORE THROAT: 0
TREMORS: 0
SINUS PAIN: 0
HEMATURIA: 0
DIAPHORESIS: 0
CHEST TIGHTNESS: 0
COUGH: 0
FATIGUE: 0
NAUSEA: 0
WHEEZING: 0

## 2024-06-25 ASSESSMENT — PATIENT HEALTH QUESTIONNAIRE - PHQ9
SUM OF ALL RESPONSES TO PHQ9 QUESTIONS 1 AND 2: 0
1. LITTLE INTEREST OR PLEASURE IN DOING THINGS: NOT AT ALL
2. FEELING DOWN, DEPRESSED OR HOPELESS: NOT AT ALL

## 2024-06-25 ASSESSMENT — PAIN SCALES - GENERAL: PAINLEVEL: 0-NO PAIN

## 2024-06-25 NOTE — PATIENT INSTRUCTIONS
It was a pleasure getting to see you today    We ordered blood work for you today that can be completed at any  labs. They have a lab here at this \[facility]".     Remember to take 2 torsemide pills in the AM.     Please call and schedule the following appointments:   Weight Loss clinic - [facility]    Scheduling Phone Numbers:  - Specialty Provider: [phone]  - Heart/Vascular Specialist: [phone]  - [phone]  - PT/OT: [phone]  - Colonoscopy: [phone]

## 2024-06-25 NOTE — PROGRESS NOTES
"Subjective   Patient ID: Patric Arenas is a 49 y.o. male who presents for Follow-up.    HPI   48yo M w/ PMHx. 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     Hospitalization FU    \"Hospital Course  Patric Arenas is a 48 y.o. male with PMH of CHF (EF 45 to 50%), COPD, nonischemic cardiomyopathy s/p AICD, HTN, HLD, MJ  who presenting to the ICU for acute hypercapnic respiratory failure requiring intubation after coming to the ED with c/o SOB x3-4 days.     ED Course:  On arrival to the ED, patient was afebrile, tachycardic to 109, tachypneic to 22 bpm and satting on 90% on 6L. Patient was then placed on 12 L cannula bubbler. Patient then transitioned to BIPAP due to VBG with Co2 of 118 and pH of 7.24. Patient also given DuoNebs, continuous albuterol, IV Solu-Medrol and, diuresis with 100 mg of IV Lasix.  VBG did not show improvement with BIPAP and patient was intubated and transitioned to the ICU.     ICU Course:  Patient admitted to MICU for acute hypercapnic resp faillure and acute decompensated HF I/s/o noncompliance with meds, O2 and CPAP.  Intubated and sedated on admit.  Aggressively diuresed with IV Lasix drip & 1 x dose of 250 mg IV Diamox.  Patient self extubated 5/28, placed on BiPap which he did not tolerated, becoming acutely agitated.  Placed on 5 L NC, started on Precedex drip which was continued until 5/29.  Patient now awake, alert, oriented x3 and behaving appropriately.  Lasix drip continued until 5/29 am, started to have soft BP's with decreased UO and SUZANNE (FeUrea 36%, intrinisic, likely ATN 2/2 hypotension vs diuretics).  Patient passed bedside swallow so was started on low sodium diet.  He was transferred to [facility] on 5/30.  However he became more restless, taking off oxygen and requiring up to 15L, airvo and bipap.  He became more sleepy and CO2 was found to be up to 110; CXR with vascular " "congestion so given IV lasix then was transferred back to CICU on 6/1.  He was placed on bipap which was switched to airvo during day.  On 6/2 he was transferred to SDU on airvo during day and tolerating bipap at night.      Floor Course:  Transferred to the floor 6/7 evening on 2L O2 N/C during the day, BiPAP at night satting >92%. Home bipap machine & home oxygen tank/equipement delivered at bedside on day of discharge. Discussed importance of complying to supplemental O2/Bipap, medication regimen, and follow-up appointments; also discussed importance of abstaining from drugs/illegal substances, to which patient verbalized understanding.      GDMT titrated as patient tolerated; resumed empa 10mg daily on day of discharge, recommend introducing entresto on an OP basis d/t SUZANNE on CKD (which improved prior to discharge with discharge Cr of 1.80). Initiated Torsemide 40mg daily which was proven to be sufficient, as patient voided 2.1L (net negative 1.2L) with administration.      [facility] for PT/OT referral sent/to be arranged.     Discharge weight: 175kg\"         Review of Systems   Constitutional:  Negative for chills, diaphoresis, fatigue and fever.   HENT:  Negative for mouth sores, postnasal drip, rhinorrhea, sinus pain, sneezing and sore throat.    Respiratory:  Negative for cough, chest tightness, shortness of breath and wheezing.    Cardiovascular:  Negative for chest pain, palpitations and leg swelling.   Gastrointestinal:  Negative for diarrhea, nausea, rectal pain and vomiting.   Genitourinary:  Negative for difficulty urinating, frequency and hematuria.   Neurological:  Negative for dizziness, tremors, syncope, speech difficulty and headaches.       Objective   /90 (BP Location: Right arm, Patient Position: Sitting)   Pulse 83   Temp 36.3 °C (97.3 °F) (Temporal)   Ht 1.727 m (5' 8\")   Wt (!) 174 kg (383 lb)   SpO2 90%   BMI 58.23 kg/m²       Physical Exam  Constitutional:       General: He is not in " acute distress.     Appearance: Normal appearance. He is obese. He is not ill-appearing or toxic-appearing.   HENT:      Head: Normocephalic and atraumatic.      Right Ear: External ear normal.      Left Ear: External ear normal.      Nose: Nose normal.      Mouth/Throat:      Pharynx: No posterior oropharyngeal erythema.   Eyes:      Extraocular Movements: Extraocular movements intact.      Conjunctiva/sclera: Conjunctivae normal.   Cardiovascular:      Rate and Rhythm: Normal rate and regular rhythm.      Pulses: Normal pulses.      Comments: Body Habitus limited Cardiopulmonary PE quality (all sounds low).   Pulmonary:      Effort: Pulmonary effort is normal. No respiratory distress.      Breath sounds: Normal breath sounds. No rhonchi or rales.   Chest:      Chest wall: No tenderness.   Musculoskeletal:         General: Normal range of motion.      Cervical back: Normal range of motion.   Skin:     General: Skin is warm.   Neurological:      Mental Status: He is alert.         Assessment/Plan     50yo M w/ PMHx. 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 He was discharged at 175kgs today came in at 174kgs. Endorses NAD, doing well, and breathing comfortably.     Med Rec completed.  Refill Atorvastatin    #Hospitalization FU  #AEHF FU  - Ensured patient has medication at home. Which he confirms,   - ON medication review patient was only taking Torsemide 20mgs not 40mgs, as planned for discharge.   - Also may not be taking Mg supplementation that patient was discharged on.   - Refilled Mg Supplementation for patient Declines other refills    - Reviewed new medications and reviewed GDMT goals, and need to be compliant.       Ensure patient has contact information for [facility], including my personal business card.     RTC in 1 months for FU/establish w/ Dr. Morales or as needed.     Discussed w/   Teofilo Bueno MD  FM & PM Resident

## 2024-06-26 ENCOUNTER — PATIENT OUTREACH (OUTPATIENT)
Dept: CARE COORDINATION | Facility: CLINIC | Age: 49
End: 2024-06-26

## 2024-07-01 ENCOUNTER — OFFICE VISIT (OUTPATIENT)
Dept: CARDIOLOGY | Facility: CLINIC | Age: 49
End: 2024-07-01
Payer: COMMERCIAL

## 2024-07-01 ENCOUNTER — ANCILLARY PROCEDURE (OUTPATIENT)
Dept: CARDIOLOGY | Facility: CLINIC | Age: 49
End: 2024-07-01
Payer: COMMERCIAL

## 2024-07-01 VITALS
HEART RATE: 81 BPM | SYSTOLIC BLOOD PRESSURE: 118 MMHG | OXYGEN SATURATION: 94 % | RESPIRATION RATE: 26 BRPM | WEIGHT: 315 LBS | HEIGHT: 68 IN | DIASTOLIC BLOOD PRESSURE: 78 MMHG | BODY MASS INDEX: 47.74 KG/M2

## 2024-07-01 DIAGNOSIS — I10 PRIMARY HYPERTENSION: ICD-10-CM

## 2024-07-01 DIAGNOSIS — I50.43 ACUTE ON CHRONIC COMBINED SYSTOLIC AND DIASTOLIC CONGESTIVE HEART FAILURE (MULTI): ICD-10-CM

## 2024-07-01 DIAGNOSIS — E66.01 CLASS 3 SEVERE OBESITY DUE TO EXCESS CALORIES WITH SERIOUS COMORBIDITY AND BODY MASS INDEX (BMI) OF 50.0 TO 59.9 IN ADULT (MULTI): ICD-10-CM

## 2024-07-01 DIAGNOSIS — L85.3 DRY SKIN: Primary | ICD-10-CM

## 2024-07-01 PROCEDURE — 3078F DIAST BP <80 MM HG: CPT | Performed by: STUDENT IN AN ORGANIZED HEALTH CARE EDUCATION/TRAINING PROGRAM

## 2024-07-01 PROCEDURE — 99215 OFFICE O/P EST HI 40 MIN: CPT | Performed by: STUDENT IN AN ORGANIZED HEALTH CARE EDUCATION/TRAINING PROGRAM

## 2024-07-01 PROCEDURE — 3008F BODY MASS INDEX DOCD: CPT | Performed by: STUDENT IN AN ORGANIZED HEALTH CARE EDUCATION/TRAINING PROGRAM

## 2024-07-01 PROCEDURE — 93005 ELECTROCARDIOGRAM TRACING: CPT

## 2024-07-01 PROCEDURE — 3074F SYST BP LT 130 MM HG: CPT | Performed by: STUDENT IN AN ORGANIZED HEALTH CARE EDUCATION/TRAINING PROGRAM

## 2024-07-01 PROCEDURE — 1036F TOBACCO NON-USER: CPT | Performed by: STUDENT IN AN ORGANIZED HEALTH CARE EDUCATION/TRAINING PROGRAM

## 2024-07-01 RX ORDER — SPIRONOLACTONE 25 MG/1
25 TABLET ORAL DAILY
Qty: 30 TABLET | Refills: 11 | Status: SHIPPED | OUTPATIENT
Start: 2024-07-01

## 2024-07-01 RX ORDER — METOPROLOL SUCCINATE 25 MG/1
25 TABLET, EXTENDED RELEASE ORAL DAILY
Qty: 30 TABLET | Refills: 11 | Status: SHIPPED | OUTPATIENT
Start: 2024-07-01

## 2024-07-01 RX ORDER — TORSEMIDE 20 MG/1
40 TABLET ORAL 2 TIMES DAILY
Qty: 120 TABLET | Refills: 11 | Status: SHIPPED | OUTPATIENT
Start: 2024-07-01

## 2024-07-01 ASSESSMENT — ENCOUNTER SYMPTOMS
LIGHT-HEADEDNESS: 0
ALTERED MENTAL STATUS: 0
DECREASED APPETITE: 0
CARDIOVASCULAR NEGATIVE: 1
WEIGHT GAIN: 0
BRUISES/BLEEDS EASILY: 0
DEPRESSION: 0
WEIGHT LOSS: 0
HEMATURIA: 0
HEMOPTYSIS: 0
EXCESSIVE DAYTIME SLEEPINESS: 1
HEMATEMESIS: 0
HEMATOCHEZIA: 0
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
## 2024-07-01 NOTE — PATIENT INSTRUCTIONS
Thank you for coming in today. If you have any questions or concerns, you may call the Heart Failure Office at [phone] option [phone], or [phone].  You may also contact our heart failure nursing team via email on [email or web address].    For quicker results set-up your  [employer] account to receive results and other correspondence directly to your phone.    Please bring all your pills/medications to your Cardiology appointments.    **  -We will renew your heart failure medications today    - Please make the following medication changes:  1. INCREASE Torsemide 40 mg twice a day     - Please have the following tests done:  1.Blood tests just before your next visit (RFP, BNP, TSH with auto reflex)    2. Nuclear pharmacological stress test. CALL [phone]    You will be referred to the following teams, CALL  [phone] to schedule your appointments with:  1.  [facility] weight loss team    2. Dermatology ( dry skin)    - Please make an appointment to be seen in 2 months

## 2024-07-02 DIAGNOSIS — Z95.810 ICD (IMPLANTABLE CARDIOVERTER-DEFIBRILLATOR) IN PLACE: Primary | ICD-10-CM

## 2024-07-09 LAB
ATRIAL RATE: 83 BPM
P AXIS: 46 DEGREES
P OFFSET: 195 MS
P ONSET: 143 MS
PR INTERVAL: 156 MS
Q ONSET: 221 MS
QRS COUNT: 14 BEATS
QRS DURATION: 90 MS
QT INTERVAL: 406 MS
QTC CALCULATION(BAZETT): 477 MS
QTC FREDERICIA: 452 MS
R AXIS: 34 DEGREES
T AXIS: -10 DEGREES
T OFFSET: 424 MS
VENTRICULAR RATE: 83 BPM

## 2024-08-15 ENCOUNTER — APPOINTMENT (OUTPATIENT)
Dept: PRIMARY CARE | Facility: CLINIC | Age: 49
End: 2024-08-15
Payer: COMMERCIAL

## 2024-09-26 ENCOUNTER — LAB (OUTPATIENT)
Dept: LAB | Facility: LAB | Age: 49
End: 2024-09-26
Payer: COMMERCIAL

## 2024-09-26 ENCOUNTER — OFFICE VISIT (OUTPATIENT)
Dept: CARDIOLOGY | Facility: CLINIC | Age: 49
End: 2024-09-26
Payer: COMMERCIAL

## 2024-09-26 VITALS
WEIGHT: 315 LBS | SYSTOLIC BLOOD PRESSURE: 130 MMHG | BODY MASS INDEX: 47.74 KG/M2 | OXYGEN SATURATION: 98 % | DIASTOLIC BLOOD PRESSURE: 70 MMHG | HEIGHT: 68 IN | HEART RATE: 75 BPM

## 2024-09-26 DIAGNOSIS — I50.43 ACUTE ON CHRONIC COMBINED SYSTOLIC AND DIASTOLIC CONGESTIVE HEART FAILURE: ICD-10-CM

## 2024-09-26 DIAGNOSIS — G47.33 OBSTRUCTIVE SLEEP APNEA: ICD-10-CM

## 2024-09-26 DIAGNOSIS — E66.01 CLASS 3 SEVERE OBESITY DUE TO EXCESS CALORIES WITH SERIOUS COMORBIDITY AND BODY MASS INDEX (BMI) OF 50.0 TO 59.9 IN ADULT: Primary | ICD-10-CM

## 2024-09-26 LAB
ALBUMIN SERPL BCP-MCNC: 4.3 G/DL (ref 3.4–5)
ANION GAP SERPL CALC-SCNC: 15 MMOL/L (ref 10–20)
BNP SERPL-MCNC: 5 PG/ML (ref 0–99)
BUN SERPL-MCNC: 23 MG/DL (ref 6–23)
CALCIUM SERPL-MCNC: 9.4 MG/DL (ref 8.6–10.6)
CHLORIDE SERPL-SCNC: 98 MMOL/L (ref 98–107)
CO2 SERPL-SCNC: 31 MMOL/L (ref 21–32)
CREAT SERPL-MCNC: 1.47 MG/DL (ref 0.5–1.3)
EGFRCR SERPLBLD CKD-EPI 2021: 57 ML/MIN/1.73M*2
GLUCOSE SERPL-MCNC: 87 MG/DL (ref 74–99)
PHOSPHATE SERPL-MCNC: 3.7 MG/DL (ref 2.5–4.9)
POTASSIUM SERPL-SCNC: 4.3 MMOL/L (ref 3.5–5.3)
SODIUM SERPL-SCNC: 140 MMOL/L (ref 136–145)
TSH SERPL-ACNC: 1.37 MIU/L (ref 0.44–3.98)

## 2024-09-26 PROCEDURE — 80069 RENAL FUNCTION PANEL: CPT

## 2024-09-26 PROCEDURE — 83880 ASSAY OF NATRIURETIC PEPTIDE: CPT

## 2024-09-26 PROCEDURE — 3078F DIAST BP <80 MM HG: CPT | Performed by: STUDENT IN AN ORGANIZED HEALTH CARE EDUCATION/TRAINING PROGRAM

## 2024-09-26 PROCEDURE — 1036F TOBACCO NON-USER: CPT | Performed by: STUDENT IN AN ORGANIZED HEALTH CARE EDUCATION/TRAINING PROGRAM

## 2024-09-26 PROCEDURE — 36415 COLL VENOUS BLD VENIPUNCTURE: CPT

## 2024-09-26 PROCEDURE — 99215 OFFICE O/P EST HI 40 MIN: CPT | Performed by: STUDENT IN AN ORGANIZED HEALTH CARE EDUCATION/TRAINING PROGRAM

## 2024-09-26 PROCEDURE — 84443 ASSAY THYROID STIM HORMONE: CPT

## 2024-09-26 PROCEDURE — 3008F BODY MASS INDEX DOCD: CPT | Performed by: STUDENT IN AN ORGANIZED HEALTH CARE EDUCATION/TRAINING PROGRAM

## 2024-09-26 PROCEDURE — 3075F SYST BP GE 130 - 139MM HG: CPT | Performed by: STUDENT IN AN ORGANIZED HEALTH CARE EDUCATION/TRAINING PROGRAM

## 2024-09-26 RX ORDER — TORSEMIDE 20 MG/1
60 TABLET ORAL 2 TIMES DAILY
Qty: 120 TABLET | Refills: 11 | Status: SHIPPED | OUTPATIENT
Start: 2024-09-26

## 2024-09-26 ASSESSMENT — ENCOUNTER SYMPTOMS
ALTERED MENTAL STATUS: 0
LIGHT-HEADEDNESS: 0
OCCASIONAL FEELINGS OF UNSTEADINESS: 0
LOSS OF SENSATION IN FEET: 0
HEMOPTYSIS: 0
WEIGHT GAIN: 0
HEMATURIA: 0
BRUISES/BLEEDS EASILY: 0
DEPRESSION: 0
HEMATOCHEZIA: 0
DECREASED APPETITE: 0
CARDIOVASCULAR NEGATIVE: 1
HEMATEMESIS: 0
EXCESSIVE DAYTIME SLEEPINESS: 1
WEIGHT LOSS: 0

## 2024-09-26 ASSESSMENT — PATIENT HEALTH QUESTIONNAIRE - PHQ9
2. FEELING DOWN, DEPRESSED OR HOPELESS: NOT AT ALL
1. LITTLE INTEREST OR PLEASURE IN DOING THINGS: NOT AT ALL
SUM OF ALL RESPONSES TO PHQ9 QUESTIONS 1 AND 2: 0
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
## 2024-09-26 NOTE — PATIENT INSTRUCTIONS
Thank you for coming in today. If you have any questions or concerns, you may call the Heart Failure Office at [phone] option [phone], or [phone].  You may also contact our heart failure nursing team via email on [email or web address].     For quicker results set-up your  [employer] account to receive results and other correspondence directly to your phone.     Please bring all your pills/medications to your Cardiology appointments.     **  -We will renew your heart failure medications today     - Please make the following medication changes:  1. INCREASE Torsemide 60 mg twice a day      - Please have the following tests done:  1.Blood tests TODAY (RFP, BNP, TSH with auto reflex)     2.  Blood tests just before your next visit (RFP, BNP)    3.  Nuclear pharmacological stress test. You have an appointment for 10/4/2024 at 9 AM     You will be referred to the following teams, CALL  [phone] to schedule your appointments with:  1.  Bariatric surgery (weight loss surgeons).  You may also get information online at  [email or web address] for new patient information sessions    2.  Sleep medicine (sleep apnea management)    - Please call your Primary Care doctor (Dr Morales) on Tel [phone]    - Please make an appointment to be seen in 2 months

## 2024-10-10 ENCOUNTER — DOCUMENTATION (OUTPATIENT)
Dept: SURGERY | Facility: HOSPITAL | Age: 49
End: 2024-10-10
Payer: COMMERCIAL

## 2024-10-10 NOTE — PROGRESS NOTES
Working referrals, I called and spoke with the pt. He scheduled with [facility]. Work list created
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
## 2024-11-05 ENCOUNTER — DOCUMENTATION (OUTPATIENT)
Dept: SURGERY | Facility: HOSPITAL | Age: 49
End: 2024-11-05
Payer: COMMERCIAL

## 2024-11-05 ENCOUNTER — OFFICE VISIT (OUTPATIENT)
Dept: SURGERY | Facility: HOSPITAL | Age: 49
End: 2024-11-05
Payer: COMMERCIAL

## 2024-11-05 VITALS
SYSTOLIC BLOOD PRESSURE: 119 MMHG | HEIGHT: 68 IN | WEIGHT: 315 LBS | BODY MASS INDEX: 47.74 KG/M2 | HEART RATE: 101 BPM | DIASTOLIC BLOOD PRESSURE: 75 MMHG

## 2024-11-05 DIAGNOSIS — I50.22 HEART FAILURE WITH MID-RANGE EJECTION FRACTION (HFMEF): ICD-10-CM

## 2024-11-05 DIAGNOSIS — I50.43 ACUTE ON CHRONIC COMBINED SYSTOLIC AND DIASTOLIC CONGESTIVE HEART FAILURE: ICD-10-CM

## 2024-11-05 DIAGNOSIS — G47.33 OBSTRUCTIVE SLEEP APNEA: ICD-10-CM

## 2024-11-05 DIAGNOSIS — Z71.3 PRE-BARIATRIC SURGERY NUTRITION EVALUATION: ICD-10-CM

## 2024-11-05 DIAGNOSIS — K21.9 GASTROESOPHAGEAL REFLUX DISEASE, UNSPECIFIED WHETHER ESOPHAGITIS PRESENT: ICD-10-CM

## 2024-11-05 DIAGNOSIS — N18.31 STAGE 3A CHRONIC KIDNEY DISEASE (MULTI): ICD-10-CM

## 2024-11-05 DIAGNOSIS — E66.01 CLASS 3 SEVERE OBESITY DUE TO EXCESS CALORIES WITH SERIOUS COMORBIDITY AND BODY MASS INDEX (BMI) OF 50.0 TO 59.9 IN ADULT: Primary | ICD-10-CM

## 2024-11-05 DIAGNOSIS — E66.813 CLASS 3 SEVERE OBESITY DUE TO EXCESS CALORIES WITH SERIOUS COMORBIDITY AND BODY MASS INDEX (BMI) OF 50.0 TO 59.9 IN ADULT: Primary | ICD-10-CM

## 2024-11-05 DIAGNOSIS — E66.01 MORBID OBESITY WITH BMI OF 50.0-59.9, ADULT (MULTI): ICD-10-CM

## 2024-11-05 DIAGNOSIS — Z13.21 ENCOUNTER FOR VITAMIN DEFICIENCY SCREENING: ICD-10-CM

## 2024-11-05 DIAGNOSIS — Z98.84 BARIATRIC SURGERY STATUS: ICD-10-CM

## 2024-11-05 DIAGNOSIS — J44.1 COPD WITH ACUTE EXACERBATION (MULTI): ICD-10-CM

## 2024-11-05 PROCEDURE — 99215 OFFICE O/P EST HI 40 MIN: CPT | Performed by: SURGERY

## 2024-11-05 PROCEDURE — 3078F DIAST BP <80 MM HG: CPT | Performed by: SURGERY

## 2024-11-05 PROCEDURE — 3074F SYST BP LT 130 MM HG: CPT | Performed by: SURGERY

## 2024-11-05 PROCEDURE — 99205 OFFICE O/P NEW HI 60 MIN: CPT | Performed by: SURGERY

## 2024-11-05 PROCEDURE — 3008F BODY MASS INDEX DOCD: CPT | Performed by: SURGERY

## 2024-11-05 NOTE — PROGRESS NOTES
I reached out to the pt as he is due in clinic today. I reviewed the patients required clearances with him, including his 6 months of consecutive MSWL classes. Pt acknowledged understanding. I also rescheduled pt's initial RD visit to assist with MSWL, as the pt would not receive credit this month for nutrition appt, and it was pt's preference to move the visit. Pt rescheduled for 12/10. Pt has scheduled sleep med visit in Jan (soonest), and pt has a CARD visit for 11/18, and we discussed him obtaining clearance from CARD provider at that time. Pt will complete his bariatric documents in clinic, and they will be scanned into his chart. I sent the pt a [employer] message with my direct contact information for follow-up questions, concerns and or if he needs assistance. Team notified.

## 2024-11-05 NOTE — PROGRESS NOTES
When attempting to send the pt a [employer] message it showed the pt as not being active. I resent the pt a text message to follow the prompts (in which he advised he received). He was asked if he has a smart phone and he said he thinks so, it is an Android. The pt advised he will inquire in clinic on how to complete the setup, and I advised him that I would let [facility] know he would need some assistance. Call ended.

## 2024-11-11 ENCOUNTER — APPOINTMENT (OUTPATIENT)
Dept: SURGERY | Facility: HOSPITAL | Age: 49
End: 2024-11-11
Payer: COMMERCIAL

## 2024-11-21 ENCOUNTER — OFFICE VISIT (OUTPATIENT)
Dept: CARDIOLOGY | Facility: HOSPITAL | Age: 49
End: 2024-11-21
Payer: COMMERCIAL

## 2024-11-21 VITALS
SYSTOLIC BLOOD PRESSURE: 117 MMHG | WEIGHT: 315 LBS | HEIGHT: 68 IN | HEART RATE: 94 BPM | DIASTOLIC BLOOD PRESSURE: 80 MMHG | BODY MASS INDEX: 47.74 KG/M2 | OXYGEN SATURATION: 95 %

## 2024-11-21 DIAGNOSIS — E66.813 CLASS 3 SEVERE OBESITY DUE TO EXCESS CALORIES WITH SERIOUS COMORBIDITY AND BODY MASS INDEX (BMI) OF 50.0 TO 59.9 IN ADULT: ICD-10-CM

## 2024-11-21 DIAGNOSIS — I50.43 ACUTE ON CHRONIC COMBINED SYSTOLIC AND DIASTOLIC CONGESTIVE HEART FAILURE: ICD-10-CM

## 2024-11-21 DIAGNOSIS — E66.01 CLASS 3 SEVERE OBESITY DUE TO EXCESS CALORIES WITH SERIOUS COMORBIDITY AND BODY MASS INDEX (BMI) OF 50.0 TO 59.9 IN ADULT: ICD-10-CM

## 2024-11-21 PROCEDURE — 3074F SYST BP LT 130 MM HG: CPT | Performed by: STUDENT IN AN ORGANIZED HEALTH CARE EDUCATION/TRAINING PROGRAM

## 2024-11-21 PROCEDURE — 1036F TOBACCO NON-USER: CPT | Performed by: STUDENT IN AN ORGANIZED HEALTH CARE EDUCATION/TRAINING PROGRAM

## 2024-11-21 PROCEDURE — 99215 OFFICE O/P EST HI 40 MIN: CPT | Performed by: STUDENT IN AN ORGANIZED HEALTH CARE EDUCATION/TRAINING PROGRAM

## 2024-11-21 PROCEDURE — 3079F DIAST BP 80-89 MM HG: CPT | Performed by: STUDENT IN AN ORGANIZED HEALTH CARE EDUCATION/TRAINING PROGRAM

## 2024-11-21 PROCEDURE — 3008F BODY MASS INDEX DOCD: CPT | Performed by: STUDENT IN AN ORGANIZED HEALTH CARE EDUCATION/TRAINING PROGRAM

## 2024-11-21 RX ORDER — ATORVASTATIN CALCIUM 40 MG/1
40 TABLET, FILM COATED ORAL DAILY
Qty: 90 TABLET | Refills: 3 | Status: SHIPPED | OUTPATIENT
Start: 2024-11-21 | End: 2025-11-21

## 2024-11-21 RX ORDER — TORSEMIDE 20 MG/1
60 TABLET ORAL 2 TIMES DAILY
Qty: 540 TABLET | Refills: 3 | Status: SHIPPED | OUTPATIENT
Start: 2024-11-21 | End: 2025-11-21

## 2024-11-21 RX ORDER — ACETAMINOPHEN 500 MG
1 TABLET ORAL DAILY
Qty: 1 EACH | Refills: 0 | Status: SHIPPED | OUTPATIENT
Start: 2024-11-21

## 2024-11-21 RX ORDER — METOPROLOL SUCCINATE 25 MG/1
25 TABLET, EXTENDED RELEASE ORAL DAILY
Qty: 90 TABLET | Refills: 3 | Status: SHIPPED | OUTPATIENT
Start: 2024-11-21 | End: 2025-11-21

## 2024-11-21 ASSESSMENT — ENCOUNTER SYMPTOMS
PND: 0
ABDOMINAL PAIN: 0
SHORTNESS OF BREATH: 0
WEIGHT LOSS: 0
DYSPNEA ON EXERTION: 1
CHANGE IN BOWEL HABIT: 0
BACK PAIN: 1
VOMITING: 0
DECREASED APPETITE: 0
NAUSEA: 0
BLURRED VISION: 0
COUGH: 0
DIZZINESS: 0
DOUBLE VISION: 0
WEIGHT GAIN: 0
NERVOUS/ANXIOUS: 0
DEPRESSION: 0
LIGHT-HEADEDNESS: 0
HEADACHES: 1
ORTHOPNEA: 0
SORE THROAT: 0
DYSURIA: 0
COLOR CHANGE: 0
PALPITATIONS: 0
FALLS: 0
EXCESSIVE DAYTIME SLEEPINESS: 0

## 2024-11-21 ASSESSMENT — PAIN SCALES - GENERAL: PAINLEVEL_OUTOF10: 8

## 2024-11-21 NOTE — PATIENT INSTRUCTIONS
Thank you for coming in today. If you have any questions or concerns, you may call the Heart Failure Office at [phone] option [phone], or [phone].  You may also contact our heart failure nursing team via email on [email or web address].    For quicker results set-up your  [employer] account to receive results and other correspondence directly to your phone.    Please bring all your pills/medications to your Cardiology appointments.    **  - We will prescribe a blood pressure monitor for you today. Please check you pressure a few times a week and write down the numbers. Bring the log to your future cardiology visit     - We will renew your heart medications today    - Please have the following tests done:  1.Blood tests in 1 week (RFP)    2. Nuclear pharmacological stress test, CALL [phone] to schedule this test    - Please make an appointment to be seen in 6 months
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
## 2024-12-04 ENCOUNTER — APPOINTMENT (OUTPATIENT)
Dept: SLEEP MEDICINE | Facility: HOSPITAL | Age: 49
End: 2024-12-04
Payer: COMMERCIAL

## 2024-12-11 ENCOUNTER — TELEPHONE (OUTPATIENT)
Dept: SURGERY | Facility: CLINIC | Age: 49
End: 2024-12-11

## 2024-12-11 NOTE — TELEPHONE ENCOUNTER
Received message that patient did not attend his initial nutrition visit for bariatric surgery program. Telephone call to patient, which was disconnected after it was identified where RN was calling from and callback rang to voicemail. Voicemail box is full and unable to leave a message at this time. He does not have MyChart.

## 2025-01-30 ENCOUNTER — CLINICAL SUPPORT (OUTPATIENT)
Dept: EMERGENCY MEDICINE | Facility: HOSPITAL | Age: 50
DRG: 286 | End: 2025-01-30
Payer: COMMERCIAL

## 2025-01-30 ENCOUNTER — HOSPITAL ENCOUNTER (OUTPATIENT)
Facility: HOSPITAL | Age: 50
Setting detail: OBSERVATION
Discharge: HOME | DRG: 286 | End: 2025-01-31
Attending: STUDENT IN AN ORGANIZED HEALTH CARE EDUCATION/TRAINING PROGRAM | Admitting: INTERNAL MEDICINE
Payer: COMMERCIAL

## 2025-01-30 DIAGNOSIS — R07.9 CHEST PAIN, UNSPECIFIED TYPE: Primary | ICD-10-CM

## 2025-01-30 DIAGNOSIS — Z95.810 ICD (IMPLANTABLE CARDIOVERTER-DEFIBRILLATOR) IN PLACE: Chronic | ICD-10-CM

## 2025-01-30 DIAGNOSIS — I50.43 ACUTE ON CHRONIC COMBINED SYSTOLIC AND DIASTOLIC CONGESTIVE HEART FAILURE: ICD-10-CM

## 2025-01-30 DIAGNOSIS — I50.32 HEART FAILURE WITH RECOVERED EJECTION FRACTION (HFRECEF): Chronic | ICD-10-CM

## 2025-01-30 LAB
ALBUMIN SERPL BCP-MCNC: 4 G/DL (ref 3.4–5)
ALP SERPL-CCNC: 99 U/L (ref 33–120)
ALT SERPL W P-5'-P-CCNC: 12 U/L (ref 10–52)
ANION GAP SERPL CALC-SCNC: 13 MMOL/L (ref 10–20)
AST SERPL W P-5'-P-CCNC: 17 U/L (ref 9–39)
ATRIAL RATE: 94 BPM
BASOPHILS # BLD AUTO: 0.01 X10*3/UL (ref 0–0.1)
BASOPHILS NFR BLD AUTO: 0.2 %
BILIRUB SERPL-MCNC: 0.6 MG/DL (ref 0–1.2)
BNP SERPL-MCNC: 17 PG/ML (ref 0–99)
BUN SERPL-MCNC: 20 MG/DL (ref 6–23)
CALCIUM SERPL-MCNC: 9.2 MG/DL (ref 8.6–10.6)
CARDIAC TROPONIN I PNL SERPL HS: 10 NG/L (ref 0–53)
CHLORIDE SERPL-SCNC: 98 MMOL/L (ref 98–107)
CO2 SERPL-SCNC: 34 MMOL/L (ref 21–32)
CREAT SERPL-MCNC: 1.51 MG/DL (ref 0.5–1.3)
EGFRCR SERPLBLD CKD-EPI 2021: 56 ML/MIN/1.73M*2
EOSINOPHIL # BLD AUTO: 0.1 X10*3/UL (ref 0–0.7)
EOSINOPHIL NFR BLD AUTO: 1.9 %
ERYTHROCYTE [DISTWIDTH] IN BLOOD BY AUTOMATED COUNT: 15 % (ref 11.5–14.5)
GLUCOSE SERPL-MCNC: 102 MG/DL (ref 74–99)
HCT VFR BLD AUTO: 51.5 % (ref 41–52)
HGB BLD-MCNC: 16.2 G/DL (ref 13.5–17.5)
IMM GRANULOCYTES # BLD AUTO: 0.01 X10*3/UL (ref 0–0.7)
IMM GRANULOCYTES NFR BLD AUTO: 0.2 % (ref 0–0.9)
LYMPHOCYTES # BLD AUTO: 1.29 X10*3/UL (ref 1.2–4.8)
LYMPHOCYTES NFR BLD AUTO: 24.9 %
MCH RBC QN AUTO: 32.6 PG (ref 26–34)
MCHC RBC AUTO-ENTMCNC: 31.5 G/DL (ref 32–36)
MCV RBC AUTO: 104 FL (ref 80–100)
MONOCYTES # BLD AUTO: 0.36 X10*3/UL (ref 0.1–1)
MONOCYTES NFR BLD AUTO: 6.9 %
NEUTROPHILS # BLD AUTO: 3.41 X10*3/UL (ref 1.2–7.7)
NEUTROPHILS NFR BLD AUTO: 65.9 %
NRBC BLD-RTO: 0 /100 WBCS (ref 0–0)
P AXIS: 68 DEGREES
P OFFSET: 182 MS
P ONSET: 162 MS
PLATELET # BLD AUTO: 228 X10*3/UL (ref 150–450)
POTASSIUM SERPL-SCNC: 3.8 MMOL/L (ref 3.5–5.3)
PR INTERVAL: 160 MS
PROT SERPL-MCNC: 8 G/DL (ref 6.4–8.2)
Q ONSET: 226 MS
QRS COUNT: 15 BEATS
QRS DURATION: 78 MS
QT INTERVAL: 394 MS
QTC CALCULATION(BAZETT): 492 MS
QTC FREDERICIA: 457 MS
R AXIS: 55 DEGREES
RBC # BLD AUTO: 4.97 X10*6/UL (ref 4.5–5.9)
SODIUM SERPL-SCNC: 141 MMOL/L (ref 136–145)
T AXIS: -7 DEGREES
T OFFSET: 423 MS
VENTRICULAR RATE: 94 BPM
WBC # BLD AUTO: 5.2 X10*3/UL (ref 4.4–11.3)

## 2025-01-30 PROCEDURE — 80053 COMPREHEN METABOLIC PANEL: CPT | Performed by: EMERGENCY MEDICINE

## 2025-01-30 PROCEDURE — 36415 COLL VENOUS BLD VENIPUNCTURE: CPT | Performed by: EMERGENCY MEDICINE

## 2025-01-30 PROCEDURE — 84484 ASSAY OF TROPONIN QUANT: CPT | Performed by: EMERGENCY MEDICINE

## 2025-01-30 PROCEDURE — 83036 HEMOGLOBIN GLYCOSYLATED A1C: CPT

## 2025-01-30 PROCEDURE — 85025 COMPLETE CBC W/AUTO DIFF WBC: CPT | Performed by: EMERGENCY MEDICINE

## 2025-01-30 PROCEDURE — 93010 ELECTROCARDIOGRAM REPORT: CPT | Performed by: STUDENT IN AN ORGANIZED HEALTH CARE EDUCATION/TRAINING PROGRAM

## 2025-01-30 PROCEDURE — 85025 COMPLETE CBC W/AUTO DIFF WBC: CPT | Performed by: STUDENT IN AN ORGANIZED HEALTH CARE EDUCATION/TRAINING PROGRAM

## 2025-01-30 PROCEDURE — 99285 EMERGENCY DEPT VISIT HI MDM: CPT | Performed by: STUDENT IN AN ORGANIZED HEALTH CARE EDUCATION/TRAINING PROGRAM

## 2025-01-30 PROCEDURE — 83880 ASSAY OF NATRIURETIC PEPTIDE: CPT | Performed by: EMERGENCY MEDICINE

## 2025-01-30 PROCEDURE — 80053 COMPREHEN METABOLIC PANEL: CPT | Performed by: STUDENT IN AN ORGANIZED HEALTH CARE EDUCATION/TRAINING PROGRAM

## 2025-01-30 PROCEDURE — 83880 ASSAY OF NATRIURETIC PEPTIDE: CPT | Performed by: STUDENT IN AN ORGANIZED HEALTH CARE EDUCATION/TRAINING PROGRAM

## 2025-01-30 PROCEDURE — 84443 ASSAY THYROID STIM HORMONE: CPT

## 2025-01-30 PROCEDURE — 93005 ELECTROCARDIOGRAM TRACING: CPT

## 2025-01-30 PROCEDURE — 84484 ASSAY OF TROPONIN QUANT: CPT | Performed by: STUDENT IN AN ORGANIZED HEALTH CARE EDUCATION/TRAINING PROGRAM

## 2025-01-30 ASSESSMENT — PAIN DESCRIPTION - DESCRIPTORS: DESCRIPTORS: SQUEEZING

## 2025-01-30 ASSESSMENT — PAIN SCALES - GENERAL: PAINLEVEL_OUTOF10: 8

## 2025-01-30 ASSESSMENT — LIFESTYLE VARIABLES
HAVE YOU EVER FELT YOU SHOULD CUT DOWN ON YOUR DRINKING: NO
EVER FELT BAD OR GUILTY ABOUT YOUR DRINKING: NO
EVER HAD A DRINK FIRST THING IN THE MORNING TO STEADY YOUR NERVES TO GET RID OF A HANGOVER: NO
TOTAL SCORE: 0
HAVE PEOPLE ANNOYED YOU BY CRITICIZING YOUR DRINKING: NO

## 2025-01-31 ENCOUNTER — APPOINTMENT (OUTPATIENT)
Dept: RADIOLOGY | Facility: HOSPITAL | Age: 50
DRG: 286 | End: 2025-01-31
Payer: COMMERCIAL

## 2025-01-31 ENCOUNTER — APPOINTMENT (OUTPATIENT)
Dept: CARDIOLOGY | Facility: HOSPITAL | Age: 50
DRG: 286 | End: 2025-01-31
Payer: COMMERCIAL

## 2025-01-31 ENCOUNTER — PHARMACY VISIT (OUTPATIENT)
Dept: PHARMACY | Facility: CLINIC | Age: 50
End: 2025-01-31
Payer: COMMERCIAL

## 2025-01-31 VITALS
BODY MASS INDEX: 47.74 KG/M2 | WEIGHT: 315 LBS | DIASTOLIC BLOOD PRESSURE: 84 MMHG | OXYGEN SATURATION: 92 % | HEART RATE: 82 BPM | SYSTOLIC BLOOD PRESSURE: 132 MMHG | HEIGHT: 68 IN | RESPIRATION RATE: 20 BRPM | TEMPERATURE: 97.5 F

## 2025-01-31 PROBLEM — I50.32 HEART FAILURE WITH RECOVERED EJECTION FRACTION (HFRECEF): Chronic | Status: ACTIVE | Noted: 2024-06-09

## 2025-01-31 PROBLEM — I10 PRIMARY HYPERTENSION: Chronic | Status: ACTIVE | Noted: 2023-09-19

## 2025-01-31 PROBLEM — R07.9 CHEST PAIN, UNSPECIFIED TYPE: Status: ACTIVE | Noted: 2025-01-31

## 2025-01-31 PROBLEM — E78.5 DYSLIPIDEMIA: Chronic | Status: ACTIVE | Noted: 2023-09-21

## 2025-01-31 PROBLEM — E66.01 CLASS 3 SEVERE OBESITY WITH BODY MASS INDEX (BMI) OF 60.0 TO 69.9 IN ADULT: Chronic | Status: ACTIVE | Noted: 2023-09-19

## 2025-01-31 PROBLEM — E66.813 CLASS 3 SEVERE OBESITY WITH BODY MASS INDEX (BMI) OF 60.0 TO 69.9 IN ADULT: Chronic | Status: ACTIVE | Noted: 2023-09-19

## 2025-01-31 PROBLEM — Z95.810 ICD (IMPLANTABLE CARDIOVERTER-DEFIBRILLATOR) IN PLACE: Chronic | Status: ACTIVE | Noted: 2025-01-31

## 2025-01-31 PROBLEM — G47.33 OBSTRUCTIVE SLEEP APNEA: Chronic | Status: ACTIVE | Noted: 2023-09-19

## 2025-01-31 LAB
AMPHETAMINES UR QL SCN: ABNORMAL
BARBITURATES UR QL SCN: ABNORMAL
BENZODIAZ UR QL SCN: ABNORMAL
BZE UR QL SCN: ABNORMAL
CANNABINOIDS UR QL SCN: ABNORMAL
CARDIAC TROPONIN I PNL SERPL HS: 9 NG/L (ref 0–53)
FENTANYL+NORFENTANYL UR QL SCN: ABNORMAL
METHADONE UR QL SCN: ABNORMAL
OPIATES UR QL SCN: ABNORMAL
OXYCODONE+OXYMORPHONE UR QL SCN: ABNORMAL
PCP UR QL SCN: ABNORMAL

## 2025-01-31 PROCEDURE — 80307 DRUG TEST PRSMV CHEM ANLYZR: CPT | Performed by: NURSE PRACTITIONER

## 2025-01-31 PROCEDURE — 2500000002 HC RX 250 W HCPCS SELF ADMINISTERED DRUGS (ALT 637 FOR MEDICARE OP, ALT 636 FOR OP/ED)

## 2025-01-31 PROCEDURE — 71045 X-RAY EXAM CHEST 1 VIEW: CPT | Performed by: SURGERY

## 2025-01-31 PROCEDURE — 3430000001 HC RX 343 DIAGNOSTIC RADIOPHARMACEUTICALS: Performed by: INTERNAL MEDICINE

## 2025-01-31 PROCEDURE — 2500000004 HC RX 250 GENERAL PHARMACY W/ HCPCS (ALT 636 FOR OP/ED): Performed by: NURSE PRACTITIONER

## 2025-01-31 PROCEDURE — 2500000001 HC RX 250 WO HCPCS SELF ADMINISTERED DRUGS (ALT 637 FOR MEDICARE OP)

## 2025-01-31 PROCEDURE — 96372 THER/PROPH/DIAG INJ SC/IM: CPT | Mod: 59

## 2025-01-31 PROCEDURE — G0378 HOSPITAL OBSERVATION PER HR: HCPCS

## 2025-01-31 PROCEDURE — 2500000004 HC RX 250 GENERAL PHARMACY W/ HCPCS (ALT 636 FOR OP/ED)

## 2025-01-31 PROCEDURE — 99222 1ST HOSP IP/OBS MODERATE 55: CPT | Performed by: INTERNAL MEDICINE

## 2025-01-31 PROCEDURE — 93016 CV STRESS TEST SUPVJ ONLY: CPT | Performed by: INTERNAL MEDICINE

## 2025-01-31 PROCEDURE — 78452 HT MUSCLE IMAGE SPECT MULT: CPT | Performed by: STUDENT IN AN ORGANIZED HEALTH CARE EDUCATION/TRAINING PROGRAM

## 2025-01-31 PROCEDURE — RXMED WILLOW AMBULATORY MEDICATION CHARGE

## 2025-01-31 PROCEDURE — 78452 HT MUSCLE IMAGE SPECT MULT: CPT

## 2025-01-31 PROCEDURE — 93017 CV STRESS TEST TRACING ONLY: CPT

## 2025-01-31 PROCEDURE — A9502 TC99M TETROFOSMIN: HCPCS | Performed by: INTERNAL MEDICINE

## 2025-01-31 PROCEDURE — 71045 X-RAY EXAM CHEST 1 VIEW: CPT

## 2025-01-31 PROCEDURE — 93018 CV STRESS TEST I&R ONLY: CPT | Performed by: INTERNAL MEDICINE

## 2025-01-31 PROCEDURE — 99239 HOSP IP/OBS DSCHRG MGMT >30: CPT | Performed by: INTERNAL MEDICINE

## 2025-01-31 RX ORDER — TORSEMIDE 20 MG/1
60 TABLET ORAL 2 TIMES DAILY
Status: DISCONTINUED | OUTPATIENT
Start: 2025-01-31 | End: 2025-01-31 | Stop reason: HOSPADM

## 2025-01-31 RX ORDER — SPIRONOLACTONE 25 MG/1
25 TABLET ORAL DAILY
Status: DISCONTINUED | OUTPATIENT
Start: 2025-01-31 | End: 2025-01-31 | Stop reason: HOSPADM

## 2025-01-31 RX ORDER — ALBUTEROL SULFATE 90 UG/1
2 INHALANT RESPIRATORY (INHALATION) EVERY 4 HOURS PRN
Status: DISCONTINUED | OUTPATIENT
Start: 2025-01-31 | End: 2025-01-31 | Stop reason: HOSPADM

## 2025-01-31 RX ORDER — METOPROLOL SUCCINATE 25 MG/1
25 TABLET, EXTENDED RELEASE ORAL DAILY
Status: DISCONTINUED | OUTPATIENT
Start: 2025-01-31 | End: 2025-01-31 | Stop reason: HOSPADM

## 2025-01-31 RX ORDER — PANTOPRAZOLE SODIUM 40 MG/1
40 TABLET, DELAYED RELEASE ORAL
Qty: 30 TABLET | Refills: 0 | Status: SHIPPED | OUTPATIENT
Start: 2025-01-31 | End: 2025-01-31 | Stop reason: HOSPADM

## 2025-01-31 RX ORDER — AMINOPHYLLINE 25 MG/ML
125 INJECTION, SOLUTION INTRAVENOUS AS NEEDED
Status: CANCELLED | OUTPATIENT
Start: 2025-01-31

## 2025-01-31 RX ORDER — ATORVASTATIN CALCIUM 40 MG/1
40 TABLET, FILM COATED ORAL DAILY
Status: DISCONTINUED | OUTPATIENT
Start: 2025-01-31 | End: 2025-01-31 | Stop reason: HOSPADM

## 2025-01-31 RX ORDER — REGADENOSON 0.08 MG/ML
0.4 INJECTION, SOLUTION INTRAVENOUS
Status: COMPLETED | OUTPATIENT
Start: 2025-01-31 | End: 2025-01-31

## 2025-01-31 RX ORDER — NAPROXEN SODIUM 220 MG/1
81 TABLET, FILM COATED ORAL DAILY
Qty: 30 TABLET | Refills: 1 | Status: ON HOLD | OUTPATIENT
Start: 2025-02-01 | End: 2025-02-07

## 2025-01-31 RX ORDER — CALCIUM CARBONATE 300MG(750)
400 TABLET,CHEWABLE ORAL DAILY
COMMUNITY
End: 2025-02-07 | Stop reason: HOSPADM

## 2025-01-31 RX ORDER — NITROGLYCERIN 0.4 MG/1
0.4 TABLET SUBLINGUAL EVERY 5 MIN PRN
Qty: 25 TABLET | Refills: 11 | Status: SHIPPED | OUTPATIENT
Start: 2025-01-31

## 2025-01-31 RX ORDER — HEPARIN SODIUM 5000 [USP'U]/ML
7500 INJECTION, SOLUTION INTRAVENOUS; SUBCUTANEOUS EVERY 8 HOURS SCHEDULED
Status: DISCONTINUED | OUTPATIENT
Start: 2025-01-31 | End: 2025-01-31 | Stop reason: HOSPADM

## 2025-01-31 RX ORDER — NAPROXEN SODIUM 220 MG/1
81 TABLET, FILM COATED ORAL DAILY
Status: DISCONTINUED | OUTPATIENT
Start: 2025-01-31 | End: 2025-01-31 | Stop reason: HOSPADM

## 2025-01-31 RX ADMIN — SPIRONOLACTONE 25 MG: 25 TABLET, FILM COATED ORAL at 08:37

## 2025-01-31 RX ADMIN — HEPARIN SODIUM 7500 UNITS: 5000 INJECTION, SOLUTION INTRAVENOUS; SUBCUTANEOUS at 06:19

## 2025-01-31 RX ADMIN — TORSEMIDE 60 MG: 20 TABLET ORAL at 08:37

## 2025-01-31 RX ADMIN — ATORVASTATIN CALCIUM 40 MG: 40 TABLET, FILM COATED ORAL at 08:37

## 2025-01-31 RX ADMIN — ASPIRIN 81 MG CHEWABLE TABLET 81 MG: 81 TABLET CHEWABLE at 08:37

## 2025-01-31 RX ADMIN — REGADENOSON 0.4 MG: 0.08 INJECTION, SOLUTION INTRAVENOUS at 11:56

## 2025-01-31 RX ADMIN — TETROFOSMIN 36 MILLICURIE: 0.23 INJECTION, POWDER, LYOPHILIZED, FOR SOLUTION INTRAVENOUS at 11:26

## 2025-01-31 RX ADMIN — EMPAGLIFLOZIN 10 MG: 10 TABLET, FILM COATED ORAL at 08:37

## 2025-01-31 RX ADMIN — METOPROLOL SUCCINATE 25 MG: 25 TABLET, EXTENDED RELEASE ORAL at 08:37

## 2025-01-31 SDOH — SOCIAL STABILITY: SOCIAL INSECURITY: HAVE YOU HAD THOUGHTS OF HARMING ANYONE ELSE?: NO

## 2025-01-31 SDOH — SOCIAL STABILITY: SOCIAL INSECURITY: ARE YOU OR HAVE YOU BEEN THREATENED OR ABUSED PHYSICALLY, EMOTIONALLY, OR SEXUALLY BY ANYONE?: NO

## 2025-01-31 SDOH — ECONOMIC STABILITY: HOUSING INSECURITY: IN THE LAST 12 MONTHS, WAS THERE A TIME WHEN YOU WERE NOT ABLE TO PAY THE MORTGAGE OR RENT ON TIME?: NO

## 2025-01-31 SDOH — ECONOMIC STABILITY: FOOD INSECURITY: WITHIN THE PAST 12 MONTHS, YOU WORRIED THAT YOUR FOOD WOULD RUN OUT BEFORE YOU GOT THE MONEY TO BUY MORE.: NEVER TRUE

## 2025-01-31 SDOH — ECONOMIC STABILITY: INCOME INSECURITY: IN THE PAST 12 MONTHS HAS THE ELECTRIC, GAS, OIL, OR WATER COMPANY THREATENED TO SHUT OFF SERVICES IN YOUR HOME?: NO

## 2025-01-31 SDOH — SOCIAL STABILITY: SOCIAL INSECURITY
WITHIN THE LAST YEAR, HAVE YOU BEEN RAPED OR FORCED TO HAVE ANY KIND OF SEXUAL ACTIVITY BY YOUR PARTNER OR EX-PARTNER?: NO

## 2025-01-31 SDOH — SOCIAL STABILITY: SOCIAL INSECURITY: ABUSE: ADULT

## 2025-01-31 SDOH — SOCIAL STABILITY: SOCIAL INSECURITY: WITHIN THE LAST YEAR, HAVE YOU BEEN HUMILIATED OR EMOTIONALLY ABUSED IN OTHER WAYS BY YOUR PARTNER OR EX-PARTNER?: NO

## 2025-01-31 SDOH — SOCIAL STABILITY: SOCIAL INSECURITY
WITHIN THE LAST YEAR, HAVE YOU BEEN KICKED, HIT, SLAPPED, OR OTHERWISE PHYSICALLY HURT BY YOUR PARTNER OR EX-PARTNER?: NO

## 2025-01-31 SDOH — ECONOMIC STABILITY: TRANSPORTATION INSECURITY: IN THE PAST 12 MONTHS, HAS LACK OF TRANSPORTATION KEPT YOU FROM MEDICAL APPOINTMENTS OR FROM GETTING MEDICATIONS?: NO

## 2025-01-31 SDOH — ECONOMIC STABILITY: HOUSING INSECURITY: IN THE PAST 12 MONTHS, HOW MANY TIMES HAVE YOU MOVED WHERE YOU WERE LIVING?: 0

## 2025-01-31 SDOH — SOCIAL STABILITY: SOCIAL INSECURITY: DO YOU FEEL UNSAFE GOING BACK TO THE PLACE WHERE YOU ARE LIVING?: NO

## 2025-01-31 SDOH — SOCIAL STABILITY: SOCIAL INSECURITY: DO YOU FEEL ANYONE HAS EXPLOITED OR TAKEN ADVANTAGE OF YOU FINANCIALLY OR OF YOUR PERSONAL PROPERTY?: NO

## 2025-01-31 SDOH — ECONOMIC STABILITY: FOOD INSECURITY: HOW HARD IS IT FOR YOU TO PAY FOR THE VERY BASICS LIKE FOOD, HOUSING, MEDICAL CARE, AND HEATING?: NOT VERY HARD

## 2025-01-31 SDOH — SOCIAL STABILITY: SOCIAL INSECURITY: WITHIN THE LAST YEAR, HAVE YOU BEEN AFRAID OF YOUR PARTNER OR EX-PARTNER?: NO

## 2025-01-31 SDOH — ECONOMIC STABILITY: FOOD INSECURITY: WITHIN THE PAST 12 MONTHS, THE FOOD YOU BOUGHT JUST DIDN'T LAST AND YOU DIDN'T HAVE MONEY TO GET MORE.: NEVER TRUE

## 2025-01-31 SDOH — SOCIAL STABILITY: SOCIAL INSECURITY: ARE THERE ANY APPARENT SIGNS OF INJURIES/BEHAVIORS THAT COULD BE RELATED TO ABUSE/NEGLECT?: NO

## 2025-01-31 SDOH — ECONOMIC STABILITY: HOUSING INSECURITY: AT ANY TIME IN THE PAST 12 MONTHS, WERE YOU HOMELESS OR LIVING IN A SHELTER (INCLUDING NOW)?: NO

## 2025-01-31 SDOH — SOCIAL STABILITY: SOCIAL INSECURITY: HAVE YOU HAD ANY THOUGHTS OF HARMING ANYONE ELSE?: NO

## 2025-01-31 SDOH — SOCIAL STABILITY: SOCIAL INSECURITY: WERE YOU ABLE TO COMPLETE ALL THE BEHAVIORAL HEALTH SCREENINGS?: YES

## 2025-01-31 SDOH — SOCIAL STABILITY: SOCIAL INSECURITY: HAS ANYONE EVER THREATENED TO HURT YOUR FAMILY OR YOUR PETS?: NO

## 2025-01-31 SDOH — SOCIAL STABILITY: SOCIAL INSECURITY: DOES ANYONE TRY TO KEEP YOU FROM HAVING/CONTACTING OTHER FRIENDS OR DOING THINGS OUTSIDE YOUR HOME?: NO

## 2025-01-31 ASSESSMENT — ACTIVITIES OF DAILY LIVING (ADL)
BATHING: INDEPENDENT
HEARING - LEFT EAR: FUNCTIONAL
JUDGMENT_ADEQUATE_SAFELY_COMPLETE_DAILY_ACTIVITIES: YES
WALKS IN HOME: INDEPENDENT
ADEQUATE_TO_COMPLETE_ADL: YES
TOILETING: INDEPENDENT
DRESSING YOURSELF: INDEPENDENT
GROOMING: INDEPENDENT
PATIENT'S MEMORY ADEQUATE TO SAFELY COMPLETE DAILY ACTIVITIES?: YES
FEEDING YOURSELF: INDEPENDENT
LACK_OF_TRANSPORTATION: NO
HEARING - RIGHT EAR: FUNCTIONAL

## 2025-01-31 ASSESSMENT — COGNITIVE AND FUNCTIONAL STATUS - GENERAL
MOBILITY SCORE: 23
DAILY ACTIVITIY SCORE: 24
CLIMB 3 TO 5 STEPS WITH RAILING: A LITTLE
DAILY ACTIVITIY SCORE: 24
CLIMB 3 TO 5 STEPS WITH RAILING: A LITTLE
PATIENT BASELINE BEDBOUND: NO
MOBILITY SCORE: 23

## 2025-01-31 ASSESSMENT — PAIN DESCRIPTION - DESCRIPTORS: DESCRIPTORS: SQUEEZING

## 2025-01-31 ASSESSMENT — PAIN - FUNCTIONAL ASSESSMENT
PAIN_FUNCTIONAL_ASSESSMENT: 0-10

## 2025-01-31 ASSESSMENT — ENCOUNTER SYMPTOMS
ALLERGIC/IMMUNOLOGIC NEGATIVE: 1
GASTROINTESTINAL NEGATIVE: 1
PSYCHIATRIC NEGATIVE: 1
EYES NEGATIVE: 1
NEUROLOGICAL NEGATIVE: 1
MUSCULOSKELETAL NEGATIVE: 1
HEMATOLOGIC/LYMPHATIC NEGATIVE: 1
RESPIRATORY NEGATIVE: 1
ENDOCRINE NEGATIVE: 1
FATIGUE: 1

## 2025-01-31 ASSESSMENT — LIFESTYLE VARIABLES
AUDIT-C TOTAL SCORE: 0
HOW OFTEN DO YOU HAVE A DRINK CONTAINING ALCOHOL: NEVER
AUDIT-C TOTAL SCORE: 0
SKIP TO QUESTIONS 9-10: 1
HOW OFTEN DO YOU HAVE 6 OR MORE DRINKS ON ONE OCCASION: NEVER
HOW MANY STANDARD DRINKS CONTAINING ALCOHOL DO YOU HAVE ON A TYPICAL DAY: PATIENT DOES NOT DRINK

## 2025-01-31 ASSESSMENT — PATIENT HEALTH QUESTIONNAIRE - PHQ9
SUM OF ALL RESPONSES TO PHQ9 QUESTIONS 1 & 2: 0
2. FEELING DOWN, DEPRESSED OR HOPELESS: NOT AT ALL
1. LITTLE INTEREST OR PLEASURE IN DOING THINGS: NOT AT ALL

## 2025-01-31 ASSESSMENT — PAIN SCALES - GENERAL
PAINLEVEL_OUTOF10: 8
PAINLEVEL_OUTOF10: 0 - NO PAIN
PAINLEVEL_OUTOF10: 0 - NO PAIN

## 2025-01-31 ASSESSMENT — PAIN DESCRIPTION - PAIN TYPE: TYPE: ACUTE PAIN

## 2025-01-31 ASSESSMENT — PAIN DESCRIPTION - ORIENTATION: ORIENTATION: LEFT

## 2025-01-31 ASSESSMENT — PAIN DESCRIPTION - PROGRESSION: CLINICAL_PROGRESSION: NOT CHANGED

## 2025-01-31 ASSESSMENT — PAIN DESCRIPTION - LOCATION: LOCATION: CHEST
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
## 2025-01-31 NOTE — CARE PLAN
The patient's goals for the shift include      The clinical goals for the shift include patient will deny chest pain    Over the shift, the patient has not complained of chest pain.

## 2025-01-31 NOTE — H&P
History Of Present Illness  Patric Arenas is a 49 y.o. male with PMH of DCM with CHF with recovered EF, s/p ICD, obesity with BMI ~60 kg/m², MJ, on chronic home O2, hypertension, hyperlipidemia, h/o illicit drug use presents for chest pain.     The pain is an aching, 7/10 pain that began a few hours prior to admission, associated with some SOB but denies fever, chills, nausea, vomiting, and diarrhea. The pain is not worse with movement or breathing.     In ED and floor, patient is no longer in pain. He is on home NC O2 and appears comfortable. Serial troponin negative, EKG without changes from previous ECGs.    He was hospitalized 5/27 -6/9/2024 following hospitalization with acute hypercapnic respiratory failure requiring IPPV. Part of his presentation was felt to be due to acute heart failure likely associated with nonadherence with medications. He was resumed on heart failure pharmacotherapy prior to hospital discharge.         Past Medical History  Past Medical History:   Diagnosis Date    CHF (congestive heart failure)     HTN (hypertension)     Laceration of liver 06/05/2006    Formatting of this note might be different from the original. Liver injury without mention of open wound into cavity, unspecified laceration [id number]    MJ on CPAP        Surgical History  Past Surgical History:   Procedure Laterality Date    CARDIAC DEFIBRILLATOR PLACEMENT      2019        Social History  He reports that he has never smoked. He has never used smokeless tobacco. He reports that he does not currently use alcohol after a past usage of about 10.0 standard drinks of alcohol per week. He reports that he does not use drugs.    Family History  Family History   Problem Relation Name Age of Onset    Hypertension Mother          Allergies  Patient has no known allergies.    Review of Systems   Constitutional:  Positive for fatigue.   HENT: Negative.     Eyes: Negative.    Respiratory: Negative.     Cardiovascular:  Positive  "for chest pain and leg swelling.   Gastrointestinal: Negative.    Endocrine: Negative.    Genitourinary: Negative.    Musculoskeletal: Negative.    Skin:  Positive for rash.   Allergic/Immunologic: Negative.    Neurological: Negative.    Hematological: Negative.    Psychiatric/Behavioral: Negative.          Physical Exam  Constitutional:       General: He is not in acute distress.     Appearance: He is obese.   Cardiovascular:      Pulses: Normal pulses.      Heart sounds: Normal heart sounds.   Pulmonary:      Effort: Pulmonary effort is normal.      Breath sounds: Normal breath sounds.   Abdominal:      Palpations: Abdomen is soft.   Musculoskeletal:      Right lower leg: Edema present.      Left lower leg: Edema present.   Neurological:      General: No focal deficit present.      Mental Status: He is oriented to person, place, and time.          Last Recorded Vitals  Blood pressure (!) 136/91, pulse 79, temperature 36.3 °C (97.3 °F), temperature source Temporal, resp. rate 20, height 1.727 m (5' 8\"), weight (!) 182 kg (401 lb 7.3 oz), SpO2 96%.    Relevant Results        Last Labs:  CBC - 1/30/2025:  9:34 PM  5.2 16.2 228    51.5      BMP  141  98  20                  ----------------<102     3.8  34  1.51     CMP - 1/30/2025:  9:34 PM  9.2 8.0 17 --- 0.6   3.7 4.0 12 99      PTT - 5/28/2024:  5:21 AM  1.1   12.7 29     Troponin I, High Sensitivity   Date/Time Value Ref Range Status   05/26/2024 11:24 PM 48 0 - 53 ng/L Final     Troponin I, High Sensitivity ([facility])   Date/Time Value Ref Range Status   01/30/2025 11:10 PM 9 0 - 53 ng/L Final     BNP   Date/Time Value Ref Range Status   01/30/2025 09:34 PM 17 0 - 99 pg/mL Final      XR chest 1 view    Result Date: 1/31/2025  Interpreted By:  Eldon Baxter and Ritchie Brandon STUDY: XR CHEST 1 VIEW;  1/31/2025 12:23 am   INDICATION: Signs/Symptoms:Chest pain, hx HFrEF.   COMPARISON: Chest x-ray 06/01/2024. CT chest 06/01/2024.   ACCESSION NUMBER(S): " [id number]   ORDERING CLINICIAN: JARRET CARRILLO   FINDINGS: AP radiograph of the chest was provided.   Limited by soft tissue attenuation factors. Apical lordosis. Rotation rightward. Leads overlie the chest, partially obscuring the field-of-view.   Left subclavian approach pacemaker with lead projecting over the right ventricle.   CARDIOMEDIASTINAL SILHOUETTE: Cardiomediastinal silhouette is mildly enlarged but stable in size and configuration.   LUNGS: Low lung volumes contributing to bronchovascular crowding. There is a degree of mild superimposed central pulmonary vascular congestion with prominent interstitial markings involving the lung bases. No focal consolidation, pleural effusion, or pneumothorax.   ABDOMEN: No remarkable upper abdominal findings.   BONES: No acute osseous changes.       1.  Cardiomegaly with mild central pulmonary vascular congestion and possible mild acute pulmonary interstitial edema. Recommend correlation with clinical volume status and correlation with BNP as indicated.   I personally reviewed the images/study and I agree with the findings as stated by resident Júnior Mccoy. This study was interpreted at [place], [place].   MACRO: None   Signed by: Eldon Baxter 1/31/2025 12:44 AM Dictation workstation:   [id number]    ECG 12 lead    Result Date: 1/30/2025  Normal sinus rhythm Nonspecific ST and T wave abnormality Abnormal ECG When compared with ECG of 01-JUL-2024 13:23, ST now depressed in Anterior leads T wave inversion no longer evident in Inferior leads See ED provider note for full interpretation and clinical correlation Confirmed by Gwen Coombs ([id number]) on 1/30/2025 10:51:14 PM      Current Outpatient Medications   Medication Instructions    albuterol (Ventolin HFA) 90 mcg/actuation inhaler 2 puffs, inhalation, Every 4 hours PRN, If you are using your rescue inhaler frequently, seek medical evaluation    atorvastatin  (LIPITOR) 40 mg, oral, Daily    blood pressure test kit-large kit 1 kit, miscellaneous, Daily    empagliflozin (JARDIANCE) 10 mg, oral, Daily    metoprolol succinate XL (TOPROL-XL) 25 mg, oral, Daily    oxygen (O2) gas therapy Inhale.    pantoprazole (PROTONIX) 40 mg, oral, Daily before breakfast, Do not crush, chew, or split.    spironolactone (ALDACTONE) 25 mg, oral, Daily    torsemide (DEMADEX) 60 mg, oral, 2 times daily         Assessment/Plan   Assessment & Plan  Chest pain, unspecified type    Class 3 severe obesity with body mass index (BMI) of 60.0 to 69.9 in adult    Obstructive sleep apnea    Primary hypertension    Dyslipidemia    Heart failure with recovered ejection fraction (HFrecEF)      Patric Arenas is a 49 y.o. male with PMH of DCM with CHF with recovered EF, s/p ICD, obesity with BMI ~60 kg/m², MJ, on chronic home O2, hypertension, hyperlipidemia, h/o illicit drug use presents for chest pain.     # Chest pain  - Patient without acute EKG changes and negative serial troponins. However, due to patient's risk factors, patient needs coronary evaluatation. Due to his weight, CT would not be a good option. Consider SPECT or LHC.   - ASA + statin    #HFrecEF  - continue home medications for HF.     #VTE prophylaxis  - started on SCD and heparin, and not LMWH, for possible LHC.     #HLD  #HTN      I spent 60 minutes in the professional and overall care of this patient.      Kadeem Martínez MD

## 2025-01-31 NOTE — PROGRESS NOTES
Pharmacy Medication History Review    Patric Arenas is a 49 y.o. male admitted for Chest pain, unspecified type. Pharmacy reviewed the patient's [id number] medications and allergies for accuracy.    The list below reflects the updated PTA list.   Prior to Admission Medications   Prescriptions Last Dose Informant   albuterol (Ventolin HFA) 90 mcg/actuation inhaler  Self   Sig: Inhale 2 puffs every 4 hours if needed for wheezing or shortness of breath. If you are using your rescue inhaler frequently, seek medical evaluation   atorvastatin (Lipitor) 40 mg tablet  Self    Sig: Take 1 tablet (40 mg) by mouth once daily.   empagliflozin (Jardiance) 10 mg  Self    Sig: Take 1 tablet (10 mg) by mouth once daily.   magnesium oxide (Mag-Ox) 400 mg tablet  Self    Sig: Take 1 tablet (400 mg) by mouth once daily.   metoprolol succinate XL (Toprol-XL) 25 mg 24 hr tablet  Self    Sig: Take 1 tablet (25 mg) by mouth once daily.   oxygen (O2) gas therapy  Self   Sig: Inhale.   pantoprazole (ProtoNix) 40 mg EC tablet Not Taking    Sig: Take 1 tablet (40 mg) by mouth once daily in the morning. Take before meals. Do not crush, chew, or split.   Patient not taking: Reported on 1/31/2025   spironolactone (Aldactone) 25 mg tablet  Self   Sig: Take 1 tablet (25 mg) by mouth once daily.   torsemide (Demadex) 20 mg tablet  Self    Sig: Take 3 tablets (60 mg) by mouth 2 times a day.    Patient currently taking 3 tablets daily in morning . Patient did not know directions said 2 times a day       Facility-Administered Medications: None        The list below reflects the updated allergy list. Please review each documented allergy for additional clarification and justification.  Allergies  Reviewed by Radha Randolph RN on 1/30/2025   No Known Allergies         Patient accepts M2B at discharge. [facility].    Sources used to complete the med history include:    [facility]  Pharmacy dispense history  Patient  interview Good historian  Chart Review  Care Everywhere   11/21/24 cardiology Dr. Suyapa Chacon     Below are additional concerns with the patient's PTA list.  None     Medications ADDED:  Magnesium oxide  Medications CHANGED:  None   Medications REMOVED:   None     [facility].   Transitions of Care Pharmacist    Please reach out via Secure Chat for questions, or if no response call [phone] or [email or web address]

## 2025-01-31 NOTE — ED PROVIDER NOTES
History of Present Illness     History provided by: Patient  Limitations to History: None  External Records Reviewed with Brief Summary: None    HPI:    Patric Arenas is a 49 y.o. male with a PMH of HFrEF (EF 50-55% 2024, s/p ICD), HTN, HLD, MJ (inconsistent MJ use) who presents with left-sided chest pain. The pain is an aching, 7/10 pain that began a couple of hours ago. He reports some SOB but denies fever, chills, nausea, vomiting, and diarrhea. The pain is not worse with movement or breathing.    Physical Exam   Triage vitals:  T 36.7 °C (98 °F)  HR 95  /87  RR (!) 24  O2 98 % Supplemental oxygen 3L     General: Awake, alert, in no acute distress. Morbidly obese, sleeping, speaking in two-word sentences  Eyes: Gaze conjugate.  No scleral icterus but +injection  HENT: Normo-cephalic, atraumatic. No stridor  CV: Regular rate, regular rhythm. Radial pulses 2+ bilaterally  Resp: Breathing non-labored, speaking in full sentences.  Clear to auscultation bilaterally  GI: Soft, distended, non-tender. No rebound or guarding.  MSK/Extremities: No gross bony deformities. Moving all extremities. 2+ edema bilaterally  Skin: Warm. Appropriate color  Neuro: Alert. Oriented. Face symmetric. Speech is fluent.  Gross strength and sensation intact in b/l UE and LEs  Psych: Appropriate mood and affect    Medical Decision Making & ED Course   Medical Decision Makin y.o. male with PMH of HFrEF (EF 50-55% 2024, s/p ICD), HTN, HLD, MJ who presents with non radiating left-sided chest pain. EKG shows NSR with ST depressions in leads II and II, troponins/BNP wnl, CBC/CMP unremarkable. Given reassuring initial workup but history of HFrEF with ICD and moderate HEART score, patient may benefit from stress test.  ----  Scoring Tools Utilized:   HEART Score 5    Differential diagnoses considered include but are not limited to: NSTEMI, PE, myocarditis, costochondritis, pericarditis     Social Determinants of Health  which Significantly Impact Care: None identified     EKG Independent Interpretation:     EKG shows NSR with ST depressions in leads II and II    Independent Result Review and Interpretation: Relevant laboratory and radiographic results were reviewed and independently interpreted by myself.  As necessary, they are commented on in the ED Course.    Chronic conditions affecting the patient's care: As documented above in MDM    The patient was discussed with the following consultants/services: None    Care Considerations: As documented above in [facility]    ED Course:  ED Course as of 01/31/25 0019 Fri Jan 31, 2025 0008 Emergency Medicine Attending Attestation:     [unfilled]    The patient was seen by the resident/fellow.  I have personally performed a substantive portion of the encounter.  I have seen and examined the patient; agree with the workup, evaluation, MDM, management and diagnosis.  The care plan has been discussed with the resident; I have reviewed the resident's note and agree with the documented findings.      Patient is a 49-year-old male with a past medical history of advanced heart failure (requiring an ICD), HTN and HLD presenting the emergency department for a few hours of left-sided chest pain.  On my exam, patient is sitting comfortably in the gurney no acute distress.  He is wearing his home NC O2 at 3 L, not as labored breathing, clear breath sounds bilaterally.  He has a regular heart rate.  He has an obese but benign abdomen.  Given his history, historical risk factors, and now missed nuclear stress test x 2 with chest pain, we will plan to admit patient for stress test in the setting of left-sided chest pain with high risk features.    I independently interpreted patient's EKG and agree with the above mentioned interpretation.      Leighton Sullivan MD   [AM]      ED Course User Index  [AM] Leighton Sullivan MD         Diagnoses as of 01/31/25 0019   Chest pain, unspecified type      Disposition     Admitted to cardiology     Procedures   Procedures    No    Patient seen and discussed with ED attending physician Dr. Nate Vences, PGY1     Samuel Vences MD  Resident  01/31/25 0038

## 2025-01-31 NOTE — DISCHARGE INSTRUCTIONS
Your stress test is scheduled for Monday 2/3/25 at 9 am. there is no prep for this portion and to arrive at Nuclear [facility].     If you have any further chest pain, please take nitroglycerin as instructed and come to ED.

## 2025-01-31 NOTE — CARE PLAN
The patient's goals for the shift include      The clinical goals for the shift include patient will deny chest pain    Over the shift, the patient has been denying chest pain and more just saying he has chest soreness. Patient is comfortable in bed.

## 2025-02-01 ENCOUNTER — APPOINTMENT (OUTPATIENT)
Dept: RADIOLOGY | Facility: HOSPITAL | Age: 50
DRG: 286 | End: 2025-02-01
Payer: COMMERCIAL

## 2025-02-01 NOTE — DISCHARGE SUMMARY
Discharge Diagnosis  Chest pain, unspecified type    Issues Requiring Follow-Up  Rest stress test    Test Results Pending At Discharge  Pending Labs       Order Current Status    Drug Screen, Urine In process            Hospital Course  Patric Arenas is a 49 y.o. male with PMH of DCM with CHF with recovered EF, s/p ICD, obesity with BMI ~60 kg/m², MJ, on chronic home O2, hypertension, hyperlipidemia, h/o illicit drug use presents for chest pain.      The pain is an aching, 7/10 pain that began a few hours prior to admission, associated with some SOB but denies fever, chills, nausea, vomiting, and diarrhea. The pain is not worse with movement or breathing.      In ED and floor, patient is no longer in pain. He is on home NC O2 and appears comfortable. Serial troponin negative, EKG without changes from previous ECGs.     He was hospitalized 5/27 -6/9/2024 following hospitalization with acute hypercapnic respiratory failure requiring IPPV. Part of his presentation was felt to be due to acute heart failure likely associated with nonadherence with medications. He was resumed on heart failure pharmacotherapy prior to hospital discharge.    Floor Course:  Troponin and EKG unremarkable. Review of EMR showed patient was seen by Dr. Chacon and stress test was ordered as patient has hx of CM without prior ischemic eval. Stress test ordered inpatient given CP. Fortunately, patient remained CP free shortly after ED admit. Due to patients size, stress test was performed in 2 parts. Stress portion showed Medium-sized moderate to severe perfusion defects involving apex, apical to mid anterior, apical inferior/septal / lateral wall seen on stress imaging. Patient did not want to remain inpatient for rest portion which was to be completed Monday. He was started on baby ASA. Discharged with NTG SL PRN. He did not want refill of PPI. He was scheduled for rest portion of stress Monday 2/3/25 at 9 am. He was advised to abstain from  ETOH, drug use and fried foods.       Follow up appt with Dr. Chacon scheduled 2/28/2025.  Informed patient ozempic now approved for MJ and may qualify for med to help with weight loss. To discuss in follow up.     Discharge weight: 182kg    After all labs and VS were reviewed the decision was made that the patient was medically stable for discharge.  The patient was discharged in satisfactory condition.    More than 30 minutes were spent in coordinating patient discharge.      Pertinent Physical Exam At Time of Discharge  General: NAD, lying in bed  Skin: warm and dry   Head/ neck: unable to assess JVD  Cardiac: RRR, S1, S2   Pulm:decreased throughout, room air   GI: soft, nontender, large girth  Extremities: no LE edema   Neuro: no focal neuro deficits   Psych: appropriate mood and behavior       Home Medications     Medication List      START taking these medications     aspirin 81 mg chewable tablet; Chew 1 tablet (81 mg) once daily.; Start   taking on: February 1, 2025   nitroglycerin 0.4 mg SL tablet; Commonly known as: Nitrostat; Place 1   tablet (0.4 mg) under the tongue every 5 minutes if needed for chest pain.   May repeat every 5 minutes for up to 3 doses.     CONTINUE taking these medications     albuterol 90 mcg/actuation inhaler; Commonly known as: Ventolin HFA;   Inhale 2 puffs every 4 hours if needed for wheezing or shortness of   breath. If you are using your rescue inhaler frequently, seek medical   evaluation   atorvastatin 40 mg tablet; Commonly known as: Lipitor; Take 1 tablet (40   mg) by mouth once daily.   empagliflozin 10 mg; Commonly known as: Jardiance; Take 1 tablet (10 mg)   by mouth once daily.   magnesium oxide 400 mg tablet; Commonly known as: Mag-Ox   metoprolol succinate XL 25 mg 24 hr tablet; Commonly known as:   Toprol-XL; Take 1 tablet (25 mg) by mouth once daily.   oxygen gas therapy; Commonly known as: O2   spironolactone 25 mg tablet; Commonly known as: Aldactone; Take 1  tablet   (25 mg) by mouth once daily.   torsemide 20 mg tablet; Commonly known as: Demadex; Take 3 tablets (60   mg) by mouth 2 times a day.     STOP taking these medications     pantoprazole 40 mg EC tablet; Commonly known as: ProtoNix       Outpatient Follow-Up  Future Appointments   Date Time Provider Department Center   2/3/2025  9:00 AM [facility] [place] ADMIN ROOM 2 [facility]   2/3/2025  9:30 AM [facility] [place] 1 [facility]   2/28/2025  3:40 PM Suyapa Chacon MD PhD [id number] Academic   5/30/2025  9:20 AM Suyapa Chacon MD PhD [id number] Academic       Fernando Zamora, APRN-CNP

## 2025-02-01 NOTE — HOSPITAL COURSE
Patric Arenas is a 49 y.o. male with PMH of DCM with CHF with recovered EF, s/p ICD, obesity with BMI ~60 kg/m², MJ, on chronic home O2, hypertension, hyperlipidemia, h/o illicit drug use presents for chest pain.      The pain is an aching, 7/10 pain that began a few hours prior to admission, associated with some SOB but denies fever, chills, nausea, vomiting, and diarrhea. The pain is not worse with movement or breathing.      In ED and floor, patient is no longer in pain. He is on home NC O2 and appears comfortable. Serial troponin negative, EKG without changes from previous ECGs.     He was hospitalized 5/27 -6/9/2024 following hospitalization with acute hypercapnic respiratory failure requiring IPPV. Part of his presentation was felt to be due to acute heart failure likely associated with nonadherence with medications. He was resumed on heart failure pharmacotherapy prior to hospital discharge.    Floor Course:  Troponin and EKG unremarkable. Review of EMR showed patient was seen by Dr. Chacon and stress test was ordered as patient has hx of CM without prior ischemic eval. Stress test ordered inpatient given CP. Fortunately, patient remained CP free shortly after ED admit. Due to patients size, stress test was performed in 2 parts. Stress portion showed Medium-sized moderate to severe perfusion defects involving apex, apical to mid anterior, apical inferior/septal / lateral wall seen on stress imaging. Patient did not want to remain inpatient for rest portion which was to be completed Monday. He was started on baby ASA. Discharged with NTG SL PRN. He did not want refill of PPI. He was scheduled for rest portion of stress Monday 2/3/25 at 9 am. He was advised to abstain from ETOH, drug use and fried foods.       Follow up appt with Dr. Chacon scheduled 2/28/2025.  Informed patient ozempic now approved for MJ and may qualify for med to help with weight loss. To discuss in follow up.     Discharge weight:  182kg    After all labs and VS were reviewed the decision was made that the patient was medically stable for discharge.  The patient was discharged in satisfactory condition.    More than 30 minutes were spent in coordinating patient discharge.

## 2025-02-03 ENCOUNTER — HOSPITAL ENCOUNTER (OUTPATIENT)
Dept: RADIOLOGY | Facility: HOSPITAL | Age: 50
Discharge: HOME | End: 2025-02-03
Payer: COMMERCIAL

## 2025-02-03 ENCOUNTER — APPOINTMENT (OUTPATIENT)
Dept: CARDIOLOGY | Facility: HOSPITAL | Age: 50
End: 2025-02-03
Payer: COMMERCIAL

## 2025-02-03 ENCOUNTER — TELEPHONE (OUTPATIENT)
Dept: CARDIOLOGY | Facility: HOSPITAL | Age: 50
End: 2025-02-03

## 2025-02-03 ENCOUNTER — HOSPITAL ENCOUNTER (INPATIENT)
Facility: HOSPITAL | Age: 50
LOS: 4 days | Discharge: HOME | End: 2025-02-07
Attending: INTERNAL MEDICINE
Payer: COMMERCIAL

## 2025-02-03 ENCOUNTER — PATIENT OUTREACH (OUTPATIENT)
Dept: CARE COORDINATION | Facility: CLINIC | Age: 50
End: 2025-02-03
Payer: COMMERCIAL

## 2025-02-03 ENCOUNTER — APPOINTMENT (OUTPATIENT)
Dept: RADIOLOGY | Facility: HOSPITAL | Age: 50
End: 2025-02-03
Payer: COMMERCIAL

## 2025-02-03 DIAGNOSIS — I50.32 HEART FAILURE WITH RECOVERED EJECTION FRACTION (HFRECEF): ICD-10-CM

## 2025-02-03 DIAGNOSIS — G47.33 OSA (OBSTRUCTIVE SLEEP APNEA): ICD-10-CM

## 2025-02-03 DIAGNOSIS — E66.01 CLASS 3 SEVERE OBESITY DUE TO EXCESS CALORIES WITH SERIOUS COMORBIDITY AND BODY MASS INDEX (BMI) OF 50.0 TO 59.9 IN ADULT: ICD-10-CM

## 2025-02-03 DIAGNOSIS — R07.9 CHEST PAIN, UNSPECIFIED TYPE: ICD-10-CM

## 2025-02-03 DIAGNOSIS — L85.3 DRY SKIN: ICD-10-CM

## 2025-02-03 DIAGNOSIS — J45.20 MILD INTERMITTENT ASTHMA WITHOUT COMPLICATION (HHS-HCC): ICD-10-CM

## 2025-02-03 DIAGNOSIS — R94.39 POSITIVE CARDIAC STRESS TEST: Primary | ICD-10-CM

## 2025-02-03 DIAGNOSIS — I20.0 UNSTABLE ANGINA (MULTI): ICD-10-CM

## 2025-02-03 DIAGNOSIS — I50.43 ACUTE ON CHRONIC COMBINED SYSTOLIC AND DIASTOLIC CONGESTIVE HEART FAILURE: ICD-10-CM

## 2025-02-03 DIAGNOSIS — J44.1 COPD WITH ACUTE EXACERBATION (MULTI): ICD-10-CM

## 2025-02-03 DIAGNOSIS — R93.1 ABNORMAL FINDINGS ON DIAGNOSTIC IMAGING OF HEART AND CORONARY CIRCULATION: ICD-10-CM

## 2025-02-03 DIAGNOSIS — E66.813 CLASS 3 SEVERE OBESITY DUE TO EXCESS CALORIES WITH SERIOUS COMORBIDITY AND BODY MASS INDEX (BMI) OF 50.0 TO 59.9 IN ADULT: ICD-10-CM

## 2025-02-03 PROBLEM — I10 HTN (HYPERTENSION): Status: ACTIVE | Noted: 2025-02-03

## 2025-02-03 PROBLEM — E66.9 OBESITY: Status: ACTIVE | Noted: 2025-02-03

## 2025-02-03 PROBLEM — E78.5 HLD (HYPERLIPIDEMIA): Status: ACTIVE | Noted: 2025-02-03

## 2025-02-03 PROBLEM — N18.30 CKD (CHRONIC KIDNEY DISEASE) STAGE 3, GFR 30-59 ML/MIN (MULTI): Status: ACTIVE | Noted: 2025-02-03

## 2025-02-03 LAB
ALBUMIN SERPL BCP-MCNC: 4.1 G/DL (ref 3.4–5)
ALP SERPL-CCNC: 92 U/L (ref 33–120)
ALT SERPL W P-5'-P-CCNC: 13 U/L (ref 10–52)
ANION GAP SERPL CALC-SCNC: 16 MMOL/L
AST SERPL W P-5'-P-CCNC: 16 U/L (ref 9–39)
BASOPHILS # BLD AUTO: 0.01 X10*3/UL (ref 0–0.1)
BASOPHILS NFR BLD AUTO: 0.2 %
BILIRUB SERPL-MCNC: 0.6 MG/DL (ref 0–1.2)
BNP SERPL-MCNC: 13 PG/ML (ref 0–99)
BUN SERPL-MCNC: 23 MG/DL (ref 6–23)
CALCIUM SERPL-MCNC: 9.7 MG/DL (ref 8.6–10.6)
CARDIAC TROPONIN I PNL SERPL HS: 13 NG/L (ref 0–53)
CHLORIDE SERPL-SCNC: 94 MMOL/L (ref 98–107)
CO2 SERPL-SCNC: 36 MMOL/L (ref 21–32)
CREAT SERPL-MCNC: 1.56 MG/DL (ref 0.5–1.3)
EGFRCR SERPLBLD CKD-EPI 2021: 53 ML/MIN/1.73M*2
EOSINOPHIL # BLD AUTO: 0.1 X10*3/UL (ref 0–0.7)
EOSINOPHIL NFR BLD AUTO: 2.3 %
ERYTHROCYTE [DISTWIDTH] IN BLOOD BY AUTOMATED COUNT: 15.1 % (ref 11.5–14.5)
GLUCOSE BLD MANUAL STRIP-MCNC: 126 MG/DL (ref 74–99)
GLUCOSE SERPL-MCNC: 107 MG/DL (ref 74–99)
HCT VFR BLD AUTO: 54.8 % (ref 41–52)
HGB BLD-MCNC: 16.5 G/DL (ref 13.5–17.5)
IMM GRANULOCYTES # BLD AUTO: 0.01 X10*3/UL (ref 0–0.7)
IMM GRANULOCYTES NFR BLD AUTO: 0.2 % (ref 0–0.9)
LYMPHOCYTES # BLD AUTO: 1.12 X10*3/UL (ref 1.2–4.8)
LYMPHOCYTES NFR BLD AUTO: 25.7 %
MAGNESIUM SERPL-MCNC: 2.27 MG/DL (ref 1.6–2.4)
MCH RBC QN AUTO: 32 PG (ref 26–34)
MCHC RBC AUTO-ENTMCNC: 30.1 G/DL (ref 32–36)
MCV RBC AUTO: 106 FL (ref 80–100)
MONOCYTES # BLD AUTO: 0.36 X10*3/UL (ref 0.1–1)
MONOCYTES NFR BLD AUTO: 8.3 %
NEUTROPHILS # BLD AUTO: 2.75 X10*3/UL (ref 1.2–7.7)
NEUTROPHILS NFR BLD AUTO: 63.3 %
NRBC BLD-RTO: 0 /100 WBCS (ref 0–0)
PLATELET # BLD AUTO: 194 X10*3/UL (ref 150–450)
POTASSIUM SERPL-SCNC: 3.5 MMOL/L (ref 3.5–5.3)
PROT SERPL-MCNC: 8.4 G/DL (ref 6.4–8.2)
RBC # BLD AUTO: 5.15 X10*6/UL (ref 4.5–5.9)
SODIUM SERPL-SCNC: 142 MMOL/L (ref 136–145)
TSH SERPL-ACNC: 1.01 MIU/L (ref 0.44–3.98)
WBC # BLD AUTO: 4.4 X10*3/UL (ref 4.4–11.3)

## 2025-02-03 PROCEDURE — 71045 X-RAY EXAM CHEST 1 VIEW: CPT | Performed by: RADIOLOGY

## 2025-02-03 PROCEDURE — 2500000004 HC RX 250 GENERAL PHARMACY W/ HCPCS (ALT 636 FOR OP/ED)

## 2025-02-03 PROCEDURE — A9502 TC99M TETROFOSMIN: HCPCS | Performed by: NURSE PRACTITIONER

## 2025-02-03 PROCEDURE — 85025 COMPLETE CBC W/AUTO DIFF WBC: CPT

## 2025-02-03 PROCEDURE — 84075 ASSAY ALKALINE PHOSPHATASE: CPT

## 2025-02-03 PROCEDURE — 78452 HT MUSCLE IMAGE SPECT MULT: CPT | Performed by: STUDENT IN AN ORGANIZED HEALTH CARE EDUCATION/TRAINING PROGRAM

## 2025-02-03 PROCEDURE — 78451 HT MUSCLE IMAGE SPECT SING: CPT

## 2025-02-03 PROCEDURE — 3430000001 HC RX 343 DIAGNOSTIC RADIOPHARMACEUTICALS: Performed by: NURSE PRACTITIONER

## 2025-02-03 PROCEDURE — 83735 ASSAY OF MAGNESIUM: CPT

## 2025-02-03 PROCEDURE — 71045 X-RAY EXAM CHEST 1 VIEW: CPT

## 2025-02-03 PROCEDURE — 2500000001 HC RX 250 WO HCPCS SELF ADMINISTERED DRUGS (ALT 637 FOR MEDICARE OP)

## 2025-02-03 PROCEDURE — 36415 COLL VENOUS BLD VENIPUNCTURE: CPT

## 2025-02-03 PROCEDURE — 1200000002 HC GENERAL ROOM WITH TELEMETRY DAILY

## 2025-02-03 PROCEDURE — 82805 BLOOD GASES W/O2 SATURATION: CPT

## 2025-02-03 PROCEDURE — 99223 1ST HOSP IP/OBS HIGH 75: CPT | Performed by: INTERNAL MEDICINE

## 2025-02-03 PROCEDURE — 84484 ASSAY OF TROPONIN QUANT: CPT

## 2025-02-03 PROCEDURE — 93010 ELECTROCARDIOGRAM REPORT: CPT | Performed by: INTERNAL MEDICINE

## 2025-02-03 PROCEDURE — 82947 ASSAY GLUCOSE BLOOD QUANT: CPT

## 2025-02-03 PROCEDURE — 83880 ASSAY OF NATRIURETIC PEPTIDE: CPT

## 2025-02-03 PROCEDURE — 93005 ELECTROCARDIOGRAM TRACING: CPT

## 2025-02-03 RX ORDER — ACETAMINOPHEN 325 MG/1
650 TABLET ORAL EVERY 6 HOURS PRN
Status: DISCONTINUED | OUTPATIENT
Start: 2025-02-03 | End: 2025-02-07 | Stop reason: HOSPADM

## 2025-02-03 RX ORDER — METOPROLOL SUCCINATE 25 MG/1
25 TABLET, EXTENDED RELEASE ORAL DAILY
Status: DISCONTINUED | OUTPATIENT
Start: 2025-02-04 | End: 2025-02-07 | Stop reason: HOSPADM

## 2025-02-03 RX ORDER — TORSEMIDE 20 MG/1
60 TABLET ORAL 2 TIMES DAILY
Status: DISCONTINUED | OUTPATIENT
Start: 2025-02-03 | End: 2025-02-04

## 2025-02-03 RX ORDER — POLYETHYLENE GLYCOL 3350 17 G/17G
17 POWDER, FOR SOLUTION ORAL DAILY
Status: DISCONTINUED | OUTPATIENT
Start: 2025-02-03 | End: 2025-02-07 | Stop reason: HOSPADM

## 2025-02-03 RX ORDER — ALBUTEROL SULFATE 90 UG/1
2 INHALANT RESPIRATORY (INHALATION) EVERY 4 HOURS PRN
Status: DISCONTINUED | OUTPATIENT
Start: 2025-02-03 | End: 2025-02-07 | Stop reason: HOSPADM

## 2025-02-03 RX ORDER — NITROGLYCERIN 0.4 MG/1
0.4 TABLET SUBLINGUAL EVERY 5 MIN PRN
Status: DISCONTINUED | OUTPATIENT
Start: 2025-02-03 | End: 2025-02-07 | Stop reason: HOSPADM

## 2025-02-03 RX ORDER — LANOLIN ALCOHOL/MO/W.PET/CERES
400 CREAM (GRAM) TOPICAL DAILY
Status: DISCONTINUED | OUTPATIENT
Start: 2025-02-03 | End: 2025-02-07 | Stop reason: HOSPADM

## 2025-02-03 RX ORDER — NAPROXEN SODIUM 220 MG/1
81 TABLET, FILM COATED ORAL DAILY
Status: DISCONTINUED | OUTPATIENT
Start: 2025-02-04 | End: 2025-02-07 | Stop reason: HOSPADM

## 2025-02-03 RX ORDER — HEPARIN SODIUM 5000 [USP'U]/ML
7500 INJECTION, SOLUTION INTRAVENOUS; SUBCUTANEOUS EVERY 8 HOURS SCHEDULED
Status: DISCONTINUED | OUTPATIENT
Start: 2025-02-03 | End: 2025-02-07 | Stop reason: HOSPADM

## 2025-02-03 RX ORDER — SPIRONOLACTONE 25 MG/1
25 TABLET ORAL DAILY
Status: DISCONTINUED | OUTPATIENT
Start: 2025-02-04 | End: 2025-02-07 | Stop reason: HOSPADM

## 2025-02-03 RX ORDER — ATORVASTATIN CALCIUM 40 MG/1
40 TABLET, FILM COATED ORAL DAILY
Status: DISCONTINUED | OUTPATIENT
Start: 2025-02-03 | End: 2025-02-07 | Stop reason: HOSPADM

## 2025-02-03 RX ADMIN — HEPARIN SODIUM 7500 UNITS: 5000 INJECTION, SOLUTION INTRAVENOUS; SUBCUTANEOUS at 22:45

## 2025-02-03 RX ADMIN — TETROFOSMIN 35.8 MILLICURIE: 0.23 INJECTION, POWDER, LYOPHILIZED, FOR SOLUTION INTRAVENOUS at 09:10

## 2025-02-03 SDOH — ECONOMIC STABILITY: HOUSING INSECURITY: IN THE PAST 12 MONTHS, HOW MANY TIMES HAVE YOU MOVED WHERE YOU WERE LIVING?: 0

## 2025-02-03 SDOH — ECONOMIC STABILITY: FOOD INSECURITY: WITHIN THE PAST 12 MONTHS, YOU WORRIED THAT YOUR FOOD WOULD RUN OUT BEFORE YOU GOT THE MONEY TO BUY MORE.: NEVER TRUE

## 2025-02-03 SDOH — SOCIAL STABILITY: SOCIAL INSECURITY: HAVE YOU HAD THOUGHTS OF HARMING ANYONE ELSE?: NO

## 2025-02-03 SDOH — ECONOMIC STABILITY: TRANSPORTATION INSECURITY: IN THE PAST 12 MONTHS, HAS LACK OF TRANSPORTATION KEPT YOU FROM MEDICAL APPOINTMENTS OR FROM GETTING MEDICATIONS?: NO

## 2025-02-03 SDOH — ECONOMIC STABILITY: FOOD INSECURITY: HOW HARD IS IT FOR YOU TO PAY FOR THE VERY BASICS LIKE FOOD, HOUSING, MEDICAL CARE, AND HEATING?: NOT VERY HARD

## 2025-02-03 SDOH — SOCIAL STABILITY: SOCIAL INSECURITY: ARE THERE ANY APPARENT SIGNS OF INJURIES/BEHAVIORS THAT COULD BE RELATED TO ABUSE/NEGLECT?: NO

## 2025-02-03 SDOH — ECONOMIC STABILITY: INCOME INSECURITY: IN THE PAST 12 MONTHS HAS THE ELECTRIC, GAS, OIL, OR WATER COMPANY THREATENED TO SHUT OFF SERVICES IN YOUR HOME?: NO

## 2025-02-03 SDOH — SOCIAL STABILITY: SOCIAL INSECURITY: ARE YOU OR HAVE YOU BEEN THREATENED OR ABUSED PHYSICALLY, EMOTIONALLY, OR SEXUALLY BY ANYONE?: NO

## 2025-02-03 SDOH — ECONOMIC STABILITY: FOOD INSECURITY: WITHIN THE PAST 12 MONTHS, THE FOOD YOU BOUGHT JUST DIDN'T LAST AND YOU DIDN'T HAVE MONEY TO GET MORE.: NEVER TRUE

## 2025-02-03 SDOH — ECONOMIC STABILITY: HOUSING INSECURITY: IN THE LAST 12 MONTHS, WAS THERE A TIME WHEN YOU WERE NOT ABLE TO PAY THE MORTGAGE OR RENT ON TIME?: NO

## 2025-02-03 SDOH — SOCIAL STABILITY: SOCIAL INSECURITY: WITHIN THE LAST YEAR, HAVE YOU BEEN AFRAID OF YOUR PARTNER OR EX-PARTNER?: NO

## 2025-02-03 SDOH — ECONOMIC STABILITY: HOUSING INSECURITY: AT ANY TIME IN THE PAST 12 MONTHS, WERE YOU HOMELESS OR LIVING IN A SHELTER (INCLUDING NOW)?: NO

## 2025-02-03 SDOH — SOCIAL STABILITY: SOCIAL INSECURITY: WITHIN THE LAST YEAR, HAVE YOU BEEN HUMILIATED OR EMOTIONALLY ABUSED IN OTHER WAYS BY YOUR PARTNER OR EX-PARTNER?: NO

## 2025-02-03 SDOH — SOCIAL STABILITY: SOCIAL INSECURITY: DO YOU FEEL UNSAFE GOING BACK TO THE PLACE WHERE YOU ARE LIVING?: NO

## 2025-02-03 SDOH — SOCIAL STABILITY: SOCIAL INSECURITY: DO YOU FEEL ANYONE HAS EXPLOITED OR TAKEN ADVANTAGE OF YOU FINANCIALLY OR OF YOUR PERSONAL PROPERTY?: NO

## 2025-02-03 SDOH — ECONOMIC STABILITY: FOOD INSECURITY
ARE ANY OF YOUR NEEDS URGENT? FOR EXAMPLE, UNCERTAINTY OF WHERE YOU WILL GET YOUR NEXT MEAL OR NOT HAVING THE MEDICATIONS YOU NEED TO TAKE TOMORROW.: NO

## 2025-02-03 SDOH — SOCIAL STABILITY: SOCIAL INSECURITY: DOES ANYONE TRY TO KEEP YOU FROM HAVING/CONTACTING OTHER FRIENDS OR DOING THINGS OUTSIDE YOUR HOME?: NO

## 2025-02-03 SDOH — SOCIAL STABILITY: SOCIAL INSECURITY: WERE YOU ABLE TO COMPLETE ALL THE BEHAVIORAL HEALTH SCREENINGS?: YES

## 2025-02-03 SDOH — SOCIAL STABILITY: SOCIAL INSECURITY: HAS ANYONE EVER THREATENED TO HURT YOUR FAMILY OR YOUR PETS?: NO

## 2025-02-03 SDOH — ECONOMIC STABILITY: GENERAL: WOULD YOU LIKE HELP WITH ANY OF THE FOLLOWING NEEDS?: I DONT NEED HELP WITH ANY OF THESE

## 2025-02-03 SDOH — SOCIAL STABILITY: SOCIAL INSECURITY: ABUSE: ADULT

## 2025-02-03 ASSESSMENT — ENCOUNTER SYMPTOMS
DIZZINESS: 0
PALPITATIONS: 0
NAUSEA: 0
LIGHT-HEADEDNESS: 0
DIARRHEA: 0
WHEEZING: 0
FATIGUE: 1
DIFFICULTY URINATING: 0
CONSTIPATION: 0
VOMITING: 0
SHORTNESS OF BREATH: 1
COUGH: 0
FEVER: 0
ABDOMINAL PAIN: 0
ABDOMINAL DISTENTION: 1
CHILLS: 0

## 2025-02-03 ASSESSMENT — COGNITIVE AND FUNCTIONAL STATUS - GENERAL
STANDING UP FROM CHAIR USING ARMS: A LITTLE
STANDING UP FROM CHAIR USING ARMS: A LITTLE
DAILY ACTIVITIY SCORE: 24
MOVING TO AND FROM BED TO CHAIR: A LITTLE
PATIENT BASELINE BEDBOUND: NO
MOBILITY SCORE: 21
STANDING UP FROM CHAIR USING ARMS: A LITTLE
MOVING TO AND FROM BED TO CHAIR: A LITTLE
DAILY ACTIVITIY SCORE: 24
CLIMB 3 TO 5 STEPS WITH RAILING: A LITTLE
CLIMB 3 TO 5 STEPS WITH RAILING: A LITTLE
DAILY ACTIVITIY SCORE: 24
CLIMB 3 TO 5 STEPS WITH RAILING: A LITTLE
MOVING TO AND FROM BED TO CHAIR: A LITTLE
MOBILITY SCORE: 21
MOBILITY SCORE: 21

## 2025-02-03 ASSESSMENT — ACTIVITIES OF DAILY LIVING (ADL)
WALKS IN HOME: INDEPENDENT
HEARING - LEFT EAR: FUNCTIONAL
LACK_OF_TRANSPORTATION: NO
TOILETING: INDEPENDENT
LACK_OF_TRANSPORTATION: NO
GROOMING: INDEPENDENT
ADEQUATE_TO_COMPLETE_ADL: YES
DRESSING YOURSELF: INDEPENDENT
BATHING: INDEPENDENT
HEARING - RIGHT EAR: FUNCTIONAL
PATIENT'S MEMORY ADEQUATE TO SAFELY COMPLETE DAILY ACTIVITIES?: YES
FEEDING YOURSELF: INDEPENDENT
LACK_OF_TRANSPORTATION: NO
JUDGMENT_ADEQUATE_SAFELY_COMPLETE_DAILY_ACTIVITIES: YES

## 2025-02-03 ASSESSMENT — PAIN SCALES - GENERAL
PAINLEVEL_OUTOF10: 9
PAINLEVEL_OUTOF10: 0 - NO PAIN

## 2025-02-03 ASSESSMENT — LIFESTYLE VARIABLES
HOW OFTEN DO YOU HAVE 6 OR MORE DRINKS ON ONE OCCASION: NEVER
AUDIT-C TOTAL SCORE: 0
SKIP TO QUESTIONS 9-10: 1
HOW OFTEN DO YOU HAVE A DRINK CONTAINING ALCOHOL: NEVER
AUDIT-C TOTAL SCORE: 0
HOW MANY STANDARD DRINKS CONTAINING ALCOHOL DO YOU HAVE ON A TYPICAL DAY: PATIENT DOES NOT DRINK

## 2025-02-03 ASSESSMENT — PAIN - FUNCTIONAL ASSESSMENT: PAIN_FUNCTIONAL_ASSESSMENT: 0-10

## 2025-02-03 NOTE — PROGRESS NOTES
Discharge facility: [facility]  Discharge diagnosis: Chest pain  Admission date: 1/31/25  Discharge date: 1/31/25  PCP Appointment Date: Needs scheduled   Specialist Appointment Date: Stress Scan 2/3/25, Cardiology 2/28/25    Hospital Encounter and Summary: Linked     Outreach call to patient to support a smooth transition of care from recent admission. Spoke with patient, reviewed discharge medications, discharge instructions, assessed social needs, and provided education on importance of follow-up appointment with provider. Will continue to monitor through transition period.    See Discharge assessment below for further details.    Wrap Up  Is the patient/caregiver familiar with Advance Care Planning?: Yes (2/3/2025 12:20 PM)  Would the patient like more information on Advance Care Planning?: No (2/3/2025 12:20 PM)  Call End Time: 1221 (2/3/2025 12:20 PM)    Engagement  Call Start Time: 1201 (2/3/2025 12:20 PM)    Medications  Medications reviewed with patient/caregiver?: Yes (2/3/2025 12:20 PM)  Is the patient having any side effects they believe may be caused by any medication additions or changes?: No (2/3/2025 12:20 PM)  Does the patient have all medications ordered at discharge?: Yes (2/3/2025 12:20 PM)  Care Management Interventions: No intervention needed (2/3/2025 12:20 PM)  Is the patient taking all medications as directed (includes completed medication regime)?: Yes (2/3/2025 12:20 PM)    Appointments  Does the patient have a primary care provider?: Yes (2/3/2025 12:20 PM)  Care Management Interventions: Verified appointment date/time/provider (2/3/2025 12:20 PM)  Has the patient kept scheduled appointments due by today?: Yes (2/3/2025 12:20 PM)  Care Management Interventions: Advised patient to keep appointment; Educated on importance of keeping appointment (2/3/2025 12:20 PM)    Patient Teaching  Does the patient have access to their discharge instructions?: Yes (2/3/2025 12:20 PM)  Care Management  Interventions: Reviewed instructions with patient (2/3/2025 12:20 PM)  What is the patient's perception of their health status since discharge?: Improving (2/3/2025 12:20 PM)  Is the patient/caregiver able to teach back the hierarchy of who to call/visit for symptoms/problems? PCP, Specialist, Home Health nurse, Urgent Care, ED, 911: Yes (2/3/2025 12:20 PM)    Anastasia Hurd RN, [facility] [place]  Phone [phone]

## 2025-02-03 NOTE — ASSESSMENT & PLAN NOTE
- has been referred to sleep medicine in the past   - per prior notes, Hx of noncompliance with CPAP

## 2025-02-03 NOTE — ASSESSMENT & PLAN NOTE
- pending ischemia evaluation with [facility] tomorrow  - last TTE  5/2024: LV EF 50-55%  - s/p ICD in 2019  - BNP pending (prev. 17) iso obesity  - Last DC Weight: 182 kg  - Admit Weight: pending  - admit CXR pending  - Admit S/S: dyspnea  - home diuretic: torsemide 60 mg BID (per patient, has only been taking AM dose)  - Current Med Regimen: empagliflozin 10 mg daily, metoprolol 25 mg daily, spironolactone 25 mg daily   - RAASi was being consider outpatient by Dr. Chacon based on how renal function improves  - Follows with: Dr. Chacon (last seen 11/2024)  - Strict Is & Os, 2L Fluid Restriction, 2-3gm Na Diet, Daily Weights

## 2025-02-03 NOTE — H&P
"History Of Present Illness  Patric Arenas is a 49 y.o. male with PMH of DCM with HFrecEF, s/p ICD, obesity with BMI ~60 kg/m², MJ, on chronic home O2, hypertension, hyperlipidemia, h/o illicit drug use presents after positive nuclear stress test that was completed in 2 parts (1/31 and today 2/3) which showed medium- sized moderate to severe perfusion defects in the apex, apical to mid anterior, and apical inferior/septal/lateral wall.     Patient initially presented to [facility] on 1/30 for 7/10 chest pain. Serial troponin negative, EKG without changes from previous ECGs.     Hospital course from 1/30-1/31:  Troponin and EKG remained unremarkable. Review of EMR showed patient was seen by Dr. Chacon and stress test was ordered as patient has hx of CM without prior ischemic eval. Stress test ordered inpatient given CP. Fortunately, patient remained CP free shortly after ED admit.     Due to patients size, stress test was performed in 2 parts. Stress portion showed Medium-sized moderate to severe perfusion defects involving apex, apical to mid anterior, apical inferior/septal / lateral wall seen on stress imaging. Patient did not want to remain inpatient for rest portion which was to be completed Monday 2/3. He was started on baby ASA. Discharged with NTG SL PRN. Patient was scheduled for rest portion of stress Monday 2/3/25 at 9 am.     Second portion on NM stress test today showed reversible medium-sized moderate-to-severe perfusion in the areas listed above, likely representing inducible myocardial ischemia. No evidence of prior infarction.    On presentation, patient denies any chest pain, no palpitations, wheezing, or cough. He endorses chronic dyspnea which limits his waking to a few steps at a time. He reports chronic LE swelling that \"never goes away\" or improves. He has been prescribed torsemide 60 mg BID but reports only taking torsemide in the AM because he \"doesn't want to pee all night\" from the PM dose. " "Patient states he is \"supposed to\" wear oxygen at home but rarely does. On exam, Mr. Arenas is lying flat without apparent distress on 1L supplemental O2. He states he can sleep on his side comfortably at night.     Otherwise, Mr. Arenas denies any weight fluctuations or appetite changes. He endorses occasional early satiety. Denies nausea/ vomiting/ constipation/ diarrhea.     Prior cardiac imaging:    TTE 5/2024  CONCLUSIONS:   1. Left ventricular systolic function is low normal with a 50-55% estimated ejection fraction.   2. Poorly visualized anatomical structures due to suboptimal image quality.   3. Abnormal septal motion consistent with RV pacemaker and right ventricular pressure overload.   4. Moderately increased left ventricular septal thickness.   5. The left ventricular posterior wall thickness is moderately increased.   6. There is reduced right ventricular systolic function.   7. RVSP within normal limits.   8. The RV systolic pressure may be underestimated.    TTE 2/2024  CONCLUSIONS:   1. Poorly visualized anatomical structures due to suboptimal image quality (even with Definity).   2. Left ventricular systolic function is suspected mildly decreased with a 45% estimated ejection fraction.   3. Abnormal septal motion consistent with RV pacemaker.    TTE 5/2021  CONCLUSIONS:   1. The left ventricular systolic function is moderately decreased with a 35% estimated ejection fraction.   2. Poorly visualized anatomical structures due to suboptimal image quality.   3. There is moderate concentric left ventricular hypertrophy.   4. There is global hypokinesis of the left ventricle with minor regional variations.    Past Medical History  Past Medical History:   Diagnosis Date    CHF (congestive heart failure)     HTN (hypertension)     Laceration of liver 06/05/2006    Formatting of this note might be different from the original. Liver injury without mention of open wound into cavity, unspecified laceration " [id number]    MJ on CPAP        Surgical History  Past Surgical History:   Procedure Laterality Date    CARDIAC DEFIBRILLATOR PLACEMENT      2019        Social History  He reports that he has never smoked. He has never used smokeless tobacco. He reports that he does not currently use alcohol after a past usage of about 10.0 standard drinks of alcohol per week. He reports that he does not use drugs.    Family History  Family History   Problem Relation Name Age of Onset    Hypertension Mother          Allergies  Patient has no known allergies.    Review of Systems   Constitutional:  Positive for fatigue. Negative for chills and fever.   HENT:  Negative for congestion.    Respiratory:  Positive for shortness of breath. Negative for cough and wheezing.    Cardiovascular:  Positive for leg swelling. Negative for chest pain and palpitations.   Gastrointestinal:  Positive for abdominal distention. Negative for abdominal pain, constipation, diarrhea, nausea and vomiting.   Genitourinary:  Negative for difficulty urinating.   Neurological:  Negative for dizziness and light-headedness.   All other systems reviewed and are negative.    Physical Exam  Vitals reviewed.   Constitutional:       General: He is not in acute distress.     Appearance: He is obese.      Comments: Chronically ill- appearing    HENT:      Mouth/Throat:      Mouth: Mucous membranes are moist.   Eyes:      Extraocular Movements: Extraocular movements intact.   Cardiovascular:      Rate and Rhythm: Normal rate and regular rhythm.   Pulmonary:      Effort: No respiratory distress.      Comments: Breath sounds diminished throughout  1L supplemental O2  Abdominal:      General: Bowel sounds are normal. There is distension.      Tenderness: There is no abdominal tenderness.   Musculoskeletal:      Right lower le+ Edema present.      Left lower le+ Edema present.   Skin:     General: Skin is warm and dry.   Neurological:      General: No focal deficit  present.      Mental Status: He is alert and oriented to person, place, and time.   Psychiatric:         Mood and Affect: Mood normal.         Behavior: Behavior normal. Behavior is cooperative.       Last Recorded Vitals  Vitals:    02/03/25 1700   BP: 110/73   Pulse: 90   Resp: 20   Temp: 36.3 °C (97.3 °F)   SpO2: (!) 89%     Relevant Results  Scheduled medications  [START ON 2/4/2025] aspirin, 81 mg, oral, Daily  atorvastatin, 40 mg, oral, Daily  [START ON 2/4/2025] empagliflozin, 10 mg, oral, Daily  heparin (porcine), 7,500 Units, subcutaneous, q8h STEVE  magnesium oxide, 400 mg, oral, Daily  [START ON 2/4/2025] metoprolol succinate XL, 25 mg, oral, Daily  polyethylene glycol, 17 g, oral, Daily  [START ON 2/4/2025] spironolactone, 25 mg, oral, Daily  [Held by provider] torsemide, 60 mg, oral, BID      Continuous medications     PRN medications  PRN medications: albuterol, nitroglycerin    Results for orders placed or performed during the hospital encounter of 01/30/25 (from the past 96 hours)   ECG 12 lead   Result Value Ref Range    Ventricular Rate 94 BPM    Atrial Rate 94 BPM    SD Interval 160 ms    QRS Duration 78 ms    QT Interval 394 ms    QTC Calculation(Bazett) 492 ms    P Axis 68 degrees    R Axis 55 degrees    T Axis -7 degrees    QRS Count 15 beats    Q Onset 226 ms    P Onset 162 ms    P Offset 182 ms    T Offset 423 ms    QTC Fredericia 457 ms   CBC with Differential   Result Value Ref Range    WBC 5.2 4.4 - 11.3 x10*3/uL    nRBC 0.0 0.0 - 0.0 /100 WBCs    RBC 4.97 4.50 - 5.90 x10*6/uL    Hemoglobin 16.2 13.5 - 17.5 g/dL    Hematocrit 51.5 41.0 - 52.0 %     (H) 80 - 100 fL    MCH 32.6 26.0 - 34.0 pg    MCHC 31.5 (L) 32.0 - 36.0 g/dL    RDW 15.0 (H) 11.5 - 14.5 %    Platelets 228 150 - 450 x10*3/uL    Neutrophils % 65.9 40.0 - 80.0 %    Immature Granulocytes %, Automated 0.2 0.0 - 0.9 %    Lymphocytes % 24.9 13.0 - 44.0 %    Monocytes % 6.9 2.0 - 10.0 %    Eosinophils % 1.9 0.0 - 6.0 %     Basophils % 0.2 0.0 - 2.0 %    Neutrophils Absolute 3.41 1.20 - 7.70 x10*3/uL    Immature Granulocytes Absolute, Automated 0.01 0.00 - 0.70 x10*3/uL    Lymphocytes Absolute 1.29 1.20 - 4.80 x10*3/uL    Monocytes Absolute 0.36 0.10 - 1.00 x10*3/uL    Eosinophils Absolute 0.10 0.00 - 0.70 x10*3/uL    Basophils Absolute 0.01 0.00 - 0.10 x10*3/uL   Comprehensive Metabolic Panel   Result Value Ref Range    Glucose 102 (H) 74 - 99 mg/dL    Sodium 141 136 - 145 mmol/L    Potassium 3.8 3.5 - 5.3 mmol/L    Chloride 98 98 - 107 mmol/L    Bicarbonate 34 (H) 21 - 32 mmol/L    Anion Gap 13 10 - 20 mmol/L    Urea Nitrogen 20 6 - 23 mg/dL    Creatinine 1.51 (H) 0.50 - 1.30 mg/dL    eGFR 56 (L) >60 mL/min/1.73m*2    Calcium 9.2 8.6 - 10.6 mg/dL    Albumin 4.0 3.4 - 5.0 g/dL    Alkaline Phosphatase 99 33 - 120 U/L    Total Protein 8.0 6.4 - 8.2 g/dL    AST 17 9 - 39 U/L    Bilirubin, Total 0.6 0.0 - 1.2 mg/dL    ALT 12 10 - 52 U/L   Brain Natriuretic Peptide   Result Value Ref Range    BNP 17 0 - 99 pg/mL   Troponin I, High Sensitivity, Initial   Result Value Ref Range    Troponin I, High Sensitivity ([facility]) 10 0 - 53 ng/L   Troponin, High Sensitivity, 1 Hour   Result Value Ref Range    Troponin I, High Sensitivity ([facility]) 9 0 - 53 ng/L   Drug Screen, Urine   Result Value Ref Range    Amphetamine Screen, Urine Presumptive Negative Presumptive Negative    Barbiturate Screen, Urine Presumptive Negative Presumptive Negative    Benzodiazepines Screen, Urine Presumptive Negative Presumptive Negative    Cannabinoid Screen, Urine Presumptive Negative Presumptive Negative    Cocaine Metabolite Screen, Urine Presumptive Negative Presumptive Negative    Fentanyl Screen, Urine Presumptive Negative Presumptive Negative    Opiate Screen, Urine Presumptive Negative Presumptive Negative    Oxycodone Screen, Urine Presumptive Negative Presumptive Negative    PCP Screen, Urine Presumptive Positive (A) Presumptive Negative    Methadone Screen, Urine  Presumptive Negative Presumptive Negative     Assessment/Plan   Patric Arenas is a 49 y.o. male with PMH of DCM with HFrecEF, s/p ICD, obesity with BMI ~60 kg/m², MJ, on chronic home O2, hypertension, hyperlipidemia, h/o illicit drug use presents after positive nuclear stress test that was completed in 2 parts (1/31 and today 2/3) which showed medium- sized moderate to severe perfusion defects in the apex, apical to mid anterior, and apical inferior/septal/lateral wall. Admitted to [facility] for further ischemic evaluation.   Assessment & Plan  Positive cardiac stress test  - 1/30: patient presented with 7/10 CP  - Serial troponin negative, EKG without changes from previous ECGs.   - 1/31 nuclear stress test (first part): showed medium- sized moderate to severe perfusion defects in the apex, apical to mid anterior, and apical inferior/septal/lateral wall.   - patient discharged on 1/31 to return for 2nd part of nuclear stress on 2/3  - 2/3 nuclear stress (second part): Reversible medium-sized moderate-to-severe perfusion defects involving apex, apical to mid anterior, apical inferior/septal/lateral wall seen on stress imaging, likely representing inducible myocardial ischemia  - [facility] pending tomorrow, NPO at midnight   - continue aspirin 81 mg daily, atorvastatin 40 mg nightly   - see HFrecEF below   Heart failure with recovered ejection fraction (HFrecEF)  - pending ischemia evaluation with [facility] tomorrow  - last TTE  5/2024: LV EF 50-55%  - s/p ICD in 2019  - BNP pending (prev. 17) iso obesity  - Last DC Weight: 182 kg  - Admit Weight: pending  - admit CXR pending  - Admit S/S: dyspnea  - home diuretic: torsemide 60 mg BID (per patient, has only been taking AM dose)  - Current Med Regimen: empagliflozin 10 mg daily, metoprolol 25 mg daily, spironolactone 25 mg daily   - RAASi was being consider outpatient by Dr. Chacon based on how renal function improves  - Follows with: Dr. Chacon (last seen 11/2024)  - Strict Is &  Os, 2L Fluid Restriction, 2-3gm Na Diet, Daily Weights  CKD (chronic kidney disease) stage 3, GFR 30-59 ml/min (Multi)  - baseline GFR 30-50s  - last DC Cr 1.51  - admit Cr pending   - likely will resume home diuretic tomorrow pending renal function panel   HTN (hypertension)  - admit /73  - no current anti- HTN regimen, will trend BPs   HLD (hyperlipidemia)  - AM fasting lipid panel pending  - continue home atorvastatin 40 mg daily   Obesity  - BMI 61 kg  - seen 10/2024 by bariatric surgery team outpatient, will need to follow- up outpatient  MJ (obstructive sleep apnea)  - has been referred to sleep medicine in the past   - per prior notes, Hx of noncompliance with CPAP     Dispo: pending [facility] tomorrow, NPO at midnight  DVT prophylaxis: subcutaneous heparin      Patient to be formally staffed with attending in the AM.     Barbara Starkey, APRN-CNP    I conducted a shared care visit with the CJ, briefly patient is admitted following a positive stress test, denies active angina, will plan for coronary angiogram and possible PCI, no contraindcations to DAPT if needed (not interested in weight loss surgery) we received mixed information about use of bipap but seems to have worsening co2 retention would benefit from follow up with pulmonary/sleep clinic

## 2025-02-03 NOTE — ASSESSMENT & PLAN NOTE
- 1/30: patient presented with 7/10 CP  - Serial troponin negative, EKG without changes from previous ECGs.   - 1/31 nuclear stress test (first part): showed medium- sized moderate to severe perfusion defects in the apex, apical to mid anterior, and apical inferior/septal/lateral wall.   - patient discharged on 1/31 to return for 2nd part of nuclear stress on 2/3  - 2/3 nuclear stress (second part): Reversible medium-sized moderate-to-severe perfusion defects involving apex, apical to mid anterior, apical inferior/septal/lateral wall seen on stress imaging, likely representing inducible myocardial ischemia  - [facility] pending tomorrow, NPO at midnight   - continue aspirin 81 mg daily, atorvastatin 40 mg nightly   - see HFrecEF below

## 2025-02-03 NOTE — ASSESSMENT & PLAN NOTE
- baseline GFR 30-50s  - last DC Cr 1.51  - admit Cr pending   - likely will resume home diuretic tomorrow pending renal function panel

## 2025-02-03 NOTE — ASSESSMENT & PLAN NOTE
- BMI 61 kg  - seen 10/2024 by bariatric surgery team outpatient, will need to follow- up outpatient

## 2025-02-04 LAB
ALBUMIN SERPL BCP-MCNC: 4.3 G/DL (ref 3.4–5)
AMPHETAMINES UR QL SCN: NORMAL
ANION GAP BLDV CALCULATED.4IONS-SCNC: 5 MMOL/L (ref 10–25)
ANION GAP SERPL CALC-SCNC: 15 MMOL/L (ref 10–20)
APPARATUS: ABNORMAL
ATRIAL RATE: 81 BPM
BARBITURATES UR QL SCN: NORMAL
BASE EXCESS BLDV CALC-SCNC: 13.1 MMOL/L (ref -2–3)
BASE EXCESS BLDV CALC-SCNC: 9.8 MMOL/L (ref -2–3)
BENZODIAZ UR QL SCN: NORMAL
BODY TEMPERATURE: 37 DEGREES CELSIUS
BODY TEMPERATURE: ABNORMAL
BUN SERPL-MCNC: 21 MG/DL (ref 6–23)
BZE UR QL SCN: NORMAL
CA-I BLDV-SCNC: 1.19 MMOL/L (ref 1.1–1.33)
CALCIUM SERPL-MCNC: 9.9 MG/DL (ref 8.6–10.6)
CANNABINOIDS UR QL SCN: NORMAL
CHLORIDE BLDV-SCNC: 94 MMOL/L (ref 98–107)
CHLORIDE SERPL-SCNC: 93 MMOL/L (ref 98–107)
CHOLEST SERPL-MCNC: 149 MG/DL (ref 0–199)
CHOLESTEROL/HDL RATIO: 4.1
CO2 SERPL-SCNC: 36 MMOL/L (ref 21–32)
CREAT SERPL-MCNC: 1.41 MG/DL (ref 0.5–1.3)
EGFRCR SERPLBLD CKD-EPI 2021: 60 ML/MIN/1.73M*2
ERYTHROCYTE [DISTWIDTH] IN BLOOD BY AUTOMATED COUNT: 15.1 % (ref 11.5–14.5)
EST. AVERAGE GLUCOSE BLD GHB EST-MCNC: 131 MG/DL
FENTANYL+NORFENTANYL UR QL SCN: NORMAL
GLUCOSE BLDV-MCNC: 151 MG/DL (ref 74–99)
GLUCOSE SERPL-MCNC: 104 MG/DL (ref 74–99)
HBA1C MFR BLD: 6.2 %
HCO3 BLDV-SCNC: 41.7 MMOL/L (ref 22–26)
HCO3 BLDV-SCNC: 44.5 MMOL/L (ref 22–26)
HCT VFR BLD AUTO: 53.8 % (ref 41–52)
HCT VFR BLD EST: 48 % (ref 41–52)
HDLC SERPL-MCNC: 36.3 MG/DL
HGB BLD-MCNC: 16.6 G/DL (ref 13.5–17.5)
HGB BLDV-MCNC: 16.1 G/DL (ref 13.5–17.5)
INHALED O2 CONCENTRATION: 100 %
LACTATE BLDV-SCNC: 1.1 MMOL/L (ref 0.4–2)
LDLC SERPL CALC-MCNC: 89 MG/DL
MAGNESIUM SERPL-MCNC: 2.43 MG/DL (ref 1.6–2.4)
MCH RBC QN AUTO: 31.8 PG (ref 26–34)
MCHC RBC AUTO-ENTMCNC: 30.9 G/DL (ref 32–36)
MCV RBC AUTO: 103 FL (ref 80–100)
METHADONE UR QL SCN: NORMAL
NON HDL CHOLESTEROL: 113 MG/DL (ref 0–149)
NRBC BLD-RTO: 0 /100 WBCS (ref 0–0)
OPIATES UR QL SCN: NORMAL
OXYCODONE+OXYMORPHONE UR QL SCN: NORMAL
OXYHGB MFR BLDV: 40.1 % (ref 45–75)
OXYHGB MFR BLDV: 84.5 % (ref 45–75)
P AXIS: 65 DEGREES
P OFFSET: 197 MS
P ONSET: 134 MS
PCO2 BLDV: 93 MM HG (ref 41–51)
PCO2 BLDV: 97 MM HG (ref 41–51)
PCP UR QL SCN: NORMAL
PH BLDV: 7.26 PH (ref 7.33–7.43)
PH BLDV: 7.27 PH (ref 7.33–7.43)
PHOSPHATE SERPL-MCNC: 3 MG/DL (ref 2.5–4.9)
PLATELET # BLD AUTO: 204 X10*3/UL (ref 150–450)
PO2 BLDV: 31 MM HG (ref 35–45)
PO2 BLDV: 63 MM HG (ref 35–45)
POTASSIUM BLDV-SCNC: 4 MMOL/L (ref 3.5–5.3)
POTASSIUM SERPL-SCNC: 3.5 MMOL/L (ref 3.5–5.3)
PR INTERVAL: 180 MS
Q ONSET: 224 MS
QRS COUNT: 13 BEATS
QRS DURATION: 88 MS
QT INTERVAL: 462 MS
QTC CALCULATION(BAZETT): 536 MS
QTC FREDERICIA: 510 MS
R AXIS: 23 DEGREES
RBC # BLD AUTO: 5.22 X10*6/UL (ref 4.5–5.9)
SAO2 % BLDV: 41 % (ref 45–75)
SAO2 % BLDV: 87 % (ref 45–75)
SODIUM BLDV-SCNC: 137 MMOL/L (ref 136–145)
SODIUM SERPL-SCNC: 140 MMOL/L (ref 136–145)
T AXIS: 137 DEGREES
T OFFSET: 455 MS
TRIGL SERPL-MCNC: 119 MG/DL (ref 0–149)
VENTRICULAR RATE: 81 BPM
VLDL: 24 MG/DL (ref 0–40)
WBC # BLD AUTO: 4.5 X10*3/UL (ref 4.4–11.3)

## 2025-02-04 PROCEDURE — 84132 ASSAY OF SERUM POTASSIUM: CPT

## 2025-02-04 PROCEDURE — 2500000001 HC RX 250 WO HCPCS SELF ADMINISTERED DRUGS (ALT 637 FOR MEDICARE OP): Performed by: STUDENT IN AN ORGANIZED HEALTH CARE EDUCATION/TRAINING PROGRAM

## 2025-02-04 PROCEDURE — 84132 ASSAY OF SERUM POTASSIUM: CPT | Performed by: STUDENT IN AN ORGANIZED HEALTH CARE EDUCATION/TRAINING PROGRAM

## 2025-02-04 PROCEDURE — 2500000005 HC RX 250 GENERAL PHARMACY W/O HCPCS: Performed by: INTERNAL MEDICINE

## 2025-02-04 PROCEDURE — C1760 CLOSURE DEV, VASC: HCPCS | Performed by: INTERNAL MEDICINE

## 2025-02-04 PROCEDURE — 5A09457 ASSISTANCE WITH RESPIRATORY VENTILATION, 24-96 CONSECUTIVE HOURS, CONTINUOUS POSITIVE AIRWAY PRESSURE: ICD-10-PCS | Performed by: INTERNAL MEDICINE

## 2025-02-04 PROCEDURE — 94660 CPAP INITIATION&MGMT: CPT

## 2025-02-04 PROCEDURE — 99232 SBSQ HOSP IP/OBS MODERATE 35: CPT | Performed by: INTERNAL MEDICINE

## 2025-02-04 PROCEDURE — B2111ZZ FLUOROSCOPY OF MULTIPLE CORONARY ARTERIES USING LOW OSMOLAR CONTRAST: ICD-10-PCS | Performed by: INTERNAL MEDICINE

## 2025-02-04 PROCEDURE — B2161ZZ FLUOROSCOPY OF RIGHT AND LEFT HEART USING LOW OSMOLAR CONTRAST: ICD-10-PCS | Performed by: INTERNAL MEDICINE

## 2025-02-04 PROCEDURE — 1200000002 HC GENERAL ROOM WITH TELEMETRY DAILY

## 2025-02-04 PROCEDURE — 85027 COMPLETE CBC AUTOMATED: CPT

## 2025-02-04 PROCEDURE — C1769 GUIDE WIRE: HCPCS | Performed by: INTERNAL MEDICINE

## 2025-02-04 PROCEDURE — 4A023N8 MEASUREMENT OF CARDIAC SAMPLING AND PRESSURE, BILATERAL, PERCUTANEOUS APPROACH: ICD-10-PCS | Performed by: INTERNAL MEDICINE

## 2025-02-04 PROCEDURE — 80307 DRUG TEST PRSMV CHEM ANLYZR: CPT

## 2025-02-04 PROCEDURE — 2500000004 HC RX 250 GENERAL PHARMACY W/ HCPCS (ALT 636 FOR OP/ED)

## 2025-02-04 PROCEDURE — 36415 COLL VENOUS BLD VENIPUNCTURE: CPT | Performed by: STUDENT IN AN ORGANIZED HEALTH CARE EDUCATION/TRAINING PROGRAM

## 2025-02-04 PROCEDURE — 99153 MOD SED SAME PHYS/QHP EA: CPT | Performed by: INTERNAL MEDICINE

## 2025-02-04 PROCEDURE — 2500000002 HC RX 250 W HCPCS SELF ADMINISTERED DRUGS (ALT 637 FOR MEDICARE OP, ALT 636 FOR OP/ED)

## 2025-02-04 PROCEDURE — 76937 US GUIDE VASCULAR ACCESS: CPT | Performed by: INTERNAL MEDICINE

## 2025-02-04 PROCEDURE — C1894 INTRO/SHEATH, NON-LASER: HCPCS | Performed by: INTERNAL MEDICINE

## 2025-02-04 PROCEDURE — 99152 MOD SED SAME PHYS/QHP 5/>YRS: CPT | Performed by: INTERNAL MEDICINE

## 2025-02-04 PROCEDURE — 93456 R HRT CORONARY ARTERY ANGIO: CPT | Performed by: INTERNAL MEDICINE

## 2025-02-04 PROCEDURE — 2500000004 HC RX 250 GENERAL PHARMACY W/ HCPCS (ALT 636 FOR OP/ED): Performed by: INTERNAL MEDICINE

## 2025-02-04 PROCEDURE — 36415 COLL VENOUS BLD VENIPUNCTURE: CPT

## 2025-02-04 PROCEDURE — 83735 ASSAY OF MAGNESIUM: CPT

## 2025-02-04 PROCEDURE — 2720000007 HC OR 272 NO HCPCS: Performed by: INTERNAL MEDICINE

## 2025-02-04 PROCEDURE — C1887 CATHETER, GUIDING: HCPCS | Performed by: INTERNAL MEDICINE

## 2025-02-04 PROCEDURE — 2500000001 HC RX 250 WO HCPCS SELF ADMINISTERED DRUGS (ALT 637 FOR MEDICARE OP)

## 2025-02-04 PROCEDURE — 80061 LIPID PANEL: CPT

## 2025-02-04 RX ORDER — FENTANYL CITRATE 50 UG/ML
INJECTION, SOLUTION INTRAMUSCULAR; INTRAVENOUS AS NEEDED
Status: DISCONTINUED | OUTPATIENT
Start: 2025-02-04 | End: 2025-02-04 | Stop reason: HOSPADM

## 2025-02-04 RX ORDER — TORSEMIDE 20 MG/1
60 TABLET ORAL ONCE
Status: COMPLETED | OUTPATIENT
Start: 2025-02-04 | End: 2025-02-04

## 2025-02-04 RX ORDER — MIDAZOLAM HYDROCHLORIDE 1 MG/ML
INJECTION, SOLUTION INTRAMUSCULAR; INTRAVENOUS AS NEEDED
Status: DISCONTINUED | OUTPATIENT
Start: 2025-02-04 | End: 2025-02-04 | Stop reason: HOSPADM

## 2025-02-04 RX ORDER — LIDOCAINE HYDROCHLORIDE 20 MG/ML
INJECTION, SOLUTION INFILTRATION; PERINEURAL AS NEEDED
Status: DISCONTINUED | OUTPATIENT
Start: 2025-02-04 | End: 2025-02-04 | Stop reason: HOSPADM

## 2025-02-04 RX ORDER — HEPARIN SODIUM 1000 [USP'U]/ML
INJECTION, SOLUTION INTRAVENOUS; SUBCUTANEOUS AS NEEDED
Status: DISCONTINUED | OUTPATIENT
Start: 2025-02-04 | End: 2025-02-04 | Stop reason: HOSPADM

## 2025-02-04 RX ORDER — BUMETANIDE 0.25 MG/ML
2 INJECTION, SOLUTION INTRAMUSCULAR; INTRAVENOUS ONCE
Status: COMPLETED | OUTPATIENT
Start: 2025-02-04 | End: 2025-02-04

## 2025-02-04 RX ORDER — POTASSIUM CHLORIDE 20 MEQ/1
40 TABLET, EXTENDED RELEASE ORAL ONCE
Status: COMPLETED | OUTPATIENT
Start: 2025-02-04 | End: 2025-02-04

## 2025-02-04 RX ORDER — NITROGLYCERIN 5 MG/ML
INJECTION, SOLUTION INTRAVENOUS AS NEEDED
Status: DISCONTINUED | OUTPATIENT
Start: 2025-02-04 | End: 2025-02-04 | Stop reason: HOSPADM

## 2025-02-04 RX ADMIN — ASPIRIN 81 MG CHEWABLE TABLET 81 MG: 81 TABLET CHEWABLE at 08:09

## 2025-02-04 RX ADMIN — ATORVASTATIN CALCIUM 40 MG: 40 TABLET, FILM COATED ORAL at 08:09

## 2025-02-04 RX ADMIN — TORSEMIDE 60 MG: 20 TABLET ORAL at 09:33

## 2025-02-04 RX ADMIN — HEPARIN SODIUM 7500 UNITS: 5000 INJECTION, SOLUTION INTRAVENOUS; SUBCUTANEOUS at 22:07

## 2025-02-04 RX ADMIN — HEPARIN SODIUM 7500 UNITS: 5000 INJECTION, SOLUTION INTRAVENOUS; SUBCUTANEOUS at 15:37

## 2025-02-04 RX ADMIN — SPIRONOLACTONE 25 MG: 25 TABLET, FILM COATED ORAL at 08:09

## 2025-02-04 RX ADMIN — METOPROLOL SUCCINATE 25 MG: 25 TABLET, EXTENDED RELEASE ORAL at 08:09

## 2025-02-04 RX ADMIN — POTASSIUM CHLORIDE 40 MEQ: 1500 TABLET, EXTENDED RELEASE ORAL at 17:38

## 2025-02-04 RX ADMIN — BUMETANIDE 2 MG: 0.25 INJECTION INTRAMUSCULAR; INTRAVENOUS at 15:37

## 2025-02-04 RX ADMIN — HEPARIN SODIUM 7500 UNITS: 5000 INJECTION, SOLUTION INTRAVENOUS; SUBCUTANEOUS at 05:02

## 2025-02-04 RX ADMIN — EMPAGLIFLOZIN 10 MG: 10 TABLET, FILM COATED ORAL at 09:33

## 2025-02-04 RX ADMIN — ACETAMINOPHEN 650 MG: 325 TABLET ORAL at 19:41

## 2025-02-04 RX ADMIN — MAGNESIUM OXIDE TAB 400 MG (241.3 MG ELEMENTAL MG) 400 MG: 400 (241.3 MG) TAB at 08:09

## 2025-02-04 ASSESSMENT — COGNITIVE AND FUNCTIONAL STATUS - GENERAL
STANDING UP FROM CHAIR USING ARMS: A LITTLE
DAILY ACTIVITIY SCORE: 24
CLIMB 3 TO 5 STEPS WITH RAILING: A LITTLE
MOVING TO AND FROM BED TO CHAIR: A LITTLE
MOBILITY SCORE: 21

## 2025-02-04 ASSESSMENT — ENCOUNTER SYMPTOMS
DIFFICULTY URINATING: 0
FEVER: 0
CHILLS: 0
COUGH: 0
ABDOMINAL DISTENTION: 1
DIZZINESS: 0
PALPITATIONS: 0
NAUSEA: 0
LIGHT-HEADEDNESS: 0
VOMITING: 0
WHEEZING: 0
DIARRHEA: 0
SHORTNESS OF BREATH: 1
ABDOMINAL PAIN: 0
FATIGUE: 1
CONSTIPATION: 0

## 2025-02-04 ASSESSMENT — ACTIVITIES OF DAILY LIVING (ADL): LACK_OF_TRANSPORTATION: NO

## 2025-02-04 ASSESSMENT — PAIN SCALES - GENERAL: PAINLEVEL_OUTOF10: 0 - NO PAIN

## 2025-02-04 ASSESSMENT — PAIN - FUNCTIONAL ASSESSMENT: PAIN_FUNCTIONAL_ASSESSMENT: 0-10

## 2025-02-04 NOTE — POST-PROCEDURE NOTE
Physician Transition of Care Summary  Invasive Cardiovascular Lab    Procedure Date: 2/4/2025  Attending:    Arlet Woods - Primary  Resident/Fellow/Other Assistant: Surgeons and Role:     * Javier Lua MD - Fellow    Indications:   Pre-op Diagnosis      * Positive cardiac stress test [R94.39]    Post-procedure diagnosis:   Post-op Diagnosis     * Positive cardiac stress test [R94.39]    Procedure(s):   Left And Right Heart Cath, Without LV  [id number] - OK R & L HRT CATH WINJX HRT ART& L VENTR IMG        Procedure Findings:   Coronary angiogram:  Right dominant system.   No evidence of obstructive CAD.     Right heart cath hemodynamics:  RA 20  RV 71/19 (RVEDP 27 mmHg)  PCWP 25  PA 74/25 (mean 49 mmHg)  PA sat 77%, AO sat 97%  CO/CI 7.44/2.79    Description of the Procedure:   Access: Right radial artery  Closure: TR band     Venous access: Right brachial vein 5 Fr  Closure: Manual pressure    Complications:   None    Stents/Implants:   Implants       No implant documentation for this case.            Anticoagulation/Antiplatelet Plan:   Per primary team    Estimated Blood Loss:   5 mL    Anesthesia: Moderate Sedation Anesthesia Staff: No anesthesia staff entered.    Any Specimen(s) Removed:   No specimens collected during this procedure.    Disposition:   LT 5 inpatient cardiology service      Electronically signed by: Javier Lua MD, 2/4/2025 1:20 PM

## 2025-02-04 NOTE — H&P
"Patient seen and assessed pre-procedure. Allergies and current medications reviewed. NPO since midnight, all questions answered. Plan for L & RHC iso positive stress test and unclear fluid status. Denies any current CP or worsening SOB, comfortable on home O2 of 2-3 L. Labs reviewed, Hgb/Plt 16.5/194, GFR/Cr 53/1.56. Patient currently on SAPT with ASA 81. Of note, patient with \"code blue\" called overnight due to apnea and being non-responsive. Per documentation, patient was found to be AAOx3, with no events noted on telemetry, VSS, ECG unremarkable. Patient with history of severe MJ, VBG with pH 7.27, CO2 97, po2 31, HCO3 44.5, placed on BiPAP with continuous pulse ox. Patient is a chronic retainer with prior CO2 levels ranging from . Patient alert, oriented, and responsive this morning, reports being able to lay flat.     History Of Present Illness  Patric Arenas is a 49 y.o. male with PMH of DCM with HFrecEF, s/p ICD, obesity with BMI ~60 kg/m², MJ, on chronic home O2, hypertension, hyperlipidemia, h/o illicit drug use presents after positive nuclear stress test that was completed in 2 parts (1/31 and today 2/3) which showed medium- sized moderate to severe perfusion defects in the apex, apical to mid anterior, and apical inferior/septal/lateral wall.     Patient initially presented to [facility] on 1/30 for 7/10 chest pain. Serial troponin negative, EKG without changes from previous ECGs.     Hospital course from 1/30-1/31:  Troponin and EKG remained unremarkable. Review of EMR showed patient was seen by Dr. Chacon and stress test was ordered as patient has hx of CM without prior ischemic eval. Stress test ordered inpatient given CP. Fortunately, patient remained CP free shortly after ED admit.     Due to patients size, stress test was performed in 2 parts. Stress portion showed Medium-sized moderate to severe perfusion defects involving apex, apical to mid anterior, apical inferior/septal / lateral wall " "seen on stress imaging. Patient did not want to remain inpatient for rest portion which was to be completed Monday 2/3. He was started on baby ASA. Discharged with NTG SL PRN. Patient was scheduled for rest portion of stress Monday 2/3/25 at 9 am.     Second portion on NM stress test today showed reversible medium-sized moderate-to-severe perfusion in the areas listed above, likely representing inducible myocardial ischemia. No evidence of prior infarction.    On presentation, patient denies any chest pain, no palpitations, wheezing, or cough. He endorses chronic dyspnea which limits his waking to a few steps at a time. He reports chronic LE swelling that \"never goes away\" or improves. He has been prescribed torsemide 60 mg BID but reports only taking torsemide in the AM because he \"doesn't want to pee all night\" from the PM dose. Patient states he is \"supposed to\" wear oxygen at home but rarely does. On exam, Mr. Arenas is lying flat without apparent distress on 1L supplemental O2. He states he can sleep on his side comfortably at night.     Otherwise, Mr. Arenas denies any weight fluctuations or appetite changes. He endorses occasional early satiety. Denies nausea/ vomiting/ constipation/ diarrhea.     Prior cardiac imaging:    TTE 5/2024  CONCLUSIONS:   1. Left ventricular systolic function is low normal with a 50-55% estimated ejection fraction.   2. Poorly visualized anatomical structures due to suboptimal image quality.   3. Abnormal septal motion consistent with RV pacemaker and right ventricular pressure overload.   4. Moderately increased left ventricular septal thickness.   5. The left ventricular posterior wall thickness is moderately increased.   6. There is reduced right ventricular systolic function.   7. RVSP within normal limits.   8. The RV systolic pressure may be underestimated.    TTE 2/2024  CONCLUSIONS:   1. Poorly visualized anatomical structures due to suboptimal image quality (even with " Definity).   2. Left ventricular systolic function is suspected mildly decreased with a 45% estimated ejection fraction.   3. Abnormal septal motion consistent with RV pacemaker.    TTE 5/2021  CONCLUSIONS:   1. The left ventricular systolic function is moderately decreased with a 35% estimated ejection fraction.   2. Poorly visualized anatomical structures due to suboptimal image quality.   3. There is moderate concentric left ventricular hypertrophy.   4. There is global hypokinesis of the left ventricle with minor regional variations.    Past Medical History  Past Medical History:   Diagnosis Date    CHF (congestive heart failure)     HTN (hypertension)     Laceration of liver 06/05/2006    Formatting of this note might be different from the original. Liver injury without mention of open wound into cavity, unspecified laceration [id number]    MJ on CPAP        Surgical History  Past Surgical History:   Procedure Laterality Date    CARDIAC DEFIBRILLATOR PLACEMENT      2019        Social History  He reports that he has never smoked. He has never used smokeless tobacco. He reports that he does not currently use alcohol after a past usage of about 10.0 standard drinks of alcohol per week. He reports that he does not use drugs.    Family History  Family History   Problem Relation Name Age of Onset    Hypertension Mother          Allergies  Patient has no known allergies.    Review of Systems   Constitutional:  Positive for fatigue. Negative for chills and fever.   HENT:  Negative for congestion.    Respiratory:  Positive for shortness of breath. Negative for cough and wheezing.    Cardiovascular:  Positive for leg swelling. Negative for chest pain and palpitations.   Gastrointestinal:  Positive for abdominal distention. Negative for abdominal pain, constipation, diarrhea, nausea and vomiting.   Genitourinary:  Negative for difficulty urinating.   Neurological:  Negative for dizziness and light-headedness.   All other  systems reviewed and are negative.    Physical Exam  Vitals reviewed.   Constitutional:       General: He is not in acute distress.     Appearance: He is obese.      Comments: Chronically ill- appearing    HENT:      Mouth/Throat:      Mouth: Mucous membranes are moist.   Eyes:      Extraocular Movements: Extraocular movements intact.   Cardiovascular:      Rate and Rhythm: Normal rate and regular rhythm.   Pulmonary:      Effort: No respiratory distress.      Comments: Breath sounds diminished throughout  1L supplemental O2  Abdominal:      General: Bowel sounds are normal. There is distension.      Tenderness: There is no abdominal tenderness.   Musculoskeletal:      Right lower le+ Edema present.      Left lower le+ Edema present.   Skin:     General: Skin is warm and dry.   Neurological:      General: No focal deficit present.      Mental Status: He is alert and oriented to person, place, and time.   Psychiatric:         Mood and Affect: Mood normal.         Behavior: Behavior normal. Behavior is cooperative.       Last Recorded Vitals  Vitals:    25 0754   BP: 125/84   Pulse: 76   Resp: 22   Temp: 35.9 °C (96.6 °F)   SpO2: 92%     Relevant Results  Scheduled medications  aspirin, 81 mg, oral, Daily  atorvastatin, 40 mg, oral, Daily  empagliflozin, 10 mg, oral, Daily  heparin (porcine), 7,500 Units, subcutaneous, q8h STEVE  magnesium oxide, 400 mg, oral, Daily  metoprolol succinate XL, 25 mg, oral, Daily  polyethylene glycol, 17 g, oral, Daily  spironolactone, 25 mg, oral, Daily  torsemide, 60 mg, oral, Once      Continuous medications     PRN medications  PRN medications: acetaminophen, albuterol, nitroglycerin    Results for orders placed or performed during the hospital encounter of 25 (from the past 96 hours)   Comprehensive metabolic panel   Result Value Ref Range    Glucose 107 (H) 74 - 99 mg/dL    Sodium 142 136 - 145 mmol/L    Potassium 3.5 3.5 - 5.3 mmol/L    Chloride 94 (L) 98 - 107  mmol/L    Bicarbonate 36 (H) 21 - 32 mmol/L    Anion Gap 16 mmol/L    Urea Nitrogen 23 6 - 23 mg/dL    Creatinine 1.56 (H) 0.50 - 1.30 mg/dL    eGFR 53 (L) >60 mL/min/1.73m*2    Calcium 9.7 8.6 - 10.6 mg/dL    Albumin 4.1 3.4 - 5.0 g/dL    Alkaline Phosphatase 92 33 - 120 U/L    Total Protein 8.4 (H) 6.4 - 8.2 g/dL    AST 16 9 - 39 U/L    Bilirubin, Total 0.6 0.0 - 1.2 mg/dL    ALT 13 10 - 52 U/L   CBC and Auto Differential   Result Value Ref Range    WBC 4.4 4.4 - 11.3 x10*3/uL    nRBC 0.0 0.0 - 0.0 /100 WBCs    RBC 5.15 4.50 - 5.90 x10*6/uL    Hemoglobin 16.5 13.5 - 17.5 g/dL    Hematocrit 54.8 (H) 41.0 - 52.0 %     (H) 80 - 100 fL    MCH 32.0 26.0 - 34.0 pg    MCHC 30.1 (L) 32.0 - 36.0 g/dL    RDW 15.1 (H) 11.5 - 14.5 %    Platelets 194 150 - 450 x10*3/uL    Neutrophils % 63.3 40.0 - 80.0 %    Immature Granulocytes %, Automated 0.2 0.0 - 0.9 %    Lymphocytes % 25.7 13.0 - 44.0 %    Monocytes % 8.3 2.0 - 10.0 %    Eosinophils % 2.3 0.0 - 6.0 %    Basophils % 0.2 0.0 - 2.0 %    Neutrophils Absolute 2.75 1.20 - 7.70 x10*3/uL    Immature Granulocytes Absolute, Automated 0.01 0.00 - 0.70 x10*3/uL    Lymphocytes Absolute 1.12 (L) 1.20 - 4.80 x10*3/uL    Monocytes Absolute 0.36 0.10 - 1.00 x10*3/uL    Eosinophils Absolute 0.10 0.00 - 0.70 x10*3/uL    Basophils Absolute 0.01 0.00 - 0.10 x10*3/uL   Magnesium   Result Value Ref Range    Magnesium 2.27 1.60 - 2.40 mg/dL   B-type natriuretic peptide   Result Value Ref Range    BNP 13 0 - 99 pg/mL   Troponin I, High Sensitivity   Result Value Ref Range    Troponin I, High Sensitivity (CMC) 13 0 - 53 ng/L   ECG 12 lead   Result Value Ref Range    Ventricular Rate 81 BPM    Atrial Rate 81 BPM    AZ Interval 180 ms    QRS Duration 88 ms    QT Interval 462 ms    QTC Calculation(Bazett) 536 ms    P Axis 65 degrees    R Axis 23 degrees    T Axis 137 degrees    QRS Count 13 beats    Q Onset 224 ms    P Onset 134 ms    P Offset 197 ms    T Offset 455 ms    QTC Fredericia 510  ms   BLOOD GAS VENOUS FULL PANEL   Result Value Ref Range    POCT pH, Venous 7.26 (L) 7.33 - 7.43 pH    POCT pCO2, Venous 93 (HH) 41 - 51 mm Hg    POCT pO2, Venous 63 (H) 35 - 45 mm Hg    POCT SO2, Venous 87 (H) 45 - 75 %    POCT Oxy Hemoglobin, Venous 84.5 (H) 45.0 - 75.0 %    POCT Hematocrit Calculated, Venous 48.0 41.0 - 52.0 %    POCT Sodium, Venous 137 136 - 145 mmol/L    POCT Potassium, Venous 4.0 3.5 - 5.3 mmol/L    POCT Chloride, Venous 94 (L) 98 - 107 mmol/L    POCT Ionized Calicum, Venous 1.19 1.10 - 1.33 mmol/L    POCT Glucose, Venous 151 (H) 74 - 99 mg/dL    POCT Lactate, Venous 1.1 0.4 - 2.0 mmol/L    POCT Base Excess, Venous 9.8 (H) -2.0 - 3.0 mmol/L    POCT HCO3 Calculated, Venous 41.7 (H) 22.0 - 26.0 mmol/L    POCT Hemoglobin, Venous 16.1 13.5 - 17.5 g/dL    POCT Anion Gap, Venous 5.0 (L) 10.0 - 25.0 mmol/L    Patient Temperature 37.0 degrees Celsius    FiO2 100 %    Apparatus FACE MASK    Lipid Panel   Result Value Ref Range    Cholesterol 149 0 - 199 mg/dL    HDL-Cholesterol 36.3 mg/dL    Cholesterol/HDL Ratio 4.1     LDL Calculated 89 <=99 mg/dL    VLDL 24 0 - 40 mg/dL    Triglycerides 119 0 - 149 mg/dL    Non HDL Cholesterol 113 0 - 149 mg/dL     Assessment/Plan   Patric Arenas is a 49 y.o. male with PMH of DCM with HFrecEF, s/p ICD, obesity with BMI ~60 kg/m², MJ, on chronic home O2, hypertension, hyperlipidemia, h/o illicit drug use presents after positive nuclear stress test that was completed in 2 parts (1/31 and today 2/3) which showed medium- sized moderate to severe perfusion defects in the apex, apical to mid anterior, and apical inferior/septal/lateral wall. Admitted to hospitals for further ischemic evaluation.   Assessment & Plan  Positive cardiac stress test    Heart failure with recovered ejection fraction (HFrecEF)    Obesity    MJ (obstructive sleep apnea)    HTN (hypertension)    HLD (hyperlipidemia)    CKD (chronic kidney disease) stage 3, GFR 30-59 ml/min (Multi)       Dispo:  pending [facility] tomorrow, NPO at midnight  DVT prophylaxis: subcutaneous heparin      Patient to be formally staffed with attending in the AM.     Burke Kim, APRN-CNP

## 2025-02-04 NOTE — SIGNIFICANT EVENT
Rapid Response Nurse Note: JANA Score of 7    Pager time: [phone]  Arrival time: 800  Event end time: 815  Location: [place] [address]  [x] Triage by secure messaging    Rapid response initiated by:  [] Rapid response RN [] Family [] Nursing Supervisor [] Physician   [x] RADAR auto page [] Sepsis auto-page [] RN [] RT   [] NP/PA [] Other:     Primary reason for call:   [] BAT [] New CPAP/BiPAP [] Bleeding [] Change in mental status   [] Chest pain [] Code blue [] FiO2 >/= 50% [] HR </= 40 bpm   [] HR >/= 130 bpm [] Hyperglycemia [] Hypoglycemia [x] RADAR    [] RR </= 8 bpm [] RR >/= 30 bpm [] SBP </= 90 mmHg [] SpO2 < 90%   [] Seizure [] Sepsis [] Shortness of breath  [] Staff concern: see comments     Interventions:  [x] None [] ABG/VBG [] Assist w/ICU transfer [] BAT paged    [] Bag mask [] Blood [] Cardioversion [] Code Blue   [] Code blue for intubation [] Code status changed [] Chest x-ray [] EKG   [] IV fluid/bolus [] KUB x-ray [] Labs/cultures [] Medication   [] Nebulizer treatment [] NIPPV (CPAP/BiPAP) [] Oxygen [] Oral airway   [] Peripheral IV [] Palliative care consult [] CT/MRI [] Sepsis protocol    [] Suctioned [] Other:     Outcome:  [] Coded and  [] Code blue for intubation [] Coded and transferred to ICU []  on division   [x] Remained on division (no change) [] Remained on division + additional monitoring [] Remained in ED [] Transferred to ED   [] Transferred to ICU [] Transferred to inpatient status [] Transferred for interventions (procedure) [] Transferred to ICU stepdown    [] Transferred to surgery [] Transferred to telemetry [] Sepsis protocol [] STEMI protocol   [] Stroke protocol       Additional Comments:      JANA page received: [phone]/84 92.   No concerns per nurse at this time; encouraged nurse to call a rapid response as needed if patient status changes.

## 2025-02-04 NOTE — HOSPITAL COURSE
"Patric Arenas is a 49 y.o. male with PMH of DCM with HFrecEF, s/p ICD, obesity with BMI ~60 kg/m², MJ, on chronic home O2, hypertension, hyperlipidemia, h/o illicit drug use presents after positive nuclear stress test that was completed in 2 parts (1/31 and today 2/3) which showed medium- sized moderate to severe perfusion defects in the apex, apical to mid anterior, and apical inferior/septal/lateral wall.      Patient initially presented to [facility] on 1/30 for 7/10 chest pain. Serial troponin negative, EKG without changes from previous ECGs.      Hospital course from 1/30-1/31:  \"Troponin and EKG remained unremarkable. Review of EMR showed patient was seen by Dr. Chacon and stress test was ordered as patient has hx of CM without prior ischemic eval. Stress test ordered inpatient given CP. Fortunately, patient remained CP free shortly after ED admit.      Due to patients size, stress test was performed in 2 parts. Stress portion showed Medium-sized moderate to severe perfusion defects involving apex, apical to mid anterior, apical inferior/septal / lateral wall seen on stress imaging. Patient did not want to remain inpatient for rest portion which was to be completed Monday 2/3. He was started on baby ASA. Discharged with NTG SL PRN. Patient was scheduled for rest portion of stress Monday 2/3/25 at 9 am.\"      Second portion on NM stress test ton 2/3 showed reversible medium-sized moderate-to-severe perfusion in the areas listed above, likely representing inducible myocardial ischemia. No evidence of prior infarction.     On presentation, patient denied any chest pain, no palpitations, wheezing, or cough. He endorsed chronic dyspnea which limits his waking to a few steps at a time. He reported chronic LE swelling that \"never goes away\" or improves. He has been prescribed torsemide 60 mg BID but reports only taking torsemide in the AM because he \"doesn't want to pee all night\" from the PM dose. Patient states he " "is \"supposed to\" wear oxygen at home but rarely does. On exam, Mr. Arenas was lying flat without apparent distress on 1L supplemental O2. He states he can sleep on his side comfortably at night.     Floor course:  LHC completed with showed w/ no evidence of obstructive CAD.   RHC showed  RA 20, RV 71/19 (RVEDP 27 mmHg), PCWP 25, PA 74/25 (mean 49 mmHg), PA sat 77%, AO sat 97%, CO/CI 7.44/2.79.     Aggressive IV diuretics provided after cath results. Switched to torsemide 60 mg BID for oral diuretic regimen. Discussed timing of diuretic dosing to encourage compliance. Patient states he usually takes his diuretic at 5 am. Educated him the take AM torsemide at 5 am and PM torsemide at 3 pm.     Discharge GDMT regimen: empagliflozin 10 mg daily, metoprolol succ 25 mg daily, spironolactone 50 mg daily (increased on date of discharge to aid with hypokalemia). Renal function remains within baseline but uptrending slightly on date of discharge. ACE/ARB/ARNI to be started on outpatient basis.    Close heart failure follow- up scheduled.     Discharge weight: 177 kg    After all labs and VS were reviewed the decision was made that the patient was medically stable for discharge.  The patient was discharged in satisfactory condition.    More than 60 minutes were spent in coordinating patient discharge.    "

## 2025-02-04 NOTE — PROGRESS NOTES
Pharmacy Medication History Review    Patric Arenas is a 49 y.o. male admitted for Positive cardiac stress test. Pharmacy reviewed the patient's [id number] medications and allergies for accuracy.    Medications ADDED:  N/A  Medications CHANGED:  N/A  Medications REMOVED:   N/A     The list below reflects the updated PTA list.   Prior to Admission Medications   Prescriptions Last Dose Informant   albuterol (Ventolin HFA) 90 mcg/actuation inhaler 2/3/2025 Self   Sig: Inhale 2 puffs every 4 hours if needed for wheezing or shortness of breath. If you are using your rescue inhaler frequently, seek medical evaluation   aspirin 81 mg chewable tablet     Sig: Chew 1 tablet (81 mg) once daily.   atorvastatin (Lipitor) 40 mg tablet  Self   Sig: Take 1 tablet (40 mg) by mouth once daily.   empagliflozin (Jardiance) 10 mg  Self   Sig: Take 1 tablet (10 mg) by mouth once daily.   magnesium oxide (Mag-Ox) 400 mg tablet  Self   Sig: Take 1 tablet (400 mg) by mouth once daily.   metoprolol succinate XL (Toprol-XL) 25 mg 24 hr tablet  Self   Sig: Take 1 tablet (25 mg) by mouth once daily.   nitroglycerin (Nitrostat) 0.4 mg SL tablet     Sig: Place 1 tablet (0.4 mg) under the tongue every 5 minutes if needed for chest pain. May repeat every 5 minutes for up to 3 doses.   oxygen (O2) gas therapy  Self   Sig: Inhale.   spironolactone (Aldactone) 25 mg tablet  Self   Sig: Take 1 tablet (25 mg) by mouth once daily.   torsemide (Demadex) 20 mg tablet  Self   Sig: Take 3 tablets (60 mg) by mouth 2 times a day.      Facility-Administered Medications: None        The list below reflects the updated allergy list. Please review each documented allergy for additional clarification and justification.  Allergies  Reviewed by Renetta Guthrie on 2/4/2025   No Known Allergies         Patient accepts M2B at discharge.     Sources:   [facility]  Pharmacy dispense history  Patient interview Moderate historian  Chart Review     Additional  "Comments:  Patient was appeared to be very fatigued during my assessment and the patient reports he still taking torsemide 20 mg tablet it was lasted filled on 11/21/24 for a 20 day supply and a quantity of 120.      MARIANA PISANO  Pharmacy Technician  02/04/25     Secure Chat preferred   If no response call [id number] or [email or web address] \"Med Rec\"   "

## 2025-02-04 NOTE — CARE PLAN
Pt had no pain or SOB today. Went for a L and R HC. R radial and R brachial entry sites. TR band removed with no complications, pressure dressing applied

## 2025-02-04 NOTE — PROGRESS NOTES
"Subjective Data:  Patient seen this AM denies chest pain. States he has baseline SOB that limits his activity to a few steps at this time. Currently on 1L NC, utilized CPAP overnight. Does not remember the events of rapid response last night, states everyone was \"panicking\" but he's \"all good\" and wants to get his R/LHC done today.     Overnight Events:    Code Blue called for somnolence/ lack of responsiveness while patient was up in the chair. Patient woken up, remained in NSR. VBG/ABG results showing elevated CO2 (which is chronic).     Today in brief:  - R/LHC pending today  - AM torsemide 60 mg given this AM for dyspnea and LE edema  - Cr 1.56, better than recent baseline     Objective Data:  Last Recorded Vitals:  Vitals:    02/03/25 2322 02/04/25 0029 02/04/25 0413 02/04/25 0754   BP:   116/81 125/84   BP Location:   Right arm    Patient Position:   Lying    Pulse:   74 76   Resp: 20 20 20 22   Temp:   36.7 °C (98.1 °F) 35.9 °C (96.6 °F)   TempSrc:   Temporal    SpO2:   98% 92%   Weight:       Height:           Last Labs:  CBC - 2/3/2025:  7:50 PM  4.4 16.5 194    54.8      CMP - 2/3/2025:  7:50 PM  9.7 8.4 16 --- 0.6   3.7 4.1 13 92      PTT - 5/28/2024:  5:21 AM  1.1   12.7 29     TROPHS   Date/Time Value Ref Range Status   02/03/2025 07:50 PM 13 0 - 53 ng/L Final   01/30/2025 11:10 PM 9 0 - 53 ng/L Final   01/30/2025 09:34 PM 10 0 - 53 ng/L Final   05/26/2024 11:24 PM 48 0 - 53 ng/L Final   05/26/2024 10:09 PM 44 0 - 53 ng/L Final   02/13/2024 07:37 PM 9 0 - 53 ng/L Final     BNP   Date/Time Value Ref Range Status   02/03/2025 07:50 PM 13 0 - 99 pg/mL Final   01/30/2025 09:34 PM 17 0 - 99 pg/mL Final     HGBA1C   Date/Time Value Ref Range Status   01/30/2025 09:34 PM 6.2 See comment % Final   05/28/2024 05:21 AM 6.2 see below % Final     LDLCALC   Date/Time Value Ref Range Status   02/04/2025 05:01 AM 89 <=99 mg/dL Final     Comment:                                 Near   Borderline      AGE      Desirable "  Optimal    High     High     Very High     0-19 Y     0 - 109     ---    110-129   >/= 130     ----    20-24 Y     0 - 119     ---    120-159   >/= 160     ----      >24 Y     0 -  99   100-129  130-159   160-189     >/=190     06/08/2024 08:19 AM 70 <=99 mg/dL Final     Comment:                                 Near   Borderline      AGE      Desirable  Optimal    High     High     Very High     0-19 Y     0 - 109     ---    110-129   >/= 130     ----    20-24 Y     0 - 119     ---    120-159   >/= 160     ----      >24 Y     0 -  99   100-129  130-159   160-189     >/=190       VLDL   Date/Time Value Ref Range Status   02/04/2025 05:01 AM 24 0 - 40 mg/dL Final   06/08/2024 08:19 AM 29 0 - 40 mg/dL Final   12/12/2022 08:07 PM 29 0 - 40 mg/dL Final      Last I/O:  No intake/output data recorded.    Past Cardiology Tests (Last 3 Years):  EKG:  ECG 12 lead 02/03/2025 (Preliminary)  2/3 NSR rate 81 BPM    Echo:  TTE 2/2024  CONCLUSIONS:   1. Poorly visualized anatomical structures due to suboptimal image quality (even with Definity).   2. Left ventricular systolic function is suspected mildly decreased with a 45% estimated ejection fraction.   3. Abnormal septal motion consistent with RV pacemaker.     TTE 5/2021  CONCLUSIONS:   1. The left ventricular systolic function is moderately decreased with a 35% estimated ejection fraction.   2. Poorly visualized anatomical structures due to suboptimal image quality.   3. There is moderate concentric left ventricular hypertrophy.   4. There is global hypokinesis of the left ventricle with minor regional variations.    Stress Test:  Nuclear Stress Test 01/31/2025  Impression   1. Medium-sized moderate to severe perfusion defects involving apex,  apical to mid anterior, apical inferior/septal / lateral wall seen on  stress imaging.  2. The left ventricle is normal in size.  3. Irregular wall motion with a post-stress LV EF estimated at 30%,  likely underestimated, correlate  clinically.       Inpatient Medications:  Scheduled medications   Medication Dose Route Frequency    aspirin  81 mg oral Daily    atorvastatin  40 mg oral Daily    empagliflozin  10 mg oral Daily    heparin (porcine)  7,500 Units subcutaneous q8h STEVE    magnesium oxide  400 mg oral Daily    metoprolol succinate XL  25 mg oral Daily    polyethylene glycol  17 g oral Daily    spironolactone  25 mg oral Daily     PRN medications   Medication    acetaminophen    albuterol    nitroglycerin     Continuous Medications   Medication Dose Last Rate     Physical Exam  Vitals reviewed.   Constitutional:       General: He is not in acute distress.     Appearance: He is obese.      Comments: Chronically ill- appearing    HENT:      Mouth/Throat:      Mouth: Mucous membranes are moist.   Eyes:      Extraocular Movements: Extraocular movements intact.   Cardiovascular:      Rate and Rhythm: Normal rate and regular rhythm.   Pulmonary:      Effort: No respiratory distress.      Comments: Breath sounds diminished throughout, 1L supplemental O2  Abdominal:      General: Bowel sounds are normal. There is distension.      Tenderness: There is no abdominal tenderness.   Musculoskeletal:      Right lower le+ Edema present.      Left lower le+ Edema present.   Skin:     General: Skin is warm and dry.   Neurological:      General: No focal deficit present.      Mental Status: He is alert and oriented to person, place, and time.   Psychiatric:         Mood and Affect: Mood normal.         Behavior: Behavior normal. Behavior is cooperative.      Assessment/Plan   Patric Arenas is a 49 y.o. male with PMH of DCM with HFrecEF, s/p ICD, obesity with BMI ~60 kg/m², MJ, on chronic home O2, hypertension, hyperlipidemia, h/o illicit drug use presents after positive nuclear stress test that was completed in 2 parts ( and today 2/3) which showed medium- sized moderate to severe perfusion defects in the apex, apical to mid anterior, and  apical inferior/septal/lateral wall. Admitted to [facility] for further ischemic evaluation.      Assessment & Plan  Positive cardiac stress test  - 1/30: patient presented with 7/10 CP  - Serial troponin negative, EKG without changes from previous ECGs.   - admit troponin: 13  - 1/31 nuclear stress test (first part): showed medium- sized moderate to severe perfusion defects in the apex, apical to mid anterior, and apical inferior/septal/lateral wall.   - patient discharged on 1/31 to return for 2nd part of nuclear stress on 2/3  - 2/3 nuclear stress (second part): Reversible medium-sized moderate-to-severe perfusion defects involving apex, apical to mid anterior, apical inferior/septal/lateral wall seen on stress imaging, likely representing inducible myocardial ischemia  - LHC pending today, NPO at midnight   - continue aspirin 81 mg daily, atorvastatin 40 mg nightly   - see HFrecEF below     Heart failure with recovered ejection fraction (HFrecEF)  - pending ischemia evaluation with LHC today  - RHC for today given difficulty assessing fluid status on exam   - last TTE  5/2024: LV EF 50-55%  - s/p ICD in 2019  - BNP 13 (prev. 17) iso obesity  - Last DC Weight: 182 kg  - Admit Weight: 170 kg? -> repeat pending   - admit CXR: mild interstitial edema, no focal infiltrate, sizeable pleural effusion or pneumothorax   - Admit S/S: dyspnea on exertion, LE swelling   - home diuretic: torsemide 60 mg BID (per patient, has only been taking AM dose due to not wanting to urinate all night)  - Current Med Regimen: empagliflozin 10 mg daily, metoprolol succ 25 mg daily, spironolactone 25 mg daily   - RAASi was being consider outpatient by Dr. Chacon based on how renal function improves  - AM torsemide 60 mg given this AM for dyspnea and LE edema  - Follows with: Dr. Chacon (last seen 11/2024)  - Strict Is & Os, 2L Fluid Restriction, 2-3gm Na Diet, Daily Weights    CKD (chronic kidney disease) stage 3, GFR 30-59 ml/min (Multi)  -  baseline GFR 30-50s  - last DC Cr 1.51  - admit Cr 1.56  - diuresis as above     HTN (hypertension)  - admit /73  - last 24 hour SBP range: 107- 128  - no current anti- HTN regimen, will trend BPs     HLD (hyperlipidemia)  - AM fasting lipid panel: HDL 36, LDL 89, triglycerides 119  - continue home atorvastatin 40 mg daily     Obesity  - BMI 61 kg  - seen 10/2024 by bariatric surgery team outpatient, will need to follow- up outpatient    MJ (obstructive sleep apnea)  - has been referred to sleep medicine in the past (new referral placed)  - per prior notes, Hx of noncompliance with CPAP but was willing to wear overnight  - overnight 2/3-2/4: Code Blue called for somnolence/ lack of responsiveness while patient was up in the chair. Patient woken up, remained in NSR. VBG/ABG results showing elevated CO2 (which is chronic)  - CPAP ordered overnight      Substance Abuse  - PCP positive on last 2 urine drug screens  - will offer [facility] services this admission and encourage cessation      Dispo: pending R/LHC today     DVT prophylaxis: subcutaneous heparin       Patient seen and discussed with Dr. Garcia     Code Status:  Full Code    Barbara Starkey, APRN-CNP    I conducted a shared care visit with the CJ- he reports feeling better today we discussed the need for sleep clinic follow up, given severe extertional dyspnea and difficultly establishing volume status will plan for RHC with angiogram today

## 2025-02-04 NOTE — CARE PLAN
The patient's goals for the shift include  verbalizing comfort level throughout shift.    The clinical goals for the shift include Patient to remain safe throughout shift.    Over the shift, the patient did not make progress toward the following goals. Barriers to progression include knowledge deficit. Recommendations to address these barriers include education, patience.    Problem: Pain - Adult  Goal: Verbalizes/displays adequate comfort level or baseline comfort level  Outcome: Not Progressing     Over the shift, the patient did make progress toward the following goals.      Problem: Safety - Adult  Goal: Free from fall injury  Outcome: Progressing     Problem: Arrythmia/Dysrhythmia  Goal: Vital signs return to baseline  Outcome: Progressing

## 2025-02-04 NOTE — SIGNIFICANT EVENT
Code blue called ~ 10.15pm due to concern for apnea/inability to protect airway.    Patient was sleeping in a chair. When RN entered room, patient's eyes were half open and he was seen to be drooling. Was not responding to any commands so code blue was called. On arrival, patient was awake and responsive.    AAOx3, CN 2-12 intact    Tele did not show any cardiac arrest. ECG NSR. Patient denies any active complaints    Vitals: HR 83, /89    Patient has severe MJ. Will obtain ABG and order CPAP inpatient.       Update:  VBG with pH7.27, CO2 97, po2 31, Hco3 was 44.5   CXR from admission with poor penetration.    At this time, I feel like the patient's presentation is driven by a combination of obesity hypoventilation syndrome and MJ. Will place him on BiPAP with continous pulse ox and repeat VBG in 2h to assess pH and CO2.    Patient is a chronic retainer with prior CO2 levels ranging from . For this reason, I do not think he needs transfer to the CICU.    I also do not feel he is grossly fluid overloaded. BNP was also 13 on admission. Will hold off on diuresis at this time

## 2025-02-04 NOTE — PROGRESS NOTES
02/04/25 0831   Discharge Planning   Living Arrangements Alone   Support Systems Family members   Assistance Needed none   Type of Residence Private residence   Do you have animals or pets at home? No   Home or Post Acute Services None   Expected Discharge Disposition Home   Does the patient need discharge transport arranged? No   Financial Resource Strain   How hard is it for you to pay for the very basics like food, housing, medical care, and heating? Not very   Housing Stability   In the last 12 months, was there a time when you were not able to pay the mortgage or rent on time? N   In the past 12 months, how many times have you moved where you were living? 1   At any time in the past 12 months, were you homeless or living in a shelter (including now)? N   Transportation Needs   In the past 12 months, has lack of transportation kept you from medical appointments or from getting medications? no   In the past 12 months, has lack of transportation kept you from meetings, work, or from getting things needed for daily living? No   Patient Choice   Provider Choice list and CMS website (https://medicare.gov/care-compare#search) for post-acute Quality and Resource Measure Data were provided and reviewed with: Patient   Patient / Family choosing to utilize agency / facility established prior to hospitalization No   Stroke Family Assessment   Stroke Family Assessment Needed No     Transitional Care Coordination Progress Note:  Patient discussed during interdisciplinary rounds.   Team members present: RN JULI ANDRES MD  Plan per Medical/Surgical team: [facility] 2-4-2025   Payor: [facility]  Discharge disposition: Home   Potential Barriers: None   ADOD: 2-6-2025     Previous Home Care None :   DME: None   Pharmacy: [facility]   Falls: None   PCP:  DIANDRA MORALES   Dialysis: None

## 2025-02-04 NOTE — PRE-SEDATION DOCUMENTATION
Sedation Plan    ASA 3     Mallampati class: III.    Risks, benefits, and alternatives discussed with patient.    Pt NPO since midnight, all questions answered.

## 2025-02-05 LAB
ALBUMIN SERPL BCP-MCNC: 4.6 G/DL (ref 3.4–5)
ANION GAP SERPL CALC-SCNC: 13 MMOL/L (ref 10–20)
BUN SERPL-MCNC: 22 MG/DL (ref 6–23)
CALCIUM SERPL-MCNC: 10.2 MG/DL (ref 8.6–10.6)
CHLORIDE SERPL-SCNC: 90 MMOL/L (ref 98–107)
CO2 SERPL-SCNC: 41 MMOL/L (ref 21–32)
CREAT SERPL-MCNC: 1.35 MG/DL (ref 0.5–1.3)
CREAT UR-MCNC: 21.4 MG/DL (ref 20–370)
EGFRCR SERPLBLD CKD-EPI 2021: 64 ML/MIN/1.73M*2
ERYTHROCYTE [DISTWIDTH] IN BLOOD BY AUTOMATED COUNT: 15 % (ref 11.5–14.5)
GLUCOSE SERPL-MCNC: 114 MG/DL (ref 74–99)
HCT VFR BLD AUTO: 57.1 % (ref 41–52)
HGB BLD-MCNC: 16.9 G/DL (ref 13.5–17.5)
MAGNESIUM SERPL-MCNC: 2.5 MG/DL (ref 1.6–2.4)
MCH RBC QN AUTO: 32.1 PG (ref 26–34)
MCHC RBC AUTO-ENTMCNC: 29.6 G/DL (ref 32–36)
MCV RBC AUTO: 108 FL (ref 80–100)
NRBC BLD-RTO: 0 /100 WBCS (ref 0–0)
PHOSPHATE SERPL-MCNC: 3.7 MG/DL (ref 2.5–4.9)
PLATELET # BLD AUTO: 215 X10*3/UL (ref 150–450)
POTASSIUM SERPL-SCNC: 4.1 MMOL/L (ref 3.5–5.3)
RBC # BLD AUTO: 5.27 X10*6/UL (ref 4.5–5.9)
SODIUM SERPL-SCNC: 140 MMOL/L (ref 136–145)
SODIUM UR-SCNC: 105 MMOL/L
SODIUM/CREAT UR-RTO: 491 MMOL/G CREAT
WBC # BLD AUTO: 4.6 X10*3/UL (ref 4.4–11.3)

## 2025-02-05 PROCEDURE — 36415 COLL VENOUS BLD VENIPUNCTURE: CPT

## 2025-02-05 PROCEDURE — 80069 RENAL FUNCTION PANEL: CPT

## 2025-02-05 PROCEDURE — 1200000002 HC GENERAL ROOM WITH TELEMETRY DAILY

## 2025-02-05 PROCEDURE — 2500000004 HC RX 250 GENERAL PHARMACY W/ HCPCS (ALT 636 FOR OP/ED)

## 2025-02-05 PROCEDURE — 2500000002 HC RX 250 W HCPCS SELF ADMINISTERED DRUGS (ALT 637 FOR MEDICARE OP, ALT 636 FOR OP/ED): Performed by: PHYSICIAN ASSISTANT

## 2025-02-05 PROCEDURE — 2500000004 HC RX 250 GENERAL PHARMACY W/ HCPCS (ALT 636 FOR OP/ED): Performed by: PHYSICIAN ASSISTANT

## 2025-02-05 PROCEDURE — 84300 ASSAY OF URINE SODIUM: CPT | Performed by: PHYSICIAN ASSISTANT

## 2025-02-05 PROCEDURE — 2500000002 HC RX 250 W HCPCS SELF ADMINISTERED DRUGS (ALT 637 FOR MEDICARE OP, ALT 636 FOR OP/ED)

## 2025-02-05 PROCEDURE — 83735 ASSAY OF MAGNESIUM: CPT

## 2025-02-05 PROCEDURE — 85027 COMPLETE CBC AUTOMATED: CPT

## 2025-02-05 PROCEDURE — 99232 SBSQ HOSP IP/OBS MODERATE 35: CPT | Performed by: INTERNAL MEDICINE

## 2025-02-05 PROCEDURE — 2500000001 HC RX 250 WO HCPCS SELF ADMINISTERED DRUGS (ALT 637 FOR MEDICARE OP): Performed by: STUDENT IN AN ORGANIZED HEALTH CARE EDUCATION/TRAINING PROGRAM

## 2025-02-05 PROCEDURE — 94660 CPAP INITIATION&MGMT: CPT

## 2025-02-05 PROCEDURE — 82570 ASSAY OF URINE CREATININE: CPT | Performed by: PHYSICIAN ASSISTANT

## 2025-02-05 PROCEDURE — 2500000001 HC RX 250 WO HCPCS SELF ADMINISTERED DRUGS (ALT 637 FOR MEDICARE OP)

## 2025-02-05 RX ORDER — FUROSEMIDE 10 MG/ML
120 INJECTION INTRAMUSCULAR; INTRAVENOUS
Status: DISCONTINUED | OUTPATIENT
Start: 2025-02-05 | End: 2025-02-06

## 2025-02-05 RX ORDER — FUROSEMIDE 10 MG/ML
120 INJECTION INTRAMUSCULAR; INTRAVENOUS ONCE
Status: COMPLETED | OUTPATIENT
Start: 2025-02-05 | End: 2025-02-05

## 2025-02-05 RX ORDER — POTASSIUM CHLORIDE 20 MEQ/1
40 TABLET, EXTENDED RELEASE ORAL ONCE
Status: COMPLETED | OUTPATIENT
Start: 2025-02-05 | End: 2025-02-05

## 2025-02-05 RX ADMIN — ATORVASTATIN CALCIUM 40 MG: 40 TABLET, FILM COATED ORAL at 08:15

## 2025-02-05 RX ADMIN — HEPARIN SODIUM 7500 UNITS: 5000 INJECTION, SOLUTION INTRAVENOUS; SUBCUTANEOUS at 13:40

## 2025-02-05 RX ADMIN — FUROSEMIDE 120 MG: 10 INJECTION, SOLUTION INTRAMUSCULAR; INTRAVENOUS at 10:33

## 2025-02-05 RX ADMIN — POTASSIUM CHLORIDE 40 MEQ: 1500 TABLET, EXTENDED RELEASE ORAL at 15:16

## 2025-02-05 RX ADMIN — EMPAGLIFLOZIN 10 MG: 10 TABLET, FILM COATED ORAL at 08:15

## 2025-02-05 RX ADMIN — SPIRONOLACTONE 25 MG: 25 TABLET, FILM COATED ORAL at 08:15

## 2025-02-05 RX ADMIN — ACETAMINOPHEN 650 MG: 325 TABLET ORAL at 18:48

## 2025-02-05 RX ADMIN — ASPIRIN 81 MG CHEWABLE TABLET 81 MG: 81 TABLET CHEWABLE at 08:15

## 2025-02-05 RX ADMIN — HEPARIN SODIUM 7500 UNITS: 5000 INJECTION, SOLUTION INTRAVENOUS; SUBCUTANEOUS at 22:49

## 2025-02-05 RX ADMIN — HEPARIN SODIUM 7500 UNITS: 5000 INJECTION, SOLUTION INTRAVENOUS; SUBCUTANEOUS at 06:13

## 2025-02-05 RX ADMIN — FUROSEMIDE 120 MG: 10 INJECTION, SOLUTION INTRAVENOUS at 15:16

## 2025-02-05 RX ADMIN — ACETAMINOPHEN 650 MG: 325 TABLET ORAL at 13:40

## 2025-02-05 RX ADMIN — MAGNESIUM OXIDE TAB 400 MG (241.3 MG ELEMENTAL MG) 400 MG: 400 (241.3 MG) TAB at 08:15

## 2025-02-05 RX ADMIN — METOPROLOL SUCCINATE 25 MG: 25 TABLET, EXTENDED RELEASE ORAL at 08:15

## 2025-02-05 ASSESSMENT — COGNITIVE AND FUNCTIONAL STATUS - GENERAL
MOBILITY SCORE: 22
CLIMB 3 TO 5 STEPS WITH RAILING: A LITTLE
WALKING IN HOSPITAL ROOM: A LITTLE
DAILY ACTIVITIY SCORE: 24

## 2025-02-05 ASSESSMENT — PAIN SCALES - GENERAL
PAINLEVEL_OUTOF10: 0 - NO PAIN
PAINLEVEL_OUTOF10: 0 - NO PAIN
PAINLEVEL_OUTOF10: 10 - WORST POSSIBLE PAIN

## 2025-02-05 ASSESSMENT — PAIN DESCRIPTION - LOCATION: LOCATION: FINGER (COMMENT WHICH ONE)

## 2025-02-05 ASSESSMENT — PAIN DESCRIPTION - ORIENTATION: ORIENTATION: RIGHT

## 2025-02-05 ASSESSMENT — PAIN - FUNCTIONAL ASSESSMENT
PAIN_FUNCTIONAL_ASSESSMENT: 0-10
PAIN_FUNCTIONAL_ASSESSMENT: 0-10

## 2025-02-05 NOTE — CARE PLAN
Problem: ACS/CP/NSTEMI/STEMI  Goal: Promote self management  Outcome: Progressing     Problem: Hypertensive Emergency/Crisis  Goal: Lab values return to normal range  Outcome: Progressing     Patient was safe and free of injury this shift. 120mg of IVP lasix x2 with over 3.5L of output. Complained of R wrist pain, relieved with tylenol. 3L of oxygen, SpO2 under 80% on RA.

## 2025-02-05 NOTE — PROGRESS NOTES
Subjective Data:  Patient seen sitting up at the side of the bed this morning. Reports only urinating once following the 2mg IV Bumex yesterday. He continues to have some shortness of breath but states it's improving. He denies chest pain or nausea. He states he doesn't wear his O2 at home but is ordered to be on 2-3L. He attempted to remove his NC today with desaturations to 75% that came back up to 95% when nursing put him back on 3L O2 via NC.    Overnight Events:    No acute events overnight    Today in brief:  - 120mg IV Lasix this morning with good response of more than 2L of urine. Repeat 120 mg IV Lasix ordered for this afternoon and BID going forward.  - Urine sodium after morning dose of Lasix pending  - One time dose of 40 mg K+ given to replenish K+ during diuresis, repeat RFP tonight     Objective Data:  Last Recorded Vitals:  Vitals:    02/05/25 0540 02/05/25 0756 02/05/25 1054 02/05/25 1503   BP:  95/73 127/87 120/77   BP Location:  Left arm Left arm Left arm   Patient Position:  Sitting Sitting Lying   Pulse:  70 71 69   Resp: 19 18 18 18   Temp:  36.3 °C (97.3 °F) 35.4 °C (95.7 °F) 36.4 °C (97.5 °F)   TempSrc:  Temporal Temporal Temporal   SpO2:  95% 94% 94%   Weight:       Height:           Last Labs:  CBC - 2/4/2025:  7:04 PM  4.5 16.6 204    53.8      CMP - 2/4/2025:  7:03 PM  9.9 8.4 16 --- 0.6   3.0 4.3 13 92      PTT - 5/28/2024:  5:21 AM  1.1   12.7 29     TROPHS   Date/Time Value Ref Range Status   02/03/2025 07:50 PM 13 0 - 53 ng/L Final   01/30/2025 11:10 PM 9 0 - 53 ng/L Final   01/30/2025 09:34 PM 10 0 - 53 ng/L Final   05/26/2024 11:24 PM 48 0 - 53 ng/L Final   05/26/2024 10:09 PM 44 0 - 53 ng/L Final   02/13/2024 07:37 PM 9 0 - 53 ng/L Final     BNP   Date/Time Value Ref Range Status   02/03/2025 07:50 PM 13 0 - 99 pg/mL Final   01/30/2025 09:34 PM 17 0 - 99 pg/mL Final     HGBA1C   Date/Time Value Ref Range Status   01/30/2025 09:34 PM 6.2 See comment % Final   05/28/2024 05:21 AM 6.2  see below % Final     LDLCALC   Date/Time Value Ref Range Status   02/04/2025 05:01 AM 89 <=99 mg/dL Final     Comment:                                 Near   Borderline      AGE      Desirable  Optimal    High     High     Very High     0-19 Y     0 - 109     ---    110-129   >/= 130     ----    20-24 Y     0 - 119     ---    120-159   >/= 160     ----      >24 Y     0 -  99   100-129  130-159   160-189     >/=190     06/08/2024 08:19 AM 70 <=99 mg/dL Final     Comment:                                 Near   Borderline      AGE      Desirable  Optimal    High     High     Very High     0-19 Y     0 - 109     ---    110-129   >/= 130     ----    20-24 Y     0 - 119     ---    120-159   >/= 160     ----      >24 Y     0 -  99   100-129  130-159   160-189     >/=190       VLDL   Date/Time Value Ref Range Status   02/04/2025 05:01 AM 24 0 - 40 mg/dL Final   06/08/2024 08:19 AM 29 0 - 40 mg/dL Final   12/12/2022 08:07 PM 29 0 - 40 mg/dL Final      Last I/O:  I/O last 3 completed shifts:  In: 740 (4.1 mL/kg) [P.O.:740]  Out: 1630 (9.1 mL/kg) [Urine:1625 (0.3 mL/kg/hr); Blood:5]  Weight: 179 kg     Past Cardiology Tests (Last 3 Years):  EKG:  ECG 12 lead 02/03/2025 (Preliminary)  2/3 NSR rate 81 BPM    Echo:  TTE 2/2024  CONCLUSIONS:   1. Poorly visualized anatomical structures due to suboptimal image quality (even with Definity).   2. Left ventricular systolic function is suspected mildly decreased with a 45% estimated ejection fraction.   3. Abnormal septal motion consistent with RV pacemaker.     TTE 5/2021  CONCLUSIONS:   1. The left ventricular systolic function is moderately decreased with a 35% estimated ejection fraction.   2. Poorly visualized anatomical structures due to suboptimal image quality.   3. There is moderate concentric left ventricular hypertrophy.   4. There is global hypokinesis of the left ventricle with minor regional variations.    Stress Test:  Nuclear Stress Test 01/31/2025  Impression   1.  Medium-sized moderate to severe perfusion defects involving apex,  apical to mid anterior, apical inferior/septal / lateral wall seen on  stress imaging.  2. The left ventricle is normal in size.  3. Irregular wall motion with a post-stress LV EF estimated at 30%,  likely underestimated, correlate clinically.       Inpatient Medications:  Scheduled medications   Medication Dose Route Frequency    aspirin  81 mg oral Daily    atorvastatin  40 mg oral Daily    empagliflozin  10 mg oral Daily    furosemide  120 mg intravenous BID    heparin (porcine)  7,500 Units subcutaneous q8h STEVE    magnesium oxide  400 mg oral Daily    metoprolol succinate XL  25 mg oral Daily    polyethylene glycol  17 g oral Daily    spironolactone  25 mg oral Daily     PRN medications   Medication    acetaminophen    albuterol    nitroglycerin     Continuous Medications   Medication Dose Last Rate     Physical Exam  Vitals reviewed.   Constitutional:       General: He is not in acute distress.     Appearance: He is obese.   HENT:      Mouth/Throat:      Mouth: Mucous membranes are moist.   Eyes:      Extraocular Movements: Extraocular movements intact.   Cardiovascular:      Rate and Rhythm: Normal rate and regular rhythm.   Pulmonary:      Effort: No respiratory distress.      Comments: Breath sounds diminished throughout, 3L supplemental O2  Abdominal:      General: Bowel sounds are normal. There is distension.      Tenderness: There is no abdominal tenderness.   Musculoskeletal:      Right lower le+ Edema present.      Left lower le+ Edema present.   Skin:     General: Skin is warm and dry.   Neurological:      General: No focal deficit present.      Mental Status: He is alert and oriented to person, place, and time.   Psychiatric:         Mood and Affect: Mood normal.         Behavior: Behavior normal. Behavior is cooperative.      Assessment/Plan   Patric Arenas is a 49 y.o. male with PMH of DCM with HFrecEF, s/p ICD, obesity with  BMI ~60 kg/m², MJ, on chronic home O2, hypertension, hyperlipidemia, h/o illicit drug use presents after positive nuclear stress test that was completed in 2 parts (1/31 and today 2/3) which showed medium- sized moderate to severe perfusion defects in the apex, apical to mid anterior, and apical inferior/septal/lateral wall. Admitted to [facility] for further ischemic evaluation.      Assessment & Plan  Positive cardiac stress test  - 1/30: patient presented with 7/10 CP  - Serial troponin negative, EKG without changes from previous ECGs.   - admit troponin: 13  - 1/31 nuclear stress test (first part): showed medium- sized moderate to severe perfusion defects in the apex, apical to mid anterior, and apical inferior/septal/lateral wall.   - patient discharged on 1/31 to return for 2nd part of nuclear stress on 2/3  - 2/3 nuclear stress (second part): Reversible medium-sized moderate-to-severe perfusion defects involving apex, apical to mid anterior, apical inferior/septal/lateral wall seen on stress imaging, likely representing inducible myocardial ischemia  - [facility] on 2/4 without obstructive CAD   - continue aspirin 81 mg daily, atorvastatin 40 mg nightly   - see HFrecEF below     Heart failure with recovered ejection fraction (HFrecEF)  - [facility] 2/4/25 with no obstructive CAD  - [facility] 2/4/25: RA 20, RV 71/19 (RVEDP 27 mmHg), PCWP 25, PA 74/25 (mean 49 mmHg), PA sat 77%, AO sat 97%, CO/CI 7.44/2.79  - Given 2 mg IV Bumex following RHC with poor response  - 120 mg IV Lasix BID started today with more than 2 L of urine out. Continue to monitor UOP and RFP  - 40 mg potassium given to replenish K+ during diuresis  - Urine sodium pending  - last TTE  5/2024: LV EF 50-55%  - s/p ICD in 2019  - BNP 13 (prev. 17) iso obesity  - Last DC Weight: 182 kg  - Admit Weight: 180 kg? ->  179 kg today  - admit CXR: mild interstitial edema, no focal infiltrate, sizeable pleural effusion or pneumothorax   - Admit S/S: dyspnea on exertion, LE  swelling   - home diuretic: torsemide 60 mg BID (per patient, has only been taking AM dose due to not wanting to urinate all night)  - Current Med Regimen: empagliflozin 10 mg daily, metoprolol succ 25 mg daily, spironolactone 25 mg daily   - RAASi was being consider outpatient by Dr. Chacon based on how renal function improves  - Follows with: Dr. Chacon (last seen 11/2024)  - Strict Is & Os, 2L Fluid Restriction, 2-3gm Na Diet, Daily Weights    CKD (chronic kidney disease) stage 3, GFR 30-59 ml/min (Multi)  - baseline GFR 30-50s  - last DC Cr 1.51  - admit Cr 1.56, Cr today 1.41  - diuresis as above     HTN (hypertension)  - admit /73  - last 24 hour SBP range: 119-127  - no current anti- HTN regimen, will trend BPs     HLD (hyperlipidemia)  - AM fasting lipid panel: HDL 36, LDL 89, triglycerides 119  - continue home atorvastatin 40 mg daily     Obesity  - BMI 61 kg  - seen 10/2024 by bariatric surgery team outpatient, will need to follow- up outpatient    MJ (obstructive sleep apnea)  - has been referred to sleep medicine in the past (new referral placed)  - per prior notes, Hx of noncompliance with CPAP but was willing to wear overnight  - overnight 2/3-2/4: Code Blue called for somnolence/ lack of responsiveness while patient was up in the chair. Patient woken up, remained in NSR. VBG/ABG results showing elevated CO2 (which is chronic)  - CPAP ordered overnight      Substance Abuse  - PCP positive on last 2 urine drug screens  - will offer [facility] services this admission and encourage cessation      Dispo: pending diuresis     DVT prophylaxis: subcutaneous heparin       Patient seen and discussed with Dr. Garcia     Code Status:  Full Code    Gillian Jean PA-C    I conducted a shared care visit with the CJ, we discussed coronary angiogram results, however severely elevated filling pressures, no signficant diuresis yesterday but responded to higher dose furosemide will schedule BID may need  metolazone augmentation

## 2025-02-05 NOTE — DOCUMENTATION CLARIFICATION NOTE
"    PATIENT:               MARILY MORA  ACCT #:                  [id number]  MRN:                       [id number]  :                       1975  ADMIT DATE:       2/3/2025 5:32 PM  DISCH DATE:  RESPONDING PROVIDER #:        [id number]          PROVIDER RESPONSE TEXT:    Acute on Chronic Diastolic Congestive Heart Failure    CDI QUERY TEXT:    Clarification      Instruction:    Based on your assessment of the patient and the clinical information, please provide the requested documentation by clicking on the appropriate radio button and enter any additional information if prompted.    Question: Please further clarify the type and acuity of congestive heart failure    When answering this query, please exercise your independent professional judgment. The fact that a question is being asked, does not imply that any particular answer is desired or expected.    The patient's clinical indicators include:  Clinical Information: Progress notes indicate pt is a 49 yr old male presenting for ischemic evaluation    Clinical Indicators:  2/3 CXR: Mild perihilar prominence which might be due to mild interstitial edema     Cardiology PN: \"Nuclear Stress Test 2025, Irregular wall motion with a post-stress LV EF estimated at 30%, likely underestimated, correlate clinically.\"     Cardiology PN: \"Heart failure with recovered ejection fraction (HFrecEF)...  BNP 13 (prev. 17) iso obesity - admit CXR: mild interstitial edema, no focal infiltrate, sizeable pleural effusion or pneumothorax - Admit S/S: dyspnea on exertion, LE swelling\"     Hospital Course: \"IV diuretics provided after cath results\"    Treatment: Bumex IV on , Torsemide PO on , Lasix IV on , Monitoring I/Os, Daily Weights, Fluid restrictions    Risk Factors: HTN, CKD  Options provided:  -- Acute on Chronic Systolic Congestive Heart Failure  -- Chronic Systolic Congestive Heart Failure  -- Acute on Chronic Diastolic Congestive Heart Failure  -- " Chronic Diastolic Congestive Heart Failure  -- Acute on Chronic combined systolic/diastolic Congestive Heart Failure  -- Chronic combined systolic/diastolic Congestive Heart Failure  -- Other - I will add my own diagnosis  -- Refer to Clinical Documentation Reviewer    Query created by: Alice Perez on 2/5/2025 1:53 PM      Electronically signed by:  FELY GODINEZ MD 2/5/2025 1:57 PM

## 2025-02-06 LAB
ALBUMIN SERPL BCP-MCNC: 4.3 G/DL (ref 3.4–5)
ANION GAP SERPL CALC-SCNC: 13 MMOL/L (ref 10–20)
BUN SERPL-MCNC: 24 MG/DL (ref 6–23)
CALCIUM SERPL-MCNC: 10.1 MG/DL (ref 8.6–10.6)
CHLORIDE SERPL-SCNC: 93 MMOL/L (ref 98–107)
CO2 SERPL-SCNC: 40 MMOL/L (ref 21–32)
CREAT SERPL-MCNC: 1.48 MG/DL (ref 0.5–1.3)
EGFRCR SERPLBLD CKD-EPI 2021: 57 ML/MIN/1.73M*2
ERYTHROCYTE [DISTWIDTH] IN BLOOD BY AUTOMATED COUNT: 15.1 % (ref 11.5–14.5)
GLUCOSE SERPL-MCNC: 117 MG/DL (ref 74–99)
HCT VFR BLD AUTO: 56 % (ref 41–52)
HGB BLD-MCNC: 16.6 G/DL (ref 13.5–17.5)
MAGNESIUM SERPL-MCNC: 2.53 MG/DL (ref 1.6–2.4)
MCH RBC QN AUTO: 31.8 PG (ref 26–34)
MCHC RBC AUTO-ENTMCNC: 29.6 G/DL (ref 32–36)
MCV RBC AUTO: 107 FL (ref 80–100)
NRBC BLD-RTO: 0 /100 WBCS (ref 0–0)
PHOSPHATE SERPL-MCNC: 3.8 MG/DL (ref 2.5–4.9)
PLATELET # BLD AUTO: 219 X10*3/UL (ref 150–450)
POTASSIUM SERPL-SCNC: 4.1 MMOL/L (ref 3.5–5.3)
RBC # BLD AUTO: 5.22 X10*6/UL (ref 4.5–5.9)
SODIUM SERPL-SCNC: 142 MMOL/L (ref 136–145)
WBC # BLD AUTO: 4 X10*3/UL (ref 4.4–11.3)

## 2025-02-06 PROCEDURE — 94660 CPAP INITIATION&MGMT: CPT

## 2025-02-06 PROCEDURE — 2500000001 HC RX 250 WO HCPCS SELF ADMINISTERED DRUGS (ALT 637 FOR MEDICARE OP)

## 2025-02-06 PROCEDURE — 2500000004 HC RX 250 GENERAL PHARMACY W/ HCPCS (ALT 636 FOR OP/ED)

## 2025-02-06 PROCEDURE — 2500000001 HC RX 250 WO HCPCS SELF ADMINISTERED DRUGS (ALT 637 FOR MEDICARE OP): Performed by: STUDENT IN AN ORGANIZED HEALTH CARE EDUCATION/TRAINING PROGRAM

## 2025-02-06 PROCEDURE — 99232 SBSQ HOSP IP/OBS MODERATE 35: CPT | Performed by: INTERNAL MEDICINE

## 2025-02-06 PROCEDURE — 80069 RENAL FUNCTION PANEL: CPT

## 2025-02-06 PROCEDURE — 85027 COMPLETE CBC AUTOMATED: CPT

## 2025-02-06 PROCEDURE — 36415 COLL VENOUS BLD VENIPUNCTURE: CPT

## 2025-02-06 PROCEDURE — 2500000002 HC RX 250 W HCPCS SELF ADMINISTERED DRUGS (ALT 637 FOR MEDICARE OP, ALT 636 FOR OP/ED)

## 2025-02-06 PROCEDURE — 83735 ASSAY OF MAGNESIUM: CPT

## 2025-02-06 PROCEDURE — 1200000002 HC GENERAL ROOM WITH TELEMETRY DAILY

## 2025-02-06 RX ORDER — FUROSEMIDE 10 MG/ML
120 INJECTION INTRAMUSCULAR; INTRAVENOUS 2 TIMES DAILY
Status: DISCONTINUED | OUTPATIENT
Start: 2025-02-06 | End: 2025-02-07 | Stop reason: HOSPADM

## 2025-02-06 RX ADMIN — ATORVASTATIN CALCIUM 40 MG: 40 TABLET, FILM COATED ORAL at 08:00

## 2025-02-06 RX ADMIN — MAGNESIUM OXIDE TAB 400 MG (241.3 MG ELEMENTAL MG) 400 MG: 400 (241.3 MG) TAB at 08:00

## 2025-02-06 RX ADMIN — ASPIRIN 81 MG CHEWABLE TABLET 81 MG: 81 TABLET CHEWABLE at 08:00

## 2025-02-06 RX ADMIN — FUROSEMIDE 120 MG: 10 INJECTION, SOLUTION INTRAVENOUS at 12:59

## 2025-02-06 RX ADMIN — EMPAGLIFLOZIN 10 MG: 10 TABLET, FILM COATED ORAL at 08:01

## 2025-02-06 RX ADMIN — METOPROLOL SUCCINATE 25 MG: 25 TABLET, EXTENDED RELEASE ORAL at 08:00

## 2025-02-06 RX ADMIN — HEPARIN SODIUM 7500 UNITS: 5000 INJECTION, SOLUTION INTRAVENOUS; SUBCUTANEOUS at 22:52

## 2025-02-06 RX ADMIN — HEPARIN SODIUM 7500 UNITS: 5000 INJECTION, SOLUTION INTRAVENOUS; SUBCUTANEOUS at 13:00

## 2025-02-06 RX ADMIN — FUROSEMIDE 120 MG: 10 INJECTION, SOLUTION INTRAVENOUS at 23:25

## 2025-02-06 RX ADMIN — ACETAMINOPHEN 650 MG: 325 TABLET ORAL at 02:27

## 2025-02-06 RX ADMIN — HEPARIN SODIUM 7500 UNITS: 5000 INJECTION, SOLUTION INTRAVENOUS; SUBCUTANEOUS at 06:30

## 2025-02-06 RX ADMIN — SPIRONOLACTONE 25 MG: 25 TABLET, FILM COATED ORAL at 08:00

## 2025-02-06 ASSESSMENT — PAIN SCALES - GENERAL: PAINLEVEL_OUTOF10: 0 - NO PAIN

## 2025-02-06 ASSESSMENT — COGNITIVE AND FUNCTIONAL STATUS - GENERAL
MOBILITY SCORE: 23
CLIMB 3 TO 5 STEPS WITH RAILING: A LITTLE
DAILY ACTIVITIY SCORE: 24

## 2025-02-06 ASSESSMENT — PAIN - FUNCTIONAL ASSESSMENT: PAIN_FUNCTIONAL_ASSESSMENT: 0-10

## 2025-02-06 NOTE — CARE PLAN
The patient's goals for the shift include      The clinical goals for the shift include Patient will report no SOB this shift.    Over the shift, the patient did not make progress toward the following goals. Barriers to progression include none. Recommendations to address these barriers include none.

## 2025-02-06 NOTE — CARE PLAN
Patient remained HDS and was free of injury throughout this shift. Still short of breath on exertion. Remains on 3L NC still. 120mg IV lasix given with approx 1.5L of output.

## 2025-02-06 NOTE — PROGRESS NOTES
Subjective Data:  Patient seen at bedside this AM. He denies any complaints. He denies SOB, CP, abdominal pain. States he is eating and drinking well. He continues to diurese well, especially over the last 24 hours.     Overnight Events:    No acute events overnight, NSR noted.     Today in brief:  - Patient net negative 3.2L, weight down.  - FENa suggests intrinsic SUZANNE.  - Continuing lasix 120 mg IV BID as patient has had good response and is symptomatically feeling better.  - Consider transition to PO regimen tomorrow.        Objective Data:  Last Recorded Vitals:  Vitals:    02/05/25 2328 02/05/25 2350 02/06/25 0338 02/06/25 0400   BP: 124/87  116/82    BP Location: Left arm  Left arm    Patient Position: Lying  Lying    Pulse: 82  76    Resp: 20 22 20 22   Temp: 36 °C (96.8 °F)  36 °C (96.8 °F)    TempSrc: Temporal  Temporal    SpO2: 93%  98%    Weight:   (!) 177 kg (391 lb 5.1 oz)    Height:           Last Labs:  CBC - 2/5/2025:  6:21 PM  4.6 16.9 215    57.1      CMP - 2/5/2025:  6:21 PM  10.2 8.4 16 --- 0.6   3.7 4.6 13 92      PTT - 5/28/2024:  5:21 AM  1.1   12.7 29     TROPHS   Date/Time Value Ref Range Status   02/03/2025 07:50 PM 13 0 - 53 ng/L Final   01/30/2025 11:10 PM 9 0 - 53 ng/L Final   01/30/2025 09:34 PM 10 0 - 53 ng/L Final   05/26/2024 11:24 PM 48 0 - 53 ng/L Final   05/26/2024 10:09 PM 44 0 - 53 ng/L Final   02/13/2024 07:37 PM 9 0 - 53 ng/L Final     BNP   Date/Time Value Ref Range Status   02/03/2025 07:50 PM 13 0 - 99 pg/mL Final   01/30/2025 09:34 PM 17 0 - 99 pg/mL Final     HGBA1C   Date/Time Value Ref Range Status   01/30/2025 09:34 PM 6.2 See comment % Final   05/28/2024 05:21 AM 6.2 see below % Final     LDLCALC   Date/Time Value Ref Range Status   02/04/2025 05:01 AM 89 <=99 mg/dL Final     Comment:                                 Near   Borderline      AGE      Desirable  Optimal    High     High     Very High     0-19 Y     0 - 109     ---    110-129   >/= 130     ----    20-24 Y      0 - 119     ---    120-159   >/= 160     ----      >24 Y     0 -  99   100-129  130-159   160-189     >/=190     06/08/2024 08:19 AM 70 <=99 mg/dL Final     Comment:                                 Near   Borderline      AGE      Desirable  Optimal    High     High     Very High     0-19 Y     0 - 109     ---    110-129   >/= 130     ----    20-24 Y     0 - 119     ---    120-159   >/= 160     ----      >24 Y     0 -  99   100-129  130-159   160-189     >/=190       VLDL   Date/Time Value Ref Range Status   02/04/2025 05:01 AM 24 0 - 40 mg/dL Final   06/08/2024 08:19 AM 29 0 - 40 mg/dL Final   12/12/2022 08:07 PM 29 0 - 40 mg/dL Final      Last I/O:  I/O last 3 completed shifts:  In: 1320 (7.4 mL/kg) [P.O.:1320]  Out: 5200 (29.3 mL/kg) [Urine:5200 (0.8 mL/kg/hr)]  Weight: 177.5 kg     Past Cardiology Tests (Last 3 Years):  EKG:  ECG 12 lead 02/03/2025 (Preliminary)  2/3 NSR rate 81 BPM    Echo:  TTE 2/2024  CONCLUSIONS:   1. Poorly visualized anatomical structures due to suboptimal image quality (even with Definity).   2. Left ventricular systolic function is suspected mildly decreased with a 45% estimated ejection fraction.   3. Abnormal septal motion consistent with RV pacemaker.     TTE 5/2021  CONCLUSIONS:   1. The left ventricular systolic function is moderately decreased with a 35% estimated ejection fraction.   2. Poorly visualized anatomical structures due to suboptimal image quality.   3. There is moderate concentric left ventricular hypertrophy.   4. There is global hypokinesis of the left ventricle with minor regional variations.    Stress Test:  Nuclear Stress Test 01/31/2025  Impression   1. Medium-sized moderate to severe perfusion defects involving apex,  apical to mid anterior, apical inferior/septal / lateral wall seen on  stress imaging.  2. The left ventricle is normal in size.  3. Irregular wall motion with a post-stress LV EF estimated at 30%,  likely underestimated, correlate clinically.        Inpatient Medications:  Scheduled medications   Medication Dose Route Frequency    aspirin  81 mg oral Daily    atorvastatin  40 mg oral Daily    empagliflozin  10 mg oral Daily    furosemide  120 mg intravenous BID    heparin (porcine)  7,500 Units subcutaneous q8h [facility]    magnesium oxide  400 mg oral Daily    metoprolol succinate XL  25 mg oral Daily    polyethylene glycol  17 g oral Daily    spironolactone  25 mg oral Daily     PRN medications   Medication    acetaminophen    albuterol    nitroglycerin     Continuous Medications   Medication Dose Last Rate     Physical Exam    Constitutional: NAD, obese, answers questions appropriately.   HENT: Atraumatic. CPAP in place, removed during exam.   CV: RRR, S1/S2 present but distant, likely 2/2 body habitus.  Resp: Difficult auscultation given body habitus, however breath sounds heard are clear. No tachypnea.   Abdominal: Distended abdomen but soft and compressible.  Neuro: alert and oriented, no focal deficts appreciated.   Skin: Warm and dry  Extremities: pitting edema + in LE's.   Psych: Appropriate mood and behavior.     Assessment/Plan   Patric Arenas is a 49 y.o. male with PMH of DCM with HFrecEF, s/p ICD, obesity with BMI ~60 kg/m², MJ, on chronic home O2, hypertension, hyperlipidemia, h/o illicit drug use presents after positive nuclear stress test that was completed in 2 parts (1/31 and today 2/3) which showed medium- sized moderate to severe perfusion defects in the apex, apical to mid anterior, and apical inferior/septal/lateral wall. Following LHC showed no sign of obstructive CAD. Admitted to [facility] for fluid management.      Assessment & Plan  Positive cardiac stress test  - 1/30: patient presented with 7/10 CP  - Serial troponin negative, EKG without changes from previous ECGs.   - admit troponin: 13  - 1/31 nuclear stress test (first part): showed medium- sized moderate to severe perfusion defects in the apex, apical to mid anterior, and apical  inferior/septal/lateral wall.   - patient discharged on 1/31 to return for 2nd part of nuclear stress on 2/3  - 2/3 nuclear stress (second part): Reversible medium-sized moderate-to-severe perfusion defects involving apex, apical to mid anterior, apical inferior/septal/lateral wall seen on stress imaging, likely representing inducible myocardial ischemia  - [facility] on 2/4 without obstructive CAD   - continue aspirin 81 mg daily, atorvastatin 40 mg nightly   - see HFrecEF below     Heart failure with recovered ejection fraction (HFrecEF)  - [facility] 2/4/25 with no obstructive CAD  - [facility] 2/4/25: RA 20, RV 71/19 (RVEDP 27 mmHg), PCWP 25, PA 74/25 (mean 49 mmHg), PA sat 77%, AO sat 97%, CO/CI 7.44/2.79  - 120 mg IV Lasix BID started yesterday with more than 4L of UO.   - last TTE  5/2024: LV EF 50-55%  - s/p ICD in 2019  - BNP 13 (prev. 17) iso obesity  - Last DC Weight: 182 kg  - Admit Weight: 180 kg? ->  177 kg today  - admit CXR: mild interstitial edema, no focal infiltrate, sizeable pleural effusion or pneumothorax   - Admit S/S: dyspnea on exertion, LE swelling   - home diuretic: torsemide 60 mg BID (per patient, has only been taking AM dose due to not wanting to urinate all night)  - Will continue 120mg IV lasix BID today and reassess ability to transition to oral regimen tomorrow.   - Current Med Regimen: empagliflozin 10 mg daily, metoprolol succ 25 mg daily, spironolactone 25 mg daily   - RAASi was being consider outpatient by Dr. Chacon based on how renal function improves  - Likely resume RAASi on outpatient basis. Blood pressures mostly controlled.   - Follows with: Dr. Chacon (last seen 11/2024)  - Strict Is & Os, 2L Fluid Restriction, 2-3gm Na Diet, Daily Weights    CKD (chronic kidney disease) stage 3, GFR 30-59 ml/min (Multi)  - baseline GFR 30-50s  - last DC Cr 1.51  - admit Cr 1.56, Cr today 1.35 (1.41)   - diuresis as above   - FENa suggesting intrinsic cause. Creatinine downtrending today s/p aggressive  diuresis.     HTN (hypertension)  - admit /73  - last 24 hour SBP range: 95 - 137 mmHg  - no current anti- HTN regimen, will trend Bps. Meds as above.      HLD (hyperlipidemia)  - AM fasting lipid panel: HDL 36, LDL 89, triglycerides 119  - continue home atorvastatin 40 mg daily     Obesity  - BMI 61 kg  - seen 10/2024 by bariatric surgery team outpatient, will need to follow- up outpatient    MJ (obstructive sleep apnea)  - has been referred to sleep medicine in the past (new referral placed)  - per prior notes, Hx of noncompliance with CPAP but was willing to wear overnight  - overnight 2/3-2/4: Code Blue called for somnolence/ lack of responsiveness while patient was up in the chair. Patient woken up, remained in NSR. VBG/ABG results showing elevated CO2 (which is chronic)  - CPAP ordered overnight    - SpO2 within normal limits on CPAP and NC.   - CBC shows polycythemia likely 2/2 chronic hypoxemia.      Substance Abuse  - PCP positive on last 2 urine drug screens  - will offer [facility] services this admission and encourage cessation      Dispo: pending further diuresis   DVT prophylaxis: subcutaneous heparin    Code Status:Full Code    Patient seen and discussed with Dr. Jose Lake PACarlC    I conducted a shared care visit with the CJ, good response to diuretics yesterday although serum bicarb is up slightly this is probably partly due to his respiratory acidosis continues to to wear oxygen and CPAP only intermittently despite continuous reminders.  He is willing to stay until tomorrow for 1 further day of diuresis

## 2025-02-06 NOTE — SIGNIFICANT EVENT
Rapid Response Nurse Note:  [x] RADAR alert/Score 6    Pager time: 125  Arrival time: 1253  Event end time:   Location: LT 5052  [] Phone triage     Rapid response initiated by:  [] Rapid Response RN [] Family [] Nursing Supervisor [] Physician   [x] RADAR auto-page [] Sepsis auto-page [] RN [] RT   [] NP/PA [] Other:     Primary reason for call:   [] BAT [] New CPAP/BiPAP [] Bleeding [] Change in mental status   [] Chest pain [] Code blue [] FiO2 >/= 50% [] HR </= 40 bpm   [] HR >/= 130 bpm [] Hyperglycemia [] Hypoglycemia [x] RADAR    [] RR </= 8 bpm [] RR >/= 30 bpm [] SBP </= 90 mmHg [] SpO2 < 90%   [] Seizure [] Sepsis [] Staff concern:     Initial VS and/or RADAR VS:  radar vitals below in bold    Vitals:    25 0338 25 0400 25 0800 25 1210   BP: 116/82  125/73 115/61   BP Location: Left arm  Left arm Left arm   Patient Position: Lying  Lying Sitting   Pulse: 76  84 92   Resp: 20 22 20 20   Temp: 36 °C (96.8 °F)  36.1 °C (97 °F) 36 °C (96.8 °F)   TempSrc: Temporal  Temporal Temporal   SpO2: 98%  92% 93%   Weight: (!) 177 kg (391 lb 5.1 oz)      Height:            Interventions:  [x] None [] ABG [] Assist w/ICU transfer [] BAT paged    [] Bag mask [] Blood [] Cardioversion [] Code Blue   [] Code blue for intubation [] Code status changed [] Chest x-ray [] EKG   [] IV fluid/bolus [] KUB x-ray [] Labs/cultures [] Medication   [] Nebulizer treatment [] NIPPV (CPAP/BiPAP) [] Oxygen [] Oral airway   [] Peripheral IV [] Palliative care consult [] CT/MRI [] Sepsis protocol    [] Suctioned [] Other:       Outcome:  [] Coded and  [] Code blue for intubation [] Coded and transferred to ICU []  on division   [x] Remained on division (no change) [] Remained on division + additional monitoring [] Remained in ED [] Transferred to ED   [] Transferred to ICU [] Transferred to inpatient status [] Transferred for interventions (procedure) [] Transferred to ICU stepdown    [] Transferred  to surgery [] Transferred to telemetry [] Sepsis protocol [] STEMI protocol   [] Stroke protocol [x] Bedside nurse instructed to page rapid response for any concerns or acute change in condition/VS     Additional Comments: Spoke to RN regarding vital signs.  Pt laying in bed on phone,  states he feels fine.  Per RN, pt due for Lasix but hand no concerns at this time.

## 2025-02-07 ENCOUNTER — PHARMACY VISIT (OUTPATIENT)
Dept: PHARMACY | Facility: CLINIC | Age: 50
End: 2025-02-07
Payer: COMMERCIAL

## 2025-02-07 VITALS
WEIGHT: 315 LBS | SYSTOLIC BLOOD PRESSURE: 117 MMHG | BODY MASS INDEX: 47.74 KG/M2 | DIASTOLIC BLOOD PRESSURE: 81 MMHG | RESPIRATION RATE: 20 BRPM | HEIGHT: 68 IN | HEART RATE: 77 BPM | TEMPERATURE: 98.8 F | OXYGEN SATURATION: 95 %

## 2025-02-07 PROCEDURE — 2500000001 HC RX 250 WO HCPCS SELF ADMINISTERED DRUGS (ALT 637 FOR MEDICARE OP)

## 2025-02-07 PROCEDURE — 2500000004 HC RX 250 GENERAL PHARMACY W/ HCPCS (ALT 636 FOR OP/ED)

## 2025-02-07 PROCEDURE — RXMED WILLOW AMBULATORY MEDICATION CHARGE

## 2025-02-07 PROCEDURE — 2500000002 HC RX 250 W HCPCS SELF ADMINISTERED DRUGS (ALT 637 FOR MEDICARE OP, ALT 636 FOR OP/ED)

## 2025-02-07 PROCEDURE — 99239 HOSP IP/OBS DSCHRG MGMT >30: CPT | Performed by: INTERNAL MEDICINE

## 2025-02-07 RX ORDER — ATORVASTATIN CALCIUM 40 MG/1
40 TABLET, FILM COATED ORAL DAILY
Qty: 30 TABLET | Refills: 3 | Status: SHIPPED | OUTPATIENT
Start: 2025-02-07

## 2025-02-07 RX ORDER — SPIRONOLACTONE 25 MG/1
50 TABLET ORAL DAILY
Qty: 30 TABLET | Refills: 3 | Status: SHIPPED | OUTPATIENT
Start: 2025-02-07 | End: 2025-05-30 | Stop reason: SDUPTHER

## 2025-02-07 RX ORDER — NAPROXEN SODIUM 220 MG/1
81 TABLET, FILM COATED ORAL DAILY
Qty: 30 TABLET | Refills: 3 | Status: SHIPPED | OUTPATIENT
Start: 2025-02-07 | End: 2025-05-30 | Stop reason: SDUPTHER

## 2025-02-07 RX ORDER — SPIRONOLACTONE 25 MG/1
25 TABLET ORAL DAILY
Qty: 30 TABLET | Refills: 3 | Status: CANCELLED | OUTPATIENT
Start: 2025-02-07

## 2025-02-07 RX ORDER — TORSEMIDE 20 MG/1
60 TABLET ORAL 2 TIMES DAILY
Qty: 180 TABLET | Refills: 3 | Status: SHIPPED | OUTPATIENT
Start: 2025-02-07 | End: 2025-05-30 | Stop reason: SDUPTHER

## 2025-02-07 RX ORDER — ALBUTEROL SULFATE 90 UG/1
2 INHALANT RESPIRATORY (INHALATION) EVERY 4 HOURS PRN
Qty: 18 G | Refills: 3 | Status: SHIPPED | OUTPATIENT
Start: 2025-02-07

## 2025-02-07 RX ORDER — TORSEMIDE 20 MG/1
60 TABLET ORAL
Status: DISCONTINUED | OUTPATIENT
Start: 2025-02-07 | End: 2025-02-07 | Stop reason: HOSPADM

## 2025-02-07 RX ORDER — TORSEMIDE 20 MG/1
60 TABLET ORAL
Status: DISCONTINUED | OUTPATIENT
Start: 2025-02-07 | End: 2025-02-07

## 2025-02-07 RX ORDER — METOPROLOL SUCCINATE 25 MG/1
25 TABLET, EXTENDED RELEASE ORAL DAILY
Qty: 30 TABLET | Refills: 3 | Status: SHIPPED | OUTPATIENT
Start: 2025-02-07 | End: 2025-05-30 | Stop reason: SDUPTHER

## 2025-02-07 RX ADMIN — TORSEMIDE 60 MG: 20 TABLET ORAL at 13:09

## 2025-02-07 RX ADMIN — HEPARIN SODIUM 7500 UNITS: 5000 INJECTION, SOLUTION INTRAVENOUS; SUBCUTANEOUS at 06:25

## 2025-02-07 RX ADMIN — Medication 1 APPLICATION: at 11:43

## 2025-02-07 RX ADMIN — METOPROLOL SUCCINATE 25 MG: 25 TABLET, EXTENDED RELEASE ORAL at 08:17

## 2025-02-07 RX ADMIN — SPIRONOLACTONE 25 MG: 25 TABLET, FILM COATED ORAL at 08:17

## 2025-02-07 RX ADMIN — ATORVASTATIN CALCIUM 40 MG: 40 TABLET, FILM COATED ORAL at 08:17

## 2025-02-07 RX ADMIN — MAGNESIUM OXIDE TAB 400 MG (241.3 MG ELEMENTAL MG) 400 MG: 400 (241.3 MG) TAB at 08:17

## 2025-02-07 RX ADMIN — ASPIRIN 81 MG CHEWABLE TABLET 81 MG: 81 TABLET CHEWABLE at 08:17

## 2025-02-07 RX ADMIN — EMPAGLIFLOZIN 10 MG: 10 TABLET, FILM COATED ORAL at 08:17

## 2025-02-07 ASSESSMENT — PAIN - FUNCTIONAL ASSESSMENT: PAIN_FUNCTIONAL_ASSESSMENT: 0-10

## 2025-02-07 ASSESSMENT — COGNITIVE AND FUNCTIONAL STATUS - GENERAL
DAILY ACTIVITIY SCORE: 24
CLIMB 3 TO 5 STEPS WITH RAILING: A LITTLE
MOBILITY SCORE: 23

## 2025-02-07 ASSESSMENT — PAIN SCALES - GENERAL: PAINLEVEL_OUTOF10: 0 - NO PAIN

## 2025-02-07 NOTE — SIGNIFICANT EVENT
After Visit Summary   1/8/2018    Marcy Diaz    MRN: [id number]           Patient Information     Date Of Birth          1986        Visit Information        Provider Department      1/8/2018 3:45 PM Rashel Almonte MD [facility]        Today's Diagnoses     Low-lying placenta        Supervision of other normal pregnancy, antepartum          Care Instructions                                                        If you have any questions regarding your visit, Please contact your care team.     [facility] Access Services: [phone]    Women s Health CLINIC HOURS TELEPHONE NUMBER       GAGE Adams-      Ginny Mae-Medical Assistant       Monday-[place]  8:00a.m-4:45 p.m  Wednesday-[place] 8:00a.m-4:45 p.m.  [facility]  8:00[facility].m-4:45 p.m.  Friday-[place]  8:00a.m-4:45 p.m. [facility]  [address]  [place], [place] [address] [phone] ask for [facility]  [phone] Fax  Imaging [phone]    [facility]  [address] [place] [address]  [phone]    [facility]  [address] [facility] [place] [address]  [phone] ask [facility]  [phone] Fax  Imaging [phone]     Urgent Care locations:    [facility] Monday-Friday  5 pm - 9 pm  Saturday and Sunday   9 am - 5 pm    Monday-Friday   11 am - 9 pm  Saturday and Sunday   9 am - 5 pm   [phone]       If you need a medication refill, please contact your pharmacy. Please allow 3 business days for your refill to be completed.  As always, Thank you for trusting us with your healthcare needs!            Follow-ups after your visit        Who to contact     If you have questions or need follow up information about today's clinic visit or your schedule please contact [facility] directly at  Rapid Response Nurse Note: RADAR alert: 6    Pager time: 352  Arrival time: 353  Event end time: 400  Location: T5-52  [x] Triage by phone or secure messaging    Rapid response initiated by:  [] Rapid response RN [] Family [] Nursing Supervisor [] Physician   [] RADAR auto page [] Sepsis auto-page [] RN [] RT   [] NP/PA [] Other:     Primary reason for call:   [] BAT [] New CPAP/BiPAP [] Bleeding [] Change in mental status   [] Chest pain [] Code blue [] FiO2 >/= 50% [] HR </= 40 bpm   [] HR >/= 130 bpm [] Hyperglycemia [] Hypoglycemia [x] RADAR    [] RR </= 8 bpm [] RR >/= 30 bpm [] SBP </= 90 mmHg [] SpO2 < 90%   [] Seizure [] Sepsis [] Shortness of breath  [] Staff concern: see comments     Initial VS and/or RADAR VS: T 36.6 °C; HR 74; RR 20; BP 99/67; SPO2 93%.    Providers present at bedside (if applicable):     Name of ICU Provider contacted (if applicable):     Interventions:  [x] None [] ABG/VBG [] Assist w/ICU transfer [] BAT paged    [] Bag mask [] Blood [] Cardioversion [] Code Blue   [] Code blue for intubation [] Code status changed [] Chest x-ray [] EKG   [] IV fluid/bolus [] KUB x-ray [] Labs/cultures [] Medication   [] Nebulizer treatment [] NIPPV (CPAP/BiPAP) [] Oxygen [] Oral airway   [] Peripheral IV [] Palliative care consult [] CT/MRI [] Sepsis protocol    [] Suctioned [] Other:     Outcome:  [] Coded and  [] Code blue for intubation [] Coded and transferred to ICU []  on division   [x] Remained on division (no change) [] Remained on division + additional monitoring [] Remained in ED [] Transferred to ED   [] Transferred to ICU [] Transferred to inpatient status [] Transferred for interventions (procedure) [] Transferred to ICU stepdown    [] Transferred to surgery [] Transferred to telemetry [] Sepsis protocol [] STEMI protocol   [] Stroke protocol [x] Bedside nurse instructed to page rapid response for any concerns or acute change in condition/VS     Additional Comments:  "[phone].  Normal or non-critical lab and imaging results will be communicated to you by Slingjothart, letter or phone within 4 business days after the clinic has received the results. If you do not hear from us within 7 days, please contact the clinic through Slingjothart or phone. If you have a critical or abnormal lab result, we will notify you by phone as soon as possible.  Submit refill requests through Barosense or call your pharmacy and they will forward the refill request to us. Please allow 3 business days for your refill to be completed.          Additional Information About Your Visit        Slingjothart Information     Barosense lets you send messages to your doctor, view your test results, renew your prescriptions, schedule appointments and more. To sign up, go to [email or web address] . Click on \"Log in\" on the left side of the screen, which will take you to the Welcome page. Then click on \"Sign up Now\" on the right side of the page.     You will be asked to enter the access code listed below, as well as some personal information. Please follow the directions to create your username and password.     Your access code is: [email or web address]  Expires: 2018  2:58 PM     Your access code will  in 90 days. If you need help or a new code, please call your [facility] clinic or [phone].        Care EveryWhere ID     This is your Care EveryWhere ID. This could be used by other organizations to access your [facility] medical records  [id number]        Your Vitals Were     Pulse Temperature Last Period Breastfeeding? BMI (Body Mass Index)       92 97.7  F (36.5  C) (Oral) 2017 (Exact Date) No 22.76 kg/m2        Blood Pressure from Last 3 Encounters:   18 101/63   17 97/60   17 99/57    Weight from Last 3 Encounters:   18 65.3 kg (144 lb)   17 62.9 kg (138 lb 9.6 oz)   17 60.8 kg (134 lb)              Today, you had the following     No orders found for display       Primary " Reviewed above RADAR VS with bedside RN.  VS within patient's current trends.  Patient is in no distress per bedside Nurse.  No interventions by rapid response team indicated at this time.  Staff to page rapid response for any concerns or acute change in condition/VS.     Care Provider Office Phone # Fax #    Neelam Ahmadi, APRN [facility] [phone]       [address]  [place] [address]        Equal Access to Services     MARIELY HOBBS : Hadii aad ku hadbartolomeo Soomaali, waaxda luqadaha, qaybta kaalmada adeegyada, waxniecy haleyn koltondayday rizzo justine mcdaniel. So [facility] [phone].    ATENCIÓN: Si habla español, tiene a bro disposición servicios gratuitos de asistencia lingüística. Llame al [phone].    We comply with applicable federal civil rights laws and [place] laws. We do not discriminate on the basis of race, color, national origin, age, disability, sex, sexual orientation, or gender identity.            Thank you!     Thank you for choosing [facility]  for your care. Our goal is always to provide you with excellent care. Hearing back from our patients is one way we can continue to improve our services. Please take a few minutes to complete the written survey that you may receive in the mail after your visit with us. Thank you!             Your Updated Medication List - Protect others around you: Learn how to safely use, store and throw away your medicines at www.disposemymeds.org.          This list is accurate as of: 1/8/18  3:56 PM.  Always use your most recent med list.                   Brand Name Dispense Instructions for use Diagnosis    prenatal multivitamin plus iron 27-0.8 MG Tabs per tablet      Take 1 tablet by mouth daily

## 2025-02-07 NOTE — CONSULTS
Heart Failure Nurse Navigator    The role of the HF nurse navigator is to (1) characterize risk profiles of patients with heart failure transitioning from [id number] after hospitalization, (2) recommend interventions to improve care and reduce risks of worse post-hospitalization outcomes. Potential recommendations may touch base on patient/family education, additional consults that may bring value, and appointment scheduling.    History  Patric Arenas is a 49 y.o. male with PMH of DCM with HFrecEF, s/p ICD, obesity with BMI ~60 kg/m², MJ, on chronic home O2, hypertension, hyperlipidemia, h/o illicit drug use presents after positive nuclear stress test that was completed in 2 parts (1/31 and today 2/3) which showed medium- sized moderate to severe perfusion defects in the apex, apical to mid anterior, and apical inferior/septal/lateral wall. Following LHC showed no sign of obstructive CAD. Admitted to [facility] for fluid management.     Patients Cardiologist(s): Suyapa Chacon MD    Patients Primary Care Provider: Dr. [facility]     1. Medical Domain  What is the patient's most recent LVEF? 55%  HFrEF (LVEF <= 40%) Quadruple therapy recommended  HFmrEF (LVEF 41-49%) Quadruple therapy recommended  HFpEF (LVEF >= 50%) Minimum recommendations: SGLT2i and MRA  Is the patient on OP GDMT for their condition?   ARNI/ACEI/ARB: No   BB: Yes Metoprolol succinate 25 mg daily  MRA: Yes spironolactone 25 mg daily  SGLT2i: Yes empagliflozin 10 mg daily  Is the patient prescribed a diuretic? Yes  Torsemide 60mg twice daily  Does this patient have an implanted device (ie cardioMEMS, ICD, CRT-D)?  Device type: ICD  Could this patient have advanced heart failure (Stage D heart failure)?: No     REFERENCE: Potential markers of advanced HF   Inotrope (dobutamine or milrinone) used during this admission?   LVEF<=25%?   >=2 hospitalizations for ADHF in the last year?   Severe symptoms of HF (fatigue,  "dyspnea, confusion, edema) despite medical therapy?   Downtitration of GDMT as compared to home medications?   Discontinuation of GDMT because of hypotension or renal intolerance?   Recurrent arrhythmias (AF, VT with ICD shocks)?   Cardiac cachexia (i.e., unintentional weight loss due to HF)?   High-risk biomarker profile (e.g., hyponatremia [Na<135], very elevated BNP, worsening kidney function)   Escalating doses of diuretics or persistent edema despite escalation     2. Mind and Emotion  Does this patient have possible cognitive impairment?: No (The Mini-Cog score 5/5)  Ask the patient to memorize these 3 words: banana, sunrise, chair  Ask the patient to draw a clock with hands pointing at \"20 minutes after 8\"  Ask the patient to recall the 3 words  Score: Add number of words recalled + clock drawing (0 points for any errors, 2 points if correct)  Interpretation: A score of 0-2 suggests cognitive impairment is present, a score of 3-5 suggests cognitive impairment is absent  Does this patient have major depression?: No (PH-Q2 score 0/6)  Over the last 2 weeks: Little interest or pleasure in doing things? (Not at all +0, Several days +1, More than half the days +2, Nearly every day +3)  Over the last 2 weeks: Feeling down, depressed or hopeless? (Not at all +0, Several days +1, More than half the days +2, Nearly every day +3)  Score: Add points  Interpretation: A score of 3 or more suggests that major depression is likely.     3. Physical Function  Could this patient be frail?: No   Defined by presence of all of these: slowness, weakness, shrinking, inactivity, exhaustion  Is this patient at risk for falling?: No   Defined by having experienced a fall in the last 12 months.    4. Social Determinants of Health  Transportation deficits?: No   Lack of insurance?: No   Poor financial resources?: No   Living conditions (homelessness, unstable home)?: No   Poor family/social support?: No   Poor personal care?: No   Food " "insecurity?: No   Lack of HCPOA?: Yes    I provided the following heart failure education:  - Living With Heart Failure book provided.  - HF signs and symptoms, heart failure zones and when to call cardiologist.   - Controlling Heart Failure at Home: medication adherence, following up with cardiologist at least once yearly, staying healthy and active, limiting sodium and fluid intake as directed by cardiologist.  - Daily Weight Education    Assessment  Met with Mr. Arenas at the bedside. He follows with Dr. Chacon for HF. He denies any SDH.   We reviewed HF education.  He will be enrolled in HF discharge program once discharged to reduce the risk of readmission.  He had HF appointments scheduled prior to this admission. Appointment reminder printed and given to patient.    IP Medications:  ARNi/ACEi/ARB: None  BB: Metoprolol succinate 25 mg daily  MRA:  Spironolactone 50mg daily  SGLT2i: empagliflozin 10 mg daily  Diuretic:  Torsemide 60mg twice daily    Recommendations/ Plan    *Cardiology Discharge Appointment   Dr. Chacon 2/28/2025 3:40pm [facility] [place] [place] \"Ray\" Florentin SALDIVARN, RN  Heart Failure Clinical Nurse Navigator  [phone]   "

## 2025-02-07 NOTE — CARE PLAN
The patient's goals for the shift include      The clinical goals for the shift include Patient will not report any SOB this shift.    Over the shift, the patient did not make progress toward the following goals. Barriers to progression include none. Recommendations to address these barriers include none.

## 2025-02-07 NOTE — DISCHARGE SUMMARY
"Discharge Diagnosis  Positive cardiac stress test  HFrEF, ADHF    Issues Requiring Follow-Up  Heart failure follow- up  Sleep apnea management    Hospital Course  Patric Arenas is a 49 y.o. male with PMH of DCM with HFrecEF, s/p ICD, obesity with BMI ~60 kg/m², MJ, on chronic home O2, hypertension, hyperlipidemia, h/o illicit drug use presents after positive nuclear stress test that was completed in 2 parts (1/31 and today 2/3) which showed medium- sized moderate to severe perfusion defects in the apex, apical to mid anterior, and apical inferior/septal/lateral wall.      Patient initially presented to [facility] on 1/30 for 7/10 chest pain. Serial troponin negative, EKG without changes from previous ECGs.      Hospital course from 1/30-1/31:  \"Troponin and EKG remained unremarkable. Review of EMR showed patient was seen by Dr. Chacon and stress test was ordered as patient has hx of CM without prior ischemic eval. Stress test ordered inpatient given CP. Fortunately, patient remained CP free shortly after ED admit.      Due to patients size, stress test was performed in 2 parts. Stress portion showed Medium-sized moderate to severe perfusion defects involving apex, apical to mid anterior, apical inferior/septal / lateral wall seen on stress imaging. Patient did not want to remain inpatient for rest portion which was to be completed Monday 2/3. He was started on baby ASA. Discharged with NTG SL PRN. Patient was scheduled for rest portion of stress Monday 2/3/25 at 9 am.\"      Second portion on NM stress test ton 2/3 showed reversible medium-sized moderate-to-severe perfusion in the areas listed above, likely representing inducible myocardial ischemia. No evidence of prior infarction.     On presentation, patient denied any chest pain, no palpitations, wheezing, or cough. He endorsed chronic dyspnea which limits his waking to a few steps at a time. He reported chronic LE swelling that \"never goes away\" or improves. He " "has been prescribed torsemide 60 mg BID but reports only taking torsemide in the AM because he \"doesn't want to pee all night\" from the PM dose. Patient states he is \"supposed to\" wear oxygen at home but rarely does. On exam, Mr. Arenas was lying flat without apparent distress on 1L supplemental O2. He states he can sleep on his side comfortably at night.     Floor course:  LHC completed with showed w/ no evidence of obstructive CAD.   RHC showed  RA 20, RV 71/19 (RVEDP 27 mmHg), PCWP 25, PA 74/25 (mean 49 mmHg), PA sat 77%, AO sat 97%, CO/CI 7.44/2.79.     Aggressive IV diuretics provided after cath results. Switched to torsemide 60 mg BID for oral diuretic regimen. Discussed timing of diuretic dosing to encourage compliance. Patient states he usually takes his diuretic at 5 am. Educated him the take AM torsemide at 5 am and PM torsemide at 3 pm.     Discharge GDMT regimen: empagliflozin 10 mg daily, metoprolol succ 25 mg daily, spironolactone 50 mg daily (increased on date of discharge to aid with hypokalemia). Renal function remains within baseline but uptrending slightly on date of discharge. ACE/ARB/ARNI to be started on outpatient basis.    Close heart failure follow- up scheduled.     Discharge weight: 177 kg    After all labs and VS were reviewed the decision was made that the patient was medically stable for discharge.  The patient was discharged in satisfactory condition.    More than 60 minutes were spent in coordinating patient discharge.    Pertinent Physical Exam At Time of Discharge  Physical Exam  Vitals reviewed.   Constitutional:       General: He is not in acute distress.     Appearance: Normal appearance.   HENT:      Mouth/Throat:      Mouth: Mucous membranes are moist.   Eyes:      Extraocular Movements: Extraocular movements intact.   Cardiovascular:      Rate and Rhythm: Normal rate and regular rhythm.      Heart sounds: Normal heart sounds.      Comments: 1+ LE swelling   Pulmonary:      " Effort: Pulmonary effort is normal. No tachypnea.      Comments: Breath sound diminished likely 2/2 body habitus     On room air  Abdominal:      General: There is distension.      Tenderness: There is no abdominal tenderness.   Musculoskeletal:         General: Normal range of motion.   Skin:     General: Skin is warm and dry.   Neurological:      General: No focal deficit present.      Mental Status: He is alert and oriented to person, place, and time.   Psychiatric:         Mood and Affect: Mood normal.         Behavior: Behavior normal.       Home Medications     Medication List      START taking these medications     eucerin cream; Commonly known as: Eucerin; Apply 1 Application topically   as needed for dry skin.     CHANGE how you take these medications     spironolactone 25 mg tablet; Commonly known as: Aldactone; Take 2   tablets (50 mg) by mouth once daily.; What changed: how much to take     CONTINUE taking these medications     albuterol 90 mcg/actuation inhaler; Commonly known as: Ventolin HFA;   Inhale 2 puffs every 4 hours if needed for wheezing or shortness of   breath. If you are using your rescue inhaler frequently, seek medical   evaluation   aspirin 81 mg chewable tablet; Chew 1 tablet (81 mg) once daily.   atorvastatin 40 mg tablet; Commonly known as: Lipitor; Take 1 tablet (40   mg) by mouth once daily.   empagliflozin 10 mg; Commonly known as: Jardiance; Take 1 tablet (10 mg)   by mouth once daily.   metoprolol succinate XL 25 mg 24 hr tablet; Commonly known as:   Toprol-XL; Take 1 tablet (25 mg) by mouth once daily.   nitroglycerin 0.4 mg SL tablet; Commonly known as: Nitrostat; Place 1   tablet (0.4 mg) under the tongue every 5 minutes if needed for chest pain.   May repeat every 5 minutes for up to 3 doses.   oxygen gas therapy; Commonly known as: O2   torsemide 20 mg tablet; Commonly known as: Demadex; Take 3 tablets (60   mg) by mouth 2 times a day.     STOP taking these medications      magnesium oxide 400 mg tablet; Commonly known as: Mag-Ox       Outpatient Follow-Up  Future Appointments   Date Time Provider Department Center   2/28/2025  3:40 PM Suyapa Chacon MD PhD [id number] [facility]   5/30/2025  9:20 AM Suyapa Chacon MD PhD [id number] [facility]       Barbara Starkey, APRN-CNP    Briefly the patient is followed for longstanding HFpEF he was admitted for urgent coronary angiogram following a positive stress test, nuclear angiogram showed no obstructive disease but he was found to have severely elevated filling pressures responded well to IV diuresis but requested to leave after only a few days of note the patient has severe hypoxic hypercapnic respiratory failure and would continuously remove his oxygen and BiPAP this is likely leading to his issues of right heart failure and volume retention.  At discharge we did recommend that he take the torsemide twice daily as he was previously directed

## 2025-02-07 NOTE — CARE PLAN
Problem: Pain - Adult  Goal: Verbalizes/displays adequate comfort level or baseline comfort level  Outcome: Progressing     Problem: Safety - Adult  Goal: Free from fall injury  Outcome: Progressing     Patient remained HDS and free of injury this shift. Walking pulse completed this shift. Patient to be discharged later today. Telemetry box and IV removed. Discharge instructions to be reviewed with patient. Patient awaiting medications from [facility]. Patient awaiting transportation home.

## 2025-02-10 ENCOUNTER — PATIENT OUTREACH (OUTPATIENT)
Dept: CARE COORDINATION | Facility: CLINIC | Age: 50
End: 2025-02-10
Payer: COMMERCIAL

## 2025-02-10 SDOH — ECONOMIC STABILITY: GENERAL: WOULD YOU LIKE HELP WITH ANY OF THE FOLLOWING NEEDS?: I DONT NEED HELP WITH ANY OF THESE

## 2025-02-10 NOTE — PROGRESS NOTES
Outreach call to patient to support a smooth transition of care from recent re-admission.  Spoke with patient, reviewed discharge medications, discharge instructions, assessed social needs, care gaps and provided education on importance of follow-up appointment with provider.  Will continue to monitor through transition period.     Wrap Up  Is the patient/caregiver familiar with Advance Care Planning?: Yes (2/10/2025 10:20 AM)  Would the patient like more information on Advance Care Planning?: No (2/10/2025 10:20 AM)  Call End Time: 1021 (2/10/2025 10:20 AM)    Engagement  Call Start Time: 1001 (2/10/2025 10:20 AM)    Medications  Medications reviewed with patient/caregiver?: Yes (2/10/2025 10:20 AM)  Is the patient having any side effects they believe may be caused by any medication additions or changes?: No (2/10/2025 10:20 AM)  Does the patient have all medications ordered at discharge?: Yes (2/10/2025 10:20 AM)  Care Management Interventions: No intervention needed (2/10/2025 10:20 AM)  Is the patient taking all medications as directed (includes completed medication regime)?: Yes (2/10/2025 10:20 AM)    Appointments  Does the patient have a primary care provider?: Yes (2/10/2025 10:20 AM)  Care Management Interventions: Verified appointment date/time/provider (2/10/2025 10:20 AM)  Has the patient kept scheduled appointments due by today?: Yes (2/10/2025 10:20 AM)  Care Management Interventions: Advised patient to keep appointment; Educated on importance of keeping appointment (Follow up with Dr. Chacon (cardiology) on 2/28/25) (2/10/2025 10:20 AM)    Patient Teaching  Does the patient have access to their discharge instructions?: Yes (2/10/2025 10:20 AM)  Care Management Interventions: Reviewed instructions with patient (2/10/2025 10:20 AM)  What is the patient's perception of their health status since discharge?: Improving (2/10/2025 10:20 AM)  Is the patient/caregiver able to teach back the hierarchy of who  to call/visit for symptoms/problems? PCP, Specialist, Home Health nurse, Urgent Care, ED, 911: Yes (2/10/2025 10:20 AM)      Anastasia Hurd RN, [facility] [place]  Phone [phone]

## 2025-02-12 ENCOUNTER — PATIENT OUTREACH (OUTPATIENT)
Dept: CASE MANAGEMENT | Facility: HOSPITAL | Age: 50
End: 2025-02-12
Payer: COMMERCIAL

## 2025-02-12 DIAGNOSIS — I50.22 HEART FAILURE WITH MID-RANGE EJECTION FRACTION (HFMEF): ICD-10-CM

## 2025-02-12 LAB
ATRIAL RATE: 81 BPM
P AXIS: 65 DEGREES
P OFFSET: 197 MS
P ONSET: 134 MS
PR INTERVAL: 180 MS
Q ONSET: 224 MS
QRS COUNT: 13 BEATS
QRS DURATION: 88 MS
QT INTERVAL: 462 MS
QTC CALCULATION(BAZETT): 536 MS
QTC FREDERICIA: 510 MS
R AXIS: 23 DEGREES
T AXIS: 137 DEGREES
T OFFSET: 455 MS
VENTRICULAR RATE: 81 BPM

## 2025-02-12 NOTE — PROGRESS NOTES
"Heart Failure Nurse Navigator Transition of Care Phone Call    The role of the HF nurse navigator is to (1) characterize risk profiles of patients with heart failure transitioning from [id number] after hospitalization, (2) recommend interventions to improve care and reduce risks of worse post-hospitalization outcomes.     Assessment  Call attempted to patient . Spoke with patient and obtained the following information.    HF Symptoms  Chest pain? No  Shortness of breath? none  Orthopnea? No  Paroxysmal nocturnal dyspnea? No  Edema? No  Weight gain >2lbs in 3 days or 5lbs in 1 week? No    Medications  Is the patient prescribed the following medications?  ARNi/ACEi/ARB: None  BB: Metoprolol succinate 25 mg daily  MRA: spironolactone 25 mg daily  SGLT2i: empagliflozin 10 mg daily  Diuretic:  Torsemide 60mg twice daily  Is the patient adherent to prescribed medications? YES    Management    Is the patient obtaining daily weights? YES  If yes, current weight? 285lbs  Is the patient following diet limitations (2-3g Na, fluid restriction)? YES  Does the patient have a  cardiology follow-up scheduled? YES  If yes, appointment details? Dr. Chacon 2/28/25 [facility] 1800  Does the patient require HF education reinforcement? Yes      Recommendations/Comments:  - Patient reports feeling well since discharge. Denies any HF symptoms.   - Reports taking daily weights. He stated he weighed himself yesterday and was 285lbs however, on discharge his weight was 177kg (257lbs). He denies swelling in BLLE and abdomen.   - Medication list reviewed with patient. Reports adherence to medications.  - Reinforced daily weights, limiting sodium intake, taking medications as prescribed and following up with cardiologist.  - Patient reminded of upcoming appointment with Dr. Chacon.    No further needs at this time.    Primo \"Gennaro\" Florentin SALDIVARN, RN  Heart Failure Clinical Nurse Navigator  [phone]  "

## 2025-02-17 ENCOUNTER — PATIENT OUTREACH (OUTPATIENT)
Dept: CARE COORDINATION | Facility: CLINIC | Age: 50
End: 2025-02-17
Payer: COMMERCIAL

## 2025-02-17 NOTE — PROGRESS NOTES
Attempt made to reach patient for KYLIE after provider follow up appointment outreach. Unable to reach patient. Will continue to monitor through transition period.    Anastasia Hurd RN, [facility] [place]  Phone [phone]

## 2025-02-17 NOTE — PROGRESS NOTES
Telephone call to patient regarding interest in bariatric surgery program. He is not interested at this time. Will move to drop status. Encouraged to reach out if he has any interest in the future.

## 2025-02-28 ENCOUNTER — OFFICE VISIT (OUTPATIENT)
Dept: CARDIOLOGY | Facility: HOSPITAL | Age: 50
End: 2025-02-28
Payer: COMMERCIAL

## 2025-02-28 VITALS
OXYGEN SATURATION: 86 % | BODY MASS INDEX: 47.74 KG/M2 | SYSTOLIC BLOOD PRESSURE: 109 MMHG | DIASTOLIC BLOOD PRESSURE: 74 MMHG | HEIGHT: 68 IN | WEIGHT: 315 LBS | HEART RATE: 103 BPM

## 2025-02-28 DIAGNOSIS — I50.32 CHRONIC HEART FAILURE WITH PRESERVED EJECTION FRACTION: ICD-10-CM

## 2025-02-28 DIAGNOSIS — I50.32 HEART FAILURE WITH RECOVERED EJECTION FRACTION (HFRECEF): Chronic | ICD-10-CM

## 2025-02-28 DIAGNOSIS — G47.30 SLEEP APNEA, UNSPECIFIED TYPE: Primary | ICD-10-CM

## 2025-02-28 PROCEDURE — 3078F DIAST BP <80 MM HG: CPT | Performed by: STUDENT IN AN ORGANIZED HEALTH CARE EDUCATION/TRAINING PROGRAM

## 2025-02-28 PROCEDURE — 3074F SYST BP LT 130 MM HG: CPT | Performed by: STUDENT IN AN ORGANIZED HEALTH CARE EDUCATION/TRAINING PROGRAM

## 2025-02-28 PROCEDURE — 99215 OFFICE O/P EST HI 40 MIN: CPT | Performed by: STUDENT IN AN ORGANIZED HEALTH CARE EDUCATION/TRAINING PROGRAM

## 2025-02-28 PROCEDURE — 3008F BODY MASS INDEX DOCD: CPT | Performed by: STUDENT IN AN ORGANIZED HEALTH CARE EDUCATION/TRAINING PROGRAM

## 2025-02-28 PROCEDURE — 1036F TOBACCO NON-USER: CPT | Performed by: STUDENT IN AN ORGANIZED HEALTH CARE EDUCATION/TRAINING PROGRAM

## 2025-02-28 ASSESSMENT — PAIN SCALES - GENERAL: PAINLEVEL_OUTOF10: 0-NO PAIN

## 2025-02-28 NOTE — PATIENT INSTRUCTIONS
Thank you for coming in today. If you have any questions or concerns, you may call the Heart Failure Office at [phone] option [phone], or [phone].  You may also contact our heart failure nursing team via email on [email or web address].    For quicker results set-up your  [employer] account to receive results and other correspondence directly to your phone.    Please bring all your pills/medications to your Cardiology appointments.    **  - Please make the following medication changes:  1. START Ozempic  subcutaneously 0.25 mg once a week    - Please have the following tests done:  1.Blood tests just before your next visit (RFP, BNP, CBC)    -You will be referred to our pharmacy assistance team to help with Ozempic cost and Ozempic dose  titration. Please CALL them directly on [phone] to make your first appointment    - Call [phone] ([phone]) to  schedule follow up with Sleep Medicine    - Please make an appointment to be seen in 3 months
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
## 2025-03-12 ENCOUNTER — PATIENT OUTREACH (OUTPATIENT)
Dept: CARE COORDINATION | Facility: CLINIC | Age: 50
End: 2025-03-12
Payer: COMMERCIAL

## 2025-03-12 SDOH — ECONOMIC STABILITY: GENERAL: WOULD YOU LIKE HELP WITH ANY OF THE FOLLOWING NEEDS?: I DONT NEED HELP WITH ANY OF THESE

## 2025-03-12 NOTE — PROGRESS NOTES
"Outreach call made to patient to offer to enroll him with Complex Care Management Program with me.     I have discussed the nature and availability of the service with patient.  Explained that in my role as his RN Care Manager I will:  -Be a point of contact with questions and concerns.  -Focus on chronic conditions and achieving health goals.  -Make sure he is receiving all preventative care he is due for.  Verbal consent given to enroll.    My contact info was provided for any needs that may arise.    Patient is aware that I will call to check in monthly or sooner if needed.    Care plan reviewed with patient per Dr. Chacon's LOV on 2/28/25     Reviewed medications, care plan, assessed social needs, and provided education on importance of follow-up appointments with pcp on 3/14/25 and with cardiology 5/30/25. Will continue to follow for care management needs.     Patient agrees with care plan, scheduled appts and denies HF sx: chest pain, sob, orthopnea, paroxysmal nocturnal dyspnea, edema, or wt gain (weighs himself daily, he is outside right now and does not have his current weight, there is no change in his wt), pt informed me that, \"he is feeling well with no complaints, taking all of his medications, no RF needed.\"    Thanked patient for his time to speak w/ me today and informed him that, I am happy to assist if he has any questions or concerns regarding his healthcare needs.     Patient verbalizes understanding and has no questions or concerns at this time.    Will continue to follow.     Anastasia Hurd RN, [facility]  Phone [phone]                 "

## 2025-03-12 NOTE — PROGRESS NOTES
"Check in 30 days after hospital discharge to support smooth transition of care. No recent hospital admissions noted upon chart review. Reviewed completed follow up appointment w/ patient that was on 2/28/24 (w/ Dr. Chacon), reviewed medications, and discussed plan of care.   Coordinated scheduled pcp visit on 3/14/25 at 11:30am for follow up, to complete care gaps. Patient verbalizes understanding and agrees with care plan and scheduled pcp appt, future cardio appts, he confirms location and that he does have transportation. Patient denies wt gain, sob, chest pain, edema or any other sx, he informed me that, \"he is feeling well, with no complaints, he is taking all of his medications as instructed, no RF needed.\" Patient agrees to continue CM with me for additional support. Informed patient that I will graduate him from [facility] at this time and enroll him with LTCM. Patient has no questions or concerns and confirms that, \"he does have my contact number.\" Will continue to follow.    Anastasia Hurd RN, [facility]  Phone [phone]      "

## 2025-03-25 ENCOUNTER — PATIENT OUTREACH (OUTPATIENT)
Dept: CARE COORDINATION | Facility: CLINIC | Age: 50
End: 2025-03-25
Payer: COMMERCIAL

## 2025-03-25 PROCEDURE — RXMED WILLOW AMBULATORY MEDICATION CHARGE

## 2025-03-25 SDOH — ECONOMIC STABILITY: GENERAL: WOULD YOU LIKE HELP WITH ANY OF THE FOLLOWING NEEDS?: I DONT NEED HELP WITH ANY OF THESE

## 2025-03-26 ENCOUNTER — PHARMACY VISIT (OUTPATIENT)
Dept: PHARMACY | Facility: CLINIC | Age: 50
End: 2025-03-26
Payer: COMMERCIAL

## 2025-04-01 ENCOUNTER — APPOINTMENT (OUTPATIENT)
Dept: PHARMACY | Facility: HOSPITAL | Age: 50
End: 2025-04-01
Payer: COMMERCIAL

## 2025-04-08 ENCOUNTER — APPOINTMENT (OUTPATIENT)
Dept: PHARMACY | Facility: HOSPITAL | Age: 50
End: 2025-04-08
Payer: COMMERCIAL

## 2025-04-08 ENCOUNTER — PHARMACY VISIT (OUTPATIENT)
Dept: PHARMACY | Facility: CLINIC | Age: 50
End: 2025-04-08
Payer: COMMERCIAL

## 2025-04-08 DIAGNOSIS — E66.813 CLASS 3 SEVERE OBESITY DUE TO EXCESS CALORIES WITHOUT SERIOUS COMORBIDITY WITH BODY MASS INDEX (BMI) OF 60.0 TO 69.9 IN ADULT: Chronic | ICD-10-CM

## 2025-04-08 DIAGNOSIS — G47.33 OBSTRUCTIVE SLEEP APNEA: Primary | Chronic | ICD-10-CM

## 2025-04-08 DIAGNOSIS — I50.32 HEART FAILURE WITH RECOVERED EJECTION FRACTION (HFRECEF): Chronic | ICD-10-CM

## 2025-04-08 DIAGNOSIS — E66.01 CLASS 3 SEVERE OBESITY DUE TO EXCESS CALORIES WITHOUT SERIOUS COMORBIDITY WITH BODY MASS INDEX (BMI) OF 60.0 TO 69.9 IN ADULT: Chronic | ICD-10-CM

## 2025-04-08 PROCEDURE — RXMED WILLOW AMBULATORY MEDICATION CHARGE

## 2025-04-08 NOTE — PROGRESS NOTES
Pharmacist Clinic: Cardiology Management    Patric Arenas is a 49 y.o. male was referred to Clinical Pharmacy Team for weight loss management.     Referring Provider: Suyapa Chacon MD*    THIS IS A NEW PATIENT APPOINTMENT. PATIENT WILL BE ESTABLISHING CARE WITH CLINICAL PHARMACY.    Appointment was completed by patric who was reached at .    REVIEW OF PAST APPNT (IF APPLICABLE):   N/a    Allergies Reviewed? Yes    No Known Allergies    Past Medical History:   Diagnosis Date    CHF (congestive heart failure)     HTN (hypertension)     Laceration of liver 06/05/2006    Formatting of this note might be different from the original. Liver injury without mention of open wound into cavity, unspecified laceration IMO4.1.23    MJ on CPAP        Current Outpatient Medications on File Prior to Visit   Medication Sig Dispense Refill    albuterol (Ventolin HFA) 90 mcg/actuation inhaler Inhale 2 puffs every 4 hours if needed for wheezing or shortness of breath. If you are using your rescue inhaler frequently, seek medical evaluation 18 g 3    aspirin 81 mg chewable tablet Chew 1 tablet (81 mg) once daily. 30 tablet 3    atorvastatin (Lipitor) 40 mg tablet Take 1 tablet (40 mg) by mouth once daily. 30 tablet 3    empagliflozin (Jardiance) 10 mg Take 1 tablet (10 mg) by mouth once daily. 30 tablet 3    Eucerin cream Apply 1 Application topically as needed for dry skin. 454 g 0    metoprolol succinate XL (Toprol-XL) 25 mg 24 hr tablet Take 1 tablet (25 mg) by mouth once daily. 30 tablet 3    nitroglycerin (Nitrostat) 0.4 mg SL tablet Place 1 tablet (0.4 mg) under the tongue every 5 minutes if needed for chest pain. May repeat every 5 minutes for up to 3 doses. 25 tablet 11    oxygen (O2) gas therapy Inhale.      semaglutide 0.25 mg or 0.5 mg (2 mg/3 mL) pen injector Inject 0.5 mg under the skin every 7 days. 3 mL 6    spironolactone (Aldactone) 25 mg tablet Take 2 tablets (50 mg) by mouth once daily. 30 tablet  "3    torsemide (Demadex) 20 mg tablet Take 3 tablets (60 mg) by mouth 2 times a day. 180 tablet 3     No current facility-administered medications on file prior to visit.         RELEVANT LAB RESULTS:  Lab Results   Component Value Date    BILITOT 0.6 02/03/2025    CALCIUM 10.1 02/06/2025    CO2 40 (HH) 02/06/2025    CL 93 (L) 02/06/2025    CREATININE 1.48 (H) 02/06/2025    GLUCOSE 117 (H) 02/06/2025    ALKPHOS 92 02/03/2025    K 4.1 02/06/2025    PROT 8.4 (H) 02/03/2025     02/06/2025    AST 16 02/03/2025    ALT 13 02/03/2025    BUN 24 (H) 02/06/2025    ANIONGAP 13 02/06/2025    MG 2.53 (H) 02/06/2025    PHOS 3.8 02/06/2025    ALBUMIN 4.3 02/06/2025    LIPASE 10 05/14/2021    GFRF CANCELED 05/03/2023    GFRF CANCELED 05/03/2023    GFRMALE 71 09/19/2023     Lab Results   Component Value Date    TRIG 119 02/04/2025    CHOL 149 02/04/2025    LDLCALC 89 02/04/2025    HDL 36.3 02/04/2025     No results found for: \"BMCBC\", \"CBCDIF\"     PHARMACEUTICAL ASSESSMENT:    MEDICATION RECONCILIATION    Was a medication reconciliation completed at this visit? Yes  Home Pharmacy Reviewed? Yes, describe: [facility]    Drug Interactions? No    Medication Documentation Review Audit       Reviewed by Anastasia Hurd RN (Coordinator) on 03/25/25 at 1511      Medication Order Taking? Sig Documenting Provider Last Dose Status   albuterol (Ventolin HFA) 90 mcg/actuation inhaler [id number]  Inhale 2 puffs every 4 hours if needed for wheezing or shortness of breath. If you are using your rescue inhaler frequently, seek medical evaluation NGUYỄN Zavala  Active   aspirin 81 mg chewable tablet [id number]  Chew 1 tablet (81 mg) once daily. NGUYỄN Zavala  Active   atorvastatin (Lipitor) 40 mg tablet [id number]  Take 1 tablet (40 mg) by mouth once daily. NGUYỄN Zavala  Active   empagliflozin (Jardiance) 10 mg [id number]  Take 1 tablet (10 mg) by mouth once daily. NGUYỄN Zavala  Active   Eucerin cream [id number]  " Apply 1 Application topically as needed for dry skin. NGUYỄN Zavala  Active   metoprolol succinate XL (Toprol-XL) 25 mg 24 hr tablet [id number]  Take 1 tablet (25 mg) by mouth once daily. NGUYỄN Zavala  Active   nitroglycerin (Nitrostat) 0.4 mg SL tablet [id number]  Place 1 tablet (0.4 mg) under the tongue every 5 minutes if needed for chest pain. May repeat every 5 minutes for up to 3 doses. NGUYỄN Ramirez  Active   oxygen (O2) gas therapy [id number]  Inhale. Historical MD Willi  Active   semaglutide 0.25 mg or 0.5 mg (2 mg/3 mL) pen injector [id number]  Inject 0.5 mg under the skin every 7 days. Suyapa Chacon MD PhD  Active   spironolactone (Aldactone) 25 mg tablet [id number]  Take 2 tablets (50 mg) by mouth once daily. NGUYỄN Zavala  Active   torsemide (Demadex) 20 mg tablet [id number]  Take 3 tablets (60 mg) by mouth 2 times a day. NGUYỄN Zavala  Active                    DISEASE MANAGEMENT ASSESSMENT:     WEIGHT LOSS ASSESSMENT     Wt Readings from Last 3 Encounters:   02/28/25 (!) 182 kg (401 lb 6.4 oz)   02/07/25 (!) 177 kg (390 lb 10.5 oz)   01/31/25 (!) 182 kg (401 lb 7.3 oz)     BMI Readings from Last 3 Encounters:   02/28/25 61.03 kg/m²   02/07/25 59.41 kg/m²   01/31/25 61.04 kg/m²       Recorded Home Weight(s): no  Diet:   Breakfast: cereal/oatmeal  Lunch: sandwich   Dinner: chicken      Affordability/Accessibility: zepbound pa approved $4.80/month  Adherence/Organization: no issues self reported. Fill history denotes non adherence  Adverse Reactions: none reported    Relevant PMH:  PMH of Pancreatitis? no  PMH of Retinopathy? no  PMH of MTC? no      COUNSELING:   - Counseled patient on MOA, expectations, side effects, duration of therapy, contraindications, administration, and monitoring parameters  - Answered all patient questions and concerns; provided [facility] phone number if issues/questions arise  Completed Education for the administration of once-weekly  Zepbound:    1. Instructed patient that Zepbound must be kept refrigerated, and if necessary a pen may be stored unrefrigerated at temperatures not to exceed 30 C (86F) for up to 21 days.   2. Remove the Pen from the refrigerator. Leave the base cap on until you are ready to inject.  3. Check the Pen label to make sure you have the right medicine and it has not . Additionally, ensure that the solution is not cloudy, discolored, or has particles in it.  4. Prior to administration, wash and rinse hands.   5. Next, choose your injection site (You may inject into your abdomen or thigh; another person may give you the injection in your upper arm).  6. Change (rotate) your injection site each week. You may use the same area of your body, but be sure to choose a different injection site in that area.  7. Once administration site has been selected, use a new alcohol swab to sanitize the administration site and allow to air dry.  8. First, make sure the pen is in the locked position. While still in the locked position remove the base cap (gray cap) and dispose into a regular trash. Do not attempt to put the base cap back on, this may damage the needle.  9. Place the Clear Base flat and firmly against your skin at the injection site.   10. Press and hold the purple injection button. You will hear a loud click. Continue holding the Clear Base firmly against your skin until you hear a second click. Do not rub the area after administration.  11. Dispose of the pen in a sharps container (i.e. Coffee can, laundry detergent bottle)  12. Injections should be done on the same day every week,  allow >=72 hours between 2 doses.   Zepbound Education:     Counseled patient on Zepbound MOA, expectations, side effects, duration of therapy, administration, and monitoring parameters.  Provided detailed dosing and administration counseling to ensure proper technique.   Reviewed Zepbound titration schedule, starting with 2.5 mg once  weekly to a goal of 15 mg once weekly if tolerated  Counseled patient on the benefits of GLP-1ra glycemic control and weight loss  Reviewed storage requirements of Zepbound when not in use, and when to administer the medication if a dose is missed.  Advised patient that they may experience improved satiety after meals and portion sizes of meals may be reduced as doses of Zepound increase.      ASSESSMENT:    Assessment/Plan   Problem List Items Addressed This Visit    None        RECOMMENDATIONS/PLAN:    Zepbound 2.5mg once week    Last Appnt with Referring Provider: 2/28/25  Next Appnt with Referring Provider: 5/30/25  Clinical Pharmacist follow up: 5/6 6949  VAF/Application Expiration: No  Type of Encounter: Iker Coreas PharmD    Verbal consent to manage patient's drug therapy was obtained from the patient . They were informed they may decline to participate or withdraw from participation in pharmacy services at any time.    Continue all meds under the continuation of care with the referring provider and clinical pharmacy team.

## 2025-04-09 ENCOUNTER — PHARMACY VISIT (OUTPATIENT)
Dept: PHARMACY | Facility: CLINIC | Age: 50
End: 2025-04-09
Payer: COMMERCIAL

## 2025-04-17 ENCOUNTER — PATIENT OUTREACH (OUTPATIENT)
Dept: CARE COORDINATION | Facility: CLINIC | Age: 50
End: 2025-04-17
Payer: COMMERCIAL

## 2025-04-17 SDOH — ECONOMIC STABILITY: GENERAL: WOULD YOU LIKE HELP WITH ANY OF THE FOLLOWING NEEDS?: I DONT NEED HELP WITH ANY OF THESE

## 2025-04-17 NOTE — PROGRESS NOTES
"Outreach call to patient for CM follow up. Patient identified by name and . Reviewed medications, care plan, assessed social needs and provided education on importance of follow up appt with pcp to complete care gaps, and scheduled follow up visits with cardiology.     Patient agrees with care plan, scheduled appts and denies HF sx: chest pain, sob, orthopnea, paroxysmal nocturnal dyspnea, edema, or wt gain, pt informed me that, \"he is feeling well with no complaints, taking all of his medications, no RF needed, he will be meeting with his pcp next week, his appt is not scheduled yet because they will be calling him to schedule, if they don't call him, he will call them.\" Offered to assist with scheduling and patient states \"he prefers to call himself if he needs to.\"     Thanked patient for his time to speak w/ me today and informed him that, I am happy to assist if he has any questions or concerns regarding his healthcare needs.     Patient verbalizes understanding and has no questions or concerns at this time.     Will continue to follow.     Anastasia Hurd RN, [facility] [place]  Phone [phone]       "

## 2025-05-01 ENCOUNTER — PATIENT OUTREACH (OUTPATIENT)
Dept: CARE COORDINATION | Facility: CLINIC | Age: 50
End: 2025-05-01
Payer: COMMERCIAL

## 2025-05-01 NOTE — PROGRESS NOTES
Outreach call to patient for CM follow up. Left voicemail message for patient with my contact information. Will continue to follow for CM.    Anastasia Hurd RN, [place]  Phone [phone]

## 2025-05-06 ENCOUNTER — APPOINTMENT (OUTPATIENT)
Dept: PHARMACY | Facility: HOSPITAL | Age: 50
End: 2025-05-06
Payer: COMMERCIAL

## 2025-05-06 DIAGNOSIS — G47.33 OBSTRUCTIVE SLEEP APNEA: Chronic | ICD-10-CM

## 2025-05-06 DIAGNOSIS — E66.813 CLASS 3 SEVERE OBESITY DUE TO EXCESS CALORIES WITHOUT SERIOUS COMORBIDITY WITH BODY MASS INDEX (BMI) OF 60.0 TO 69.9 IN ADULT: Chronic | ICD-10-CM

## 2025-05-06 PROCEDURE — RXMED WILLOW AMBULATORY MEDICATION CHARGE

## 2025-05-06 NOTE — PROGRESS NOTES
Pharmacist Clinic: Cardiology Management    David Arenas is a 49 y.o. male was referred to Clinical Pharmacy Team for weight loss management.     Referring Provider: Suyapa Chacon MD*    THIS IS A FOLLOW UP PATIENT APPOINTMENT. AT LAST VISIT ON 4/8 WITH PHARMACIST (christel).    Appointment was completed by david who was reached at .    REVIEW OF PAST APPNT (IF APPLICABLE):   Started zepbound.  No noted issues.    Allergies Reviewed? Yes    No Known Allergies    Past Medical History:   Diagnosis Date    CHF (congestive heart failure)     HTN (hypertension)     Laceration of liver 06/05/2006    Formatting of this note might be different from the original. Liver injury without mention of open wound into cavity, unspecified laceration IMO4.1.23    MJ on CPAP        Current Outpatient Medications on File Prior to Visit   Medication Sig Dispense Refill    albuterol (Ventolin HFA) 90 mcg/actuation inhaler Inhale 2 puffs every 4 hours if needed for wheezing or shortness of breath. If you are using your rescue inhaler frequently, seek medical evaluation 18 g 3    aspirin 81 mg chewable tablet Chew 1 tablet (81 mg) once daily. 30 tablet 3    atorvastatin (Lipitor) 40 mg tablet Take 1 tablet (40 mg) by mouth once daily. 30 tablet 3    empagliflozin (Jardiance) 10 mg Take 1 tablet (10 mg) by mouth once daily. 30 tablet 3    Eucerin cream Apply 1 Application topically as needed for dry skin. 454 g 0    metoprolol succinate XL (Toprol-XL) 25 mg 24 hr tablet Take 1 tablet (25 mg) by mouth once daily. 30 tablet 3    nitroglycerin (Nitrostat) 0.4 mg SL tablet Place 1 tablet (0.4 mg) under the tongue every 5 minutes if needed for chest pain. May repeat every 5 minutes for up to 3 doses. 25 tablet 11    oxygen (O2) gas therapy Inhale.      spironolactone (Aldactone) 25 mg tablet Take 2 tablets (50 mg) by mouth once daily. 30 tablet 3    tirzepatide, weight loss, (Zepbound) 2.5 mg/0.5 mL injection Inject 2.5 mg  "under the skin every 7 days. 4 each 0    torsemide (Demadex) 20 mg tablet Take 3 tablets (60 mg) by mouth 2 times a day. 180 tablet 3     No current facility-administered medications on file prior to visit.         RELEVANT LAB RESULTS:  Lab Results   Component Value Date    BILITOT 0.6 02/03/2025    CALCIUM 10.1 02/06/2025    CO2 40 (HH) 02/06/2025    CL 93 (L) 02/06/2025    CREATININE 1.48 (H) 02/06/2025    GLUCOSE 117 (H) 02/06/2025    ALKPHOS 92 02/03/2025    K 4.1 02/06/2025    PROT 8.4 (H) 02/03/2025     02/06/2025    AST 16 02/03/2025    ALT 13 02/03/2025    BUN 24 (H) 02/06/2025    ANIONGAP 13 02/06/2025    MG 2.53 (H) 02/06/2025    PHOS 3.8 02/06/2025    ALBUMIN 4.3 02/06/2025    LIPASE 10 05/14/2021    GFRF CANCELED 05/03/2023    GFRF CANCELED 05/03/2023    GFRMALE 71 09/19/2023     Lab Results   Component Value Date    TRIG 119 02/04/2025    CHOL 149 02/04/2025    LDLCALC 89 02/04/2025    HDL 36.3 02/04/2025     No results found for: \"BMCBC\", \"CBCDIF\"     PHARMACEUTICAL ASSESSMENT:    MEDICATION RECONCILIATION    Was a medication reconciliation completed at this visit? Yes  Home Pharmacy Reviewed? Yes, describe: [facility]    Drug Interactions? No    Medication Documentation Review Audit       Reviewed by Anastasia Hurd RN (Coordinator) on 04/17/25 at 1730      Medication Order Taking? Sig Documenting Provider Last Dose Status   albuterol (Ventolin HFA) 90 mcg/actuation inhaler [id number]  Inhale 2 puffs every 4 hours if needed for wheezing or shortness of breath. If you are using your rescue inhaler frequently, seek medical evaluation NGUYỄN Zavala  Active   aspirin 81 mg chewable tablet [id number]  Chew 1 tablet (81 mg) once daily. NGUYỄN Zavala  Active   atorvastatin (Lipitor) 40 mg tablet [id number]  Take 1 tablet (40 mg) by mouth once daily. ANANT Zavala-MOHAN  Active   empagliflozin (Jardiance) 10 mg [id number]  Take 1 tablet (10 mg) by mouth once daily. ANANT Zavala-CNP  " Active   Eucerin cream [id number]  Apply 1 Application topically as needed for dry skin. NGUYỄN Zavala  Active   metoprolol succinate XL (Toprol-XL) 25 mg 24 hr tablet [id number]  Take 1 tablet (25 mg) by mouth once daily. NGUYỄN Zavala  Active   nitroglycerin (Nitrostat) 0.4 mg SL tablet [id number]  Place 1 tablet (0.4 mg) under the tongue every 5 minutes if needed for chest pain. May repeat every 5 minutes for up to 3 doses. NGUYỄN Ramirez  Active   oxygen (O2) gas therapy [id number]  Inhale. Historical Provider, MD  Active   spironolactone (Aldactone) 25 mg tablet [id number]  Take 2 tablets (50 mg) by mouth once daily. NGUYỄN Zavala  Active   tirzepatide, weight loss, (Zepbound) 2.5 mg/0.5 mL injection [id number]  Inject 2.5 mg under the skin every 7 days. Suyapa Chacon MD PhD  Active   torsemide (Demadex) 20 mg tablet [id number]  Take 3 tablets (60 mg) by mouth 2 times a day. NGUYỄN Zavala  Active                    DISEASE MANAGEMENT ASSESSMENT:     WEIGHT LOSS ASSESSMENT     Wt Readings from Last 3 Encounters:   02/28/25 (!) 182 kg (401 lb 6.4 oz)   02/07/25 (!) 177 kg (390 lb 10.5 oz)   01/31/25 (!) 182 kg (401 lb 7.3 oz)     BMI Readings from Last 3 Encounters:   02/28/25 61.03 kg/m²   02/07/25 59.41 kg/m²   01/31/25 61.04 kg/m²       Recorded Home Weight(s): no noted weight changes with 2.5mg dose  Diet:   Breakfast: cereal/oatmeal  Lunch: sandwich   Dinner: chicken      Affordability/Accessibility: zepbound pa approved $4.80/month  Adherence/Organization: no issues self reported. Fill history denotes non adherence  Adverse Reactions: none reported    Relevant PMH:  PMH of Pancreatitis? no  PMH of Retinopathy? no  PMH of MTC? no      COUNSELING:   - Counseled patient on MOA, expectations, side effects, duration of therapy, contraindications, administration, and monitoring parameters  - Answered all patient questions and concerns; provided [facility] phone number if  issues/questions arise    Zepbound Education:     Counseled patient on Zepbound MOA, expectations, side effects, duration of therapy, administration, and monitoring parameters.  Provided detailed dosing and administration counseling to ensure proper technique.   Reviewed Zepbound titration schedule, starting with 2.5 mg once weekly to a goal of 15 mg once weekly if tolerated  Counseled patient on the benefits of GLP-1ra glycemic control and weight loss  Reviewed storage requirements of Zepbound when not in use, and when to administer the medication if a dose is missed.  Advised patient that they may experience improved satiety after meals and portion sizes of meals may be reduced as doses of Zepound increase.      ASSESSMENT:    Assessment/Plan   Problem List Items Addressed This Visit    None        RECOMMENDATIONS/PLAN:    Increase Zepbound 5mg once week    Last Appnt with Referring Provider: 2/28/25  Next Appnt with Referring Provider: 5/30/25  Clinical Pharmacist follow up: 5/28 1140  VAF/Application Expiration: No  Type of Encounter: Iker Coreas PharmD    Verbal consent to manage patient's drug therapy was obtained from the patient . They were informed they may decline to participate or withdraw from participation in pharmacy services at any time.    Continue all meds under the continuation of care with the referring provider and clinical pharmacy team.

## 2025-05-07 ENCOUNTER — PHARMACY VISIT (OUTPATIENT)
Dept: PHARMACY | Facility: CLINIC | Age: 50
End: 2025-05-07
Payer: COMMERCIAL

## 2025-05-07 PROCEDURE — RXMED WILLOW AMBULATORY MEDICATION CHARGE

## 2025-05-08 NOTE — PROGRESS NOTES
"Returned call to patient. Spoke with patient. Patient identified by name and . Mr. Arenas informed me that, \"he called to get his medication refilled and he was able to get it refilled when he had his visit with pharmacy today, he will continue to complete his scheduled follow up visits and schedule his visit with his pcp.    Thanked patient for his time to speak with me today and informed him that, I am happy to assist if he has any questions or concerns.    Patient informed me that, he does not have any other requests, questions or concerns at this time.    Will continue to follow for CM outreach.    Anastasia Hurd RN, [facility] [place]  Phone [phone]    " DISPLAY PLAN FREE TEXT

## 2025-05-10 ENCOUNTER — PHARMACY VISIT (OUTPATIENT)
Dept: PHARMACY | Facility: CLINIC | Age: 50
End: 2025-05-10
Payer: COMMERCIAL

## 2025-05-26 ENCOUNTER — PHARMACY VISIT (OUTPATIENT)
Dept: PHARMACY | Facility: CLINIC | Age: 50
End: 2025-05-26
Payer: COMMERCIAL

## 2025-05-26 PROCEDURE — RXMED WILLOW AMBULATORY MEDICATION CHARGE

## 2025-05-28 ENCOUNTER — APPOINTMENT (OUTPATIENT)
Dept: PHARMACY | Facility: HOSPITAL | Age: 50
End: 2025-05-28
Payer: COMMERCIAL

## 2025-05-28 DIAGNOSIS — E66.813 CLASS 3 SEVERE OBESITY DUE TO EXCESS CALORIES WITHOUT SERIOUS COMORBIDITY WITH BODY MASS INDEX (BMI) OF 60.0 TO 69.9 IN ADULT: Chronic | ICD-10-CM

## 2025-05-28 DIAGNOSIS — G47.33 OBSTRUCTIVE SLEEP APNEA: Chronic | ICD-10-CM

## 2025-05-28 PROCEDURE — RXMED WILLOW AMBULATORY MEDICATION CHARGE
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
## 2025-05-29 ENCOUNTER — PATIENT OUTREACH (OUTPATIENT)
Dept: CARE COORDINATION | Facility: CLINIC | Age: 50
End: 2025-05-29
Payer: COMMERCIAL

## 2025-05-29 SDOH — ECONOMIC STABILITY: GENERAL: WOULD YOU LIKE HELP WITH ANY OF THE FOLLOWING NEEDS?: I DONT NEED HELP WITH ANY OF THESE

## 2025-05-29 NOTE — PROGRESS NOTES
"Outreach call to patient for CM follow up. Patient identified by name and . Mr. Arenas informed me that \"he is doing well, no complaints, he confirms his follow up appt with Dr. Chacon tomorrow at 9:20am and he will be sure to complete this follow up appt, he will continue to follow his care plan, complete reviewed care gaps as instructed and he confirms that he has my contact information.\"    Thanked Mr. Arenas for his time to speak w/ me today and informed him that, I am happy to assist if he has any questions or concerns regarding his healthcare needs.     Mr. Arenas verbalizes understanding and has no questions or concerns at this time.     Will continue to follow.     Anastasia Hurd RN, [facility] [place]  Phone [phone]    "

## 2025-05-30 ENCOUNTER — OFFICE VISIT (OUTPATIENT)
Dept: CARDIOLOGY | Facility: HOSPITAL | Age: 50
End: 2025-05-30
Payer: COMMERCIAL

## 2025-05-30 ENCOUNTER — PHARMACY VISIT (OUTPATIENT)
Dept: PHARMACY | Facility: CLINIC | Age: 50
End: 2025-05-30
Payer: COMMERCIAL

## 2025-05-30 VITALS
WEIGHT: 315 LBS | BODY MASS INDEX: 47.74 KG/M2 | SYSTOLIC BLOOD PRESSURE: 111 MMHG | OXYGEN SATURATION: 90 % | HEIGHT: 68 IN | HEART RATE: 93 BPM | DIASTOLIC BLOOD PRESSURE: 78 MMHG

## 2025-05-30 DIAGNOSIS — I50.43 ACUTE ON CHRONIC COMBINED SYSTOLIC AND DIASTOLIC CONGESTIVE HEART FAILURE: ICD-10-CM

## 2025-05-30 DIAGNOSIS — I42.6 DILATED CARDIOMYOPATHY SECONDARY TO ALCOHOL (MULTI): Primary | ICD-10-CM

## 2025-05-30 DIAGNOSIS — R07.9 CHEST PAIN, UNSPECIFIED TYPE: ICD-10-CM

## 2025-05-30 DIAGNOSIS — I50.22 HEART FAILURE WITH MID-RANGE EJECTION FRACTION (HFMEF): ICD-10-CM

## 2025-05-30 PROCEDURE — 3008F BODY MASS INDEX DOCD: CPT | Performed by: STUDENT IN AN ORGANIZED HEALTH CARE EDUCATION/TRAINING PROGRAM

## 2025-05-30 PROCEDURE — 1036F TOBACCO NON-USER: CPT | Performed by: STUDENT IN AN ORGANIZED HEALTH CARE EDUCATION/TRAINING PROGRAM

## 2025-05-30 PROCEDURE — 99215 OFFICE O/P EST HI 40 MIN: CPT | Performed by: STUDENT IN AN ORGANIZED HEALTH CARE EDUCATION/TRAINING PROGRAM

## 2025-05-30 PROCEDURE — 3078F DIAST BP <80 MM HG: CPT | Performed by: STUDENT IN AN ORGANIZED HEALTH CARE EDUCATION/TRAINING PROGRAM

## 2025-05-30 PROCEDURE — 3074F SYST BP LT 130 MM HG: CPT | Performed by: STUDENT IN AN ORGANIZED HEALTH CARE EDUCATION/TRAINING PROGRAM

## 2025-05-30 PROCEDURE — 99213 OFFICE O/P EST LOW 20 MIN: CPT | Performed by: STUDENT IN AN ORGANIZED HEALTH CARE EDUCATION/TRAINING PROGRAM

## 2025-05-30 RX ORDER — TORSEMIDE 20 MG/1
60 TABLET ORAL 2 TIMES DAILY
Qty: 540 TABLET | Refills: 3 | Status: SHIPPED | OUTPATIENT
Start: 2025-05-30 | End: 2026-05-30

## 2025-05-30 RX ORDER — SPIRONOLACTONE 25 MG/1
50 TABLET ORAL DAILY
Qty: 180 TABLET | Refills: 3 | Status: SHIPPED | OUTPATIENT
Start: 2025-05-30 | End: 2026-05-30

## 2025-05-30 RX ORDER — METOPROLOL SUCCINATE 25 MG/1
25 TABLET, EXTENDED RELEASE ORAL DAILY
Qty: 90 TABLET | Refills: 3 | Status: SHIPPED | OUTPATIENT
Start: 2025-05-30 | End: 2026-05-30

## 2025-05-30 RX ORDER — NAPROXEN SODIUM 220 MG/1
81 TABLET, FILM COATED ORAL DAILY
Qty: 90 TABLET | Refills: 3 | Status: SHIPPED | OUTPATIENT
Start: 2025-05-30 | End: 2026-05-30

## 2025-05-30 ASSESSMENT — PATIENT HEALTH QUESTIONNAIRE - PHQ9
1. LITTLE INTEREST OR PLEASURE IN DOING THINGS: NOT AT ALL
2. FEELING DOWN, DEPRESSED OR HOPELESS: NOT AT ALL
SUM OF ALL RESPONSES TO PHQ9 QUESTIONS 1 AND 2: 0

## 2025-05-30 ASSESSMENT — ENCOUNTER SYMPTOMS
WEIGHT GAIN: 0
DIARRHEA: 0
WEAKNESS: 0
LOSS OF SENSATION IN FEET: 0
ORTHOPNEA: 0
CONSTIPATION: 0
FEVER: 0
PALPITATIONS: 0
DIZZINESS: 0
DYSPNEA ON EXERTION: 1
OCCASIONAL FEELINGS OF UNSTEADINESS: 0
WHEEZING: 0
NAUSEA: 0
VOMITING: 0
DYSURIA: 0
LIGHT-HEADEDNESS: 0
HEADACHES: 0
SHORTNESS OF BREATH: 0
DEPRESSION: 0
COUGH: 0
CHILLS: 0
EXCESSIVE DAYTIME SLEEPINESS: 0
WEIGHT LOSS: 0

## 2025-05-30 ASSESSMENT — PAIN SCALES - GENERAL: PAINLEVEL_OUTOF10: 0-NO PAIN

## 2025-05-30 ASSESSMENT — COLUMBIA-SUICIDE SEVERITY RATING SCALE - C-SSRS
6. HAVE YOU EVER DONE ANYTHING, STARTED TO DO ANYTHING, OR PREPARED TO DO ANYTHING TO END YOUR LIFE?: NO
2. HAVE YOU ACTUALLY HAD ANY THOUGHTS OF KILLING YOURSELF?: NO
1. IN THE PAST MONTH, HAVE YOU WISHED YOU WERE DEAD OR WISHED YOU COULD GO TO SLEEP AND NOT WAKE UP?: NO
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
## 2025-05-30 NOTE — PATIENT INSTRUCTIONS
Thank you for coming in today. If you have any questions or concerns, you may call the Heart Failure Office at [phone] option [phone], or [phone].  You may also contact our heart failure nursing team via email on [email or web address].    For quicker results set-up your  [employer] account to receive results and other correspondence directly to your phone.    Please bring all your pills/medications to your Cardiology appointments.    **  - No new medication changes today    -We will renew your heart failure medications today    - Please make the following medication changes:  1.  START Entresto 24/26 mg twice daily.  You will will be provided with coupons today for this medication    - Please have the following tests done:  1.Blood tests TODAY (comprehensive panel, BNP, CBC, TSH with auto reflex)    2. Blood tests in 1 month ( BNP, RFP)    -You will be referred to our pharmacy assistance team to help with Entresto cost. Please CALL them directly on [phone] to make your first appointment    - Please make an appointment to be seen in 3 months

## 2025-06-06 ENCOUNTER — APPOINTMENT (OUTPATIENT)
Dept: CARDIOLOGY | Facility: HOSPITAL | Age: 50
End: 2025-06-06
Payer: COMMERCIAL

## 2025-06-22 ENCOUNTER — CLINICAL SUPPORT (OUTPATIENT)
Dept: EMERGENCY MEDICINE | Facility: HOSPITAL | Age: 50
End: 2025-06-22
Payer: COMMERCIAL

## 2025-06-22 ENCOUNTER — HOSPITAL ENCOUNTER (EMERGENCY)
Facility: HOSPITAL | Age: 50
Discharge: HOME | End: 2025-06-22
Attending: STUDENT IN AN ORGANIZED HEALTH CARE EDUCATION/TRAINING PROGRAM
Payer: COMMERCIAL

## 2025-06-22 VITALS
SYSTOLIC BLOOD PRESSURE: 121 MMHG | DIASTOLIC BLOOD PRESSURE: 88 MMHG | BODY MASS INDEX: 47.74 KG/M2 | TEMPERATURE: 97.9 F | WEIGHT: 315 LBS | HEIGHT: 68 IN | HEART RATE: 97 BPM | RESPIRATION RATE: 16 BRPM

## 2025-06-22 DIAGNOSIS — R19.7 DIARRHEA, UNSPECIFIED TYPE: Primary | ICD-10-CM

## 2025-06-22 LAB
ALBUMIN SERPL BCP-MCNC: 4.4 G/DL (ref 3.4–5)
ALP SERPL-CCNC: 116 U/L (ref 33–120)
ALT SERPL W P-5'-P-CCNC: 11 U/L (ref 10–52)
ANION GAP SERPL CALC-SCNC: 13 MMOL/L (ref 10–20)
AST SERPL W P-5'-P-CCNC: 14 U/L (ref 9–39)
BASOPHILS # BLD AUTO: 0 X10*3/UL (ref 0–0.1)
BASOPHILS NFR BLD AUTO: 0 %
BILIRUB SERPL-MCNC: 1 MG/DL (ref 0–1.2)
BUN SERPL-MCNC: 18 MG/DL (ref 6–23)
CALCIUM SERPL-MCNC: 9.6 MG/DL (ref 8.6–10.6)
CHLORIDE SERPL-SCNC: 95 MMOL/L (ref 98–107)
CO2 SERPL-SCNC: 31 MMOL/L (ref 21–32)
CREAT SERPL-MCNC: 1.45 MG/DL (ref 0.5–1.3)
EGFRCR SERPLBLD CKD-EPI 2021: 58 ML/MIN/1.73M*2
EOSINOPHIL # BLD AUTO: 0.04 X10*3/UL (ref 0–0.7)
EOSINOPHIL NFR BLD AUTO: 0.9 %
ERYTHROCYTE [DISTWIDTH] IN BLOOD BY AUTOMATED COUNT: 13.7 % (ref 11.5–14.5)
GLUCOSE SERPL-MCNC: 89 MG/DL (ref 74–99)
HCT VFR BLD AUTO: 52.4 % (ref 41–52)
HGB BLD-MCNC: 16.9 G/DL (ref 13.5–17.5)
IMM GRANULOCYTES # BLD AUTO: 0.02 X10*3/UL (ref 0–0.7)
IMM GRANULOCYTES NFR BLD AUTO: 0.5 % (ref 0–0.9)
LIPASE SERPL-CCNC: 18 U/L (ref 9–82)
LYMPHOCYTES # BLD AUTO: 0.81 X10*3/UL (ref 1.2–4.8)
LYMPHOCYTES NFR BLD AUTO: 19.1 %
MAGNESIUM SERPL-MCNC: 2.47 MG/DL (ref 1.6–2.4)
MCH RBC QN AUTO: 31.9 PG (ref 26–34)
MCHC RBC AUTO-ENTMCNC: 32.3 G/DL (ref 32–36)
MCV RBC AUTO: 99 FL (ref 80–100)
MONOCYTES # BLD AUTO: 0.25 X10*3/UL (ref 0.1–1)
MONOCYTES NFR BLD AUTO: 5.9 %
NEUTROPHILS # BLD AUTO: 3.13 X10*3/UL (ref 1.2–7.7)
NEUTROPHILS NFR BLD AUTO: 73.6 %
NRBC BLD-RTO: 0 /100 WBCS (ref 0–0)
PLATELET # BLD AUTO: 239 X10*3/UL (ref 150–450)
POTASSIUM SERPL-SCNC: 4 MMOL/L (ref 3.5–5.3)
PROT SERPL-MCNC: 9 G/DL (ref 6.4–8.2)
RBC # BLD AUTO: 5.3 X10*6/UL (ref 4.5–5.9)
SODIUM SERPL-SCNC: 135 MMOL/L (ref 136–145)
WBC # BLD AUTO: 4.3 X10*3/UL (ref 4.4–11.3)

## 2025-06-22 PROCEDURE — 80053 COMPREHEN METABOLIC PANEL: CPT

## 2025-06-22 PROCEDURE — 93010 ELECTROCARDIOGRAM REPORT: CPT | Performed by: STUDENT IN AN ORGANIZED HEALTH CARE EDUCATION/TRAINING PROGRAM

## 2025-06-22 PROCEDURE — 83690 ASSAY OF LIPASE: CPT

## 2025-06-22 PROCEDURE — 93005 ELECTROCARDIOGRAM TRACING: CPT

## 2025-06-22 PROCEDURE — 83735 ASSAY OF MAGNESIUM: CPT | Performed by: STUDENT IN AN ORGANIZED HEALTH CARE EDUCATION/TRAINING PROGRAM

## 2025-06-22 PROCEDURE — 85025 COMPLETE CBC W/AUTO DIFF WBC: CPT

## 2025-06-22 PROCEDURE — 99284 EMERGENCY DEPT VISIT MOD MDM: CPT | Performed by: STUDENT IN AN ORGANIZED HEALTH CARE EDUCATION/TRAINING PROGRAM

## 2025-06-22 PROCEDURE — 2500000001 HC RX 250 WO HCPCS SELF ADMINISTERED DRUGS (ALT 637 FOR MEDICARE OP)

## 2025-06-22 RX ORDER — DICYCLOMINE HYDROCHLORIDE 10 MG/1
10 CAPSULE ORAL ONCE
Status: COMPLETED | OUTPATIENT
Start: 2025-06-22 | End: 2025-06-22

## 2025-06-22 RX ADMIN — DICYCLOMINE HYDROCHLORIDE 10 MG: 10 CAPSULE ORAL at 07:06

## 2025-06-22 ASSESSMENT — PAIN SCALES - GENERAL
PAINLEVEL_OUTOF10: 10 - WORST POSSIBLE PAIN
PAINLEVEL_OUTOF10: 10 - WORST POSSIBLE PAIN

## 2025-06-22 ASSESSMENT — PAIN - FUNCTIONAL ASSESSMENT: PAIN_FUNCTIONAL_ASSESSMENT: 0-10

## 2025-06-22 ASSESSMENT — PAIN DESCRIPTION - LOCATION: LOCATION: ABDOMEN

## 2025-06-22 NOTE — DISCHARGE INSTRUCTIONS
You were seen in the emergency department today for abdominal pain and diarrhea.  If your symptoms do not improve or worsen over the coming days please return to the emergency department for reevaluation.  You may also see your primary care provider this week

## 2025-06-22 NOTE — PROGRESS NOTES
Emergency Department Transition of Care Note       Signout   I received Patric Arenas in signout from Dr. Acosta.  Please see the ED Provider Note for all HPI, PE and MDM up to the time of signout at 0700.  This is in addition to the primary record.    In brief Patric Arenas is an 50 y.o. male presenting for abdominal pain and diarrhea    At the time of signout we were awaiting:  Symptom control    ED Course & Medical Decision Making   Medical Decision Making:  Under my care, patient received bentyl prior to sign out. Observed in the emergency department sleeping comfortably. On repeat evaluation patient states that the Bentyl did help his abdominal pain.  He has not had any additional episodes of diarrhea in the last 5 hours since being in the emergency department.  Patient provided a p.o. challenge which he tolerated well. Labs as doumented below. Feels comfortable with discharge home. Instructed to follow up with PCP if symptoms do not improve or return to the ED.    ED Course:  ED Course as of 06/22/25 0917   Sun Jun 22, 2025   0910 CBC and Auto Differential(!)  Leukopenia, similar to prior. No anemia  [AW]   0917 Comprehensive metabolic panel(!)  Baseline creatinine, otherwise no significant electrolyte abnormalities [AW]   0917 LIPASE: 18 [AW]      ED Course User Index  [AW] Elda Liu DO         Diagnoses as of 06/22/25 0917   Diarrhea, unspecified type       Disposition   As a result of the work-up, the patient was discharged home.  he was informed of his diagnosis and instructed to come back with any concerns or worsening of condition.  he and was agreeable to the plan as discussed above.  he was given the opportunity to ask questions.  All of the patient's questions were answered.    Procedures   Procedures    Patient seen and discussed with ED attending physician.    Elda Liu DO  Emergency Medicine

## 2025-06-22 NOTE — ED PROVIDER NOTES
History of Present Illness     History provided by: Patient  Limitations to History: None  External Records Reviewed with Brief Summary: Outpatient progress note from Cardiology on 25 which showed patient has history of Hfimproved F, s/p ICD, LHC in 2025 which was negative. Also has history of MJ adherent with CPAP, HTN, HLD. Patient takes twice daily loop diuretics.     HPI:  Patric Arenas is a 50 y.o. male with past medical history of COPD, CHF, asthma, hypertension presenting to the ED for evaluation of abdominal pain and diarrhea which onset last night.  Patient states he was out getting something to eat including chicken and fries and shortly after returning home after dinner, he began to experience abdominal pain and subsequent episodes of diarrhea.  Patient endorses 7 total episodes of diarrhea without any blood in the stool.  He denies any nausea or vomiting.  He denies any lightheadedness fevers or chills.  He denies any urinary symptoms.    Physical Exam   Triage vitals:  T 36.6 °C (97.9 °F)  HR 97  /88  RR 16  O2        General: Awake, alert, in no acute distress  Eyes: Gaze conjugate.  No conjunctival injection  HENT: Normo-cephalic, atraumatic. No stridor  CV: Regular rate, regular rhythm. Radial pulses 2+ bilaterally  Resp: Breathing non-labored, speaking in full sentences.  Clear to auscultation bilaterally  GI: Soft, distended, no reproducible abdominal tenderness. No rebound or guarding.  MSK/Extremities: No gross bony deformities. Moving all extremities. Bilateral edema which is nonpitting.   Skin: Warm. Appropriate color  Neuro: Alert. Oriented. Face symmetric. Speech is fluent.  Gross strength and sensation intact in b/l UE and Les. Ambulates without difficulty.   Psych: Appropriate mood and affect    Medical Decision Making & ED Course   Medical Decision Makin y.o. male presenting to the ED for evaluation of abdominal pain which onset following eating chicken and Turkish  fries from restaurant with subsequent development of diarrhea. Given time frame of symptom onset, feel this is likely due to a gastroenteritis picture. Patient's abdominal exam is benign and he has had no episodes of vomiting or nausea associated with his symptoms. No blood in stools. Patient was having normal bowel movements yesterday. Patient to receive Bentyl for symptom relief. Patient also to obtain basic laboratory studies. Anticipate patient can likely be discharged home following PO challenge/results. Patient signed out to oncoming provider following results of labs, PO challenge, and reassessment following bentyl administration. Please see oncoming provider note for additional workup, intervention, and final disposition.     ----    Differential diagnoses considered include but are not limited to: see MDM     Social Determinants of Health which Significantly Impact Care: None identified     EKG Independent Interpretation: EKG obtained at 0426 was independently interpreted and reviewed by myself which reveals normal sinus rhythm with ventricular rate of 89 bpm.  Normal axis.  Intervals within normal.  No acute ST elevations or depressions concerning for ischemia.  T wave inversions present in lead III, which was seen previously when compared to prior EKG from 1/31/2025.    Independent Result Review and Interpretation: Relevant laboratory and radiographic results were reviewed and independently interpreted by myself.  As necessary, they are commented on in the ED Course.    Chronic conditions affecting the patient's care: As documented above in [facility]    The patient was discussed with the following consultants/services: None    Care Considerations: As documented above in [facility]    ED Course:  ED Course as of 06/23/25 2216   Sun Jun 22, 2025   0910 CBC and Auto Differential(!)  Leukopenia, similar to prior. No anemia  [AW]   0917 Comprehensive metabolic panel(!)  Baseline creatinine, otherwise no significant  electrolyte abnormalities [AW]   0917 LIPASE: 18 [AW]      ED Course User Index  [AW] Elda Liu DO         Diagnoses as of 06/23/25 2216   Diarrhea, unspecified type     Disposition   Patient was signed out to Dr. Liu at 0700 pending completion of their work-up.  Please see the next provider's transition of care note for the remainder of the patient's care.     Procedures   Procedures    Patient seen and discussed with ED attending physician.    Shante Solorzano DO  Emergency Medicine     Shante Solorzano DO  Resident  06/23/25 7305

## 2025-06-23 ENCOUNTER — PATIENT OUTREACH (OUTPATIENT)
Dept: CARE COORDINATION | Facility: CLINIC | Age: 50
End: 2025-06-23
Payer: COMMERCIAL

## 2025-06-23 LAB
ATRIAL RATE: 89 BPM
P AXIS: 34 DEGREES
P OFFSET: 207 MS
P ONSET: 147 MS
PR INTERVAL: 152 MS
Q ONSET: 223 MS
QRS COUNT: 14 BEATS
QRS DURATION: 90 MS
QT INTERVAL: 384 MS
QTC CALCULATION(BAZETT): 467 MS
QTC FREDERICIA: 437 MS
R AXIS: 64 DEGREES
T AXIS: -12 DEGREES
T OFFSET: 415 MS
VENTRICULAR RATE: 89 BPM

## 2025-06-23 NOTE — PROGRESS NOTES
Outreach call to patient to support a smooth transition of care from recent ED admission. Unable to reach patient or lvm. Will continue to follow for CM.    Anastasia Hurd RN, [facility] [place]  Phone [phone]

## 2025-06-25 ENCOUNTER — PHARMACY VISIT (OUTPATIENT)
Dept: PHARMACY | Facility: CLINIC | Age: 50
End: 2025-06-25
Payer: COMMERCIAL

## 2025-06-25 ENCOUNTER — APPOINTMENT (OUTPATIENT)
Dept: PHARMACY | Facility: HOSPITAL | Age: 50
End: 2025-06-25
Payer: COMMERCIAL

## 2025-06-25 DIAGNOSIS — E66.813 CLASS 3 SEVERE OBESITY DUE TO EXCESS CALORIES WITHOUT SERIOUS COMORBIDITY WITH BODY MASS INDEX (BMI) OF 60.0 TO 69.9 IN ADULT: Chronic | ICD-10-CM

## 2025-06-25 DIAGNOSIS — I42.6 DILATED CARDIOMYOPATHY SECONDARY TO ALCOHOL (MULTI): ICD-10-CM

## 2025-06-25 DIAGNOSIS — G47.33 OBSTRUCTIVE SLEEP APNEA: Chronic | ICD-10-CM

## 2025-06-25 DIAGNOSIS — I50.43 ACUTE ON CHRONIC COMBINED SYSTOLIC AND DIASTOLIC CONGESTIVE HEART FAILURE: ICD-10-CM

## 2025-06-25 DIAGNOSIS — I50.22 HEART FAILURE WITH MID-RANGE EJECTION FRACTION (HFMEF): ICD-10-CM

## 2025-06-25 PROCEDURE — RXMED WILLOW AMBULATORY MEDICATION CHARGE

## 2025-06-27 ENCOUNTER — PATIENT OUTREACH (OUTPATIENT)
Dept: CARE COORDINATION | Facility: CLINIC | Age: 50
End: 2025-06-27
Payer: COMMERCIAL

## 2025-06-27 NOTE — PROGRESS NOTES
"Outreach call to patient for CM follow up. Patient identified by name and . Mr. Arenas informed me that \"he continues to do well, he continues to follow his care plan, his LOV with Dr. Chacon was on 25, he also follows his care plan with pharmacy, he has no changes in his weight, no edema, difficulty breathing, no complaints of any symptoms, he is feeling well, has no questions or concerns at this time and confirms that he has my contact information, he will continue to complete his scheduled follow up visits and schedule his visit with his pcp to complete his care gaps.    Thanked Mr. Arenas for his time to speak w/ me today and informed him that, I am happy to assist if he has any questions or concerns regarding his healthcare needs. Will continue to follow.    Anastasia Hurd RN, [facility] [place]  Phone [phone]          "

## 2025-06-30 DIAGNOSIS — I50.43 ACUTE ON CHRONIC COMBINED SYSTOLIC AND DIASTOLIC CONGESTIVE HEART FAILURE: ICD-10-CM

## 2025-06-30 DIAGNOSIS — I42.6 DILATED CARDIOMYOPATHY SECONDARY TO ALCOHOL (MULTI): ICD-10-CM

## 2025-06-30 DIAGNOSIS — I50.22 HEART FAILURE WITH MID-RANGE EJECTION FRACTION (HFMEF): ICD-10-CM

## 2025-06-30 RX ORDER — METOPROLOL SUCCINATE 25 MG/1
25 TABLET, EXTENDED RELEASE ORAL DAILY
Qty: 30 TABLET | Refills: 3 | OUTPATIENT
Start: 2025-06-30

## 2025-07-01 DIAGNOSIS — I42.6 DILATED CARDIOMYOPATHY SECONDARY TO ALCOHOL (MULTI): ICD-10-CM

## 2025-07-01 DIAGNOSIS — I50.22 HEART FAILURE WITH MID-RANGE EJECTION FRACTION (HFMEF): ICD-10-CM

## 2025-07-01 DIAGNOSIS — I50.43 ACUTE ON CHRONIC COMBINED SYSTOLIC AND DIASTOLIC CONGESTIVE HEART FAILURE: ICD-10-CM

## 2025-07-01 RX ORDER — METOPROLOL SUCCINATE 25 MG/1
25 TABLET, EXTENDED RELEASE ORAL DAILY
Qty: 90 TABLET | Refills: 3 | Status: SHIPPED | OUTPATIENT
Start: 2025-07-01 | End: 2026-07-01

## 2025-07-05 ENCOUNTER — CLINICAL SUPPORT (OUTPATIENT)
Dept: EMERGENCY MEDICINE | Facility: HOSPITAL | Age: 50
End: 2025-07-05
Payer: COMMERCIAL

## 2025-07-05 ENCOUNTER — HOSPITAL ENCOUNTER (EMERGENCY)
Facility: HOSPITAL | Age: 50
Discharge: HOME | End: 2025-07-05
Attending: STUDENT IN AN ORGANIZED HEALTH CARE EDUCATION/TRAINING PROGRAM
Payer: COMMERCIAL

## 2025-07-05 ENCOUNTER — APPOINTMENT (OUTPATIENT)
Dept: RADIOLOGY | Facility: HOSPITAL | Age: 50
End: 2025-07-05
Payer: COMMERCIAL

## 2025-07-05 VITALS
HEIGHT: 68 IN | TEMPERATURE: 97.6 F | OXYGEN SATURATION: 97 % | DIASTOLIC BLOOD PRESSURE: 82 MMHG | WEIGHT: 315 LBS | HEART RATE: 110 BPM | SYSTOLIC BLOOD PRESSURE: 124 MMHG | RESPIRATION RATE: 18 BRPM | BODY MASS INDEX: 47.74 KG/M2

## 2025-07-05 DIAGNOSIS — R07.89 OTHER CHEST PAIN: ICD-10-CM

## 2025-07-05 DIAGNOSIS — R10.31 RIGHT LOWER QUADRANT ABDOMINAL PAIN: Primary | ICD-10-CM

## 2025-07-05 DIAGNOSIS — T88.7XXA MEDICATION SIDE EFFECT: ICD-10-CM

## 2025-07-05 DIAGNOSIS — K21.9 GASTROESOPHAGEAL REFLUX DISEASE WITHOUT ESOPHAGITIS: ICD-10-CM

## 2025-07-05 DIAGNOSIS — R19.7 DIARRHEA, UNSPECIFIED TYPE: ICD-10-CM

## 2025-07-05 LAB
ALBUMIN SERPL BCP-MCNC: 4.3 G/DL (ref 3.4–5)
ALP SERPL-CCNC: 135 U/L (ref 33–120)
ALT SERPL W P-5'-P-CCNC: 6 U/L (ref 10–52)
ANION GAP SERPL CALC-SCNC: 15 MMOL/L (ref 10–20)
AST SERPL W P-5'-P-CCNC: 20 U/L (ref 9–39)
BASOPHILS # BLD AUTO: 0.01 X10*3/UL (ref 0–0.1)
BASOPHILS NFR BLD AUTO: 0.2 %
BILIRUB SERPL-MCNC: 0.7 MG/DL (ref 0–1.2)
BNP SERPL-MCNC: 2 PG/ML (ref 0–99)
BUN SERPL-MCNC: 26 MG/DL (ref 6–23)
CALCIUM SERPL-MCNC: 9.6 MG/DL (ref 8.6–10.6)
CARDIAC TROPONIN I PNL SERPL HS: 6 NG/L (ref 0–53)
CHLORIDE SERPL-SCNC: 92 MMOL/L (ref 98–107)
CO2 SERPL-SCNC: 25 MMOL/L (ref 21–32)
CREAT SERPL-MCNC: 1.9 MG/DL (ref 0.5–1.3)
EGFRCR SERPLBLD CKD-EPI 2021: 42 ML/MIN/1.73M*2
EOSINOPHIL # BLD AUTO: 0.49 X10*3/UL (ref 0–0.7)
EOSINOPHIL NFR BLD AUTO: 9.1 %
ERYTHROCYTE [DISTWIDTH] IN BLOOD BY AUTOMATED COUNT: 13.3 % (ref 11.5–14.5)
GLUCOSE SERPL-MCNC: 96 MG/DL (ref 74–99)
HCT VFR BLD AUTO: 48.2 % (ref 41–52)
HGB BLD-MCNC: 16.5 G/DL (ref 13.5–17.5)
IMM GRANULOCYTES # BLD AUTO: 0.07 X10*3/UL (ref 0–0.7)
IMM GRANULOCYTES NFR BLD AUTO: 1.3 % (ref 0–0.9)
LIPASE SERPL-CCNC: 15 U/L (ref 9–82)
LYMPHOCYTES # BLD AUTO: 1.55 X10*3/UL (ref 1.2–4.8)
LYMPHOCYTES NFR BLD AUTO: 28.8 %
MCH RBC QN AUTO: 32.2 PG (ref 26–34)
MCHC RBC AUTO-ENTMCNC: 34.2 G/DL (ref 32–36)
MCV RBC AUTO: 94 FL (ref 80–100)
MONOCYTES # BLD AUTO: 0.43 X10*3/UL (ref 0.1–1)
MONOCYTES NFR BLD AUTO: 8 %
NEUTROPHILS # BLD AUTO: 2.84 X10*3/UL (ref 1.2–7.7)
NEUTROPHILS NFR BLD AUTO: 52.6 %
NRBC BLD-RTO: 0 /100 WBCS (ref 0–0)
PLATELET # BLD AUTO: 247 X10*3/UL (ref 150–450)
POTASSIUM SERPL-SCNC: 4.3 MMOL/L (ref 3.5–5.3)
PROT SERPL-MCNC: 8.6 G/DL (ref 6.4–8.2)
RBC # BLD AUTO: 5.12 X10*6/UL (ref 4.5–5.9)
SODIUM SERPL-SCNC: 128 MMOL/L (ref 136–145)
WBC # BLD AUTO: 5.4 X10*3/UL (ref 4.4–11.3)

## 2025-07-05 PROCEDURE — 74177 CT ABD & PELVIS W/CONTRAST: CPT

## 2025-07-05 PROCEDURE — 71046 X-RAY EXAM CHEST 2 VIEWS: CPT

## 2025-07-05 PROCEDURE — 84484 ASSAY OF TROPONIN QUANT: CPT

## 2025-07-05 PROCEDURE — 83690 ASSAY OF LIPASE: CPT

## 2025-07-05 PROCEDURE — 71046 X-RAY EXAM CHEST 2 VIEWS: CPT | Mod: FOREIGN READ | Performed by: RADIOLOGY

## 2025-07-05 PROCEDURE — 96375 TX/PRO/DX INJ NEW DRUG ADDON: CPT

## 2025-07-05 PROCEDURE — 2500000001 HC RX 250 WO HCPCS SELF ADMINISTERED DRUGS (ALT 637 FOR MEDICARE OP): Performed by: STUDENT IN AN ORGANIZED HEALTH CARE EDUCATION/TRAINING PROGRAM

## 2025-07-05 PROCEDURE — 99285 EMERGENCY DEPT VISIT HI MDM: CPT | Mod: 25 | Performed by: STUDENT IN AN ORGANIZED HEALTH CARE EDUCATION/TRAINING PROGRAM

## 2025-07-05 PROCEDURE — 2550000001 HC RX 255 CONTRASTS: Performed by: STUDENT IN AN ORGANIZED HEALTH CARE EDUCATION/TRAINING PROGRAM

## 2025-07-05 PROCEDURE — 74177 CT ABD & PELVIS W/CONTRAST: CPT | Mod: FOREIGN READ | Performed by: RADIOLOGY

## 2025-07-05 PROCEDURE — 2500000004 HC RX 250 GENERAL PHARMACY W/ HCPCS (ALT 636 FOR OP/ED)

## 2025-07-05 PROCEDURE — 83880 ASSAY OF NATRIURETIC PEPTIDE: CPT

## 2025-07-05 PROCEDURE — 80053 COMPREHEN METABOLIC PANEL: CPT

## 2025-07-05 PROCEDURE — 85025 COMPLETE CBC W/AUTO DIFF WBC: CPT

## 2025-07-05 PROCEDURE — 96361 HYDRATE IV INFUSION ADD-ON: CPT

## 2025-07-05 PROCEDURE — 93005 ELECTROCARDIOGRAM TRACING: CPT

## 2025-07-05 PROCEDURE — 96374 THER/PROPH/DIAG INJ IV PUSH: CPT | Mod: 59

## 2025-07-05 PROCEDURE — 2500000004 HC RX 250 GENERAL PHARMACY W/ HCPCS (ALT 636 FOR OP/ED): Performed by: STUDENT IN AN ORGANIZED HEALTH CARE EDUCATION/TRAINING PROGRAM

## 2025-07-05 PROCEDURE — 36415 COLL VENOUS BLD VENIPUNCTURE: CPT

## 2025-07-05 RX ORDER — DICYCLOMINE HYDROCHLORIDE 20 MG/1
20 TABLET ORAL 4 TIMES DAILY PRN
Qty: 10 TABLET | Refills: 0 | Status: SHIPPED | OUTPATIENT
Start: 2025-07-05

## 2025-07-05 RX ORDER — FAMOTIDINE 10 MG/ML
20 INJECTION, SOLUTION INTRAVENOUS ONCE
Status: COMPLETED | OUTPATIENT
Start: 2025-07-05 | End: 2025-07-05

## 2025-07-05 RX ORDER — DICYCLOMINE HYDROCHLORIDE 10 MG/1
10 CAPSULE ORAL ONCE
Status: COMPLETED | OUTPATIENT
Start: 2025-07-05 | End: 2025-07-05

## 2025-07-05 RX ORDER — METOCLOPRAMIDE HYDROCHLORIDE 5 MG/ML
10 INJECTION INTRAMUSCULAR; INTRAVENOUS ONCE
Status: COMPLETED | OUTPATIENT
Start: 2025-07-05 | End: 2025-07-05

## 2025-07-05 RX ADMIN — FAMOTIDINE 20 MG: 10 INJECTION INTRAVENOUS at 09:47

## 2025-07-05 RX ADMIN — SODIUM CHLORIDE, SODIUM LACTATE, POTASSIUM CHLORIDE, AND CALCIUM CHLORIDE 250 ML: 600; 310; 30; 20 INJECTION, SOLUTION INTRAVENOUS at 12:01

## 2025-07-05 RX ADMIN — DICYCLOMINE HYDROCHLORIDE 10 MG: 10 CAPSULE ORAL at 10:57

## 2025-07-05 RX ADMIN — IOHEXOL 100 ML: 350 INJECTION, SOLUTION INTRAVENOUS at 14:43

## 2025-07-05 RX ADMIN — METOCLOPRAMIDE 10 MG: 5 INJECTION, SOLUTION INTRAMUSCULAR; INTRAVENOUS at 09:47

## 2025-07-05 ASSESSMENT — LIFESTYLE VARIABLES
TOTAL SCORE: 0
HAVE YOU EVER FELT YOU SHOULD CUT DOWN ON YOUR DRINKING: NO
EVER HAD A DRINK FIRST THING IN THE MORNING TO STEADY YOUR NERVES TO GET RID OF A HANGOVER: NO
HAVE PEOPLE ANNOYED YOU BY CRITICIZING YOUR DRINKING: NO
EVER FELT BAD OR GUILTY ABOUT YOUR DRINKING: NO

## 2025-07-05 ASSESSMENT — HEART SCORE
AGE: 45-64
HISTORY: SLIGHTLY SUSPICIOUS
RISK FACTORS: >2 RISK FACTORS OR HX OF ATHEROSCLEROTIC DISEASE
ECG: NORMAL
HEART SCORE: 3
TROPONIN: LESS THAN OR EQUAL TO NORMAL LIMIT

## 2025-07-05 ASSESSMENT — PAIN DESCRIPTION - DESCRIPTORS: DESCRIPTORS: ACHING

## 2025-07-05 ASSESSMENT — PAIN DESCRIPTION - LOCATION: LOCATION: CHEST

## 2025-07-05 ASSESSMENT — PAIN DESCRIPTION - ORIENTATION: ORIENTATION: ANTERIOR

## 2025-07-05 ASSESSMENT — PAIN SCALES - GENERAL: PAINLEVEL_OUTOF10: 7

## 2025-07-05 ASSESSMENT — PAIN - FUNCTIONAL ASSESSMENT: PAIN_FUNCTIONAL_ASSESSMENT: 0-10

## 2025-07-05 ASSESSMENT — PAIN DESCRIPTION - PAIN TYPE: TYPE: ACUTE PAIN

## 2025-07-05 NOTE — DISCHARGE INSTRUCTIONS
Return to the ED for returning chest pain, increased shortness of breath, abnormal persistent abdominal pains, worsened diarrhea, or any other acute concerns. Follow-up with primary care regarding GI concerns after increased Zepbound dosing.

## 2025-07-05 NOTE — ED PROVIDER NOTES
Emergency Department Provider Note       History of Present Illness     History provided by: Patient  Limitations to History: None  External Records Reviewed with Brief Summary: Prior ED report     HPI:  Patric Arenas is a 50 y.o. male with hx of CHF, s/p ICD, LHC, MJ on CPAP, HTN, HLD, asthma who presents to the ED for evaluation of acute onset burning CP and 5 days of diarrhea. Pt states that roughly 2 weeks ago his Zepbound dosing was increased. He was initially experiencing no issues with the medication, however, 5 days ago the pt began experiencing persistent diarrhea. He denies any hematochezia. Pt has a decreased appetite but notes continued appropriate hydration. He experiences intermittent nausea but denies any vomiting. Pt states last night he was awoken from his sleep with left sided CP. The CP was not associated with radiation into the arm, neck, back, or abdomen. Pt denies any diaphoresis with the event. Pt has not taken any additional medications at this time. Pt denies any abdominal pain, increased pedal edema, HA, dizziness.     Physical Exam   Triage vitals:  T 36.4 °C (97.6 °F)  HR 82  /72  RR 16  O2 (!) 91 % None (Room air)    General: no acute distress, pt appears uncomfortable when adjusting in bed, obese  Skin: warm, dry, intact, chronic skin changes to bilateral lower leg to mid-shin  Cardiac: RRR, no murmur appreciated, no pitting edema, palpable DP pulses bilaterally  Pulm: no tachypnea, no respiratory distress, no rhonchi, no rales, clear to auscultation bilaterally  Abd: non tender, obese, soft  MSK: non tender chest wall  Psych: appropriate mood and affect       Medical Decision Making & ED Course   Medical Decision Makin y.o. male with hx of COPD, CHF, CAD, s/p ICD, JM, HTN who presents with 5 days of diarrhea and acute onset of CP that awoke him from his sleep last night. Pt states that he was recently increased on his Zepbound dose. Initially had no issues, however,  5 days ago began having persistent diarrhea followed by decreased appetite. Pt has been hydrating well. CP started acutely while asleep yesterday but did not radiate and was not associated with diaphoresis or vomiting.     CBC, CMP, BNP, Troponin, Lipase order during initial workup  CXR and EKG    CMP demonstrates mild SUZANNE and hyponatremia. 250mL NS bolus administered. Will continue to monitor.   ----    Differential diagnoses considered include but are not limited to: ACS, chest strain, GERD, gastroenteritis, pancreatitis, medication side effect, electrolyte abnormalities, cholelithiasis      Social Determinants of Health which Significantly Impact Care: Social Determinants of Health which Significantly Impact Care: None identified     EKG Independent Interpretation: The EKG obtained at 0850 was independently interpreted by myself. It demonstrates normal sinus rhythm with a ventricular rate of 92. Normal axis. Prolonged QT. No ST segment elevation.     Independent Result Review and Interpretation: Relevant laboratory and radiographic results were reviewed and independently interpreted by myself.  As necessary, they are commented on in the ED Course.  CXR: no focal consolidation    Chronic conditions affecting the patient's care: As documented above in MDM    Care Considerations: None    ED Course:  ED Course as of 07/05/25 1524   Sat Jul 05, 2025   0940 EKG at 0850: NST, incomplete RBBB, prolonged QT, no STEMI [NP]   0947 CBC and Auto Differential(!)  CBC is unremarkable. No leukocytosis noted.  [NP]   0948 XR chest 2 views  ICD in place, no focal consolidation [NP]   1002 Troponin I, High Sensitivity  Negative initial troponin, no ST elevation noted on EKG, less likely ACS [NP]   1005 Pt reports that his CP has improved. He is laying in bed but notes mild non-reproducible pain to the RLQ that began shortly after a bowel movement here in the ED.  [NP]   1058 Pt continues to report RLQ pain, will order CT [NP]    1101 B-Type Natriuretic Peptide  Unremarkable, however, pt is on Entresto therefore likely inaccurate test result   [NP]   1111 Lipase  Unremarkable [NP]   1200 Comprehensive metabolic panel(!)  Mild SUZANNE, will treat with 250mL bolus NS  Hyponatremia, pt in setting of multiple days of diarrhea. Given NS. Monitor [NP]   1337 Pt resting comfortably. Has eaten part of a sandwich and a pack of saltine crackers without difficulty. Will continue to monitor.  [NP]   1514 CT abdomen pelvis w IV contrast  No obstruction, no acute processes.  [NP]      ED Course User Index  [NP] Subha Whitehead DO         Diagnoses as of 07/05/25 1524   Right lower quadrant abdominal pain   Gastroesophageal reflux disease without esophagitis   Other chest pain - not cardiac in nature   Medication side effect   Diarrhea, unspecified type       Disposition   Pt discharged home. Informed regarding all negative findings today here in the ED. Recommend follow-up with PCP for GI concerns after increased Zepbound dosing. ED return precautions provided.       Patient seen and discussed with ED attending physician.    Subha Whitehead DO  Emergency Medicine      Emergency Medicine Attending Attestation:     The patient was seen by the resident/fellow.  I have personally performed a substantive portion of the encounter.  I have seen and examined the patient; agree with the workup, evaluation, MDM, management and diagnosis.  The care plan has been discussed with the resident; I have reviewed the resident’s note and agree with the documented findings.      I independently interpreted patient's EKG and agree with the above mentioned interpretation.   QT prolonged on initial EKG.     On repeat, which I have personally reviewed and interpreted this EKG.  Normal sinus rhythm, rate 99 BPM  Normal axis  TN interval and QRS duration within normal limits.  QTc within normal limits.  No signs of acute ischemia or infarction    Patient presents with episode of  burning chest pain occurred overnight. Not exertional or pleuritic. No radiation of pain. Denies fevers, chills, cough, shortness of breath. Also reports aching and cramping RLQ abd pain and watery, nonbloody diarrhea. No recent abx, travel, hospitalizations. Notes he recently increased his Zepbound dose. EKG is nonischemic, CXR no PTX PNA Pulm edema pleural effusion widened mediastinum. Labs reassuring, no anemia, signs of acute liver injury, obstructive biliary pathology or acute pancreatitis. HS trop not elevated doubt DEBORAH. SUZANNE on CKD noted he was given a 250 ml bolus of IV fluids given hx CHF. CT A/P obtained and shows no acute process. Symptoms tx with pepcid, reglan, bentyl. On re eval he endorses significant symptomatic improvement and is tolerating PO. Suspect viral process vs side effect of Zepbound. He is appropriate for DC and PCP follow up.               Steffen Simerlink, MD Nirali D Patel, DO  Resident  07/05/25 1520       Steffen Simerlink, MD  07/05/25 2020

## 2025-07-05 NOTE — ED TRIAGE NOTES
Pt present sot the ED for complaints of chest pain that started last night. Pt states that he is taking a medication to help with weight loss and states they just upped his dose and that he has been having problems with it ever since. Pt rates his chest pain at a 7/10 and states it is in the middle of his chest and is non radiating in nature. Pt has a PMHX of COPD and CHF. Pt also states that he has been having diarrhea for the last 5 days that has not improved.

## 2025-07-06 LAB
ATRIAL RATE: 99 BPM
P AXIS: 60 DEGREES
P OFFSET: 197 MS
P ONSET: 148 MS
PR INTERVAL: 148 MS
Q ONSET: 222 MS
QRS COUNT: 16 BEATS
QRS DURATION: 92 MS
QT INTERVAL: 362 MS
QTC CALCULATION(BAZETT): 464 MS
QTC FREDERICIA: 427 MS
R AXIS: 41 DEGREES
T AXIS: 70 DEGREES
T OFFSET: 403 MS
VENTRICULAR RATE: 99 BPM

## 2025-07-07 ENCOUNTER — APPOINTMENT (OUTPATIENT)
Dept: PRIMARY CARE | Facility: CLINIC | Age: 50
End: 2025-07-07
Payer: COMMERCIAL

## 2025-07-07 ENCOUNTER — PATIENT OUTREACH (OUTPATIENT)
Dept: CARE COORDINATION | Facility: CLINIC | Age: 50
End: 2025-07-07
Payer: COMMERCIAL

## 2025-07-07 NOTE — PROGRESS NOTES
Outreach call to patient to support a smooth transition of care from recent ED admission. Unable to reach patient. Will continue to follow for CM.    Anastasia Hurd RN, [facility] [place]  Phone [phone]

## 2025-07-23 ENCOUNTER — APPOINTMENT (OUTPATIENT)
Dept: PHARMACY | Facility: HOSPITAL | Age: 50
End: 2025-07-23
Payer: COMMERCIAL

## 2025-07-23 DIAGNOSIS — I42.6 DILATED CARDIOMYOPATHY SECONDARY TO ALCOHOL (MULTI): ICD-10-CM

## 2025-07-23 DIAGNOSIS — I50.32 HEART FAILURE WITH RECOVERED EJECTION FRACTION (HFRECEF): ICD-10-CM

## 2025-07-23 DIAGNOSIS — I50.43 ACUTE ON CHRONIC COMBINED SYSTOLIC AND DIASTOLIC CONGESTIVE HEART FAILURE: Primary | ICD-10-CM

## 2025-07-23 DIAGNOSIS — G47.33 OBSTRUCTIVE SLEEP APNEA: ICD-10-CM

## 2025-07-23 DIAGNOSIS — E66.813 CLASS 3 SEVERE OBESITY DUE TO EXCESS CALORIES WITHOUT SERIOUS COMORBIDITY WITH BODY MASS INDEX (BMI) OF 60.0 TO 69.9 IN ADULT: ICD-10-CM

## 2025-07-23 DIAGNOSIS — I50.22 HEART FAILURE WITH MID-RANGE EJECTION FRACTION (HFMEF): ICD-10-CM

## 2025-07-23 PROCEDURE — RXMED WILLOW AMBULATORY MEDICATION CHARGE

## 2025-07-23 NOTE — PROGRESS NOTES
Pharmacist Clinic: Cardiology Management    David Arenas is a 50 y.o. male was referred to Clinical Pharmacy Team for weight loss management.     Referring Provider: Suyapa Chacon MD*    THIS IS A FOLLOW UP PATIENT APPOINTMENT. AT LAST VISIT ON 5/6 WITH PHARMACIST (christel).    Appointment was completed by david who was reached at .    REVIEW OF PAST APPNT (IF APPLICABLE):   Increased zepbound to 5mg  No noted issues.    Allergies Reviewed? Yes    No Known Allergies    Past Medical History:   Diagnosis Date    CHF (congestive heart failure)     COPD (chronic obstructive pulmonary disease) (Multi)     HTN (hypertension)     Laceration of liver 06/05/2006    Formatting of this note might be different from the original. Liver injury without mention of open wound into cavity, unspecified laceration [id number]    MJ on CPAP        Current Outpatient Medications on File Prior to Visit   Medication Sig Dispense Refill    albuterol (Ventolin HFA) 90 mcg/actuation inhaler Inhale 2 puffs every 4 hours if needed for wheezing or shortness of breath. If you are using your rescue inhaler frequently, seek medical evaluation 18 g 3    aspirin 81 mg chewable tablet Chew 1 tablet (81 mg) once daily. 90 tablet 3    atorvastatin (Lipitor) 40 mg tablet Take 1 tablet (40 mg) by mouth once daily. (Patient not taking: Reported on 5/30/2025) 30 tablet 3    dicyclomine (Bentyl) 20 mg tablet Take 1 tablet (20 mg) by mouth 4 times a day as needed (adominal pain). 10 tablet 0    empagliflozin (Jardiance) 10 mg tablet Take 1 tablet (10 mg) by mouth once daily. 90 tablet 3    Eucerin cream Apply 1 Application topically as needed for dry skin. 454 g 0    metoprolol succinate XL (Toprol-XL) 25 mg 24 hr tablet Take 1 tablet (25 mg) by mouth once daily. 90 tablet 3    nitroglycerin (Nitrostat) 0.4 mg SL tablet Place 1 tablet (0.4 mg) under the tongue every 5 minutes if needed for chest pain. May repeat every 5 minutes for up to  "3 doses. 25 tablet 11    oxygen (O2) gas therapy Inhale.      sacubitriL-valsartan (Entresto) 24-26 mg tablet Take 1 tablet by mouth 2 times a day. 180 tablet 3    spironolactone (Aldactone) 25 mg tablet Take 2 tablets (50 mg) by mouth once daily. 180 tablet 3    tirzepatide, weight loss, (Zepbound) 10 mg/0.5 mL injection Inject 10 mg under the skin every 7 days. 4 each 0    torsemide (Demadex) 20 mg tablet Take 3 tablets (60 mg) by mouth 2 times a day. 540 tablet 3     No current facility-administered medications on file prior to visit.         RELEVANT LAB RESULTS:  Lab Results   Component Value Date    BILITOT 0.7 07/05/2025    CALCIUM 9.6 07/05/2025    CO2 25 07/05/2025    CL 92 (L) 07/05/2025    CREATININE 1.90 (H) 07/05/2025    GLUCOSE 96 07/05/2025    ALKPHOS 135 (H) 07/05/2025    K 4.3 07/05/2025    PROT 8.6 (H) 07/05/2025     (L) 07/05/2025    AST 20 07/05/2025    ALT 6 (L) 07/05/2025    BUN 26 (H) 07/05/2025    ANIONGAP 15 07/05/2025    MG 2.47 (H) 06/22/2025    PHOS 3.8 02/06/2025    ALBUMIN 4.3 07/05/2025    LIPASE 15 07/05/2025    GFRF CANCELED 05/03/2023    GFRF CANCELED 05/03/2023    GFRMALE 71 09/19/2023     Lab Results   Component Value Date    TRIG 119 02/04/2025    CHOL 149 02/04/2025    LDLCALC 89 02/04/2025    HDL 36.3 02/04/2025     No results found for: \"BMCBC\", \"CBCDIF\"     PHARMACEUTICAL ASSESSMENT:    MEDICATION RECONCILIATION    Was a medication reconciliation completed at this visit? Yes  Home Pharmacy Reviewed? Yes, describe: [facility]    Drug Interactions? No    Medication Documentation Review Audit       Reviewed by Subha Whitehead DO (Resident) on 07/05/25 at 1526      Medication Order Taking? Sig Documenting Provider Last Dose Status   albuterol (Ventolin HFA) 90 mcg/actuation inhaler [id number]  Inhale 2 puffs every 4 hours if needed for wheezing or shortness of breath. If you are using your rescue inhaler frequently, seek medical evaluation ANANT Zavala-CNP  Active   aspirin " 81 mg chewable tablet [id number]  Chew 1 tablet (81 mg) once daily. Suyapa Chacon MD PhD  Active   atorvastatin (Lipitor) 40 mg tablet [id number]  Take 1 tablet (40 mg) by mouth once daily.   Patient not taking: Reported on 5/30/2025    ANANT Zavala-CNP  Active   empagliflozin (Jardiance) 10 mg tablet [id number]  Take 1 tablet (10 mg) by mouth once daily. Suyapa Chacon MD PhD  Active   Eucerin cream [id number]  Apply 1 Application topically as needed for dry skin. ANANT Zavala-CNP  Active   metoprolol succinate XL (Toprol-XL) 25 mg 24 hr tablet [id number]  Take 1 tablet (25 mg) by mouth once daily. Suyapa Chacon MD PhD  Active   nitroglycerin (Nitrostat) 0.4 mg SL tablet [id number]  Place 1 tablet (0.4 mg) under the tongue every 5 minutes if needed for chest pain. May repeat every 5 minutes for up to 3 doses. ANANT Ramirez-CNP  Active   oxygen (O2) gas therapy [id number]  Inhale. Nathan Márquez MD  Active   sacubitriL-valsartan (Entresto) 24-26 mg tablet [id number]  Take 1 tablet by mouth 2 times a day. Suyapa Chacon MD PhD  Active   spironolactone (Aldactone) 25 mg tablet [id number]  Take 2 tablets (50 mg) by mouth once daily. Suyapa Chacon MD PhD  Active   tirzepatide, weight loss, (Zepbound) 10 mg/0.5 mL injection [id number]  Inject 10 mg under the skin every 7 days. Suyapa Chacon MD PhD  Active   torsemide (Demadex) 20 mg tablet [id number]  Take 3 tablets (60 mg) by mouth 2 times a day. Suyapa Chacon MD PhD  Active                    DISEASE MANAGEMENT ASSESSMENT:     WEIGHT LOSS ASSESSMENT     Wt Readings from Last 3 Encounters:   07/05/25 (!) 172 kg (380 lb)   06/22/25 (!) 172 kg (380 lb)   05/30/25 (!) 179 kg (394 lb 3.2 oz)     BMI Readings from Last 3 Encounters:   07/05/25 57.78 kg/m²   06/22/25 57.78 kg/m²   05/30/25 59.94 kg/m²       Recorded Home Weight(s): 380  Diet:   Breakfast: cereal/oatmeal  Lunch: sandwich   Dinner:  chicken      Affordability/Accessibility: zepbound pa approved $4.80/month  Adherence/Organization: no issues self reported. Fill history denotes non adherence  Adverse Reactions: has been off of zepbound 10mg for 2 weeks now 2/2 diarrhea. ED note suspected viral proess rather than ae. Out of caution after two missed weeks will retitrate from 2.5mg and maxing at 7.5    Relevant PMH:  PMH of Pancreatitis? no  PMH of Retinopathy? no  PMH of MTC? no      COUNSELING:   - Counseled patient on MOA, expectations, side effects, duration of therapy, contraindications, administration, and monitoring parameters  - Answered all patient questions and concerns; provided [facility] phone number if issues/questions arise    Zepbound Education:     Counseled patient on Zepbound MOA, expectations, side effects, duration of therapy, administration, and monitoring parameters.  Provided detailed dosing and administration counseling to ensure proper technique.   Reviewed Zepbound titration schedule, starting with 2.5 mg once weekly to a goal of 15 mg once weekly if tolerated  Counseled patient on the benefits of GLP-1ra glycemic control and weight loss  Reviewed storage requirements of Zepbound when not in use, and when to administer the medication if a dose is missed.  Advised patient that they may experience improved satiety after meals and portion sizes of meals may be reduced as doses of Zepound increase.      ASSESSMENT:    Assessment/Plan   Problem List Items Addressed This Visit    None        RECOMMENDATIONS/PLAN:    Restart zepbound 2.5mg q week    Last Appnt with Referring Provider: 2/28/25  Next Appnt with Referring Provider: 5/30/25  Clinical Pharmacist follow up: 8/20 1140  VAF/Application Expiration: No  Type of Encounter: Iker Coreas PharmD    Verbal consent to manage patient's drug therapy was obtained from the patient . They were informed they may decline to participate or withdraw from participation in pharmacy  services at any time.    Continue all meds under the continuation of care with the referring provider and clinical pharmacy team.

## 2025-07-26 ENCOUNTER — PHARMACY VISIT (OUTPATIENT)
Dept: PHARMACY | Facility: CLINIC | Age: 50
End: 2025-07-26
Payer: COMMERCIAL

## 2025-07-31 ENCOUNTER — PATIENT OUTREACH (OUTPATIENT)
Dept: CARE COORDINATION | Facility: CLINIC | Age: 50
End: 2025-07-31
Payer: COMMERCIAL

## 2025-07-31 NOTE — PROGRESS NOTES
"Outreach call to patient for CM follow up. Unable to reach patient or lvm, recording states \"vmb has not been set up yet.\" Will continue to follow for CM.    Anastasia Hurd RN, [facility] [place]  Phone [phone]    "

## 2025-08-11 ENCOUNTER — APPOINTMENT (OUTPATIENT)
Dept: RADIOLOGY | Facility: HOSPITAL | Age: 50
End: 2025-08-11
Payer: COMMERCIAL

## 2025-08-11 ENCOUNTER — CLINICAL SUPPORT (OUTPATIENT)
Dept: EMERGENCY MEDICINE | Facility: HOSPITAL | Age: 50
End: 2025-08-11
Payer: COMMERCIAL

## 2025-08-11 ENCOUNTER — HOSPITAL ENCOUNTER (EMERGENCY)
Facility: HOSPITAL | Age: 50
Discharge: HOME | End: 2025-08-11
Attending: EMERGENCY MEDICINE
Payer: COMMERCIAL

## 2025-08-11 ENCOUNTER — PATIENT OUTREACH (OUTPATIENT)
Dept: CARE COORDINATION | Facility: CLINIC | Age: 50
End: 2025-08-11
Payer: COMMERCIAL

## 2025-08-11 VITALS
HEART RATE: 75 BPM | HEIGHT: 71 IN | DIASTOLIC BLOOD PRESSURE: 88 MMHG | TEMPERATURE: 97.4 F | WEIGHT: 315 LBS | OXYGEN SATURATION: 93 % | RESPIRATION RATE: 16 BRPM | SYSTOLIC BLOOD PRESSURE: 135 MMHG | BODY MASS INDEX: 44.1 KG/M2

## 2025-08-11 DIAGNOSIS — R06.02 SHORTNESS OF BREATH: Primary | ICD-10-CM

## 2025-08-11 LAB
ALBUMIN SERPL BCP-MCNC: 4.1 G/DL (ref 3.4–5)
ALP SERPL-CCNC: 93 U/L (ref 33–120)
ALT SERPL W P-5'-P-CCNC: 12 U/L (ref 10–52)
ANION GAP BLDV CALCULATED.4IONS-SCNC: 3 MMOL/L (ref 10–25)
ANION GAP SERPL CALC-SCNC: 11 MMOL/L (ref 10–20)
APTT PPP: 29 SECONDS (ref 26–36)
AST SERPL W P-5'-P-CCNC: 13 U/L (ref 9–39)
ATRIAL RATE: 75 BPM
BASE EXCESS BLDV CALC-SCNC: 9.1 MMOL/L (ref -2–3)
BASOPHILS # BLD AUTO: 0.02 X10*3/UL (ref 0–0.1)
BASOPHILS NFR BLD AUTO: 0.5 %
BILIRUB SERPL-MCNC: 0.7 MG/DL (ref 0–1.2)
BNP SERPL-MCNC: 6 PG/ML (ref 0–99)
BODY TEMPERATURE: 37 DEGREES CELSIUS
BUN SERPL-MCNC: 22 MG/DL (ref 6–23)
CA-I BLDV-SCNC: 1.16 MMOL/L (ref 1.1–1.33)
CALCIUM SERPL-MCNC: 9.4 MG/DL (ref 8.6–10.6)
CARDIAC TROPONIN I PNL SERPL HS: 4 NG/L (ref 0–53)
CHLORIDE BLDV-SCNC: 99 MMOL/L (ref 98–107)
CHLORIDE SERPL-SCNC: 96 MMOL/L (ref 98–107)
CO2 SERPL-SCNC: 33 MMOL/L (ref 21–32)
CREAT SERPL-MCNC: 1.43 MG/DL (ref 0.5–1.3)
EGFRCR SERPLBLD CKD-EPI 2021: 59 ML/MIN/1.73M*2
EOSINOPHIL # BLD AUTO: 0.08 X10*3/UL (ref 0–0.7)
EOSINOPHIL NFR BLD AUTO: 2 %
ERYTHROCYTE [DISTWIDTH] IN BLOOD BY AUTOMATED COUNT: 13.5 % (ref 11.5–14.5)
GLUCOSE BLDV-MCNC: 135 MG/DL (ref 74–99)
GLUCOSE SERPL-MCNC: 123 MG/DL (ref 74–99)
HCO3 BLDV-SCNC: 36.9 MMOL/L (ref 22–26)
HCT VFR BLD AUTO: 49.5 % (ref 41–52)
HCT VFR BLD EST: 49 % (ref 41–52)
HGB BLD-MCNC: 15.7 G/DL (ref 13.5–17.5)
HGB BLDV-MCNC: 16.4 G/DL (ref 13.5–17.5)
IMM GRANULOCYTES # BLD AUTO: 0.02 X10*3/UL (ref 0–0.7)
IMM GRANULOCYTES NFR BLD AUTO: 0.5 % (ref 0–0.9)
INHALED O2 CONCENTRATION: 21 %
INR PPP: 1 (ref 0.9–1.1)
LACTATE BLDV-SCNC: 1.5 MMOL/L (ref 0.4–2)
LYMPHOCYTES # BLD AUTO: 0.62 X10*3/UL (ref 1.2–4.8)
LYMPHOCYTES NFR BLD AUTO: 15.2 %
MAGNESIUM SERPL-MCNC: 2.17 MG/DL (ref 1.6–2.4)
MCH RBC QN AUTO: 31.8 PG (ref 26–34)
MCHC RBC AUTO-ENTMCNC: 31.7 G/DL (ref 32–36)
MCV RBC AUTO: 100 FL (ref 80–100)
MONOCYTES # BLD AUTO: 0.18 X10*3/UL (ref 0.1–1)
MONOCYTES NFR BLD AUTO: 4.4 %
NEUTROPHILS # BLD AUTO: 3.15 X10*3/UL (ref 1.2–7.7)
NEUTROPHILS NFR BLD AUTO: 77.4 %
NRBC BLD-RTO: 0 /100 WBCS (ref 0–0)
OXYHGB MFR BLDV: 45.3 % (ref 45–75)
P AXIS: 54 DEGREES
P OFFSET: 203 MS
P ONSET: 147 MS
PCO2 BLDV: 61 MM HG (ref 41–51)
PH BLDV: 7.39 PH (ref 7.33–7.43)
PHOSPHATE SERPL-MCNC: 1.3 MG/DL (ref 2.5–4.9)
PLATELET # BLD AUTO: 228 X10*3/UL (ref 150–450)
PO2 BLDV: 28 MM HG (ref 35–45)
POTASSIUM BLDV-SCNC: 4 MMOL/L (ref 3.5–5.3)
POTASSIUM SERPL-SCNC: 3.7 MMOL/L (ref 3.5–5.3)
PR INTERVAL: 152 MS
PROT SERPL-MCNC: 8.4 G/DL (ref 6.4–8.2)
PROTHROMBIN TIME: 11.5 SECONDS (ref 9.8–12.4)
Q ONSET: 223 MS
QRS COUNT: 12 BEATS
QRS DURATION: 90 MS
QT INTERVAL: 420 MS
QTC CALCULATION(BAZETT): 469 MS
QTC FREDERICIA: 452 MS
R AXIS: 13 DEGREES
RBC # BLD AUTO: 4.93 X10*6/UL (ref 4.5–5.9)
SAO2 % BLDV: 46 % (ref 45–75)
SODIUM BLDV-SCNC: 135 MMOL/L (ref 136–145)
SODIUM SERPL-SCNC: 136 MMOL/L (ref 136–145)
T AXIS: -17 DEGREES
T OFFSET: 433 MS
VENTRICULAR RATE: 75 BPM
WBC # BLD AUTO: 4.1 X10*3/UL (ref 4.4–11.3)

## 2025-08-11 PROCEDURE — 85610 PROTHROMBIN TIME: CPT | Performed by: EMERGENCY MEDICINE

## 2025-08-11 PROCEDURE — 83735 ASSAY OF MAGNESIUM: CPT | Performed by: EMERGENCY MEDICINE

## 2025-08-11 PROCEDURE — 84100 ASSAY OF PHOSPHORUS: CPT | Performed by: EMERGENCY MEDICINE

## 2025-08-11 PROCEDURE — 85025 COMPLETE CBC W/AUTO DIFF WBC: CPT | Performed by: EMERGENCY MEDICINE

## 2025-08-11 PROCEDURE — 93005 ELECTROCARDIOGRAM TRACING: CPT

## 2025-08-11 PROCEDURE — 71046 X-RAY EXAM CHEST 2 VIEWS: CPT | Performed by: RADIOLOGY

## 2025-08-11 PROCEDURE — 85730 THROMBOPLASTIN TIME PARTIAL: CPT | Performed by: EMERGENCY MEDICINE

## 2025-08-11 PROCEDURE — 84075 ASSAY ALKALINE PHOSPHATASE: CPT | Performed by: EMERGENCY MEDICINE

## 2025-08-11 PROCEDURE — 36415 COLL VENOUS BLD VENIPUNCTURE: CPT | Performed by: EMERGENCY MEDICINE

## 2025-08-11 PROCEDURE — 71046 X-RAY EXAM CHEST 2 VIEWS: CPT

## 2025-08-11 PROCEDURE — 99285 EMERGENCY DEPT VISIT HI MDM: CPT | Mod: 25 | Performed by: EMERGENCY MEDICINE

## 2025-08-11 PROCEDURE — 83880 ASSAY OF NATRIURETIC PEPTIDE: CPT | Performed by: EMERGENCY MEDICINE

## 2025-08-11 PROCEDURE — 84484 ASSAY OF TROPONIN QUANT: CPT | Performed by: EMERGENCY MEDICINE

## 2025-08-11 PROCEDURE — 84132 ASSAY OF SERUM POTASSIUM: CPT | Performed by: EMERGENCY MEDICINE

## 2025-08-20 ENCOUNTER — APPOINTMENT (OUTPATIENT)
Dept: PHARMACY | Facility: HOSPITAL | Age: 50
End: 2025-08-20
Payer: COMMERCIAL

## 2025-08-20 PROCEDURE — RXMED WILLOW AMBULATORY MEDICATION CHARGE

## 2025-08-25 ENCOUNTER — PHARMACY VISIT (OUTPATIENT)
Dept: PHARMACY | Facility: CLINIC | Age: 50
End: 2025-08-25
Payer: COMMERCIAL

## 2025-08-25 ENCOUNTER — PATIENT OUTREACH (OUTPATIENT)
Dept: CARE COORDINATION | Facility: CLINIC | Age: 50
End: 2025-08-25
Payer: COMMERCIAL

## 2025-09-18 ENCOUNTER — APPOINTMENT (OUTPATIENT)
Dept: PHARMACY | Facility: HOSPITAL | Age: 50
End: 2025-09-18
Payer: COMMERCIAL

## 2025-10-01 ENCOUNTER — APPOINTMENT (OUTPATIENT)
Dept: CARDIOLOGY | Facility: HOSPITAL | Age: 50
End: 2025-10-01
Payer: COMMERCIAL

## (undated) DEVICE — TR BAND, RADIAL COMPRESSION, LONG, 29CM

## (undated) DEVICE — GUIDEWIRE, STRAIGHT, HI-TORQUE BALANCE MIDDLEWEIGHT, 0.014 IN X 190 CM, HYDROCOAT

## (undated) DEVICE — SHEATH, GLIDESHEATH, SLENDER, 6FR 10CM

## (undated) DEVICE — PAD, ELECTRODE DEFIB PADPRO ADULT STRL W/ADAPTER

## (undated) DEVICE — CATHETER, OPTITORQUE, 5FR X 110CM, TIG 4.5

## (undated) DEVICE — INTRODUCER, GLIDESHEATH SLENDER A-KIT, 5FR 10CM

## (undated) DEVICE — CATHETER, WEDGE PRESSURE, BALLOON, DOUBLE LUMEN, 5 FR, 110 CM

## (undated) DEVICE — GUIDEWIRE, INQWIRE, 3MM J, .035 X 210CM, FIXED